# Patient Record
Sex: MALE | Race: BLACK OR AFRICAN AMERICAN | NOT HISPANIC OR LATINO | Employment: OTHER | URBAN - METROPOLITAN AREA
[De-identification: names, ages, dates, MRNs, and addresses within clinical notes are randomized per-mention and may not be internally consistent; named-entity substitution may affect disease eponyms.]

---

## 2017-02-08 ENCOUNTER — OFFICE VISIT (OUTPATIENT)
Dept: FAMILY MEDICINE | Facility: HOSPITAL | Age: 67
End: 2017-02-08
Payer: MEDICARE

## 2017-02-08 VITALS — HEART RATE: 89 BPM | SYSTOLIC BLOOD PRESSURE: 156 MMHG | RESPIRATION RATE: 20 BRPM | DIASTOLIC BLOOD PRESSURE: 105 MMHG

## 2017-02-08 DIAGNOSIS — I10 ESSENTIAL HYPERTENSION: ICD-10-CM

## 2017-02-08 DIAGNOSIS — M10.9 GOUT, ARTHRITIS: ICD-10-CM

## 2017-02-08 DIAGNOSIS — E78.5 HYPERLIPIDEMIA, UNSPECIFIED HYPERLIPIDEMIA TYPE: ICD-10-CM

## 2017-02-08 DIAGNOSIS — R93.3 ABNORMAL COLONOSCOPY: Primary | ICD-10-CM

## 2017-02-08 DIAGNOSIS — E11.9 DIABETES MELLITUS TYPE 2 IN NONOBESE: ICD-10-CM

## 2017-02-08 PROCEDURE — 99214 OFFICE O/P EST MOD 30 MIN: CPT | Performed by: FAMILY MEDICINE

## 2017-02-08 RX ORDER — AMLODIPINE BESYLATE 10 MG/1
10 TABLET ORAL DAILY
Qty: 30 TABLET | Refills: 11 | Status: SHIPPED | OUTPATIENT
Start: 2017-02-08 | End: 2017-05-03 | Stop reason: SDUPTHER

## 2017-02-08 RX ORDER — ATORVASTATIN CALCIUM 40 MG/1
80 TABLET, FILM COATED ORAL DAILY
Qty: 90 TABLET | Refills: 1 | Status: SHIPPED | OUTPATIENT
Start: 2017-02-08 | End: 2017-05-03 | Stop reason: SDUPTHER

## 2017-02-08 RX ORDER — LISINOPRIL 40 MG/1
40 TABLET ORAL DAILY
Qty: 90 TABLET | Refills: 1 | Status: SHIPPED | OUTPATIENT
Start: 2017-02-08 | End: 2017-10-30 | Stop reason: SDUPTHER

## 2017-02-08 RX ORDER — METFORMIN HYDROCHLORIDE 1000 MG/1
1000 TABLET ORAL 2 TIMES DAILY WITH MEALS
Qty: 90 TABLET | Refills: 1 | Status: SHIPPED | OUTPATIENT
Start: 2017-02-08 | End: 2017-05-03

## 2017-02-08 RX ORDER — ALLOPURINOL 100 MG/1
100 TABLET ORAL DAILY
Qty: 90 TABLET | Refills: 1 | Status: SHIPPED | OUTPATIENT
Start: 2017-02-08 | End: 2017-05-03

## 2017-02-08 NOTE — PROGRESS NOTES
Subjective:       Patient ID: Ambrosio Montoya is a 66 y.o. male.    Chief Complaint: Hypertension    HPI Comments: Mr. Montoya is a 66 year old male with PMH CVA in 2013, MI in 2000, HTN, Gout, chronic back pain who presents to clinic for BP follow-up and health maintenance. Patient reports he has had back pain for a couple weeks and describes it as a dull, middle back pain. Denies shooting pains, numbness, tingling or recent falls. Denies blood in his stool or recent weight loss. Previous abnormal colonoscopy recommended follow-up in three years (due in 2015). Patient states he has otherwise been feeling well. No recent/current gout flares.     Hypertension   Pertinent negatives include no chest pain, headaches, neck pain or shortness of breath.     Review of Systems   Constitutional: Negative for chills and fever.   HENT: Negative for congestion and rhinorrhea.    Eyes: Negative for pain and discharge.   Respiratory: Negative for cough and shortness of breath.    Cardiovascular: Negative for chest pain.   Gastrointestinal: Negative for abdominal pain, diarrhea, nausea and vomiting.   Genitourinary: Negative for difficulty urinating and dysuria.   Musculoskeletal: Positive for back pain. Negative for neck pain.   Skin: Negative for rash.   Neurological: Negative for light-headedness and headaches.   Hematological: Does not bruise/bleed easily.   Psychiatric/Behavioral: Negative for agitation and behavioral problems.       Objective:      Vitals:    02/08/17 1632   BP: (!) 156/105   Pulse: 89   Resp: 20     Physical Exam   Constitutional: He is oriented to person, place, and time. He appears well-developed and well-nourished. No distress.   In wheelchair   HENT:   Head: Normocephalic and atraumatic.   Right Ear: External ear normal.   Left Ear: External ear normal.   Mouth/Throat: No oropharyngeal exudate.   Eyes: EOM are normal. Right eye exhibits no discharge. Left eye exhibits no discharge.   Neck: Normal range of  motion. Neck supple. No tracheal deviation present.   Cardiovascular: Normal rate, regular rhythm and normal heart sounds.  Exam reveals no gallop and no friction rub.    No murmur heard.  Pulmonary/Chest: Effort normal and breath sounds normal. No respiratory distress.   Abdominal: Soft. He exhibits no distension. There is no tenderness.   Musculoskeletal: He exhibits no edema or deformity.   Neurological: He is alert and oriented to person, place, and time.   Slight right sided weakness (residual).   Skin: Skin is warm and dry. He is not diaphoretic.   Psychiatric: He has a normal mood and affect. His behavior is normal.   Nursing note and vitals reviewed.      Assessment:       1. Abnormal colonoscopy    2. Gout, arthritis    3. Hyperlipidemia, unspecified hyperlipidemia type    4. Essential hypertension    5. Diabetes mellitus type 2 in nonobese        Plan:       Abnormal colonoscopy  -     Ambulatory consult to Gastroenterology    Gout, arthritis  -     allopurinol (ZYLOPRIM) 100 MG tablet; Take 1 tablet (100 mg total) by mouth once daily.  Dispense: 90 tablet; Refill: 1    Hyperlipidemia, unspecified hyperlipidemia type  -     atorvastatin (LIPITOR) 40 MG tablet; Take 2 tablets (80 mg total) by mouth once daily.  Dispense: 90 tablet; Refill: 1    Essential hypertension  -     lisinopril (PRINIVIL,ZESTRIL) 40 MG tablet; Take 1 tablet (40 mg total) by mouth once daily.  Dispense: 90 tablet; Refill: 1    Diabetes mellitus type 2 in nonobese  -     metformin (GLUCOPHAGE) 1000 MG tablet; Take 1 tablet (1,000 mg total) by mouth 2 (two) times daily with meals.  Dispense: 90 tablet; Refill: 1  -     Hemoglobin A1c; Future; Expected date: 2/8/17    Other orders  -     amlodipine (NORVASC) 10 MG tablet; Take 1 tablet (10 mg total) by mouth once daily.  Dispense: 30 tablet; Refill: 11    Ordered A1C but lab closed, patient will follow-up in one month. Given refills on BP medications and discussed checking his BP at  [facility]. Will recheck in one month and follow-up on colonoscopy.     Return in about 1 month (around 3/8/2017).

## 2017-02-09 NOTE — PROGRESS NOTES
I assume primary medical responsibility for this patient, I have reviewed the case history, findings, diagnosis and treatment plan with the resident and agree that the care is reasonable and necessary. This service has been performed by a resident without the presence of a teaching physician under the primary care exception  Theresa Menezes  2/9/2017

## 2017-03-22 ENCOUNTER — OFFICE VISIT (OUTPATIENT)
Dept: NEUROLOGY | Facility: HOSPITAL | Age: 67
End: 2017-03-22
Attending: INTERNAL MEDICINE
Payer: MEDICARE

## 2017-03-22 VITALS
HEIGHT: 71 IN | RESPIRATION RATE: 18 BRPM | WEIGHT: 211.63 LBS | TEMPERATURE: 99 F | BODY MASS INDEX: 29.63 KG/M2 | DIASTOLIC BLOOD PRESSURE: 90 MMHG | HEART RATE: 70 BPM | SYSTOLIC BLOOD PRESSURE: 158 MMHG

## 2017-03-22 DIAGNOSIS — Z86.010 HISTORY OF COLON POLYPS: Primary | ICD-10-CM

## 2017-03-22 PROCEDURE — 99215 OFFICE O/P EST HI 40 MIN: CPT | Performed by: INTERNAL MEDICINE

## 2017-03-22 RX ORDER — POLYETHYLENE GLYCOL 3350, SODIUM SULFATE ANHYDROUS, SODIUM BICARBONATE, SODIUM CHLORIDE, POTASSIUM CHLORIDE 236; 22.74; 6.74; 5.86; 2.97 G/4L; G/4L; G/4L; G/4L; G/4L
4 POWDER, FOR SOLUTION ORAL ONCE
Qty: 4000 ML | Refills: 0 | Status: SHIPPED | OUTPATIENT
Start: 2017-03-22 | End: 2017-03-22

## 2017-03-22 NOTE — PATIENT INSTRUCTIONS
Nulytely Colonoscopy Prep Instructions    [facility]   [address] [place] [address]  [phone]    PROCEDURE DAY/TIME: 4/20/2017    CLEAR LIQUID DIET (START THE DAY BEFORE PROCEDURE):  Clear Liquid Diet means any liquid from the list below that is not red or purple in color:   Gatorade, Tad-Aid, Lemonade (Yellow ONLY)-Gatorade is the preferred liquid   Tea (no milk or dairy)   Carbonated beverages (soft drink), regular or diet   Apple juice, white grape juice, white cranberry juice   Jell-O (orange, lemon, or lime flavors ONLY)   Clear, fat-free, beef or chicken broth   Bouillon, clear consommé   Snowball, Popsicles (NOT red or purple)  * No Solid Food or Alcohol   ITEMS TO BE PURCHASED FOR PREP (Nu-Lytely requires a prescription):         Nu-Lytely preparation solution.          Gas tablets (Gas-X, Mylanta Gas, Simethicone)  BOWEL PREP INSTRUCTIONS THE DAY BEFORE THE EXAM:  1. Drink only clear liquids (see the above diet) all day. Gatorade is the best liquid.   Drink an extra 8 ounces of clear liquid every hour from 11am to 5pm.         2.   At 6pm, mix Nu-Lytely powder according to the directions on the container and               drink 8 ounces of solution every 10 minutes until about half of the solution is                consumed. Place the remainder of the solution in the refrigerator.         3.   At 9pm, take two gas tablets with 8 ounces of clear liquid.        4.   At 10pm, take two gas tablets with 8 ounces of clear liquid.  THE DAY OF THE EXAM:        1.  Take your morning medications, if any, with a small sip of water.         2.  Beginning 5 hours before your procedure time, drink the remaining half of              Nu-Lytely solution. Drink 8 ounces of solution every 10 minutes until the solution              is gone.              *If your procedure is scheduled for the early morning, you will need to get up in               the middle of the night to take  this dose of preparation. The correct timing of this               dose is essential to an effective preparation. If you do not take this dose, your               exam may be incomplete and need to be repeated.         3.  Have nothing to eat or drink for 3 hours before the procedure.         3.  Bring someone to drive you home (you should not drive for 12 hrs after the exam)        4.  Report to Admitting, 1st floor hospital entrance 2 hours before procedure time.     If you are diabetic, do not take insulin or oral medications the morning of the procedure. Take only a half dose of insulin the day before your procedure. Do not take your diabetic pills the day before your procedure

## 2017-03-22 NOTE — PROGRESS NOTES
"[facility]    CC: colon polyps     HPI 66 y.o. male with a previous history of HTN, DM, CAD, stroke presents for follow up of a previous history of adenomatous colon polyps including one medium sized adenoma removed from the right colon ~5 years ago by me. He denies associated family history of colon cancer. He denies abdominal pain or vomiting. He has no other complaints.       Past Medical History:   Diagnosis Date    Coronary artery disease 2001    ACS    CVA (cerebral infarction)     Diabetes mellitus type II 2001    Hyperlipidemia 2001    Hypertension     Myocardial infarction 2002    Stroke 3/2012    [facility]         Review of Systems  General ROS: negative for chills, fever or weight loss  Cardiovascular ROS: no chest pain or dyspnea on exertion  Gastrointestinal ROS: no abdominal pain, change in bowel habits, or black/ bloody stools    Physical Examination  BP (!) 158/90  Pulse 70  Temp 98.8 °F (37.1 °C) (Oral)   Resp 18  Ht 5' 11" (1.803 m)  Wt 96 kg (211 lb 9.6 oz)  BMI 29.51 kg/m2  General appearance: alert, cooperative, no distress; sits in a wheelchair but can ambulate   HENT: Normocephalic, atraumatic, neck symmetrical, no nasal discharge   Lungs: clear to auscultation bilaterally, no dullness to percussion bilaterally  Heart: regular rate and rhythm without rub; no displacement of the PMI   Abdomen: soft, non-tender; bowel sounds normoactive; no organomegaly  Extremities: extremities symmetric; no clubbing, cyanosis, or edema      Assessment:   History of 13mm colon polyp removed by me in 2012     Plan:  Repeat colonoscopy. If normal, this patient may never need another colonoscopy       Armando Hidlago MD   [address] [place] [address], [address]   [place] [address] [phone]    "

## 2017-03-22 NOTE — MR AVS SNAPSHOT
[facility]  [address] [place] [address]  Phone: [phone]  Fax: [phone]                  Ambrosio Montoya   3/22/2017 9:45 AM   Office Visit    Description:  Male : 1950   Provider:  Armando Hidalgo MD   Department:  [facility]           Reason for Visit     Colonoscopy           Diagnoses this Visit        Comments    History of colon polyps    -  Primary            To Do List           Goals (5 Years of Data)     None       These Medications        Disp Refills Start End    polyethylene glycol (GOLYTELY,NULYTELY) 236-22.74-6.74 -5.86 gram suspension 4000 mL 0 3/22/2017 3/22/2017    Take 4,000 mLs (4 L total) by mouth once. - Oral    Pharmacy: [id number] - [place], [place] - [address] [facility] Ph #: [phone]         [facility]     [facility] Care [facility] -  Assistance  Registered nurses in the [facility] provide clinical advisement, health education, appointment booking, and other advisory services.  Call for this free service at [phone].             Medications           Message regarding Medications     Verify the changes and/or additions to your medication regime listed below are the same as discussed with your clinician today.  If any of these changes or additions are incorrect, please notify your healthcare provider.        START taking these NEW medications        Refills    polyethylene glycol (GOLYTELY,NULYTELY) 236-22.74-6.74 -5.86 gram suspension 0    Sig: Take 4,000 mLs (4 L total) by mouth once.    Class: Normal    Route: Oral           Verify that the below list of medications is an accurate representation of the medications you are currently taking.  If none reported, the list may be blank. If incorrect, please contact your healthcare provider. Carry this list with you in case of emergency.           Current Medications     allopurinol (ZYLOPRIM) 100 MG tablet Take 1 tablet (100 mg  "total) by mouth once daily.    amlodipine (NORVASC) 10 MG tablet Take 1 tablet (10 mg total) by mouth once daily.    aspirin (ECOTRIN) 325 MG EC tablet Take 1 tablet (325 mg total) by mouth once daily.    atorvastatin (LIPITOR) 40 MG tablet Take 2 tablets (80 mg total) by mouth once daily.    lisinopril (PRINIVIL,ZESTRIL) 40 MG tablet Take 1 tablet (40 mg total) by mouth once daily.    metformin (GLUCOPHAGE) 1000 MG tablet Take 1 tablet (1,000 mg total) by mouth 2 (two) times daily with meals.    triamcinolone acetonide 0.1% (KENALOG) 0.1 % ointment Apply topically 3 (three) times daily. For eczema    buPROPion (WELLBUTRIN SR) 150 MG TBSR 12 hr tablet Take 1 tablet (150 mg total) by mouth 2 (two) times daily.    furosemide (LASIX) 20 MG tablet Take 1 tablet (20 mg total) by mouth daily as needed.    indomethacin (INDOCIN) 25 MG capsule Take 1 capsule (25 mg total) by mouth Daily.    naproxen (NAPROSYN) 500 MG tablet Take 1 tablet (500 mg total) by mouth 2 (two) times daily as needed (take as needed for pain).    oxycodone-acetaminophen (PERCOCET) 5-325 mg per tablet Take 1 tablet by mouth every 8 (eight) hours as needed for Pain (as needed for severe 10/10 pain).    polyethylene glycol (GLYCOLAX) 17 gram PwPk Take 17 g by mouth once daily.    polyethylene glycol (GOLYTELY,NULYTELY) 236-22.74-6.74 -5.86 gram suspension Take 4,000 mLs (4 L total) by mouth once.           Clinical Reference Information           Your Vitals Were     BP Pulse Temp Resp Height Weight    158/90 70 98.8 °F (37.1 °C) (Oral) 18 5' 11" (1.803 m) 96 kg (211 lb 9.6 oz)    BMI                29.51 kg/m2          Blood Pressure          Most Recent Value    BP  (!)  158/90      Allergies as of 3/22/2017     Pcn [Penicillins]    Plavix [Clopidogrel]      Immunizations Administered on Date of Encounter - 3/22/2017     None      Orders Placed During Today's Visit      Normal Orders This Visit    Case request GI: COLONOSCOPY       Instructions  "   Nulytely Colonoscopy Prep Instructions    [facility]   [address] [place] [address]  [phone]    PROCEDURE DAY/TIME: 4/20/2017    CLEAR LIQUID DIET (START THE DAY BEFORE PROCEDURE):  Clear Liquid Diet means any liquid from the list below that is not red or purple in color:   Gatorade, Tad-Aid, Lemonade (Yellow ONLY)-Gatorade is the preferred liquid   Tea (no milk or dairy)   Carbonated beverages (soft drink), regular or diet   Apple juice, white grape juice, white cranberry juice   Jell-O (orange, lemon, or lime flavors ONLY)   Clear, fat-free, beef or chicken broth   Bouillon, clear consommé   Snowball, Popsicles (NOT red or purple)  * No Solid Food or Alcohol   ITEMS TO BE PURCHASED FOR PREP (Nu-Lytely requires a prescription):         Nu-Lytely preparation solution.          Gas tablets (Gas-X, Mylanta Gas, Simethicone)  BOWEL PREP INSTRUCTIONS THE DAY BEFORE THE EXAM:  1. Drink only clear liquids (see the above diet) all day. Gatorade is the best liquid.   Drink an extra 8 ounces of clear liquid every hour from 11am to 5pm.         2.   At 6pm, mix Nu-Lytely powder according to the directions on the container and               drink 8 ounces of solution every 10 minutes until about half of the solution is                consumed. Place the remainder of the solution in the refrigerator.         3.   At 9pm, take two gas tablets with 8 ounces of clear liquid.        4.   At 10pm, take two gas tablets with 8 ounces of clear liquid.  THE DAY OF THE EXAM:        1.  Take your morning medications, if any, with a small sip of water.         2.  Beginning 5 hours before your procedure time, drink the remaining half of              Nu-Lytely solution. Drink 8 ounces of solution every 10 minutes until the solution              is gone.              *If your procedure is scheduled for the early morning, you will need to get up in               the middle of the night to take  this dose of preparation. The correct timing of this               dose is essential to an effective preparation. If you do not take this dose, your               exam may be incomplete and need to be repeated.         3.  Have nothing to eat or drink for 3 hours before the procedure.         3.  Bring someone to drive you home (you should not drive for 12 hrs after the exam)        4.  Report to Admitting, 1st floor hospital entrance 2 hours before procedure time.     If you are diabetic, do not take insulin or oral medications the morning of the procedure. Take only a half dose of insulin the day before your procedure. Do not take your diabetic pills the day before your procedure          Smoking Cessation     If you would like to quit smoking:   You may be eligible for free services if you are a [place] resident and started smoking cigarettes before September 1, 1988.  Call the [employer]oking [employer]essation [employer] (S[employer]) toll free at (380) 971-6746 or (371) 034-2394.   Call 7-549-QUIT-NOW if you do not meet the above criteria.            Language Assistance Services     ATTENTION: Language assistance services are available, free of charge. Please call [phone].      ATENCIÓN: Si habla español, tiene a mann disposición servicios gratuitos de asistencia lingüística. Llame al [phone].     [facility] Ý: N?u b?n nói Ti?ng Vi?t, có các d?ch v? h? tr? ngôn ng? mi?n phí dành cho b?n. G?i s? [phone].         [facility] complies with applicable Federal civil rights laws and does not discriminate on the basis of race, color, national origin, age, disability, or sex.

## 2017-03-22 NOTE — LETTER
March 22, 2017      [facility] [address] [place] [address]  Phone: [phone]  Fax: [phone]       Patient: Ambrosio Montoya   YOB: 1950  Date of Visit: 03/22/2017    To Whom It May Concern:    Janene Garcia was at [facility] on 03/22/2017 caring for her family member. She may return to work/school on 3/23/2017. If you have any questions or concerns, or if I can be of further assistance, please do not hesitate to contact me.    Sincerely,        Kalpana Colin LPN

## 2017-03-23 ENCOUNTER — TELEPHONE (OUTPATIENT)
Dept: NEUROLOGY | Facility: HOSPITAL | Age: 67
End: 2017-03-23

## 2017-03-23 NOTE — TELEPHONE ENCOUNTER
----- Message from Kalpana Colin LPN sent at 3/22/2017 10:25 AM CDT -----  Hi iDann,   Can we please get auth for this pt for 4/20?    DX: Z86.010  CPT:     Thanks!  Kalpana

## 2017-04-20 ENCOUNTER — ANESTHESIA EVENT (OUTPATIENT)
Dept: ENDOSCOPY | Facility: HOSPITAL | Age: 67
End: 2017-04-20
Payer: MEDICARE

## 2017-04-20 ENCOUNTER — ANESTHESIA (OUTPATIENT)
Dept: ENDOSCOPY | Facility: HOSPITAL | Age: 67
End: 2017-04-20
Payer: MEDICARE

## 2017-04-20 ENCOUNTER — SURGERY (OUTPATIENT)
Age: 67
End: 2017-04-20

## 2017-04-20 ENCOUNTER — HOSPITAL ENCOUNTER (OUTPATIENT)
Facility: HOSPITAL | Age: 67
Discharge: HOME | End: 2017-04-20
Attending: INTERNAL MEDICINE | Admitting: INTERNAL MEDICINE
Payer: MEDICARE

## 2017-04-20 VITALS
RESPIRATION RATE: 20 BRPM | HEIGHT: 71 IN | BODY MASS INDEX: 28.98 KG/M2 | SYSTOLIC BLOOD PRESSURE: 106 MMHG | HEART RATE: 64 BPM | DIASTOLIC BLOOD PRESSURE: 66 MMHG | TEMPERATURE: 98 F | OXYGEN SATURATION: 91 % | WEIGHT: 207 LBS

## 2017-04-20 DIAGNOSIS — Z86.010 HISTORY OF COLON POLYPS: Primary | ICD-10-CM

## 2017-04-20 DIAGNOSIS — K63.5 POLYP OF COLON, UNSPECIFIED PART OF COLON, UNSPECIFIED TYPE: ICD-10-CM

## 2017-04-20 PROBLEM — Z86.0100 HISTORY OF COLON POLYPS: Status: ACTIVE | Noted: 2017-04-20

## 2017-04-20 LAB
GLUCOSE SERPL-MCNC: 89 MG/DL (ref 70–110)
POCT GLUCOSE: 89 MG/DL (ref 70–110)

## 2017-04-20 PROCEDURE — 25000003 PHARM REV CODE 250: Performed by: INTERNAL MEDICINE

## 2017-04-20 PROCEDURE — 37000009 HC ANESTHESIA EA ADD 15 MINS: Performed by: INTERNAL MEDICINE

## 2017-04-20 PROCEDURE — 37000008 HC ANESTHESIA 1ST 15 MINUTES: Performed by: INTERNAL MEDICINE

## 2017-04-20 PROCEDURE — 27201089 HC SNARE, DISP (ANY): Performed by: INTERNAL MEDICINE

## 2017-04-20 PROCEDURE — 88305 TISSUE EXAM BY PATHOLOGIST: CPT | Performed by: PATHOLOGY

## 2017-04-20 PROCEDURE — 88305 TISSUE EXAM BY PATHOLOGIST: CPT | Mod: 26,,, | Performed by: PATHOLOGY

## 2017-04-20 PROCEDURE — 25000003 PHARM REV CODE 250: Performed by: NURSE ANESTHETIST, CERTIFIED REGISTERED

## 2017-04-20 PROCEDURE — 45385 COLONOSCOPY W/LESION REMOVAL: CPT | Performed by: INTERNAL MEDICINE

## 2017-04-20 PROCEDURE — 63600175 PHARM REV CODE 636 W HCPCS: Performed by: NURSE ANESTHETIST, CERTIFIED REGISTERED

## 2017-04-20 RX ORDER — LIDOCAINE HCL/PF 100 MG/5ML
SYRINGE (ML) INTRAVENOUS
Status: DISCONTINUED | OUTPATIENT
Start: 2017-04-20 | End: 2017-04-20

## 2017-04-20 RX ORDER — ASPIRIN 325 MG
325 TABLET ORAL DAILY
COMMUNITY
End: 2017-10-30 | Stop reason: SDUPTHER

## 2017-04-20 RX ORDER — PHENYLEPHRINE HYDROCHLORIDE 10 MG/ML
INJECTION INTRAVENOUS
Status: DISCONTINUED | OUTPATIENT
Start: 2017-04-20 | End: 2017-04-20

## 2017-04-20 RX ORDER — SODIUM CHLORIDE 9 MG/ML
INJECTION, SOLUTION INTRAVENOUS CONTINUOUS
Status: DISCONTINUED | OUTPATIENT
Start: 2017-04-20 | End: 2017-04-20 | Stop reason: HOSPADM

## 2017-04-20 RX ORDER — PROPOFOL 10 MG/ML
VIAL (ML) INTRAVENOUS CONTINUOUS PRN
Status: DISCONTINUED | OUTPATIENT
Start: 2017-04-20 | End: 2017-04-20

## 2017-04-20 RX ORDER — PROPOFOL 10 MG/ML
VIAL (ML) INTRAVENOUS
Status: DISCONTINUED | OUTPATIENT
Start: 2017-04-20 | End: 2017-04-20

## 2017-04-20 RX ADMIN — PROPOFOL 50 MG: 10 INJECTION, EMULSION INTRAVENOUS at 08:04

## 2017-04-20 RX ADMIN — PHENYLEPHRINE HYDROCHLORIDE 200 MCG: 10 INJECTION INTRAVENOUS at 08:04

## 2017-04-20 RX ADMIN — SODIUM CHLORIDE: 0.9 INJECTION, SOLUTION INTRAVENOUS at 07:04

## 2017-04-20 RX ADMIN — EPHEDRINE SULFATE 10 MG: 50 INJECTION, SOLUTION INTRAMUSCULAR; INTRAVENOUS; SUBCUTANEOUS at 08:04

## 2017-04-20 RX ADMIN — PHENYLEPHRINE HYDROCHLORIDE 100 MCG: 10 INJECTION INTRAVENOUS at 08:04

## 2017-04-20 RX ADMIN — PROPOFOL 150 MCG/KG/MIN: 10 INJECTION, EMULSION INTRAVENOUS at 08:04

## 2017-04-20 RX ADMIN — LIDOCAINE HYDROCHLORIDE 100 MG: 20 INJECTION, SOLUTION INTRAVENOUS at 08:04

## 2017-04-20 RX ADMIN — EPHEDRINE SULFATE 15 MG: 50 INJECTION, SOLUTION INTRAMUSCULAR; INTRAVENOUS; SUBCUTANEOUS at 08:04

## 2017-04-20 RX ADMIN — PROPOFOL 30 MG: 10 INJECTION, EMULSION INTRAVENOUS at 08:04

## 2017-04-20 NOTE — TRANSFER OF CARE
"Anesthesia Transfer of Care Note    Patient: Ambrosio Montoya    Procedure(s) Performed: Procedure(s) (LRB):  COLONOSCOPY (N/A)    Patient location: GI    Anesthesia Type: MAC    Transport from OR: Transported from OR on room air with adequate spontaneous ventilation    Post pain: adequate analgesia    Post assessment: no apparent anesthetic complications and tolerated procedure well    Post vital signs: stable    Level of consciousness: awake, alert and oriented    Nausea/Vomiting: no nausea/vomiting    Complications: none          Last vitals:   Visit Vitals    /84 (BP Location: Right arm, Patient Position: Lying, BP Method: Automatic)    Pulse (!) 59    Temp 36.6 °C (97.8 °F) (Oral)    Resp 20    Ht 5' 11" (1.803 m)    Wt 93.9 kg (207 lb)    SpO2 99%    BMI 28.87 kg/m2     "

## 2017-04-20 NOTE — ANESTHESIA POSTPROCEDURE EVALUATION
"Anesthesia Post Evaluation    Patient: Ambrosio Montoya    Procedure(s) Performed: Procedure(s) (LRB):  COLONOSCOPY (N/A)    Final Anesthesia Type: MAC  Patient location during evaluation: GI PACU  Patient participation: Yes- Able to Participate  Level of consciousness: awake and alert and oriented  Post-procedure vital signs: reviewed and stable  Pain management: adequate  Airway patency: patent  PONV status at discharge: No PONV  Anesthetic complications: no      Cardiovascular status: blood pressure returned to baseline and hemodynamically stable  Respiratory status: unassisted  Hydration status: euvolemic  Follow-up not needed.        Visit Vitals    /84 (BP Location: Right arm, Patient Position: Lying, BP Method: Automatic)    Pulse (!) 59    Temp 36.6 °C (97.8 °F) (Oral)    Resp 20    Ht 5' 11" (1.803 m)    Wt 93.9 kg (207 lb)    SpO2 99%    BMI 28.87 kg/m2       Pain/Raegan Score: Pain Assessment Performed: Yes (4/20/2017  7:18 AM)  Presence of Pain: denies (4/20/2017  7:18 AM)      "

## 2017-04-20 NOTE — ANESTHESIA PREPROCEDURE EVALUATION
04/20/2017  Ambrosio Montoya is a 66 y.o., male for colonoscopy    Review of patient's allergies indicates:   Allergen Reactions    Pcn [penicillins] Nausea And Vomiting    Plavix [clopidogrel] Itching and Rash     Past Medical History:   Diagnosis Date    Coronary artery disease 2001    ACS    CVA (cerebral infarction)     Diabetes mellitus type II 2001    Hyperlipidemia 2001    Hypertension     Myocardial infarction 2002    Stroke 3/2012    [facility]     No past surgical history on file.    Anesthesia Evaluation         Review of Systems    Wt Readings from Last 3 Encounters:   03/22/17 96 kg (211 lb 9.6 oz)   06/06/16 92.7 kg (204 lb 5.9 oz)   01/11/16 77.1 kg (170 lb)     Temp Readings from Last 3 Encounters:   03/22/17 37.1 °C (98.8 °F) (Oral)   06/06/16 36.1 °C (97 °F) (Oral)   01/11/16 37 °C (98.6 °F) (Oral)     BP Readings from Last 3 Encounters:   03/22/17 (!) 158/90   02/08/17 (!) 156/105   06/06/16 (!) 151/97     Pulse Readings from Last 3 Encounters:   03/22/17 70   02/08/17 89   06/06/16 82     Lab Results   Component Value Date    WBC 5.90 11/03/2015    HGB 14.2 11/03/2015    HCT 42.5 11/03/2015    MCV 81 (L) 11/03/2015     11/03/2015         Physical Exam  General:  Well nourished    Airway/Jaw/Neck:  Airway Findings: Mouth Opening: Normal Tongue: Normal  General Airway Assessment: Adult  Mallampati: II  Improves to II with phonation.  TM Distance: Normal, at least 6 cm            Mental Status:  Mental Status Findings:  Cooperative, Alert and Oriented         Anesthesia Plan  Type of Anesthesia, risks & benefits discussed:  Anesthesia Type:  MAC  Patient's Preference:   Intra-op Monitoring Plan:   Intra-op Monitoring Plan Comments:   Post Op Pain Control Plan:   Post Op Pain Control Plan Comments:   Induction:   IV  Beta Blocker:         Informed Consent: Patient  understands risks and agrees with Anesthesia plan.  Questions answered. Anesthesia consent signed with patient.  ASA Score: 3     Day of Surgery Review of History & Physical: I have interviewed and examined the patient. I have reviewed the patient's H&P dated:  There are no significant changes.  H&P update referred to the provider.  H&P completed by Anesthesiologist.       Ready For Surgery From Anesthesia Perspective.

## 2017-04-20 NOTE — H&P
[facility]    CC: history of colon polyps    HPI 66 y.o. male with a previous history of HTN, DM, CAD, stroke presents for follow up of a previous history of adenomatous colon polyps including one medium sized adenoma removed from the right colon ~5 years ago by me. He denies associated family history of colon cancer. He denies abdominal pain or vomiting. He has no other complaints.          Past Medical History:   Diagnosis Date    Coronary artery disease 2001    ACS    CVA (cerebral infarction)     Diabetes mellitus type II 2001    Hyperlipidemia 2001    Hypertension     Myocardial infarction 2002    [facility]2012    [facility]  [place]       Physical Examination  There were no vitals taken for this visit.  General appearance: alert, cooperative, no distress  HENT: Normocephalic, atraumatic, neck symmetrical, no nasal discharge   Lungs: clear to auscultation bilaterally, no dullness to percussion bilaterally  Heart: regular rate and rhythm without rub; no displacement of the PMI   Abdomen: soft, non-tender; bowel sounds normoactive; no organomegaly  Extremities: extremities symmetric; no clubbing, cyanosis, or edema  Neurologic: Alert and oriented X 3, normal strength, normal coordination and gait    Labs:    H/H 14/42    Assessment:   67 yo AAM with history of removal of a 13mm colon polyp by me in 2012    Plan:  Repeat colonoscopy today. If normal, this patient may never need another colonoscopy     Armando Hidalgo MD   [address] [place] [address], [address]   [place] [address] [phone]

## 2017-04-20 NOTE — IP AVS SNAPSHOT
[facility]  [address]  [place] [address]  Phone: [phone]           Patient Discharge Instructions   Our goal is to set you up for success. This packet includes information on your condition, medications, and your home care.  It will help you care for yourself to prevent having to return to the hospital.     Please ask your nurse if you have any questions.      There are many details to remember when preparing to leave the hospital. Here is what you will need to do:    1. Take your medicine. If you are prescribed medications, review your Medication List on the following pages. You may have new medications to  at the pharmacy and others that you'll need to stop taking. Review the instructions for how and when to take your medications. Talk with your doctor or nurses if you are unsure of what to do.     2. Go to your follow-up appointments. Specific follow-up information is listed in the following pages. Your may be contacted by a nurse or clinical provider about future appointments. Be sure we have all of the phone numbers to reach you. Please contact your provider's office if you are unable to make an appointment.     3. Watch for warning signs. Your doctor or nurse will give you detailed warning signs to watch for and when to call for assistance. These instructions may also include educational information about your condition. If you experience any of warning signs to your health, call your doctor.               ** Verify the list of medication(s) below is accurate and up to date. Carry this with you in case of emergency. If your medications have changed, please notify your healthcare provider.             Medication List      CONTINUE taking these medications        Additional Info                      allopurinol 100 MG tablet   Commonly known as:  ZYLOPRIM   Quantity:  90 tablet   Refills:  1   Dose:  100 mg    Instructions:  Take 1 tablet (100 mg total) by mouth once daily.      Begin Date    AM    Noon    PM    Bedtime       amlodipine 10 MG tablet   Commonly known as:  NORVASC   Quantity:  30 tablet   Refills:  11   Dose:  10 mg    Instructions:  Take 1 tablet (10 mg total) by mouth once daily.     Begin Date    AM    Noon    PM    Bedtime       * aspirin 325 MG tablet   Refills:  0   Dose:  325 mg    Instructions:  Take 325 mg by mouth once daily.     Begin Date    AM    Noon    PM    Bedtime       * aspirin 325 MG EC tablet   Commonly known as:  ECOTRIN   Quantity:  90 tablet   Refills:  4   Dose:  325 mg    Instructions:  Take 1 tablet (325 mg total) by mouth once daily.     Begin Date    AM    Noon    PM    Bedtime       atorvastatin 40 MG tablet   Commonly known as:  LIPITOR   Quantity:  90 tablet   Refills:  1   Dose:  80 mg    Instructions:  Take 2 tablets (80 mg total) by mouth once daily.     Begin Date    AM    Noon    PM    Bedtime       buPROPion 150 MG TBSR 12 hr tablet   Commonly known as:  WELLBUTRIN SR   Quantity:  60 tablet   Refills:  11   Dose:  150 mg    Instructions:  Take 1 tablet (150 mg total) by mouth 2 (two) times daily.     Begin Date    AM    Noon    PM    Bedtime       furosemide 20 MG tablet   Commonly known as:  LASIX   Quantity:  10 tablet   Refills:  0   Dose:  20 mg    Instructions:  Take 1 tablet (20 mg total) by mouth daily as needed.     Begin Date    AM    Noon    PM    Bedtime       indomethacin 25 MG capsule   Commonly known as:  INDOCIN   Quantity:  60 capsule   Refills:  1   Dose:  25 mg    Instructions:  Take 1 capsule (25 mg total) by mouth Daily.     Begin Date    AM    Noon    PM    Bedtime       lisinopril 40 MG tablet   Commonly known as:  PRINIVIL,ZESTRIL   Quantity:  90 tablet   Refills:  1   Dose:  40 mg    Instructions:  Take 1 tablet (40 mg total) by mouth once daily.     Begin Date    AM    Noon    PM    Bedtime       metformin 1000 MG tablet   Commonly known as:  GLUCOPHAGE   Quantity:  90 tablet   Refills:  1   Dose:  1000 mg     Instructions:  Take 1 tablet (1,000 mg total) by mouth 2 (two) times daily with meals.     Begin Date    AM    Noon    PM    Bedtime       naproxen 500 MG tablet   Commonly known as:  NAPROSYN   Quantity:  60 tablet   Refills:  0   Dose:  500 mg    Instructions:  Take 1 tablet (500 mg total) by mouth 2 (two) times daily as needed (take as needed for pain).     Begin Date    AM    Noon    PM    Bedtime       oxycodone-acetaminophen 5-325 mg per tablet   Commonly known as:  PERCOCET   Quantity:  10 tablet   Refills:  0   Dose:  1 tablet    Instructions:  Take 1 tablet by mouth every 8 (eight) hours as needed for Pain (as needed for severe 10/10 pain).     Begin Date    AM    Noon    PM    Bedtime       polyethylene glycol 17 gram Pwpk   Commonly known as:  GLYCOLAX   Quantity:  14 each   Refills:  0   Dose:  17 g    Instructions:  Take 17 g by mouth once daily.     Begin Date    AM    Noon    PM    Bedtime       triamcinolone acetonide 0.1% 0.1 % ointment   Commonly known as:  KENALOG   Quantity:  30 g   Refills:  11    Instructions:  Apply topically 3 (three) times daily. For eczema     Begin Date    AM    Noon    PM    Bedtime       * Notice:  This list has 2 medication(s) that are the same as other medications prescribed for you. Read the directions carefully, and ask your doctor or other care provider to review them with you.               Please bring to all follow up appointments:    1. A copy of your discharge instructions.  2. All medicines you are currently taking in their original bottles.  3. Identification and insurance card.    Please arrive 15 minutes ahead of scheduled appointment time.    Please call 24 hours in advance if you must reschedule your appointment and/or time.          Discharge Instructions     Future Orders    Diet general     Questions:    Total calories:      Fat restriction, if any:      Protein restriction, if any:      Na restriction, if any:      Fluid restriction:      Additional  restrictions:          Discharge Instructions       Discharge Summary/Instructions for after Colonoscopy with Biopsy/Polypectomy    Ambrosio Montoya  4/20/2017  Armando Hidalgo MD    Restrictions on Activity:    - Do not drive car or operate machinery until the day after the procedure.  - The following day: return to full activity including work.  - For 3 days: No heavy lifting, straining or running.  - Diet: Eat and drink normally unless instructed otherwise.    Treatment for Common Side Effects:  - Mild abdominal pain and bloating or excessive gas: rest, eat lightly and use a heating pad.     Symptoms to watch for and report to your physician:  1. Severe abdominal pain.  2. Fever within 24 hours after a procedure.  3. A large amount of rectal bleeding. (A small amount of blood from the rectum is not serious, especially if hemorrhoids are present.)  4. Because air was put into your colon during the procedure, expelling large amount of air from your rectum is normal.  5. You may not have a bowel movement for 1-3 days because of the colonoscopy prep. This is normal.  6. Go directly to the emergency room if you notice any of the following:     Chills and/or fever over 101   Persistent vomiting   Severe abdominal pain, other than gas cramps   Severe chest pain   Black, tarry stools   Any bleeding - exceeding one tablespoon    If you have any questions or problems, please call your Physician:    Armando Hidalgo MD      Lab Results: Contact Physician's Office      If a complication or emergency situation arises and you are unable to reach your Physician - GO TO THE EMERGENCY ROOM.          Primary Diagnosis     Your primary diagnosis was:  History Of Colonic Polyps      Admission Information     Date & Time Provider Department [facility]    4/20/2017  6:17 AM Armando Hidalgo MD [facility] [id number]      Care Providers     Provider Role Specialty Primary office phone    Armando Hidalgo MD Attending  "Provider Gastroenterology [phone]    Armando Hidalgo MD Surgeon  Gastroenterology [phone]      Your Vitals Were     BP Pulse Temp Resp Height Weight    91/51 68 98 °F (36.7 °C) 21 5' 11" (1.803 m) 93.9 kg (207 lb)    SpO2 BMI             94% 28.87 kg/m2         Recent Lab Values        3/27/2012 10/1/2013 1/6/2014 6/30/2014 1/14/2015 7/1/2015 1/5/2016         3:45 AM  9:40 AM 11:35 AM  8:42 AM 11:31 AM  2:37 PM  2:30 PM     A1C 6.5 (H) 6.7 (H) 7.0 (H) 6.7 (H) 6.8 (H) 6.9 (H) 6.6 (H)           Pending Labs     Order Current Status    Specimen to Pathology - Surgery Collected (04/20/17 0837)      Allergies as of 4/20/2017        Reactions    Pcn [Penicillins] Nausea And Vomiting    Plavix [Clopidogrel] Itching, Rash      [facility]     [facility] Nurse Care Line - 24/7 Assistance  Unless otherwise directed by your provider, please contact [facility], our nurse care line that is available for 24/7 assistance.     Registered nurses in the [facility] provide clinical advisement, health education, appointment booking, and other advisory services.  Call for this free service at [phone].        Advance Directives     An advance directive is a document which, in the event you are no longer able to make decisions for yourself, tells your healthcare team what kind of treatment you do or do not want to receive, or who you would like to make those decisions for you.  If you do not currently have an advance directive, [facility] encourages you to create one.  For more information call:  [phone]), [phone] ([phone]),  or log on to [email or web address].        Smoking Cessation     If you would like to quit smoking:   You may be eligible for free services if you are a [place] resident and started smoking cigarettes before September 1, 1988.  Call the [employer]oking [employer]essation [employer] ([employer]) toll free at (230) 568-3306 or (057) 062-5544.   Call 8-944-QUIT-NOW if you " do not meet the above criteria.   Contact us via email: [email or web address]   View our website for more information: [email or web address]        Language Assistance Services     ATTENTION: Language assistance services are available, free of charge. Please call [phone].      ATENCIÓN: Si habla oneyda, tiene a mann disposición servicios gratuitos de asistencia lingüística. Llame al [phone].     CHÚ Ý: N?u b?n nói Ti?ng Vi?t, có các d?ch v? h? tr? ngôn ng? mi?n phí dành cho b?n. G?i s? [phone].        Stroke Education              Diabetes Discharge Instructions                                    [facility] complies with applicable Federal civil rights laws and does not discriminate on the basis of race, color, national origin, age, disability, or sex.

## 2017-04-20 NOTE — DISCHARGE INSTRUCTIONS
Discharge Summary/Instructions for after Colonoscopy with Biopsy/Polypectomy    Ambrosio Montoya  4/20/2017  Armando Hidalgo MD    Restrictions on Activity:    - Do not drive car or operate machinery until the day after the procedure.  - The following day: return to full activity including work.  - For 3 days: No heavy lifting, straining or running.  - Diet: Eat and drink normally unless instructed otherwise.    Treatment for Common Side Effects:  - Mild abdominal pain and bloating or excessive gas: rest, eat lightly and use a heating pad.     Symptoms to watch for and report to your physician:  1. Severe abdominal pain.  2. Fever within 24 hours after a procedure.  3. A large amount of rectal bleeding. (A small amount of blood from the rectum is not serious, especially if hemorrhoids are present.)  4. Because air was put into your colon during the procedure, expelling large amount of air from your rectum is normal.  5. You may not have a bowel movement for 1-3 days because of the colonoscopy prep. This is normal.  6. Go directly to the emergency room if you notice any of the following:     Chills and/or fever over 101   Persistent vomiting   Severe abdominal pain, other than gas cramps   Severe chest pain   Black, tarry stools   Any bleeding - exceeding one tablespoon    If you have any questions or problems, please call your Physician:    Armando Hidalgo MD      Lab Results: Contact Physician's Office      If a complication or emergency situation arises and you are unable to reach your Physician - GO TO THE EMERGENCY ROOM.

## 2017-04-24 PROBLEM — K63.5 POLYP OF COLON: Status: ACTIVE | Noted: 2017-04-24

## 2017-04-27 ENCOUNTER — TELEPHONE (OUTPATIENT)
Dept: NEUROLOGY | Facility: HOSPITAL | Age: 67
End: 2017-04-27

## 2017-04-27 NOTE — TELEPHONE ENCOUNTER
Notified patient that the three polyps removed during colonoscopy returned tubular adenomas. Will plan to discuss repeating colonoscopy in three years for surveillance.

## 2017-04-28 ENCOUNTER — OFFICE VISIT (OUTPATIENT)
Dept: FAMILY MEDICINE | Facility: HOSPITAL | Age: 67
End: 2017-04-28
Payer: MEDICARE

## 2017-04-28 ENCOUNTER — LAB VISIT (OUTPATIENT)
Dept: LAB | Facility: HOSPITAL | Age: 67
End: 2017-04-28
Attending: FAMILY MEDICINE
Payer: MEDICARE

## 2017-04-28 VITALS
HEIGHT: 71 IN | BODY MASS INDEX: 28.98 KG/M2 | DIASTOLIC BLOOD PRESSURE: 85 MMHG | WEIGHT: 207 LBS | HEART RATE: 70 BPM | SYSTOLIC BLOOD PRESSURE: 120 MMHG

## 2017-04-28 DIAGNOSIS — M10.9 GOUT, ARTHRITIS: Primary | ICD-10-CM

## 2017-04-28 DIAGNOSIS — E11.9 DIABETES MELLITUS TYPE 2 IN NONOBESE: ICD-10-CM

## 2017-04-28 DIAGNOSIS — E11.8 TYPE 2 DIABETES MELLITUS WITH COMPLICATION, WITHOUT LONG-TERM CURRENT USE OF INSULIN: ICD-10-CM

## 2017-04-28 PROCEDURE — 36415 COLL VENOUS BLD VENIPUNCTURE: CPT

## 2017-04-28 PROCEDURE — 99214 OFFICE O/P EST MOD 30 MIN: CPT | Performed by: FAMILY MEDICINE

## 2017-04-28 PROCEDURE — 83036 HEMOGLOBIN GLYCOSYLATED A1C: CPT

## 2017-04-28 RX ORDER — AMLODIPINE BESYLATE 10 MG/1
TABLET ORAL
COMMUNITY
Start: 2017-02-08 | End: 2017-05-03

## 2017-04-28 RX ORDER — INDOMETHACIN 50 MG/1
50 CAPSULE ORAL
Qty: 30 CAPSULE | Refills: 0 | Status: SHIPPED | OUTPATIENT
Start: 2017-04-28 | End: 2017-05-03

## 2017-04-28 NOTE — PROGRESS NOTES
Subjective:       Patient ID: Ambrosio Montoya is a 66 y.o. male.    Chief Complaint: Follow-up    HPI   Pt is a 67 yo M with hx of gout who presents to the clinic for an urgent visit with L thumb pain. Pt is accompanied by a friend who states that he has been eating food which he is not suppose to including a large red meat meal 3 days ago which is around the time his symptoms started. He denies any trauma to the finger. His last gout flare was a few months back per pt. He is compliant with allopurinol.     Review of Systems   Constitutional: Negative for chills, fatigue and fever.   HENT: Negative for sneezing and sore throat.    Eyes: Negative for pain.   Respiratory: Negative for cough, chest tightness, shortness of breath and wheezing.    Cardiovascular: Negative for chest pain and palpitations.   Gastrointestinal: Negative for abdominal pain, constipation, diarrhea, rectal pain and vomiting.   Genitourinary: Negative for dysuria.   Musculoskeletal: Positive for arthralgias. Negative for back pain.   Skin: Negative for rash.   Neurological: Negative for headaches.   Psychiatric/Behavioral: Negative for confusion.       Objective:      Vitals:    04/28/17 0945   BP: 120/85   Pulse: 70     Physical Exam   Constitutional: He is oriented to person, place, and time. He appears well-developed and well-nourished.   Sitting in wheelchair, NAD    HENT:   Head: Normocephalic and atraumatic.   Eyes: Conjunctivae are normal. Pupils are equal, round, and reactive to light.   Neck: Normal range of motion.   Cardiovascular: Normal rate, regular rhythm and normal heart sounds.    Pulmonary/Chest: Effort normal and breath sounds normal. He has no wheezes.   Abdominal: Soft. Bowel sounds are normal. There is no tenderness.   Musculoskeletal:   L thumb swollen as compared to R side. TTP. ROM limited 2/2 pain. Mild warmth.    Neurological: He is alert and oriented to person, place, and time.   Skin: Skin is warm and dry. No rash  noted.       Assessment:       1. Gout, arthritis    2. Type 2 diabetes mellitus with complication, without long-term current use of insulin        Plan:       Gout, arthritis  -     indomethacin (INDOCIN) 50 MG capsule; Take 1 capsule (50 mg total) by mouth 3 (three) times daily with meals.  Dispense: 30 capsule; Refill: 0  - Pt given handout for proper diet to follow to avoid another flare    Type 2 diabetes mellitus with complication, without long-term current use of insulin   - Pt advised to have A1c done which has been ordered already  - F/U w/ PCP to discuss regimen    Return in about 1 week (around 5/5/2017).

## 2017-04-29 LAB
ESTIMATED AVG GLUCOSE: 151 MG/DL
HBA1C MFR BLD HPLC: 6.9 %

## 2017-05-03 ENCOUNTER — OFFICE VISIT (OUTPATIENT)
Dept: FAMILY MEDICINE | Facility: HOSPITAL | Age: 67
End: 2017-05-03
Attending: FAMILY MEDICINE
Payer: MEDICARE

## 2017-05-03 VITALS — DIASTOLIC BLOOD PRESSURE: 79 MMHG | SYSTOLIC BLOOD PRESSURE: 116 MMHG | HEART RATE: 82 BPM

## 2017-05-03 DIAGNOSIS — M10.9 GOUT, ARTHRITIS: ICD-10-CM

## 2017-05-03 DIAGNOSIS — I10 ESSENTIAL HYPERTENSION: Primary | ICD-10-CM

## 2017-05-03 DIAGNOSIS — E78.5 HYPERLIPIDEMIA, UNSPECIFIED HYPERLIPIDEMIA TYPE: ICD-10-CM

## 2017-05-03 DIAGNOSIS — Z72.0 TOBACCO ABUSE: ICD-10-CM

## 2017-05-03 DIAGNOSIS — R32 URINARY INCONTINENCE, UNSPECIFIED TYPE: ICD-10-CM

## 2017-05-03 DIAGNOSIS — M10.032 ACUTE IDIOPATHIC GOUT OF LEFT WRIST: ICD-10-CM

## 2017-05-03 DIAGNOSIS — E11.8 TYPE 2 DIABETES MELLITUS WITH COMPLICATION, WITHOUT LONG-TERM CURRENT USE OF INSULIN: ICD-10-CM

## 2017-05-03 PROCEDURE — 99214 OFFICE O/P EST MOD 30 MIN: CPT | Performed by: FAMILY MEDICINE

## 2017-05-03 RX ORDER — ATORVASTATIN CALCIUM 40 MG/1
80 TABLET, FILM COATED ORAL DAILY
Qty: 90 TABLET | Refills: 1 | Status: SHIPPED | OUTPATIENT
Start: 2017-05-03 | End: 2017-10-30 | Stop reason: SDUPTHER

## 2017-05-03 RX ORDER — METFORMIN HYDROCHLORIDE 500 MG/1
1000 TABLET, EXTENDED RELEASE ORAL
Qty: 180 TABLET | Refills: 3 | Status: SHIPPED | OUTPATIENT
Start: 2017-05-03 | End: 2017-10-30 | Stop reason: SDUPTHER

## 2017-05-03 RX ORDER — IBUPROFEN 600 MG/1
600 TABLET ORAL 3 TIMES DAILY
Qty: 21 TABLET | Refills: 0 | Status: SHIPPED | OUTPATIENT
Start: 2017-05-03 | End: 2017-05-10

## 2017-05-03 RX ORDER — AMLODIPINE BESYLATE 10 MG/1
10 TABLET ORAL DAILY
Qty: 30 TABLET | Refills: 11 | Status: SHIPPED | OUTPATIENT
Start: 2017-05-03 | End: 2017-10-30 | Stop reason: SDUPTHER

## 2017-05-03 NOTE — PROGRESS NOTES
Subjective:       Patient ID: Ambrosio Montoya is a 66 y.o. male.    Chief Complaint: Gout    HPI Comments: Mr. Montoya is a 66 year old male with pMH HTN, CVA, Stroke, HLD, DMII, CAD with MI who presents to clinic for R hand pain, urinary incontinence and medication refill. Patient reports one week of R hand pain, dull aching pain in his L thumb without f/chills. The pain is constant and making it difficult to grab things. Similar to previous gout flares. Daughter also reports urinary incontinence that has been worsening over the past two months. Patient will feel an urge and suddenly be covered in urine. No weight loss, decreased appetite or night sweats. No other bone pain.     Review of Systems   Constitutional: Negative for chills and fever.   HENT: Negative for congestion and rhinorrhea.    Eyes: Negative for pain and discharge.   Respiratory: Negative for cough and shortness of breath.    Cardiovascular: Negative for chest pain.   Gastrointestinal: Negative for abdominal pain, diarrhea, nausea and vomiting.   Genitourinary: Positive for urgency. Negative for difficulty urinating and dysuria.   Musculoskeletal: Positive for joint swelling. Negative for back pain and neck pain.   Skin: Negative for rash.   Neurological: Negative for light-headedness and headaches.   Psychiatric/Behavioral: Negative for agitation and behavioral problems.       Objective:      Vitals:    05/03/17 1614   BP: 116/79   Pulse: 82     Physical Exam   Constitutional: He is oriented to person, place, and time. He appears well-developed and well-nourished. No distress.   HENT:   Head: Normocephalic and atraumatic.   Right Ear: External ear normal.   Left Ear: External ear normal.   Mouth/Throat: No oropharyngeal exudate.   Eyes: Conjunctivae and EOM are normal. Right eye exhibits no discharge. Left eye exhibits no discharge.   Neck: Normal range of motion. Neck supple.   Cardiovascular: Normal rate, regular rhythm and normal heart sounds.   Exam reveals no gallop and no friction rub.    No murmur heard.  Pulmonary/Chest: Effort normal and breath sounds normal. No respiratory distress.   Abdominal: Soft. He exhibits no distension. There is no tenderness.   Musculoskeletal: He exhibits no edema or deformity.   Swelling to R thumb MCP, TTP, no erythema or warmth.    Neurological: He is alert and oriented to person, place, and time.   Skin: Skin is warm and dry. He is not diaphoretic.   Psychiatric: He has a normal mood and affect. His behavior is normal.   Nursing note and vitals reviewed.      Assessment:       1. Essential hypertension    2. Gout, arthritis    3. Hyperlipidemia, unspecified hyperlipidemia type    4. Tobacco abuse    5. Type 2 diabetes mellitus with complication, without long-term current use of insulin    6. Acute idiopathic gout of left wrist    7. Urinary incontinence, unspecified type        Plan:       Essential hypertension  -     amlodipine (NORVASC) 10 MG tablet; Take 1 tablet (10 mg total) by mouth once daily.  Dispense: 30 tablet; Refill: 11    Gout, arthritis  -     ibuprofen (ADVIL,MOTRIN) 600 MG tablet; Take 1 tablet (600 mg total) by mouth 3 (three) times daily.  Dispense: 21 tablet; Refill: 0    Hyperlipidemia, unspecified hyperlipidemia type  -     atorvastatin (LIPITOR) 40 MG tablet; Take 2 tablets (80 mg total) by mouth once daily.  Dispense: 90 tablet; Refill: 1    Tobacco abuse  -     Ambulatory referral to Smoking Cessation Program    Type 2 diabetes mellitus with complication, without long-term current use of insulin  -     metformin (GLUCOPHAGE XR) 500 MG 24 hr tablet; Take 2 tablets (1,000 mg total) by mouth daily with breakfast.  Dispense: 180 tablet; Refill: 3    Acute idiopathic gout of left wrist  -     ibuprofen (ADVIL,MOTRIN) 600 MG tablet; Take 1 tablet (600 mg total) by mouth 3 (three) times daily.  Dispense: 21 tablet; Refill: 0    Urinary incontinence, unspecified type  -     Urinalysis; Future; Expected  date: 5/3/17  -     Ambulatory Referral to Urology      Sent urinalysis order. Will refer to Urology for incontinence work-up. Continue home BP meds and return in one month to determine gout resolution.     Return in about 1 month (around 6/3/2017).

## 2017-05-11 ENCOUNTER — OFFICE VISIT (OUTPATIENT)
Dept: UROLOGY | Facility: CLINIC | Age: 67
End: 2017-05-11
Payer: MEDICARE

## 2017-05-11 VITALS — DIASTOLIC BLOOD PRESSURE: 89 MMHG | SYSTOLIC BLOOD PRESSURE: 132 MMHG | HEART RATE: 73 BPM

## 2017-05-11 DIAGNOSIS — N40.1 BENIGN NON-NODULAR PROSTATIC HYPERPLASIA WITH LOWER URINARY TRACT SYMPTOMS: Primary | ICD-10-CM

## 2017-05-11 DIAGNOSIS — E11.8 TYPE 2 DIABETES MELLITUS WITH COMPLICATION, WITHOUT LONG-TERM CURRENT USE OF INSULIN: ICD-10-CM

## 2017-05-11 DIAGNOSIS — I10 ESSENTIAL HYPERTENSION: ICD-10-CM

## 2017-05-11 DIAGNOSIS — R39.9 LOWER URINARY TRACT SYMPTOMS: ICD-10-CM

## 2017-05-11 LAB
BACTERIA #/AREA URNS AUTO: NORMAL /HPF
BILIRUB UR QL STRIP: NEGATIVE
CLARITY UR REFRACT.AUTO: CLEAR
COLOR UR AUTO: YELLOW
GLUCOSE UR QL STRIP: NEGATIVE
HGB UR QL STRIP: NEGATIVE
KETONES UR QL STRIP: NEGATIVE
LEUKOCYTE ESTERASE UR QL STRIP: ABNORMAL
MICROSCOPIC COMMENT: NORMAL
NITRITE UR QL STRIP: NEGATIVE
PH UR STRIP: 5 [PH] (ref 5–8)
PROT UR QL STRIP: NEGATIVE
RBC #/AREA URNS AUTO: 1 /HPF (ref 0–4)
SP GR UR STRIP: 1.02 (ref 1–1.03)
SQUAMOUS #/AREA URNS AUTO: 1 /HPF
URN SPEC COLLECT METH UR: ABNORMAL
UROBILINOGEN UR STRIP-ACNC: NEGATIVE EU/DL
WBC #/AREA URNS AUTO: 2 /HPF (ref 0–5)

## 2017-05-11 PROCEDURE — 99213 OFFICE O/P EST LOW 20 MIN: CPT | Mod: PBBFAC,PO | Performed by: UROLOGY

## 2017-05-11 PROCEDURE — 99204 OFFICE O/P NEW MOD 45 MIN: CPT | Mod: S$PBB,,, | Performed by: UROLOGY

## 2017-05-11 PROCEDURE — 99999 PR PBB SHADOW E&M-EST. PATIENT-LVL III: CPT | Mod: PBBFAC,,, | Performed by: UROLOGY

## 2017-05-11 PROCEDURE — 81001 URINALYSIS AUTO W/SCOPE: CPT

## 2017-05-11 PROCEDURE — 87086 URINE CULTURE/COLONY COUNT: CPT

## 2017-05-11 RX ORDER — OXYBUTYNIN CHLORIDE 5 MG/1
5 TABLET, EXTENDED RELEASE ORAL DAILY
Qty: 30 TABLET | Refills: 11 | Status: SHIPPED | OUTPATIENT
Start: 2017-05-11 | End: 2018-08-11 | Stop reason: SDUPTHER

## 2017-05-11 NOTE — PROGRESS NOTES
Subjective:      Patient ID: Ambrosio Montoya is a 66 y.o. male.    Chief Complaint: No chief complaint on file.    Patient is a 66 y.o. male who is new to our clinic and referred by their PCP, Dr. Naik for evaluation of LUTs.     HPI  Benign Prostatic Hypertrophy  Patient complains of lower urinary tract symptoms. He reports frequency, urgency and urge incontinence. He denies incomplete emptying, nocturia, straining and weak stream. Patient states symptoms are of moderate severity. Onset of symptoms was 2 months ago and was gradual in onset.  He has no personal history and a family history of prostate cancer. He reports a history of a prior stroke--3 years ago. He denies flank pain, gross hematuria and kidney stones.    He also has a h/o malodorous urine and UTI's.     He smokes 1/2-1 ppd for ~50 years.      Drinks coffee and caffeinated drinks (coke).  Eats spicy foods.  Does not drink alcohol.      Review of Systems   Constitutional: Negative for chills, fever and unexpected weight change.   HENT: Negative for rhinorrhea and sore throat.    Eyes: Negative for visual disturbance.   Respiratory: Negative for shortness of breath.    Cardiovascular: Negative for chest pain and leg swelling.   Gastrointestinal: Negative for abdominal pain.   Endocrine: Positive for polyuria.   Genitourinary: Positive for frequency and urgency. Negative for dysuria and hematuria.   Musculoskeletal: Positive for back pain.   Skin: Negative for rash and wound.   Allergic/Immunologic: Negative for immunocompromised state.   Neurological: Positive for weakness. Negative for speech difficulty.   Hematological: Does not bruise/bleed easily.   Psychiatric/Behavioral: Negative for confusion.        Objective:     Physical Exam   Nursing note and vitals reviewed.  Constitutional: He is oriented to person, place, and time. Vital signs are normal. He appears well-developed and well-nourished. He is cooperative.   HENT:   Head: Normocephalic.   Neck:  No tracheal deviation present.   Cardiovascular: Normal rate and intact distal pulses.    Pulmonary/Chest: Effort normal. No accessory muscle usage. No tachypnea. No respiratory distress.   Abdominal: Soft. Normal appearance. He exhibits no distension, no fluid wave, no ascites and no mass. There is no tenderness. There is no rebound and no CVA tenderness. No hernia. Hernia confirmed negative in the right inguinal area and confirmed negative in the left inguinal area.   Liver/spleen non-palpable.   Genitourinary: Prostate normal, testes normal and penis normal. Rectal exam shows no external hemorrhoid, no mass, no tenderness and anal tone normal. Prostate is not tender. Right testis shows no mass and no tenderness. Right testis is descended. Left testis shows no mass and no tenderness. Left testis is descended. Uncircumcised.   Genitourinary Comments: Scrotum showed no rashes or lesions.  Anus/perineum without lesion.  Epididymis showed no masses or tenderness. No meatal stenosis, meatus in normal location. No penile discharge/plaques. Prostate smooth, no nodules, 30 grams.  Seminal vesicles non-palpable.  Normal sphincter tone.        Musculoskeletal: Normal range of motion.   Lymphadenopathy: No inguinal adenopathy noted on the right or left side.        Right: No inguinal adenopathy present.        Left: No inguinal adenopathy present.   Neurological: He is alert and oriented to person, place, and time.   Weakness in right hand and lower extremity with gait abnormality     Skin: No bruising and no rash noted.     Psychiatric: He has a normal mood and affect. His speech is normal and behavior is normal. Thought content normal.     Assessment:     1. Benign non-nodular prostatic hyperplasia with lower urinary tract symptoms    2. Lower urinary tract symptoms    3. Essential hypertension    4. Type 2 diabetes mellitus with complication, without long-term current use of insulin      Plan:     1. BPH:  -A post void  residual bladder scan was performed immediately after voiding. The patient's PVR was noted to be 21mL (voided just prior to visit)  -Avoid bladder irritants including but not limited to caffeine, alcohol, smoking, spicy foods, acidic foods, tomato-based products, citrus, artificial sweeteners, chocolate, coffee or tea.  -urine dip: unable to urinate today  -prostate meds:none ; will start oxybuytnin--side effects discussed  -ua/urine culture today       2. HTN:  --BP reviewed  -stable, continue meds and f/u with PCP    3.DM:  -tight control    4. Urge incontinence  -recommend lifestyle changes.  Patient drinks coffee/caffeinated drinks.  Has decreased mobility.  Smokes cigarettes.  All contribute to his urge incontinence  -will try oxybutynin.

## 2017-05-11 NOTE — LETTER
May 11, 2017      Surinder Rodriguez MD  [address]  [facility] [address]           [facility]  [address] [facility] [address]  Phone: [phone]          Patient: Ambrosio Montoya   MR Number: [id number]   YOB: 1950   Date of Visit: 5/11/2017       Dear Dr. Surinder Rodriguez:    Thank you for referring Ambrosio Montoya to me for evaluation. Attached you will find relevant portions of my assessment and plan of care.    If you have questions, please do not hesitate to call me. I look forward to following Ambrosio Montoya along with you.    Sincerely,    Oziel Scott MD    Enclosure  CC:  No Recipients    If you would like to receive this communication electronically, please contact [email or web address] or [phone] to request more information on [employer] access.    For providers and/or their staff who would like to refer a patient to [facility], please contact us through our one-stop-shop provider referral line, [facility], at [phone].    If you feel you have received this communication in error or would no longer like to receive these types of communications, please e-mail [email or web address]

## 2017-05-11 NOTE — MR AVS SNAPSHOT
[facility]  [address]  [place] [address]  Phone: [phone]                  Ambrosio Montoya   2017 1:15 PM   Office Visit    Description:  Male : 1950   Provider:  Oziel Scott MD   Department:  [facility]           Diagnoses this Visit        Comments    Benign non-nodular prostatic hyperplasia with lower urinary tract symptoms    -  Primary     Lower urinary tract symptoms         Essential hypertension         Type 2 diabetes mellitus with complication, without long-term current use of insulin                To Do List           Goals (5 Years of Data)     None      Follow-Up and Disposition     Return in about 1 year (around 2018) for symptom assessment.       These Medications        Disp Refills Start End    oxybutynin (DITROPAN-XL) 5 MG TR24 30 tablet 11 2017    Take 1 tablet (5 mg total) by mouth once daily. - Oral    Pharmacy: [id number]  [place], [place] [address] #: [phone]         [facility]     [facility] Nurse Care Line -  Assistance  Unless otherwise directed by your provider, please contact [facility], our nurse care line that is available for  assistance.     Registered nurses in the [facility] provide: appointment scheduling, clinical advisement, health education, and other advisory services.  Call: [phone] (toll free)               Medications           Message regarding Medications     Verify the changes and/or additions to your medication regime listed below are the same as discussed with your clinician today.  If any of these changes or additions are incorrect, please notify your healthcare provider.        START taking these NEW medications        Refills    oxybutynin (DITROPAN-XL) 5 MG TR24 11    Sig: Take 1 tablet (5 mg total) by mouth once daily.    Class: Normal    Route: Oral           Verify that the below list of medications is an accurate  representation of the medications you are currently taking.  If none reported, the list may be blank. If incorrect, please contact your healthcare provider. Carry this list with you in case of emergency.           Current Medications     amlodipine (NORVASC) 10 MG tablet Take 1 tablet (10 mg total) by mouth once daily.    aspirin 325 MG tablet Take 325 mg by mouth once daily.    atorvastatin (LIPITOR) 40 MG tablet Take 2 tablets (80 mg total) by mouth once daily.    lisinopril (PRINIVIL,ZESTRIL) 40 MG tablet Take 1 tablet (40 mg total) by mouth once daily.    metformin (GLUCOPHAGE XR) 500 MG 24 hr tablet Take 2 tablets (1,000 mg total) by mouth daily with breakfast.    oxybutynin (DITROPAN-XL) 5 MG TR24 Take 1 tablet (5 mg total) by mouth once daily.    polyethylene glycol (GLYCOLAX) 17 gram PwPk Take 17 g by mouth once daily.    triamcinolone acetonide 0.1% (KENALOG) 0.1 % ointment Apply topically 3 (three) times daily. For eczema           Clinical Reference Information           Your Vitals Were     BP Pulse                132/89 73          Blood Pressure          Most Recent Value    BP  132/89      Allergies as of 5/11/2017     Pcn [Penicillins]    Plavix [Clopidogrel]      Immunizations Administered on Date of Encounter - 5/11/2017     None      Orders Placed During Today's Visit      Normal Orders This Visit    Urinalysis Microscopic     Urinalysis     Urine culture       Smoking Cessation     If you would like to quit smoking:   You may be eligible for free services if you are a [place] resident and started smoking cigarettes before September 1, 1988.  Call the Smoking Cessation Trust ([facility]) toll free at [phone] or [phone].   Call 3-800-QUIT-NOW if you do not meet the above criteria.   Contact us via email: [email or web address]   View our website for more information: [email or web address]        Language Assistance Services     ATTENTION: Language assistance services are  available, free of charge. Please call [phone].      ATENCIÓN: Si habla oneyda, tiene a mann disposición servicios gratuitos de asistencia lingüística. Llame al [phone].     CHÚ Ý: N?u b?n nói Ti?ng Vi?t, có các d?ch v? h? tr? ngôn ng? mi?n phí dành cho b?n. G?i s? [phone].         [facility] complies with applicable Federal civil rights laws and does not discriminate on the basis of race, color, national origin, age, disability, or sex.

## 2017-05-12 ENCOUNTER — TELEPHONE (OUTPATIENT)
Dept: UROLOGY | Facility: CLINIC | Age: 67
End: 2017-05-12

## 2017-05-12 NOTE — TELEPHONE ENCOUNTER
----- Message from Oziel Scott MD sent at 5/12/2017  9:43 AM CDT -----  Please notify patient that his urinalysis was normal.

## 2017-05-13 LAB
BACTERIA UR CULT: NORMAL
BACTERIA UR CULT: NORMAL

## 2017-06-01 ENCOUNTER — CLINICAL SUPPORT (OUTPATIENT)
Dept: SMOKING CESSATION | Facility: CLINIC | Age: 67
End: 2017-06-01
Payer: COMMERCIAL

## 2017-06-01 DIAGNOSIS — F17.200 NICOTINE DEPENDENCE: Primary | ICD-10-CM

## 2017-06-01 PROCEDURE — 99404 PREV MED CNSL INDIV APPRX 60: CPT | Mod: S$GLB,,,

## 2017-06-01 PROCEDURE — 99999 PR PBB SHADOW E&M-EST. PATIENT-LVL I: CPT | Mod: PBBFAC,,,

## 2017-06-01 RX ORDER — DM/P-EPHED/ACETAMINOPH/DOXYLAM 30-7.5/3
2 LIQUID (ML) ORAL
Qty: 96 LOZENGE | Refills: 0 | Status: SHIPPED | OUTPATIENT
Start: 2017-06-01 | End: 2017-07-01

## 2017-06-01 RX ORDER — IBUPROFEN 200 MG
1 TABLET ORAL DAILY
Qty: 14 PATCH | Refills: 0 | Status: SHIPPED | OUTPATIENT
Start: 2017-06-01 | End: 2017-06-15

## 2017-06-01 RX ORDER — IBUPROFEN 200 MG
1 TABLET ORAL DAILY
Qty: 14 PATCH | Refills: 0 | Status: SHIPPED | OUTPATIENT
Start: 2017-06-01 | End: 2017-06-01 | Stop reason: CLARIF

## 2017-06-01 NOTE — PROGRESS NOTES
See Tobacco Cessation Intake Form for patient assessment and recommendations.  Exhaled carbon monoxide level was 2 ppm per Smoker[facility]zer.

## 2017-06-01 NOTE — Clinical Note
Pt seen at intake today. He is currently tobacco free. Discussed tobacco cessation medication of 14 mg nicotine patch QD and 2 mg nicotine lozenge PRN (1-2 per hour in place of cigarettes). He states he continues desire/cravings for tobacco. Exhaled carbon monoxide level was 2 (0-6 non-smoker). Will see pt back in office in 1 wk.

## 2017-06-08 ENCOUNTER — CLINICAL SUPPORT (OUTPATIENT)
Dept: SMOKING CESSATION | Facility: CLINIC | Age: 67
End: 2017-06-08
Payer: COMMERCIAL

## 2017-06-08 DIAGNOSIS — F17.200 NICOTINE DEPENDENCE: ICD-10-CM

## 2017-06-08 PROCEDURE — 99402 PREV MED CNSL INDIV APPRX 30: CPT | Mod: S$GLB,,,

## 2017-06-08 PROCEDURE — 99999 PR PBB SHADOW E&M-EST. PATIENT-LVL I: CPT | Mod: PBBFAC,,,

## 2017-06-08 NOTE — Clinical Note
Pt seen in office today. He remains tobacco free at this time. Pt remains on tobacco cessation medication of 14 mg nicotine patch QD and 4 mg nicotine lozenge PRN (1-2 per hour in place of cigarettes). No adverse effects/mental changes noted at this time. Congratulated him on his success and encouraged him to keep up the great work. Reviewed coping strategies/habitual behavior/relapse prevention with patient. Exhaled carbon monoxide level was 6 ppm per Smokerlyzer (0-6 non-smoker). Will see pt back in office in 1 wk.

## 2017-06-08 NOTE — PROGRESS NOTES
Individual Follow-Up Form    6/8/2017    Clinical Status of Patient: Outpatient    Length of Service: 30 minutes    Continuing Medication: yes  Patches or Nicotine Lozenges    Other Medications: none     Target Symptoms: Withdrawal and medication side effects. The following were  rated moderate (3) to severe (4) on TCRS:  · Moderate (3): desire/crave tobacco  · Severe (4): none    Comments:  Pt seen in office today. He remains tobacco free at this time. Pt remains on tobacco cessation medication of 14 mg nicotine patch QD and 4 mg nicotine lozenge PRN (1-2 per hour in place of cigarettes). No adverse effects/mental changes noted at this time. Congratulated him on his success and encouraged him to keep up the great work. Reviewed coping strategies/habitual behavior/relapse prevention with patient. Exhaled carbon monoxide level was 6 ppm per Smokerlyzer (0-6 non-smoker). Will see pt back in office in 1 wk.     Diagnosis: F17.200    Next Visit: 1 week

## 2017-06-15 ENCOUNTER — CLINICAL SUPPORT (OUTPATIENT)
Dept: SMOKING CESSATION | Facility: CLINIC | Age: 67
End: 2017-06-15
Payer: COMMERCIAL

## 2017-06-15 DIAGNOSIS — F17.200 NICOTINE DEPENDENCE: ICD-10-CM

## 2017-06-15 PROCEDURE — 99402 PREV MED CNSL INDIV APPRX 30: CPT | Mod: S$GLB,,,

## 2017-06-15 NOTE — PROGRESS NOTES
Individual Follow-Up Form    6/15/2017    Clinical Status of Patient: Outpatient    Length of Service: 30 minutes    Continuing Medication: yes  Patches or Nicotine Lozenges    Other Medications: none     Target Symptoms: Withdrawal and medication side effects. The following were  rated moderate (3) to severe (4) on TCRS:  · Moderate (3): none  · Severe (4): desire/crave tobacco    Comments: Pt seen in office today. He remains tobacco free at this time. He does state that cravings for tobacco remain great, but he manages. Pt remains on tobacco cessation medication of 21 mg nicotine patch QD and 2 mg nicotine lozenge PRN (1-2 per hour in place of cigarettes). No adverse effects/mental changes noted at this time. Congratulated him on his continues success and encouraged him to keep up the great work. Reviewed coping strategies/habitual behavior/relapse prevention with patient. Exhaled carbon monoxide level was 1 ppm per Smokerlyzer (0-6 non-smoker). Will see pt back in office in 1 wk.     Diagnosis: F17.200    Next Visit: 1 week

## 2017-06-15 NOTE — Clinical Note
Pt seen in office today. He remains tobacco free at this time. He does state that cravings for tobacco remain great, but he manages. Pt remains on tobacco cessation medication of 21 mg nicotine patch QD and 2 mg nicotine lozenge PRN (1-2 per hour in place of cigarettes). No adverse effects/mental changes noted at this time. Congratulated him on his continues success and encouraged him to keep up the great work. Reviewed coping strategies/habitual behavior/relapse prevention with patient. Exhaled carbon monoxide level was 1 ppm per Smokerlyzer (0-6 non-smoker). Will see pt back in office in 1 wk.

## 2017-06-20 ENCOUNTER — OFFICE VISIT (OUTPATIENT)
Dept: FAMILY MEDICINE | Facility: HOSPITAL | Age: 67
End: 2017-06-20
Payer: MEDICARE

## 2017-06-20 VITALS
DIASTOLIC BLOOD PRESSURE: 95 MMHG | HEART RATE: 75 BPM | SYSTOLIC BLOOD PRESSURE: 154 MMHG | WEIGHT: 200.63 LBS | HEIGHT: 71 IN | BODY MASS INDEX: 28.09 KG/M2

## 2017-06-20 DIAGNOSIS — R29.898 RIGHT LEG WEAKNESS: ICD-10-CM

## 2017-06-20 DIAGNOSIS — E11.8 TYPE 2 DIABETES MELLITUS WITH COMPLICATION, WITHOUT LONG-TERM CURRENT USE OF INSULIN: ICD-10-CM

## 2017-06-20 DIAGNOSIS — I10 ESSENTIAL HYPERTENSION: ICD-10-CM

## 2017-06-20 DIAGNOSIS — M10.9 GOUT, ARTHRITIS: Primary | ICD-10-CM

## 2017-06-20 PROCEDURE — 99213 OFFICE O/P EST LOW 20 MIN: CPT | Performed by: FAMILY MEDICINE

## 2017-06-20 RX ORDER — INDOMETHACIN 50 MG/1
50 CAPSULE ORAL 2 TIMES DAILY PRN
Qty: 30 CAPSULE | Refills: 0 | Status: SHIPPED | OUTPATIENT
Start: 2017-06-20 | End: 2022-09-16 | Stop reason: SDUPTHER
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
## 2017-06-21 NOTE — PROGRESS NOTES
I assume primary medical responsibility for this patient, I have reviewed the case history, findings, diagnosis and treatment plan with the resident and agree that the care is reasonable and necessary. This service has been performed by a resident without the presence of a teaching physician under the primary care exception  Theresa Menezes  6/21/2017

## 2017-06-22 ENCOUNTER — CLINICAL SUPPORT (OUTPATIENT)
Dept: SMOKING CESSATION | Facility: CLINIC | Age: 67
End: 2017-06-22
Payer: COMMERCIAL

## 2017-06-22 DIAGNOSIS — F17.200 NICOTINE DEPENDENCE: Primary | ICD-10-CM

## 2017-06-22 PROCEDURE — 99402 PREV MED CNSL INDIV APPRX 30: CPT | Mod: S$GLB,,,

## 2017-06-22 NOTE — Clinical Note
Pt seen in office today. He remains tobacco free at this time. He does state that cravings for tobacco remain great, but he manages by sucking on toothpicks. Pt remains on tobacco cessation medication of 21 mg nicotine patch QD and 2 mg nicotine lozenge PRN (1-2 per hour in place of cigarettes). No adverse effects/mental changes noted at this time. Congratulated him on his quit and to continued success and encouraged him to keep up the good work. Reviewed coping strategies/habitual behavior/relapse prevention with patient.  Will see pt back in office in 1 wk.

## 2017-06-29 ENCOUNTER — CLINICAL SUPPORT (OUTPATIENT)
Dept: SMOKING CESSATION | Facility: CLINIC | Age: 67
End: 2017-06-29
Payer: COMMERCIAL

## 2017-06-29 DIAGNOSIS — F17.200 NICOTINE DEPENDENCE: Primary | ICD-10-CM

## 2017-06-29 PROCEDURE — 99404 PREV MED CNSL INDIV APPRX 60: CPT | Mod: S$GLB,,,

## 2017-06-29 RX ORDER — IBUPROFEN 200 MG
1 TABLET ORAL DAILY
Qty: 14 PATCH | Refills: 0 | Status: SHIPPED | OUTPATIENT
Start: 2017-06-29 | End: 2022-09-16

## 2017-06-29 NOTE — Clinical Note
Patient states that he is almost 1 month free of cigarettes. Patient remains on 21 mg nicotine patch along with lozenges to supplement. Reordered 14 mg patches for patient at this visit.

## 2017-06-29 NOTE — PROGRESS NOTES
Individual Follow-Up Form    6/29/2017      Clinical Status of Patient: Outpatient    Length of Service: 45 minutes    Continuing Medication: yes  Patches    Other Medications: Lozenge     Target Symptoms: Withdrawal and medication side effects. The following were  rated moderate (3) to severe (4) on TCRS:  · Moderate (3): None  · Severe (4): None    Comments: Patient states that he is almost 1 month free of cigarettes. Patient remains on 21 mg nicotine patch along with lozenges to supplement. Patient states that the toothpicks also help him out a lot for the times that he experiences tough urges. Patient also complained of slight itching near the area of the patch. Advised patient to try using some benadryl cream on the area that he feels the itch. Patient states that he his almost out of patches, reordered patches 14 mg patches for patient. Discussed strategies and other coping mechanisms with patient.    Diagnosis: F17.210    Next Visit: 1 week

## 2017-07-06 ENCOUNTER — CLINICAL SUPPORT (OUTPATIENT)
Dept: SMOKING CESSATION | Facility: CLINIC | Age: 67
End: 2017-07-06
Payer: COMMERCIAL

## 2017-07-06 DIAGNOSIS — F17.200 NICOTINE DEPENDENCE: ICD-10-CM

## 2017-07-06 PROCEDURE — 99402 PREV MED CNSL INDIV APPRX 30: CPT | Mod: S$GLB,,,

## 2017-07-06 PROCEDURE — 99999 PR PBB SHADOW E&M-EST. PATIENT-LVL I: CPT | Mod: PBBFAC,,,

## 2017-07-06 NOTE — Clinical Note
Pt seen in office today. He remains tobacco free at this time. Pt remains on tobacco cessation medication of 14 mg nicotine patch QD. No adverse effects/mental changes noted at this time. Reviewed coping strategies/habitual behavior/relapse prevention with patient. Pt has completed all assigned tobacco cessation visits. Will see back PRN

## 2017-07-06 NOTE — PROGRESS NOTES
Individual Follow-Up Form    7/6/2017    Clinical Status of Patient: Outpatient    Length of Service: 30 minutes    Continuing Medication: yes  Patches    Other Medications: none     Target Symptoms: Withdrawal and medication side effects. The following were  rated moderate (3) to severe (4) on TCRS:  · Moderate (3): desire/crave tobacco  · Severe (4): none    Comments:  Pt seen in office today. He remains tobacco free at this time. Pt remains on tobacco cessation medication of 14 mg nicotine patch QD. No adverse effects/mental changes noted at this time. Reviewed coping strategies/habitual behavior/relapse prevention with patient. Pt has completed all assigned tobacco cessation visits. Will see back PRN    Diagnosis: F17.200    Next Visit:  PRN

## 2017-08-02 ENCOUNTER — CLINICAL SUPPORT (OUTPATIENT)
Dept: SMOKING CESSATION | Facility: CLINIC | Age: 67
End: 2017-08-02
Payer: COMMERCIAL

## 2017-08-02 DIAGNOSIS — F17.200 NICOTINE DEPENDENCE: Primary | ICD-10-CM

## 2017-08-02 PROCEDURE — 99407 BEHAV CHNG SMOKING > 10 MIN: CPT | Mod: S$GLB,,,

## 2017-10-23 DIAGNOSIS — E78.5 HYPERLIPIDEMIA, UNSPECIFIED HYPERLIPIDEMIA TYPE: ICD-10-CM

## 2017-10-23 RX ORDER — ATORVASTATIN CALCIUM 40 MG/1
80 TABLET, FILM COATED ORAL DAILY
Qty: 90 TABLET | Refills: 1 | Status: CANCELLED | OUTPATIENT
Start: 2017-10-23

## 2017-10-30 ENCOUNTER — OFFICE VISIT (OUTPATIENT)
Dept: FAMILY MEDICINE | Facility: HOSPITAL | Age: 67
End: 2017-10-30
Attending: FAMILY MEDICINE
Payer: MEDICARE

## 2017-10-30 VITALS — HEART RATE: 51 BPM | SYSTOLIC BLOOD PRESSURE: 108 MMHG | DIASTOLIC BLOOD PRESSURE: 64 MMHG

## 2017-10-30 DIAGNOSIS — I63.9 CEREBROVASCULAR ACCIDENT (CVA), UNSPECIFIED MECHANISM: ICD-10-CM

## 2017-10-30 DIAGNOSIS — Z23 ENCOUNTER FOR VACCINATION: Primary | ICD-10-CM

## 2017-10-30 DIAGNOSIS — E11.8 TYPE 2 DIABETES MELLITUS WITH COMPLICATION, WITHOUT LONG-TERM CURRENT USE OF INSULIN: ICD-10-CM

## 2017-10-30 DIAGNOSIS — L30.9 ECZEMA, UNSPECIFIED TYPE: ICD-10-CM

## 2017-10-30 DIAGNOSIS — E78.5 HYPERLIPIDEMIA, UNSPECIFIED HYPERLIPIDEMIA TYPE: ICD-10-CM

## 2017-10-30 DIAGNOSIS — I10 ESSENTIAL HYPERTENSION: ICD-10-CM

## 2017-10-30 PROCEDURE — 99214 OFFICE O/P EST MOD 30 MIN: CPT | Mod: 25 | Performed by: FAMILY MEDICINE

## 2017-10-30 PROCEDURE — 90670 PCV13 VACCINE IM: CPT

## 2017-10-30 PROCEDURE — G0009 ADMIN PNEUMOCOCCAL VACCINE: HCPCS

## 2017-10-30 PROCEDURE — G0008 ADMIN INFLUENZA VIRUS VAC: HCPCS

## 2017-10-30 RX ORDER — AMLODIPINE BESYLATE 10 MG/1
10 TABLET ORAL DAILY
Qty: 90 TABLET | Refills: 1 | Status: SHIPPED | OUTPATIENT
Start: 2017-10-30 | End: 2019-04-29 | Stop reason: SDUPTHER

## 2017-10-30 RX ORDER — ATORVASTATIN CALCIUM 40 MG/1
80 TABLET, FILM COATED ORAL DAILY
Qty: 90 TABLET | Refills: 1 | Status: SHIPPED | OUTPATIENT
Start: 2017-10-30 | End: 2019-01-18 | Stop reason: SDUPTHER

## 2017-10-30 RX ORDER — LISINOPRIL 40 MG/1
40 TABLET ORAL DAILY
Qty: 90 TABLET | Refills: 1 | Status: SHIPPED | OUTPATIENT
Start: 2017-10-30

## 2017-10-30 RX ORDER — METFORMIN HYDROCHLORIDE 500 MG/1
1000 TABLET, EXTENDED RELEASE ORAL
Qty: 90 TABLET | Refills: 3 | Status: SHIPPED | OUTPATIENT
Start: 2017-10-30 | End: 2020-03-11

## 2017-10-30 RX ORDER — TRIAMCINOLONE ACETONIDE 1 MG/G
OINTMENT TOPICAL DAILY
Qty: 30 G | Refills: 3 | Status: SHIPPED | OUTPATIENT
Start: 2017-10-30

## 2017-10-30 RX ORDER — ASPIRIN 325 MG
325 TABLET ORAL DAILY
COMMUNITY
Start: 2017-10-30

## 2017-10-30 NOTE — PROGRESS NOTES
Subjective:       Patient ID: Ambrosio Montoya is a 67 y.o. male.    Chief Complaint: Flu Vaccine    Mr. Montoya is a 67 year old male with PMH essential HTN, DMII, CVA, gout and eczema who presents for follow-up. Reports he has been taking his BP medications and hasn't had changes in urination, HA's, LH or visual changes. Patient reports that he would like his flu and pneumonia vaccine today. States he has been feeling well at home. Denies polyuria, polydipsia, compliant with Metformin. Does not check his sugars at home. Denies neuropathy symptoms and checks feet daily. Patient does report some chronic deconditioning since having a stroke years ago. Has issues standing, mostly 2/2 R leg weakness. Complains of itching of rash on flexor surfaces, requesting something to help.  Current smoker, relapsed. Not interested in quitting today. Denies EtOH.       Review of Systems   Constitutional: Negative for chills and fever.   HENT: Negative for congestion and sore throat.    Eyes: Negative for pain and discharge.   Respiratory: Negative for cough and shortness of breath.    Cardiovascular: Negative for chest pain.   Gastrointestinal: Negative for abdominal pain, diarrhea, nausea and vomiting.   Genitourinary: Negative for difficulty urinating and dysuria.   Musculoskeletal: Negative for back pain and neck pain.   Skin: Negative for rash.   Neurological: Negative for light-headedness and headaches.   Hematological: Does not bruise/bleed easily.   Psychiatric/Behavioral: Negative for agitation and behavioral problems.       Objective:      Vitals:    10/30/17 1511   BP: 108/64   Pulse: (!) 51     Physical Exam   Constitutional: He is oriented to person, place, and time. He appears well-developed and well-nourished. No distress.   In wheelchair.   HENT:   Head: Normocephalic and atraumatic.   Right Ear: External ear normal.   Left Ear: External ear normal.   Mouth/Throat: No oropharyngeal exudate.   Eyes: Conjunctivae and EOM  are normal. Right eye exhibits no discharge. Left eye exhibits no discharge.   Neck: Normal range of motion. Neck supple.   Cardiovascular: Normal rate, regular rhythm and normal heart sounds.  Exam reveals no gallop and no friction rub.    No murmur heard.  Pulmonary/Chest: Effort normal and breath sounds normal. No respiratory distress.   Abdominal: Soft. He exhibits no distension. There is no tenderness.   Musculoskeletal: He exhibits no edema or deformity.   Foot exam: normal sensation, good pulses, no skin breakdown.    Neurological: He is alert and oriented to person, place, and time.   Skin: Skin is warm and dry. He is not diaphoretic.   Psychiatric: He has a normal mood and affect. His behavior is normal.   Nursing note and vitals reviewed.      Assessment:       1. Encounter for vaccination    2. Essential hypertension    3. Type 2 diabetes mellitus with complication, without long-term current use of insulin    4. Hyperlipidemia, unspecified hyperlipidemia type    5. Cerebrovascular accident (CVA), unspecified mechanism    6. Eczema, unspecified type        Plan:       Encounter for vaccination    Essential hypertension  -     amLODIPine (NORVASC) 10 MG tablet; Take 1 tablet (10 mg total) by mouth once daily.  Dispense: 90 tablet; Refill: 1  -     lisinopril (PRINIVIL,ZESTRIL) 40 MG tablet; Take 1 tablet (40 mg total) by mouth once daily.  Dispense: 90 tablet; Refill: 1    Type 2 diabetes mellitus with complication, without long-term current use of insulin  -     metFORMIN (GLUCOPHAGE XR) 500 MG 24 hr tablet; Take 2 tablets (1,000 mg total) by mouth daily with breakfast.  Dispense: 90 tablet; Refill: 3    Hyperlipidemia, unspecified hyperlipidemia type  -     atorvastatin (LIPITOR) 40 MG tablet; Take 2 tablets (80 mg total) by mouth once daily.  Dispense: 90 tablet; Refill: 1    Cerebrovascular accident (CVA), unspecified mechanism  -     aspirin 325 MG tablet; Take 1 tablet (325 mg total) by mouth once  daily.  -     Ambulatory Referral to Physical/Occupational Therapy    Eczema, unspecified type  -     triamcinolone acetonide 0.1% (KENALOG) 0.1 % ointment; Apply topically once daily. For eczema  Dispense: 30 g; Refill: 3    Other orders  -     (In Office Administered) Pneumococcal Conjugate Vaccine (13 Valent) (IM)  -     Influenza - High Dose (65+) (PF) (IM)    Patient reports deconditioning since prior stroke. Occasionally has trouble standing up on his own. Would like to go back to physical therapy, referral placed. Flu shot and pneumovax administered today.     Return in about 3 months (around 1/30/2018).

## 2017-11-06 ENCOUNTER — CLINICAL SUPPORT (OUTPATIENT)
Dept: REHABILITATION | Facility: HOSPITAL | Age: 67
End: 2017-11-06
Payer: MEDICARE

## 2017-11-06 DIAGNOSIS — R26.9 ABNORMAL GAIT: Primary | ICD-10-CM

## 2017-11-06 DIAGNOSIS — R26.2 DIFFICULTY WALKING: ICD-10-CM

## 2017-11-06 DIAGNOSIS — R53.1 RIGHT SIDED WEAKNESS: ICD-10-CM

## 2017-11-06 PROCEDURE — 97163 PT EVAL HIGH COMPLEX 45 MIN: CPT | Mod: PN

## 2017-11-06 PROCEDURE — G8979 MOBILITY GOAL STATUS: HCPCS | Mod: CK,PN

## 2017-11-06 PROCEDURE — G8978 MOBILITY CURRENT STATUS: HCPCS | Mod: CL,PN

## 2017-11-06 NOTE — PLAN OF CARE
"TIME RECORD    Date: 17    Start Time:  1315  Stop Time:  1400    PROCEDURES:    TIMED  Procedure Min.                         UNTIMED  Procedure Min.   1 high eval 45         Total Timed Minutes:  0  Total Timed Units:  0  Total Untimed Units:  1  Charges Billed/# of units:  1 high eval      OUTPATIENT NEUROLOGICAL REHABILITATION  PHYSICAL THERAPY EVALUATION    Onset Date:   Primary Diagnosis:   1. Abnormal gait     2. Right sided weakness     3. Difficulty walking       Treatment Diagnosis: R sided weakness  Past Medical History:   Diagnosis Date    Coronary artery disease     ACS    CVA (cerebral infarction)     Diabetes mellitus type II     Hyperlipidemia     Hypertension     Myocardial infarction 2002    Stroke 3/2012    [facility]     Precautions: standard  Prior Therapy: OP at [place] on   Medications: Ambrosio Montoya has a current medication list which includes the following prescription(s): amlodipine, aspirin, atorvastatin, indomethacin, lisinopril, metformin, nicotine, oxybutynin, polyethylene glycol, and triamcinolone acetonide 0.1%.  Nutrition:  Normal  History of Present Illness: R sided weakness  Prior Level of Function: Assistive Device and SPC  Social History: lives with girlfriend  Place of Residence (Steps/Adaptations): 1 [place] home with 5 steps to enter  Functional Deficits Leading to Referral/Nature of Injury: L CVA  Current functional status:  KRIS  DME owned: w/c, cane  Work/Job description:  retired  Fall Incidence: 0   Patient Therapy Goals: want to try to get back to walking a little more      Subjective:      Pt stated: he has been able to walk better and he used to walk with an AfO on the L foot but "I stopped wearing it".     Family present/states: --  Pain: 0/10    Objective:      - Command followin%   - Speech: no deficits     Mental status: alert, oriented to person, place, and time  Behavior:  calm  Attention Span and Concentration:  " Normal    Dominant hand:  right     Posture Alignment :slouched posture    Sensation:  Light Touch: intact            Proprioception:   Intact    Tone: 2/4 More marked increase in muscle tone through most of the ROM, but affected part(s) easily moved  Limbs/muscles affected: R UE and R LE    Visual/Auditory: denies changes     Coordination:   - fine motor: impaired R UE  - UE coordination: impaired R UE    - LE coordination:  Impaired heel to shin on the R due to weakness and decreased AROM         Right Left Right Left     Strength Strength AROM/PROM AROM/PROM   Hip flexion 3- 4+ WFL WNL   Extension 3- 4 WFL WNL   Glute max 3- 4       Abduction 3- 4- WFL WNL   Knee ext 3- 4+ 0/0 WNL   Knee flexion 3 4+ 90/130 WNL   DF 3- 4+ WFL WNL   PF 3+ 4+ 0 WNL        Gait Assessment:   - AD used: with QC  - Assistance: Cathie to CGA  - Distance: ~200 ft  - Stairs: Min A per report    Gait Analysis:  Deviations noted: decreased R hip flexion, decreased R strike, decreased R stance time, pt drags R leg, decreased R UE swing    Impairments contributing to deviations:  R sided weakness    Endurance Deficit: noted and pt only able to walk household and short community distances     Balance Assessment:    Sitting balance (static,dynamic):WFL  Standing balance (static, dynamic):see DGI    Dynamic Gait Index - assessed with SPC  1. Gait level surface -  1  2. Change in gait speed - 1  3. Gait with horizontal head turns - 1  4. Gait with vertical head turns - 1  5. Gait and pivot turn - 1  6. Step over obstacle - 1  7. Step around obstacle - 1  8. Steps - 1  Total 8/24 66% CL    Functional Mobility (Bed mobility, transfers)  Bed mobility: I  Supine to sit: I  Sit to supine: I  Rolling: I  Transfers to bed: KRIS  Sit to stand:  Mod I  Stand pivot:  Mod I  Stairs: see DGI  Wheelchair mobility: indep  Tub transfer:[place] and he takes chair baths.    FOTO 88% limitation     Patient Education/Response:     Written Home Exercises Provided: sit  to stand without UE support with hand on thighs 2 x 10  Pt demo good understanding of the education provided. Ambrosio demonstrated good return demonstration of activities.     Education provided re:role of PT, goals for PT, scheduling - pt verbalized good understanding.     Assessment:   Initial Assessment (Pertinent finding, problem list and factors affecting outcome):This is a 67 y.o. male referred to outpatient physical therapy and presents with a medical diagnosis of R sided weakness due to old CVA and demonstrates limitations as described in the problem list. Due to pt's deficits, he is unable to walk community distances, he requires AD to ambulate, he is unable to exit his porch and stairs and walk to his car without assist or supervision and he is a high risk of falls. He has had PT in the past and would benefit from resuming PT and an assessment of his AFO for improve LE positioning for improved gait.  He presented to session today with old slippers and no AFO on R foot.    History  Co-morbidities and personal factors that may impact the plan of care Examination  Body Structures and Functions, activity limitations and participation restrictions that may impact the plan of care Clinical Presentation   Decision Making/ Complexity Score   Co-morbidities:   Coronary artery disease   ACS   CVA (cerebral infarction)   Diabetes mellitus type II   Hyperlipidemia   Hypertension   Myocardial infarction   Stroke     Personal Factors:     high Body Regions:  UE, trunk, LE    Body Systems: musculoskeletal - posture, ROM, strength; neuromuscular - sensation, balance, gait      Activity limitations and Participation Restrictions: pt cant exit or enter his home without assist, pt requires AD to ambulate, high fall risk in home and community, decreased social interactions and self care    high     FOTO cerebrovascular Survey: 88% limitation    high   high       Rehab Potiential: fair   Pt will benefit from continuing skilled  outpatient physical therapy to address the deficits listed below in the problem list, provide pt/family education and to maximize pt's level of independence in the home and community environment.     Medical necessity is demonstrated by the following IMPAIRMENTS/PROMBLEM LIST:   1. Fall Risk - impaired balance   2. Weakness R side UE/LE  3. Impaired muscle tone in R UE/LE  4. Gait deviations   5. Decreased community ambulation   6. Decreased activity tolerance   7. Difficulty to participate in daily activities   8. Continued inability to participate in vocational pursuits   9. Requires skilled supervision to complete and progress HEP     Anticipated barriers to physical therapy: pt's inactivity in the home and noncompliance with AFO wear      Plans and Goals:   Short Term Goals (6 Weeks):   1. Pt will be indep with initial HEP.   2. Pt will be able to perform TUG in 20 secs or less with least restrictive AD without LOB.  3. Pt will be able stand on beep board without AD without beep without UE support without assist for improved standing balance without AD for improved function in standing.      Long Term Goals (12 Weeks):   1. Pt will have MMT score of 4-/5 in all major ms groups in R LE.   2. Patient will be able to achieve greater than or equal to 13/24 on the DGI using least restrictive device decreasing patient's risk for falling and improving patient to 40-60% impaired, limited, or restricted category.  Gcode CK 8978   3. Pt will become a safe community ambulator with least restrictive device with low risk of falls and minimal gait deviations.  4. Pt will complete 6 minute walk test with 0 LOB and least restrictive device.    5. Pt perform tall kneel to 1/2 kneel to stand transitions x 5 bilaterally with UE support  6. Pt will report 57% on the FOTO Functional Assessment  indicating increased functional balance and  mobility.          Certification Period: 11/6/17 to 1/6/17  Recommended Treatment Plan: 1-2  times per week for 8 weeks:  To recieve Education, HEP, therapeutic exercises, neuromuscular re-education, therapeutic activities, manual therapy, joint mobilizations, and modalities modalities prn, ASTYM prn, kinesiotape prn, Functional Dry Needling prn modalities, ASTYM prn, kinesiotape prn, Functional Dry Needling prn to achieve established goals. Pt may be seen by PTA as part of the rehabilitation team.     Other Recommendations: OT eval and treat for R UE impairments      Therapist: Gayle Nath, PT    I CERTIFY THE NEED FOR THESE SERVICES FURNISHED UNDER THIS PLAN OF TREATMENT AND WHILE UNDER MY CARE    Physician's comments: ____________________________________________________________________________________________________________________________________________    Physician's Name: ___________________________________

## 2017-11-09 PROBLEM — R53.1 RIGHT SIDED WEAKNESS: Status: ACTIVE | Noted: 2017-11-09

## 2017-11-10 ENCOUNTER — CLINICAL SUPPORT (OUTPATIENT)
Dept: REHABILITATION | Facility: HOSPITAL | Age: 67
End: 2017-11-10
Payer: MEDICARE

## 2017-11-10 DIAGNOSIS — R53.1 RIGHT SIDED WEAKNESS: ICD-10-CM

## 2017-11-10 PROCEDURE — 97110 THERAPEUTIC EXERCISES: CPT | Mod: PN

## 2017-11-10 NOTE — PROGRESS NOTES
TIME RECORD    Date:  11/10/2017    Start Time:  10:15  Stop Time:  11:05    PROCEDURES:    TIMED  Procedure Min.   TE 45                     UNTIMED  Procedure Min.             Total Timed Minutes:  45  Total Timed Units:  3  Total Untimed Units:  0  Charges Billed/# of units:  3TE      Progress/Current Status    Subjective:     Patient ID: Ambrosio Montoya is a 67 y.o. male.  Diagnosis:   1. Right sided weakness       Pain: 0/10  Pt had no compliant of pain today. Pt agreeable to therapy.     Objective:   Pt arrived to therapy with rolling walker. Session initiated with scifit stepper X6 minutes with red theraband around B LE to maintain alignment of the extremities for B UE/LE reciprocal pattern. Pt then ambulated to the low mat to performed therex exercises which includes the following exercises as per logged below 1:1 with SPTA for hip strengthening and increase of ROM.       DATE 11/10/17   Visit 2   G CODE  POC exp 2 of 10   FOTO 2 of 10   Visit Amount  Total 95.94  171.54   Standing feet apart    Standing feet together    Standing 1/2 tandem    Standing tandem    SLS R    SLS L    Side stepping    Cross overs    Karoke    Wii fit balance    Bosu    Beep board    Physioball sitting balance    SLR 2x10 B   clamshells    Hip abduction Supine (slide board)  2x10 AAROM   bridges 2x10   Adduction isometric 2x10   LAQ 2x10   SAQ 2x15 B (quad activation)   HS curls 2x10 with RTB   HS stretch with strap MT   Gastroc stretch    Leg press    Hip extension    Hip flexion    Hip Abduction    Hip adduction    Knee flexion    Toe raises    Heel raises    Nu-step 6 min  L 1.5   Step ups    Lateral step ups    INITIALS SM SPTA/JA 1/6         Assessment:     Pt reported general fatigue during and post therapy. Pt required active assist for hip ABD in supine and LAQ on the R LE. Pt also required moderate assist for SLR on the R. Notice pt R knee hyperextension with minimal heel strike, forward trunk flexion when ambulating w/  Rollator on level surfaces. Pt has sublux R shoulder and may benefit from a shoulder  Pt had no compliant of pain after therapy.     Patient Education/Response:     Pt was instructed to do HEP and postural awareness at home. Also, instruct pt to be aware of his posture when ambulating with rolling walker to not lean forward too much and practice postural exercises to help with ambulating safely.     Plans and Goals:   Cont with POC and advance with standing exercises for muscle strengthening on the next therapy session.     Short Term Goals (6 Weeks):   1. Pt will be indep with initial HEP.   2. Pt will be able to perform TUG in 20 secs or less with least restrictive AD without LOB.  3. Pt will be able stand on beep board without AD without beep without UE support without assist for improved standing balance without AD for improved function in standing.      Long Term Goals (12 Weeks):   1. Pt will have MMT score of 4-/5 in all major ms groups in R LE.   2. Patient will be able to achieve greater than or equal to 13/24 on the DGI using least restrictive device decreasing patient's risk for falling and improving patient to 40-60% impaired, limited, or restricted category.  ode CK 8978   3. Pt will become a safe community ambulator with least restrictive device with low risk of falls and minimal gait deviations.  4. Pt will complete 6 minute walk test with 0 LOB and least restrictive device.    5. Pt perform tall kneel to 1/2 kneel to stand transitions x 5 bilaterally with UE support  6. Pt will report 57% on the FOTO Functional Assessment  indicating increased functional balance and  mobility.      Certification Period: 11/6/17 to 1/6/17    Session conducted by Rebecca DYER ([facility] Physical Therapist Assistant), under direct supervision of Bony Wolff PTA

## 2017-11-21 ENCOUNTER — CLINICAL SUPPORT (OUTPATIENT)
Dept: REHABILITATION | Facility: HOSPITAL | Age: 67
End: 2017-11-21
Payer: MEDICARE

## 2017-11-21 DIAGNOSIS — R53.1 RIGHT SIDED WEAKNESS: ICD-10-CM

## 2017-11-21 DIAGNOSIS — R26.9 ABNORMAL GAIT: ICD-10-CM

## 2017-11-21 DIAGNOSIS — R26.2 DIFFICULTY WALKING: Primary | ICD-10-CM

## 2017-11-21 PROCEDURE — 97112 NEUROMUSCULAR REEDUCATION: CPT | Mod: PN

## 2017-11-21 PROCEDURE — 97110 THERAPEUTIC EXERCISES: CPT | Mod: PN

## 2017-11-21 NOTE — PROGRESS NOTES
TIME RECORD    Date:  11/21/2017    Start Time:  1100  Stop Time:  1145    PROCEDURES:    TIMED  Procedure Min.   NMR 15   TE 30                 UNTIMED  Procedure Min.             Total Timed Minutes:  45  Total Timed Units:  3  Total Untimed Units:  0  Charges Billed/# of units:  3 (NMR, 2TE)      Progress/Current Status    Subjective:     Patient ID: Ambrosio Montoya is a 67 y.o. male.  Diagnosis:   1. Difficulty walking     2. Right sided weakness     3. Abnormal gait       Pain: 0 /10  Pt stated that he feel last week due to stiffnes     Objective:     Session initiated with TE per log x 30' including MT per log f/b Neuro re-ed and endurance training with B UE/LE extremities for reciprocal motion of all limbs on sci-fit x 10 min at level 1.5 at > or equal to 50 spm w/o rest.        DATE 11/21/17 11/10/17   Visit 3 2   G CODE  POC exp 3 of 10 2 of 10   FOTO 3 of 10 2 of 10   Visit Amount  Total 95.00  266.54 95.94  171.54   Standing feet apart --    Standing feet together --    Standing 1/2 tandem --    Standing tandem --    SLS R --    SLS L     Side stepping 3 laps //    Cross overs --    Karoke --    Wii fit balance --    Bosu --    Beep board --    Physioball sitting balance --    SLR 2x10 B 2x10 B   clamshells hooklying 2x 10 RTB    Hip abduction  Supine (slide board)  2x10 AAROM   bridges 2x10 2x10   Adduction isometric 5'' x 10 2x10   LAQ 2x10 2x10   SAQ 2x15 B 2x15 B (quad activation)   HS curls 2x10 with RTB 2x10 with RTB   HS stretch with strap MT  MT   Gastroc stretch MT    Leg press --    Hip extension --    Hip flexion --    Hip Abduction --    Hip adduction --    Knee flexion --    Toe raises --    Heel raises --    Nu-step 10' L 1.5 6 min  L 1.5   Step ups --    Lateral step ups --    INITIALS FS SM SPTA/JA 1/6       Assessment:     Pt needs continued strengthening to improve overall functional, balance and gait.     Patient Education/Response:     CONT HEP    Plans and Goals:       Short Term Goals  (6 Weeks):   1. Pt will be indep with initial HEP.   2. Pt will be able to perform TUG in 20 secs or less with least restrictive AD without LOB.  3. Pt will be able stand on beep board without AD without beep without UE support without assist for improved standing balance without AD for improved function in standing.      Long Term Goals (12 Weeks):   1. Pt will have MMT score of 4-/5 in all major ms groups in R LE.   2. Patient will be able to achieve greater than or equal to 13/24 on the DGI using least restrictive device decreasing patient's risk for falling and improving patient to 40-60% impaired, limited, or restricted category.  ode CK 8978   3. Pt will become a safe community ambulator with least restrictive device with low risk of falls and minimal gait deviations.  4. Pt will complete 6 minute walk test with 0 LOB and least restrictive device.    5. Pt perform tall kneel to 1/2 kneel to stand transitions x 5 bilaterally with UE support  6. Pt will report 57% on the FOTO Functional Assessment  indicating increased functional balance and  mobility.      Certification Period: 11/6/17 to 1/6/17

## 2017-11-27 ENCOUNTER — CLINICAL SUPPORT (OUTPATIENT)
Dept: REHABILITATION | Facility: HOSPITAL | Age: 67
End: 2017-11-27
Payer: MEDICARE

## 2017-11-27 DIAGNOSIS — R53.1 RIGHT SIDED WEAKNESS: ICD-10-CM

## 2017-11-27 PROCEDURE — 97112 NEUROMUSCULAR REEDUCATION: CPT | Mod: PN

## 2017-11-27 PROCEDURE — 97110 THERAPEUTIC EXERCISES: CPT | Mod: PN

## 2017-11-27 NOTE — PROGRESS NOTES
TIME RECORD    Date:  11/27/2017    Start Time:  1150  Stop Time:  1230    PROCEDURES:    TIMED  Procedure Min.   TE 38   NMR 12                 UNTIMED  Procedure Min.             Total Timed Minutes:  50  Total Timed Units:  3  Total Untimed Units:  0  Charges Billed/# of units:  3 (2TE, 1NMR)      Progress/Current Status    Subjective:     Patient ID: Ambrosio Montoya is a 67 y.o. male.  Diagnosis:   1. Right sided weakness       Pain: 0 /10  Pt tolerated presented to session today with AFO on and using Rollator.    Objective:       Session initiated with TE per log x 30' including MT per log f/b Neuro re-ed and endurance training with B UE/LE extremities for reciprocal motion of all limbs on sci-fit x 10 min at level 1.5 at > or equal to 50 spm w/o rest. Pt performed standing and dyn gait in // bars per log.      DATE 11/27/17 11/21/17 11/10/17   Visit 4 3 2   G CODE  POC exp 4 of 10 3 of 10 2 of 10   FOTO 4 of 10 3 of 10 2 of 10   Visit Amount  Total  95.00  266.54 95.94  171.54   Standing feet apart -- --    Standing feet together -- --    Standing 1/2 tandem -- --    Standing tandem -- --    SLS R -- --    SLS L --     Forward/backward 3 laps//     Side stepping 3 laps // 3 laps //    Cross overs -- --    Karoke -- --    Wii fit balance -- --    Bosu -- --    Beep board -- --    Physioball sitting balance -- --    SLR 2x10 B 2x10 B 2x10 B   clamshells hooklying 2x 10 RTB hooklying 2x 10 RTB    Hip abduction --  Supine (slide board)  2x10 AAROM   bridges 2x10 w/ ball 2x10 2x10   Adduction isometric W/ bridges 5'' x 10 2x10   LAQ 2x10 2x10 2x10   SAQ 2x15 B 2x15 B 2x15 B (quad activation)   HS curls 2x10 with RTB 2x10 with RTB 2x10 with RTB   HS stretch with strap NT/next MT  MT   Gastroc stretch NT/next MT    Leg press -- --    Hip extension -- --    Hip flexion -- --    Hip Abduction -- --    Hip adduction -- --    Knee flexion -- --    Toe raises -- --    Heel raises -- --    Nu-step 8' L 1.5 10' L 1.5 6 min  L  1.5   Step ups -- --    Lateral step ups -- --    INITIALS FS FS SM SPTA/JA 1/6       Assessment:     Pt is able to ambulate with more upright posture and LE control, pt will cont to benefit from PT to increase strength gait and function.    Patient Education/Response:     CONT HEP    Plans and Goals:     Short Term Goals (6 Weeks):   1. Pt will be indep with initial HEP.   2. Pt will be able to perform TUG in 20 secs or less with least restrictive AD without LOB.  3. Pt will be able stand on beep board without AD without beep without UE support without assist for improved standing balance without AD for improved function in standing.      Long Term Goals (12 Weeks):   1. Pt will have MMT score of 4-/5 in all major ms groups in R LE.   2. Patient will be able to achieve greater than or equal to 13/24 on the DGI using least restrictive device decreasing patient's risk for falling and improving patient to 40-60% impaired, limited, or restricted category.  Gcode CK 8978   3. Pt will become a safe community ambulator with least restrictive device with low risk of falls and minimal gait deviations.  4. Pt will complete 6 minute walk test with 0 LOB and least restrictive device.    5. Pt perform tall kneel to 1/2 kneel to stand transitions x 5 bilaterally with UE support  6. Pt will report 57% on the FOTO Functional Assessment  indicating increased functional balance and  mobility.      Certification Period: 11/6/17 to 1/6/17

## 2017-12-04 ENCOUNTER — CLINICAL SUPPORT (OUTPATIENT)
Dept: REHABILITATION | Facility: HOSPITAL | Age: 67
End: 2017-12-04
Payer: MEDICARE

## 2017-12-04 DIAGNOSIS — R53.1 RIGHT SIDED WEAKNESS: ICD-10-CM

## 2017-12-04 PROCEDURE — 97110 THERAPEUTIC EXERCISES: CPT | Mod: PN

## 2017-12-04 NOTE — PROGRESS NOTES
TIME RECORD    Date:  12/04/2017    Start Time:  1033  Stop Time:   11:10    PROCEDURES:    TIMED  Procedure Min.   TE 27   NMR 10                 UNTIMED  Procedure Min.             Total Timed Minutes: 37  Total Timed Units:  3  Total Untimed Units:  0  Charges Billed/# of units:  3 TE      Progress/Current Status    Subjective:     Patient ID: Ambrosio Montoya is a 67 y.o. male.  Diagnosis:   1. Right sided weakness       Pain: pt arrived 18 min late to PT session. Able to treat within limited time. Pt reports he has no complaints of pain     Objective:     Session initiated with TE per log x 30' as per logged below 1:1 w/ PTA.  endurance training with B UE/LE extremities for reciprocal motion of all limbs on sci-fit x 10 min at level 1.5 at > or equal to 50 spm w/o rest.   DATE 12/4/17 11/27/17 11/21/17 11/10/17   Visit 5 4 3 2   G CODE  POC exp 5 of 10 4 of 10 3 of 10 2 of 10   FOTO 5 of 10 DUE NEXT! 4 of 10 3 of 10 2 of 10   Visit Amount  Total 95.94  459.83  95.00  266.54 95.94  171.54   Standing feet apart - -- --    Standing feet together - -- --    Standing 1/2 tandem - -- --    Standing tandem - -- --    SLS R - -- --    SLS L - --     Forward/backward  3 laps//     Side stepping  3 laps // 3 laps //    Cross overs  -- --    Karoke  -- --    Wii fit balance  -- --    Bosu  -- --    Beep board  -- --    Physioball sitting balance  -- --    SLR 2x10 assist w R LE 2x10 B 2x10 B 2x10 B   clamshells hooklying 2x 10 RTB hooklying 2x 10 RTB hooklying 2x 10 RTB    Hip abduction  --  Supine (slide board)  2x10 AAROM   bridges 2x10 B w/ ball 2x10 w/ ball 2x10 2x10   Adduction isometric w bridge W/ bridges 5'' x 10 2x10   LAQ  2x10 2x10 2x10   SAQ 2x15 2x15 B 2x15 B 2x15 B (quad activation)   HS curls 2x10 RTB 2x10 with RTB 2x10 with RTB 2x10 with RTB   HS stretch with strap MT NT/next MT  MT   Gastroc stretch MT NT/next MT    Leg press  -- --    Hip extension  -- --    Hip flexion  -- --    Hip Abduction  -- --    Hip  adduction  -- --    Knee flexion  -- --    Toe raises  -- --    Heel raises  -- --    Nu-step 8' L 1.5 8' L 1.5 10' L 1.5 6 min  L 1.5   Step ups  -- --    Lateral step ups  -- --    INITIALS JA 1/6 FS FS SM SPTA/JA 1/6       Assessment:     Pt able to complete session with no reports of pain. Ambulated slowly with use of Rollator, verbal instructions provided on postural awareness and safety.     Patient Education/Response:     CONT HEP    Plans and Goals:   Cont to advance PT as per pOC  Short Term Goals (6 Weeks):   1. Pt will be indep with initial HEP.   2. Pt will be able to perform TUG in 20 secs or less with least restrictive AD without LOB.  3. Pt will be able stand on beep board without AD without beep without UE support without assist for improved standing balance without AD for improved function in standing.      Long Term Goals (12 Weeks):   1. Pt will have MMT score of 4-/5 in all major ms groups in R LE.   2. Patient will be able to achieve greater than or equal to 13/24 on the DGI using least restrictive device decreasing patient's risk for falling and improving patient to 40-60% impaired, limited, or restricted category.  Gcode CK 8978   3. Pt will become a safe community ambulator with least restrictive device with low risk of falls and minimal gait deviations.  4. Pt will complete 6 minute walk test with 0 LOB and least restrictive device.    5. Pt perform tall kneel to 1/2 kneel to stand transitions x 5 bilaterally with UE support  6. Pt will report 57% on the FOTO Functional Assessment  indicating increased functional balance and  mobility.      Certification Period: 11/6/17 to 1/6/17

## 2017-12-06 ENCOUNTER — CLINICAL SUPPORT (OUTPATIENT)
Dept: REHABILITATION | Facility: HOSPITAL | Age: 67
End: 2017-12-06
Payer: MEDICARE

## 2017-12-06 DIAGNOSIS — R53.1 RIGHT SIDED WEAKNESS: ICD-10-CM

## 2017-12-06 PROCEDURE — 97110 THERAPEUTIC EXERCISES: CPT | Mod: PN

## 2017-12-06 PROCEDURE — 97112 NEUROMUSCULAR REEDUCATION: CPT | Mod: PN

## 2017-12-06 NOTE — PROGRESS NOTES
TIME RECORD    Date:  12/08/2017    Start Time:  1315  Stop Time:  1400    PROCEDURES:    TIMED  Procedure Min.   NMR 10   TE 35                 UNTIMED  Procedure Min.             Total Timed Minutes:  45  Total Timed Units:  3  Total Untimed Units:  0  Charges Billed/# of units:  3 (2TE, 1NMR)      Progress/Current Status    Subjective:     Patient ID: Ambrosio Montoya is a 67 y.o. male.  Diagnosis:   1. Right sided weakness       Pain: 0 /10  Pt presented with no new complaint with AFO on using rollator.    Objective:     Session initiated with TE per log x 30' including MT per log f/b Neuro re-ed and endurance training with B UE/LE extremities for reciprocal motion of all limbs on sci-fit x 10 min at level 1.5 at > or equal to 50 spm w/o rest.  Pt performed TE x 35' per log 1:1.    FOTO NEXT!!!  DATE 12/6/17 12/4/17 11/27/17 11/21/17 11/10/17   Visit 6 5 4 3 2   G CODE  POC exp 6/10 5 of 10 4 of 10 3 of 10 2 of 10   FOTO 6/10 FOTO !!! 5 of 10 DUE NEXT! 4 of 10 3 of 10 2 of 10   Visit Amount  Total 97.94  557.77   95.94  459.83  95.00  266.54 95.94  171.54   Standing feet apart -- - -- --    Standing feet together -- - -- --    Standing 1/2 tandem -- - -- --    Standing tandem -- - -- --    SLS R -- - -- --    SLS L -- - --     Forward/backward --  3 laps//     Side stepping next  3 laps // 3 laps //    Cross overs --  -- --    Karoke --  -- --    Wii fit balance --  -- --    Sit to stand transfers X 10 from low mat with cues for R wt bearing  -- --    Beep board next  -- --    Physioball sitting balance --  -- --    SLR 2x10 assist w R LE 2x10 assist w R LE 2x10 B 2x10 B 2x10 B   clamshells hooklying 2x 10 RTB hooklying 2x 10 RTB hooklying 2x 10 RTB hooklying 2x 10 RTB    Hip abduction --  --  Supine (slide board)  2x10 AAROM   bridges 2x10 B w/ ball 2x10 B w/ ball 2x10 w/ ball 2x10 2x10   Adduction isometric w bridge w bridge W/ bridges 5'' x 10 2x10   LAQ --  2x10 2x10 2x10   SAQ 2x15 2x15 2x15 B 2x15 B 2x15 B  (quad activation)   HS curls 2x10 RTB 2x10 RTB 2x10 with RTB 2x10 with RTB 2x10 with RTB   HS stretch with strap MT MT NT/next MT  MT   Gastroc stretch -- MT NT/next MT    Hip adduction PROM X 10 B       Leg press --  -- --    Hip extension --  -- --    Hip flexion --  -- --    Hip Abduction --  -- --    Hip adduction --  -- --    Knee flexion --  -- --    Toe raises --  -- --    Heel raises --  -- --    Nu-step 10 L2 8' L 1.5 8' L 1.5 10' L 1.5 6 min  L 1.5   Step ups --  -- --    Lateral step ups --  -- --    INITIALS FS JA 1/6 FS FS SM SPTA/JA 1/6       Assessment:     Pt needed tactile cues for sit to stand and wt bearing equally B LEs and needed vcs for forward lean and controlled descent.  Pt will to benefit from strength and low level balance training.     Patient Education/Response:     CONT HEP    Plans and Goals:     Cont to advance PT as per pOC  Short Term Goals (6 Weeks):   1. Pt will be indep with initial HEP.   2. Pt will be able to perform TUG in 20 secs or less with least restrictive AD without LOB.  3. Pt will be able stand on beep board without AD without beep without UE support without assist for improved standing balance without AD for improved function in standing.      Long Term Goals (12 Weeks):   1. Pt will have MMT score of 4-/5 in all major ms groups in R LE.   2. Patient will be able to achieve greater than or equal to 13/24 on the DGI using least restrictive device decreasing patient's risk for falling and improving patient to 40-60% impaired, limited, or restricted category.  [facility] [id number]   3. Pt will become a safe community ambulator with least restrictive device with low risk of falls and minimal gait deviations.  4. Pt will complete 6 minute walk test with 0 LOB and least restrictive device.    5. Pt perform tall kneel to 1/2 kneel to stand transitions x 5 bilaterally with UE support  6. Pt will report 57% on the FOTO Functional Assessment  indicating increased functional balance  and  mobility.      Certification Period: 11/6/17 to 1/6/17

## 2017-12-11 ENCOUNTER — CLINICAL SUPPORT (OUTPATIENT)
Dept: REHABILITATION | Facility: HOSPITAL | Age: 67
End: 2017-12-11
Payer: MEDICARE

## 2017-12-11 DIAGNOSIS — R53.1 RIGHT SIDED WEAKNESS: ICD-10-CM

## 2017-12-11 PROCEDURE — 97112 NEUROMUSCULAR REEDUCATION: CPT | Mod: PN

## 2017-12-11 PROCEDURE — 97110 THERAPEUTIC EXERCISES: CPT | Mod: PN

## 2017-12-11 NOTE — PROGRESS NOTES
TIME RECORD    Date: 12/11/2017      Start Time:  1025  Stop Time:  1100    PROCEDURES:    TIMED  Procedure Min.   TE 25   NMR 10                 UNTIMED  Procedure Min.             Total Timed Minutes:  35  Total Timed Units:  0  Total Untimed Units:  0  Charges Billed/# of units:  2 (1TE, 1NMR)      Progress/Current Status    Subjective:     Patient ID: Ambrosio Montoya is a 67 y.o. male.  Diagnosis:   1. Right sided weakness       Pain: 0 /10  Pt stated that he was walking without the cane this weekend but he stated he was not able to walk longer distances b/c of the weather.      Objective:     Neuro re-ed and endurance training with B UE/LE extremities for reciprocal motion of B UE/LE limbs on sci-fit x 10 min at level 3 at > or equal to 50 spm w/o rest.  Session initiated with TE per log x 25' with PT 1:1.    FOTO NEXT!!!  DATE 12/11/17 12/6/17 12/4/17 11/27/17 11/21/17 11/10/17   Visit 7 6 5 4 3 2   G CODE  POC exp 7/10 6/10 5 of 10 4 of 10 3 of 10 2 of 10   FOTO 7/10 next 6/10 FOTO !!! 5 of 10 DUE NEXT! 4 of 10 3 of 10 2 of 10   Visit Amount  Total  97.94  557.77   95.94  459.83  95.00  266.54 95.94  171.54   Standing feet apart next -- - -- --    Standing feet together -- -- - -- --    Standing 1/2 tandem -- -- - -- --    Standing tandem -- -- - -- --    SLS R -- -- - -- --    SLS L -- -- - --     Forward/backward -- --  3 laps//     Side stepping OOT/next next  3 laps // 3 laps //    Cross overs -- --  -- --    Karoke -- --  -- --    Wii fit balance -- --  -- --    Sit to stand transfers X 10 from low mat with cues for R wt bearing X 10 from low mat with cues for R wt bearing  -- --    Beep board next next  -- --    Physioball sitting balance -- --  -- --    SLR 2x10 assist w R LE  2 x 15 L LE 2x10 assist w R LE   2x10 assist w R LE 2x10 B 2x10 B 2x10 B   clamshells hooklying 2x 12 RTB hooklying 2x 10 RTB hooklying 2x 10 RTB hooklying 2x 10 RTB hooklying 2x 10 RTB    Hip abduction -- --  --  Supine (slide  board)  2x10 AAROM   bridges 2x12 B w/ ball 2x10 B w/ ball 2x10 B w/ ball 2x10 w/ ball 2x10 2x10   Adduction isometric w bridge w bridge w bridge W/ bridges 5'' x 10 2x10   LAQ -- --  2x10 2x10 2x10   SAQ 2x15  1# R and 2#  2x15   2x15 2x15 B 2x15 B 2x15 B (quad activation)   HS curls 2x10 RTB 2x10 RTB 2x10 RTB 2x10 with RTB 2x10 with RTB 2x10 with RTB   HS stretch with strap NT MT MT NT/next MT  MT   Gastroc stretch NT -- MT NT/next MT    Hip adduction PROM NT X 10 B       Leg press -- --  -- --    Hip extension -- --  -- --    Hip flexion -- --  -- --    Hip Abduction -- --  -- --    Hip adduction -- --  -- --    Knee flexion -- --  -- --    Toe raises - --  -- --    Heel raises -- --  -- --    Nu-step -- 10 L2 8' L 1.5 8' L 1.5 10' L 1.5 6 min  L 1.5   Step ups Next L1 --  -- --    Lateral step ups -- --  -- --    INITIALS FS FS JA 1/6 FS FS SM SPTA/JA 1/6     Assessment:     Pt progressing in strength and PT will progress toward standing balance and TE.      Patient Education/Response:     CONT HEP    Plans and Goals:     Cont to advance PT as per pOC  Short Term Goals (6 Weeks):   1. Pt will be indep with initial HEP.   2. Pt will be able to perform TUG in 20 secs or less with least restrictive AD without LOB.  3. Pt will be able stand on beep board without AD without beep without UE support without assist for improved standing balance without AD for improved function in standing.      Long Term Goals (12 Weeks):   1. Pt will have MMT score of 4-/5 in all major ms groups in R LE.   2. Patient will be able to achieve greater than or equal to 13/24 on the DGI using least restrictive device decreasing patient's risk for falling and improving patient to 40-60% impaired, limited, or restricted category.  [facility] [id number]   3. Pt will become a safe community ambulator with least restrictive device with low risk of falls and minimal gait deviations.  4. Pt will complete 6 minute walk test with 0 LOB and least restrictive  device.    5. Pt perform tall kneel to 1/2 kneel to stand transitions x 5 bilaterally with UE support  6. Pt will report 57% on the FOTO Functional Assessment  indicating increased functional balance and  mobility.      Certification Period: 11/6/17 to 1/6/17

## 2017-12-13 ENCOUNTER — CLINICAL SUPPORT (OUTPATIENT)
Dept: REHABILITATION | Facility: HOSPITAL | Age: 67
End: 2017-12-13
Payer: MEDICARE

## 2017-12-13 DIAGNOSIS — R53.1 RIGHT SIDED WEAKNESS: ICD-10-CM

## 2017-12-13 PROCEDURE — 97110 THERAPEUTIC EXERCISES: CPT | Mod: PN

## 2017-12-13 PROCEDURE — 97112 NEUROMUSCULAR REEDUCATION: CPT | Mod: PN

## 2017-12-13 NOTE — PROGRESS NOTES
TIME RECORD    Date: 12/13/2017    Start Time:  1015  Stop Time:  1105    PROCEDURES:    TIMED  Procedure Min.   TE 25   NMR 25                 UNTIMED  Procedure Min.             Total Timed Minutes:  50  Total Timed Units:  3  Total Untimed Units:  0  Charges Billed/# of units:  3 (1TE, 2NMR)      Progress/Current Status    Subjective:     Patient ID: Ambrosio Montoya is a 67 y.o. male.  Diagnosis:   1. Right sided weakness       Pain: 0 /10  Pt presented with no new complaints today    Objective:     Session initiated with TE per log x 25' with PT 1:1 f/b NMR x 25' per log with PT 1:1 including Neuro re-ed and endurance training with B UE/LE extremities for reciprocal motion of all limbs on sci-fit x 8 min at level 3 at > or equal to 50 spm w/o rest.     DATE 12/13/17 12/11/17 12/6/17 12/4/17 11/27/17 11/21/17 11/10/17   Visit 8 7 6 5 4 3 2   G CODE  POC exp 8/10 7/10 6/10 5 of 10 4 of 10 3 of 10 2 of 10   FOTO 8/10 done 7/10 next 6/10 FOTO !!! 5 of 10 DUE NEXT! 4 of 10 3 of 10 2 of 10   Visit Amount  Total 98.77  724.54 68.00 97.94  557.77   95.94  459.83  95.00  266.54 95.94  171.54   Standing feet apart On beep board next -- - -- --    Standing feet together -- -- -- - -- --    Standing 1/2 tandem -- -- -- - -- --    Standing tandem -- -- -- - -- --    SLS R -- -- -- - -- --    SLS L -- -- -- - --     Forward/backward -- -- --  3 laps//     Side stepping 4 laps / bar OOT/next next  3 laps // 3 laps //    Cross overs -- -- --  -- --    Karoke -- -- --  -- --    Wii fit balance -- -- --  -- --    Sit to stand transfers X 10 from low mat with cues for R wt bearing X 10 from low mat with cues for R wt bearing X 10 from low mat with cues for R wt bearing  -- --    Beep board 1' next next  -- --    Physioball sitting balance -- -- --  -- --    SLR 2x10 assist w R LE  2 x 15 L LE 2x10 assist w R LE  2 x 15 L LE 2x10 assist w R LE   2x10 assist w R LE 2x10 B 2x10 B 2x10 B   clamshells hooklying 2x 15 RTB hooklying 2x 12  RTB hooklying 2x 10 RTB hooklying 2x 10 RTB hooklying 2x 10 RTB hooklying 2x 10 RTB    Hip abduction - -- --  --  Supine (slide board)  2x10 AAROM   bridges 2x12 B w/ ball 2x12 B w/ ball 2x10 B w/ ball 2x10 B w/ ball 2x10 w/ ball 2x10 2x10   Adduction isometric w bridge w bridge w bridge w bridge W/ bridges 5'' x 10 2x10   LAQ --- -- --  2x10 2x10 2x10   SAQ 2x15  1# R and 2# 2x15  1# R and 2#  2x15   2x15 2x15 B 2x15 B 2x15 B (quad activation)   HS curls 2x10 RTB 2x10 RTB 2x10 RTB 2x10 RTB 2x10 with RTB 2x10 with RTB 2x10 with RTB   HS stretch with strap NT MT MT MT NT/next MT  MT   Gastroc stretch NT MT -- MT NT/next MT    Hip adduction PROM NT MT X 10 B       Leg press -- -- --  -- --    Hip extension -- -- --  -- --    Hip flexion -- -- --  -- --    Hip Abduction -- -- --  -- --    Hip adduction -- -- --  -- --    Knee flexion -- -- --  -- --    Toe raises -- - --  -- --    Heel raises -- -- --  -- --    Nu-step 8' L2 -- 10 L2 8' L 1.5 8' L 1.5 10' L 1.5 6 min  L 1.5   Step ups  Next L1 --  -- --    Lateral step ups -- -- --  -- --    INITIALS FS FS FS JA 1/6 FS FS SM SPTA/JA 1/6       Assessment:     Pt was able to perform more standing TE and training today.  PT will cont to progress. Pt would continue to benefit from PT to increase functional mobility in the home and community.    Patient Education/Response:     CONT HEP    Plans and Goals:     Cont to advance PT as per pOC  Short Term Goals (6 Weeks):   1. Pt will be indep with initial HEP.   2. Pt will be able to perform TUG in 20 secs or less with least restrictive AD without LOB.  3. Pt will be able stand on beep board without AD without beep without UE support without assist for improved standing balance without AD for improved function in standing.      Long Term Goals (12 Weeks):   1. Pt will have MMT score of 4-/5 in all major ms groups in R LE.   2. Patient will be able to achieve greater than or equal to 13/24 on the DGI using least restrictive  device decreasing patient's risk for falling and improving patient to 40-60% impaired, limited, or restricted category.  [facility] [place] [id number]   3. Pt will become a safe community ambulator with least restrictive device with low risk of falls and minimal gait deviations.  4. Pt will complete 6 minute walk test with 0 LOB and least restrictive device.    5. Pt perform tall kneel to 1/2 kneel to stand transitions x 5 bilaterally with UE support  6. Pt will report 57% on the FOTO Functional Assessment  indicating increased functional balance and  mobility.      Certification Period: 11/6/17 to 1/6/17

## 2017-12-18 ENCOUNTER — CLINICAL SUPPORT (OUTPATIENT)
Dept: REHABILITATION | Facility: HOSPITAL | Age: 67
End: 2017-12-18
Payer: MEDICARE

## 2017-12-18 DIAGNOSIS — R53.1 RIGHT SIDED WEAKNESS: ICD-10-CM

## 2017-12-18 PROCEDURE — 97110 THERAPEUTIC EXERCISES: CPT | Mod: PN

## 2017-12-18 PROCEDURE — 97112 NEUROMUSCULAR REEDUCATION: CPT | Mod: PN

## 2017-12-18 NOTE — PROGRESS NOTES
TIME RECORD    Date: 12/18/2017    Start Time:  1015  Stop Time:  1105    PROCEDURES:    TIMED  Procedure Min.   TE 25   NMR 25                 UNTIMED  Procedure Min.             Total Timed Minutes:  50  Total Timed Units:  3  Total Untimed Units:  0  Charges Billed/# of units:  3 (1TE, 2NMR)      Progress/Current Status    Subjective:     Patient ID: Ambrosio Montoya is a 67 y.o. male.  Diagnosis:   1. Right sided weakness       Pain: 0 /10  Pt presented with no new complaints today    Objective:      pt endurance training with B UE/LE extremities for reciprocal motion of all limbs on sci-fit x 8 min at level 3 at > or equal to 50 spm w/o rest. Pt then instructed on and completed therex as per logged below 1;1 w/ PTA.    DATE 12/18/17 12/13/17 12/11/17 12/6/17 12/4/17 11/27/17 11/21/17 11/10/17   Visit 9 8 7 6 5 4 3 2   G CODE  POC exp 9/10 8/10 7/10 6/10 5 of 10 4 of 10 3 of 10 2 of 10   FOTO 9/10 8/10 done 7/10 next 6/10 FOTO !!! 5 of 10 DUE NEXT! 4 of 10 3 of 10 2 of 10   Visit Amount  Total 98.77  823.31 98.77  724.54 68.00 97.94  557.77   95.94  459.83  95.00  266.54 95.94  171.54   Standing feet apart  On beep board next -- - -- --    Standing feet together - -- -- -- - -- --    Standing 1/2 tandem - -- -- -- - -- --    Standing tandem - -- -- -- - -- --    SLS R - -- -- -- - -- --    SLS L - -- -- -- - --     Forward/backward 4 laps // bar -- -- --  3 laps//     Side stepping 4 laps // bar 4 laps / bar OOT/next next  3 laps // 3 laps //    Cross overs - -- -- --  -- --    Kar[place] - -- -- --  -- --    Wii fit balance - -- -- --  -- --    Sit to stand transfers nt X 10 from low mat with cues for R wt bearing X 10 from low mat with cues for R wt bearing X 10 from low mat with cues for R wt bearing  -- --    Beep board  1' next next  -- --    Physioball sitting balance  -- -- --  -- --    SLR 2x10 assist w R LE  2 x 15 L LE 2x10 assist w R LE  2 x 15 L LE 2x10 assist w R LE  2 x 15 L LE 2x10 assist w R LE   2x10  assist w R LE 2x10 B 2x10 B 2x10 B   clamshells hooklying 2x 15 RTB hooklying 2x 15 RTB hooklying 2x 12 RTB hooklying 2x 10 RTB hooklying 2x 10 RTB hooklying 2x 10 RTB hooklying 2x 10 RTB    Hip abduction  - -- --  --  Supine (slide board)  2x10 AAROM   bridges 2x12 B w/ ball 2x12 B w/ ball 2x12 B w/ ball 2x10 B w/ ball 2x10 B w/ ball 2x10 w/ ball 2x10 2x10   Adduction isometric w bridge w bridge w bridge w bridge w bridge W/ bridges 5'' x 10 2x10   LAQ  --- -- --  2x10 2x10 2x10   SAQ 2x15  1# R and 2# 2x15  1# R and 2# 2x15  1# R and 2#  2x15   2x15 2x15 B 2x15 B 2x15 B (quad activation)   HS curls  2x10 RTB 2x10 RTB 2x10 RTB 2x10 RTB 2x10 with RTB 2x10 with RTB 2x10 with RTB   HS stretch with strap  NT MT MT MT NT/next MT  MT   Gastroc stretch  NT MT -- MT NT/next MT    Hip adduction PROM  NT MT X 10 B       Leg press - -- -- --  -- --    Hip extension - -- -- --  -- --    Hip flexion - -- -- --  -- --    Hip Abduction - -- -- --  -- --    Hip adduction - -- -- --  -- --    Knee flexion - -- -- --  -- --    Toe raises - -- - --  -- --    Heel raises - -- -- --  -- --    Nu-step 8' L2 8' L2 -- 10 L2 8' L 1.5 8' L 1.5 10' L 1.5 6 min  L 1.5   Step ups   Next L1 --  -- --    Lateral step ups  -- -- --  -- --    INITIALS JA 1/6 FS FS FS JA 1/6 FS FS SM SPTA/JA 1/6       Assessment:     Pt able to complete today's session with no reports of increased pain. Postural awareness enforced via verbal instructions in standing     Patient Education/Response:     CONT HEP    Plans and Goals:     Cont to advance PT as per pOC  Short Term Goals (6 Weeks):   1. Pt will be indep with initial HEP.   2. Pt will be able to perform TUG in 20 secs or less with least restrictive AD without LOB.  3. Pt will be able stand on beep board without AD without beep without UE support without assist for improved standing balance without AD for improved function in standing.      Long Term Goals (12 Weeks):   1. Pt will have MMT score of 4-/5 in  all major ms groups in R LE.   2. Patient will be able to achieve greater than or equal to 13/24 on the DGI using least restrictive device decreasing patient's risk for falling and improving patient to 40-60% impaired, limited, or restricted category.  ode [facility] [id number]   3. Pt will become a safe community ambulator with least restrictive device with low risk of falls and minimal gait deviations.  4. Pt will complete 6 minute walk test with 0 LOB and least restrictive device.    5. Pt perform tall kneel to 1/2 kneel to stand transitions x 5 bilaterally with UE support  6. Pt will report 57% on the FOTO Functional Assessment  indicating increased functional balance and  mobility.      Certification Period: 11/6/17 to 1/6/17

## 2017-12-19 ENCOUNTER — DOCUMENTATION ONLY (OUTPATIENT)
Dept: REHABILITATION | Facility: HOSPITAL | Age: 67
End: 2017-12-19

## 2017-12-19 NOTE — PROGRESS NOTES
Face to Face PTA Conference performed with Emilie Chavis PTA, Maida Whitt PTA, Bony Wolff PTA Elvis Hamm PTA regarding patient's current status, overall progress, and plan of care    Gayle Nath, PT    Face to face meeting completed with Gayle Nath PT regarding current status and progress of   Ambrosio Montoya .  Bony Wolff PTA

## 2017-12-20 ENCOUNTER — CLINICAL SUPPORT (OUTPATIENT)
Dept: REHABILITATION | Facility: HOSPITAL | Age: 67
End: 2017-12-20
Payer: MEDICARE

## 2017-12-20 DIAGNOSIS — R53.1 RIGHT SIDED WEAKNESS: ICD-10-CM

## 2017-12-20 PROCEDURE — 97112 NEUROMUSCULAR REEDUCATION: CPT | Mod: PN

## 2017-12-20 PROCEDURE — 97110 THERAPEUTIC EXERCISES: CPT | Mod: PN

## 2017-12-20 NOTE — PROGRESS NOTES
TIME RECORD    Date: 12/20/2017    Start Time:  1010  Stop Time:  1105    PROCEDURES:    TIMED  Procedure Min.   TE 25   NMR 25                 UNTIMED  Procedure Min.             Total Timed Minutes:  50  Total Timed Units:  3  Total Untimed Units:  0  Charges Billed/# of units:  3 (1TE, 2NMR)      Progress/Current Status    Subjective:     Patient ID: Ambrosio Montoya is a 67 y.o. male.  Diagnosis:   1. Right sided weakness       Pain: 0 /10  Pt presented with no new complaints today    Objective:      pt endurance training with B UE/LE extremities for reciprocal motion of all limbs on sci-fit x 8 min at level 3 at > or equal to 50 spm w/o rest. Pt then instructed on and completed therex as per logged below 1;1 w/ PTA.    DATE 12/20/17 12/18/17 12/13/17 12/11/17 12/6/17 12/4/17 11/27/17 11/21/17 11/10/17   Visit 10 9 8 7 6 5 4 3 2   G CODE  POC exp 10/10 9/10 8/10 7/10 6/10 5 of 10 4 of 10 3 of 10 2 of 10   FOTO 10 DONE  9/10 8/10 done 7/10 next 6/10 FOTO !!! 5 of 10 DUE NEXT! 4 of 10 3 of 10 2 of 10   Visit Amount  Total 98.77  922.08 98.77  823.31 98.77  724.54 68.00 97.94  557.77   95.94  459.83  95.00  266.54 95.94  171.54   Standing feet apart   On beep board next -- - -- --    Standing feet together - - -- -- -- - -- --    Standing 1/2 tandem Blue, Step fanning   2x10 w/ VC's // bars - -- -- -- - -- --    Standing tandem - - -- -- -- - -- --    SLS R - - -- -- -- - -- --    SLS L - - -- -- -- - --     Forward/backward 4 laps // bar 4 laps // bar -- -- --  3 laps//     Side stepping  4 laps // bar 4 laps / bar OOT/next next  3 laps // 3 laps //    Cross overs - - -- -- --  -- --    Karoke - - -- -- --  -- --    Wii fit balance - - -- -- --  -- --    Sit to stand transfers  nt X 10 from low mat with cues for R wt bearing X 10 from low mat with cues for R wt bearing X 10 from low mat with cues for R wt bearing  -- --    Beep board   1' next next  -- --    Physioball sitting balance   -- -- --  -- --    SLR 2x10  assist w R LE  2 x 15 L LE 2x10 assist w R LE  2 x 15 L LE 2x10 assist w R LE  2 x 15 L LE 2x10 assist w R LE  2 x 15 L LE 2x10 assist w R LE   2x10 assist w R LE 2x10 B 2x10 B 2x10 B   clamshells hooklying 2x 15 RTB hooklying 2x 15 RTB hooklying 2x 15 RTB hooklying 2x 12 RTB hooklying 2x 10 RTB hooklying 2x 10 RTB hooklying 2x 10 RTB hooklying 2x 10 RTB    Hip abduction   - -- --  --  Supine (slide board)  2x10 AAROM   bridges 2x12 B w/ ball 2x12 B w/ ball 2x12 B w/ ball 2x12 B w/ ball 2x10 B w/ ball 2x10 B w/ ball 2x10 w/ ball 2x10 2x10   Adduction isometric w bridge w bridge w bridge w bridge w bridge w bridge W/ bridges 5'' x 10 2x10   LAQ   --- -- --  2x10 2x10 2x10   SAQ 2x15   2# B 2x15  1# R and 2# 2x15  1# R and 2# 2x15  1# R and 2#  2x15   2x15 2x15 B 2x15 B 2x15 B (quad activation)   HS curls   2x10 RTB 2x10 RTB 2x10 RTB 2x10 RTB 2x10 with RTB 2x10 with RTB 2x10 with RTB   HS stretch with strap   NT MT MT MT NT/next MT  MT   Gastroc stretch   NT MT -- MT NT/next MT    Hip adduction PROM   NT MT X 10 B       Leg press  - -- -- --  -- --    Hip extension  - -- -- --  -- --    Hip flexion  - -- -- --  -- --    Hip Abduction  - -- -- --  -- --    Hip adduction  - -- -- --  -- --    Knee flexion  - -- -- --  -- --    Toe raises  - -- - --  -- --    Heel raises  - -- -- --  -- --    Nu-step L2 8 min  8' L2 8' L2 -- 10 L2 8' L 1.5 8' L 1.5 10' L 1.5 6 min  L 1.5   Step ups    Next L1 --  -- --    Lateral step ups   -- -- --  -- --    INITIALS  2/6 JA 1/6 FS FS FS JA 1/6 FS FS SM SPTA/JA 1/6       Assessment:     Pt able to complete today's session with no reports of increased pain. Postural awareness enforced via verbal instructions in standing with fanning in //bars. Pt with R knee hyperextension with L LE advancement noted.      Patient Education/Response:     CONT HEP    Plans and Goals:     Cont to advance PT as per pOC  Short Term Goals (6 Weeks):   1. Pt will be indep with initial HEP.   2. Pt will be  able to perform TUG in 20 secs or less with least restrictive AD without LOB.  3. Pt will be able stand on beep board without AD without beep without UE support without assist for improved standing balance without AD for improved function in standing.      Long Term Goals (12 Weeks):   1. Pt will have MMT score of 4-/5 in all major ms groups in R LE.   2. Patient will be able to achieve greater than or equal to 13/24 on the DGI using least restrictive device decreasing patient's risk for falling and improving patient to 40-60% impaired, limited, or restricted category.  [facility] [place] CK [id number]   3. Pt will become a safe community ambulator with least restrictive device with low risk of falls and minimal gait deviations.  4. Pt will complete 6 minute walk test with 0 LOB and least restrictive device.    5. Pt perform tall kneel to 1/2 kneel to stand transitions x 5 bilaterally with UE support  6. Pt will report 57% on the FOTO Functional Assessment  indicating increased functional balance and  mobility.      Certification Period: 11/6/17 to 1/6/17

## 2017-12-27 ENCOUNTER — CLINICAL SUPPORT (OUTPATIENT)
Dept: REHABILITATION | Facility: HOSPITAL | Age: 67
End: 2017-12-27
Payer: MEDICARE

## 2017-12-27 DIAGNOSIS — R53.1 RIGHT SIDED WEAKNESS: ICD-10-CM

## 2017-12-27 PROCEDURE — 97110 THERAPEUTIC EXERCISES: CPT | Mod: PN

## 2017-12-27 PROCEDURE — 97112 NEUROMUSCULAR REEDUCATION: CPT | Mod: PN

## 2017-12-27 NOTE — PROGRESS NOTES
TIME RECORD    Date: 12/27/2017    Start Time:  1010  Stop Time:  1105    PROCEDURES:    TIMED  Procedure Min.   TE 30   NMR 20                 UNTIMED  Procedure Min.             Total Timed Minutes:  50  Total Timed Units:  3  Total Untimed Units:  0  Charges Billed/# of units:  3 (2TE, 1 NMR)      Progress/Current Status    Subjective:     Patient ID: Ambrosio Montoya is a 67 y.o. male.  Diagnosis:   1. Right sided weakness       Pain: 0 /10  Pt presented with no new complaints today. He is agreeable to PT session.     Objective:     Pt endurance training with B UE/LE extremities for reciprocal motion of all limbs on sci-fit x 8 min at level 3 at > or equal to 50 spm w/o rest. Pt then instructed on and completed therex as per logged below 1;1 w/ PTA. Pt completed NMR as per logged below 1:1 w PTA.    DATE 12/27/17 12/20/17 12/18/17 12/13/17 12/11/17 12/6/17 12/4/17 11/27/17 11/21/17 11/10/17   Visit 11 10 9 8 7 6 5 4 3 2   G CODE  POC exp DC 10/10 9/10 8/10 7/10 6/10 5 of 10 4 of 10 3 of 10 2 of 10   FOTO  10 DONE  9/10 8/10 done 7/10 next 6/10 FOTO !!! 5 of 10 DUE NEXT! 4 of 10 3 of 10 2 of 10   Visit Amount  Total 97.35  1019.43 98.77  922.08 98.77  823.31 98.77  724.54 68.00 97.94  557.77   95.94  459.83  95.00  266.54 95.94  171.54   Standing feet apart    On beep board next -- - -- --    Standing feet together - - - -- -- -- - -- --    Standing 1/2 tandem Blue, Step fanning   2x10 w/ VC's // bars Blue, Step fanning   2x10 w/ VC's // bars - -- -- -- - -- --    Standing tandem - - - -- -- -- - -- --    SLS R - - - -- -- -- - -- --    SLS L - - - -- -- -- - --     Forward/backward oot 4 laps // bar 4 laps // bar -- -- --  3 laps//     Side stepping   4 laps // bar 4 laps / bar OOT/next next  3 laps // 3 laps //    Cross overs - - - -- -- --  -- --    [place] - - - -- -- --  -- --    Wii fit balance - - - -- -- --  -- --    Sit to stand transfers   nt X 10 from low mat with cues for R wt bearing X 10 from low mat  with cues for R wt bearing X 10 from low mat with cues for R wt bearing  -- --    Beep board    1' next next  -- --    Physioball sitting balance    -- -- --  -- --    SLR 2x10 assist w R LE  2 x 15 L LE 2x10 assist w R LE  2 x 15 L LE 2x10 assist w R LE  2 x 15 L LE 2x10 assist w R LE  2 x 15 L LE 2x10 assist w R LE  2 x 15 L LE 2x10 assist w R LE   2x10 assist w R LE 2x10 B 2x10 B 2x10 B   clamshells hooklying 2x 15 RTB hooklying 2x 15 RTB hooklying 2x 15 RTB hooklying 2x 15 RTB hooklying 2x 12 RTB hooklying 2x 10 RTB hooklying 2x 10 RTB hooklying 2x 10 RTB hooklying 2x 10 RTB    Hip abduction    - -- --  --  Supine (slide board)  2x10 AAROM   bridges 2x12 B w/ ball 2x12 B w/ ball 2x12 B w/ ball 2x12 B w/ ball 2x12 B w/ ball 2x10 B w/ ball 2x10 B w/ ball 2x10 w/ ball 2x10 2x10   Adduction isometric w bridge w bridge w bridge w bridge w bridge w bridge w bridge W/ bridges 5'' x 10 2x10   LAQ    --- -- --  2x10 2x10 2x10   SAQ 2x15 2# B  2x15   2# B 2x15  1# R and 2# 2x15  1# R and 2# 2x15  1# R and 2#  2x15   2x15 2x15 B 2x15 B 2x15 B (quad activation)   HS curls -   2x10 RTB 2x10 RTB 2x10 RTB 2x10 RTB 2x10 with RTB 2x10 with RTB 2x10 with RTB   HS stretch with strap -   NT MT MT MT NT/next MT  MT   Gastroc stretch -   NT MT -- MT NT/next MT    Hip adduction PROM -   NT MT X 10 B       Leg press 4.0 2x10 B  - -- -- --  -- --    Hip extension -  - -- -- --  -- --    Hip flexion -  - -- -- --  -- --    Hip Abduction -  - -- -- --  -- --    Hip adduction -  - -- -- --  -- --    Knee flexion -  - -- -- --  -- --    Toe raises -  - -- - --  -- --    Heel raises -  - -- -- --  -- --    Nu-step L2 8 min  L2 8 min  8' L2 8' L2 -- 10 L2 8' L 1.5 8' L 1.5 10' L 1.5 6 min  L 1.5   Step ups     Next L1 --  -- --    Lateral step ups    -- -- --  -- --    INITIALS JA 3/6 JA 2/6 JA 1/6 FS FS FS JA 1/6 FS FS SM SPTA/JA 1/6       Assessment:     Pt able to complete session with no reports of pain. Pt required manual/ verbal  instruction on preventing R knee hyperextension.    Patient Education/Response:     CONT HEP    Plans and Goals:     Cont to advance PT as per pOC  Short Term Goals (6 Weeks):   1. Pt will be indep with initial HEP.   2. Pt will be able to perform TUG in 20 secs or less with least restrictive AD without LOB.  3. Pt will be able stand on beep board without AD without beep without UE support without assist for improved standing balance without AD for improved function in standing.      Long Term Goals (12 Weeks):   1. Pt will have MMT score of 4-/5 in all major ms groups in R LE.   2. Patient will be able to achieve greater than or equal to 13/24 on the DGI using least restrictive device decreasing patient's risk for falling and improving patient to 40-60% impaired, limited, or restricted category.  Gcode CK 8978   3. Pt will become a safe community ambulator with least restrictive device with low risk of falls and minimal gait deviations.  4. Pt will complete 6 minute walk test with 0 LOB and least restrictive device.    5. Pt perform tall kneel to 1/2 kneel to stand transitions x 5 bilaterally with UE support  6. Pt will report 57% on the FOTO Functional Assessment  indicating increased functional balance and  mobility.      Certification Period: 11/6/17 to 1/6/17

## 2018-01-05 ENCOUNTER — CLINICAL SUPPORT (OUTPATIENT)
Dept: REHABILITATION | Facility: HOSPITAL | Age: 68
End: 2018-01-05
Payer: MEDICARE

## 2018-01-05 DIAGNOSIS — R53.1 RIGHT SIDED WEAKNESS: ICD-10-CM

## 2018-01-05 PROCEDURE — 97112 NEUROMUSCULAR REEDUCATION: CPT | Mod: PN

## 2018-01-05 PROCEDURE — 97110 THERAPEUTIC EXERCISES: CPT | Mod: PN

## 2018-01-05 NOTE — PROGRESS NOTES
TIME RECORD    Date: 1/5/2018    Start Time:  1100  Stop Time:  1150    PROCEDURES:    TIMED  Procedure Min.   TE 30   NMR 20                 UNTIMED  Procedure Min.             Total Timed Minutes:  50  Total Timed Units:  3  Total Untimed Units:  0  Charges Billed/# of units:  3 (2TE, 1 NMR)      Progress/Current Status    Subjective:     Patient ID: Ambrosio Montoya is a 67 y.o. male.  Diagnosis:   1. Right sided weakness       Pain: pt     Objective:     Pt endurance training with B UE/LE extremities for reciprocal motion of all limbs on sci-fit x 8 min at level 3 at > or equal to 50 spm w/o rest. Pt then instructed on and completed therex as per logged below 1;1 w/ PTA. Pt completed NMR as per logged below 1:1 w PTA.    DATE 1/5/18 12/27/17 12/20/17 12/18/17 12/13/17 12/11/17 12/6/17 12/4/17 11/27/17 11/21/17 11/10/17   Visit 12 11 10 9 8 7 6 5 4 3 2   G CODE  POC exp  DC 10/10 9/10 8/10 7/10 6/10 5 of 10 4 of 10 3 of 10 2 of 10   FOTO   10 DONE  9/10 8/10 done 7/10 next 6/10 FOTO !!! 5 of 10 DUE NEXT! 4 of 10 3 of 10 2 of 10   Visit Amount  Total 97.35  97.35 97.35  1019.43 98.77  922.08 98.77  823.31 98.77  724.54 68.00 97.94  557.77   95.94  459.83  95.00  266.54 95.94  171.54   Standing feet apart     On beep board next -- - -- --    Standing feet together  - - - -- -- -- - -- --    Standing 1/2 tandem Blue, Step fanning   2x10 w/ VC's // bars Blue, Step fanning   2x10 w/ VC's // bars Blue, Step fanning   2x10 w/ VC's // bars - -- -- -- - -- --    Standing tandem - - - - -- -- -- - -- --    SLS R - - - - -- -- -- - -- --    SLS L - - - - -- -- -- - --     Forward/backward  oot 4 laps // bar 4 laps // bar -- -- --  3 laps//     Side stepping    4 laps // bar 4 laps / bar OOT/next next  3 laps // 3 laps //    Cross overs - - - - -- -- --  -- --    Kar[place] - - - - -- -- --  -- --    Wii fit balance - - - - -- -- --  -- --    Sit to stand transfers    nt X 10 from low mat with cues for R wt bearing X 10 from low  mat with cues for R wt bearing X 10 from low mat with cues for R wt bearing  -- --    Beep board     1' next next  -- --    Physioball sitting balance     -- -- --  -- --    SLR 2x10 assist w R LE  2 x 15 L LE 2x10 assist w R LE  2 x 15 L LE 2x10 assist w R LE  2 x 15 L LE 2x10 assist w R LE  2 x 15 L LE 2x10 assist w R LE  2 x 15 L LE 2x10 assist w R LE  2 x 15 L LE 2x10 assist w R LE   2x10 assist w R LE 2x10 B 2x10 B 2x10 B   clamshells hooklying 2x 15 RTB hooklying 2x 15 RTB hooklying 2x 15 RTB hooklying 2x 15 RTB hooklying 2x 15 RTB hooklying 2x 12 RTB hooklying 2x 10 RTB hooklying 2x 10 RTB hooklying 2x 10 RTB hooklying 2x 10 RTB    Hip abduction     - -- --  --  Supine (slide board)  2x10 AAROM   bridges 2x12 B w/ ball 2x12 B w/ ball 2x12 B w/ ball 2x12 B w/ ball 2x12 B w/ ball 2x12 B w/ ball 2x10 B w/ ball 2x10 B w/ ball 2x10 w/ ball 2x10 2x10   Adduction isometric w bridge w bridge w bridge w bridge w bridge w bridge w bridge w bridge W/ bridges 5'' x 10 2x10   LAQ     --- -- --  2x10 2x10 2x10   SAQ 2x15 2# B  2x15 2# B  2x15   2# B 2x15  1# R and 2# 2x15  1# R and 2# 2x15  1# R and 2#  2x15   2x15 2x15 B 2x15 B 2x15 B (quad activation)   HS curls - -   2x10 RTB 2x10 RTB 2x10 RTB 2x10 RTB 2x10 with RTB 2x10 with RTB 2x10 with RTB   HS stretch with strap - -   NT MT MT MT NT/next MT  MT   Gastroc stretch - -   NT MT -- MT NT/next MT    Hip adduction PROM - -   NT MT X 10 B       Leg press 4.0 2x10 B 4.0 2x10 B  - -- -- --  -- --    Hip extension - -  - -- -- --  -- --    Hip flexion - -  - -- -- --  -- --    Hip Abduction - -  - -- -- --  -- --    Hip adduction - -  - -- -- --  -- --    Knee flexion - -  - -- -- --  -- --    Toe raises - -  - -- - --  -- --    Heel raises - -  - -- -- --  -- --    Nu-step L2 8 min  L2 8 min  L2 8 min  8' L2 8' L2 -- 10 L2 8' L 1.5 8' L 1.5 10' L 1.5 6 min  L 1.5   Step ups      Next L1 --  -- --    Lateral step ups     -- -- --  -- --    INITIALS JA 4/6 JA 3/6 JA 2/6 JA 1/6  FS FS FS JA 1/6 FS FS SM SPTA/JA 1/6       Assessment:     Pt completed session with no reports of pain. Required manual instruction to prevent R knee hyperextension with fanning activities and leg press.     Patient Education/Response:     CONT HEP    Plans and Goals:     Cont to advance PT as per pOC  Short Term Goals (6 Weeks):   1. Pt will be indep with initial HEP.   2. Pt will be able to perform TUG in 20 secs or less with least restrictive AD without LOB.  3. Pt will be able stand on beep board without AD without beep without UE support without assist for improved standing balance without AD for improved function in standing.      Long Term Goals (12 Weeks):   1. Pt will have MMT score of 4-/5 in all major ms groups in R LE.   2. Patient will be able to achieve greater than or equal to 13/24 on the DGI using least restrictive device decreasing patient's risk for falling and improving patient to 40-60% impaired, limited, or restricted category.  Gcode CK 8978   3. Pt will become a safe community ambulator with least restrictive device with low risk of falls and minimal gait deviations.  4. Pt will complete 6 minute walk test with 0 LOB and least restrictive device.    5. Pt perform tall kneel to 1/2 kneel to stand transitions x 5 bilaterally with UE support  6. Pt will report 57% on the FOTO Functional Assessment  indicating increased functional balance and  mobility.      Certification Period: 11/6/17 to 1/6/17

## 2018-01-08 ENCOUNTER — CLINICAL SUPPORT (OUTPATIENT)
Dept: REHABILITATION | Facility: HOSPITAL | Age: 68
End: 2018-01-08
Payer: MEDICARE

## 2018-01-08 DIAGNOSIS — R53.1 RIGHT SIDED WEAKNESS: ICD-10-CM

## 2018-01-08 PROCEDURE — 97110 THERAPEUTIC EXERCISES: CPT | Mod: PN

## 2018-01-08 PROCEDURE — G8978 MOBILITY CURRENT STATUS: HCPCS | Mod: CK,PN

## 2018-01-08 PROCEDURE — G8979 MOBILITY GOAL STATUS: HCPCS | Mod: CK,PN

## 2018-01-08 NOTE — PLAN OF CARE
Date: 1/8/2018      PHYSICAL THERAPY UPDATED PLAN OF TREATMENT    Patient name: Ambrosio Montoya  Onset Date:  2013  SOC Date:  11/06/2017  Primary Diagnosis:    1. Right sided weakness       Treatment Diagnosis:  R sided weakness, difficulty walking, fall risk, decreased functional mobility  Certification Period:  1/8/2018 to 3/18/18  Precautions:  standard  Visits from SOC:  13  Functional Level Prior to SOC:    Tone: 2/4 More marked increase in muscle tone through most of the ROM, but affected part(s) easily moved  Limbs/muscles affected: R UE and R LE     Visual/Auditory: denies changes      Coordination:   - fine motor: impaired R UE  - UE coordination: impaired R UE    - LE coordination:  Impaired heel to shin on the R due to weakness and decreased AROM          Right Left Right Left     Strength Strength AROM/PROM AROM/PROM   Hip flexion 3- 4+ WFL WNL   Extension 3- 4 WFL WNL   Glute max 3- 4       Abduction 3- 4- WFL WNL   Knee ext 3- 4+ 0/0 WNL   Knee flexion 3 4+ 90/130 WNL   DF 3- 4+ WFL WNL   PF 3+ 4+ 0 WNL         Gait Assessment:   - AD used: with QC  - Assistance: Cathie to CGA  - Distance: ~200 ft  - Stairs: Min A per report     Gait Analysis:  Deviations noted: decreased R hip flexion, decreased R strike, decreased R stance time, pt drags R leg, decreased R UE swing     Impairments contributing to deviations:  R sided weakness     Endurance Deficit: noted and pt only able to walk household and short community distances      Balance Assessment:    Sitting balance (static,dynamic):WFL  Standing balance (static, dynamic):see DGI     Dynamic Gait Index - assessed with SPC  1. Gait level surface -  1  2. Change in gait speed - 1  3. Gait with horizontal head turns - 1  4. Gait with vertical head turns - 1  5. Gait and pivot turn - 1  6. Step over obstacle - 1  7. Step around obstacle - 1  8. Steps - 1  Total 8/24 66% CL     Functional Mobility (Bed mobility, transfers)  Bed mobility: I  Supine to sit: I  Sit  to supine: I  Rolling: I  Transfers to bed: KRIS  Sit to stand:  Mod I  Stand pivot:  Mod I  Stairs: see DGI  Wheelchair mobility: indep  Tub transfer:maxA and he takes chair baths.     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  He has decreased functional mobility in the home and community. He uses SPC in the home and rollator in the community and is progressing toward increased R LE strength, increased transfers, improved gait and increase overall endurance and mobility. He has progress toward his STGs and LTGs but would cont to benefit from PT to progress and met goals and improve gait and mobility while reducing fall risk. See updated functional assessment below:    TU secs with rollator    FOTO: 42% limited    6 minute walk test: 280 ft with rollator with 0 LOB an no rest       Right Left Right Left     Strength Strength AROM/PROM AROM/PROM   Hip flexion 3+ 4+ WFL WNL   Extension 3 4 WFL WNL   Glute max 3 4       Abduction 4- 4- WFL WNL   Knee ext 4- 4+ 0/0 WNL   Knee flexion 3+ 4+ 90/130 WNL   DF 3- 4+ WFL WNL   PF 3+ 4+ 0 WNL      Dynamic Gait Index - assessed with rollator  1. Gait level surface -  2  2. Change in gait speed - 1  3. Gait with horizontal head turns - 2  4. Gait with vertical head turns - 1  5. Gait and pivot turn - 2  6. Step over obstacle - 1  7. Step around obstacle - 2  8. Steps - 1  Total  50% CL    Previous Short Term Goals Status:     Short Term Goals (6 Weeks):   1. Pt will be indep with initial HEP.  MET  2. Pt will be able to perform TUG in 20 secs or less with least restrictive AD without LOB. Not met 43 secs with rollator  3. Pt will be able stand on beep board without AD without beep without UE support without assist for improved standing balance without AD for improved function in standing. progressed    Long Term Goal Status:   continue per initial plan of care.  Reasons for Recertification of  Therapy:   Exp of current care plan    Certification Period: 1/8/18 to 3/8/18  Recommended Treatment Plan: 2 times per week for 8 weeks: Gait Training, Locomotor Training, Manual Therapy, Moist Heat/ Ice, Neuromuscular Re-ed, Patient Education, Self Care, Therapeutic Activites and Therapeutic Exercise, modalities prn, ASTYM prn, kinesiotape prn, Functional Dry Needling prn     Other Recommendations: none    Therapist's Name: Gayle Ntah   Date: 01/08/2018    I CERTIFY THE NEED FOR THESE SERVICES FURNISHED UNDER THIS PLAN OF TREATMENT AND WHILE UNDER MY CARE    Physician's comments: ________________________________________________________________________________________________________________________________________________      Physician's Name: ___________________________________

## 2018-01-08 NOTE — PROGRESS NOTES
TIME RECORD    Date: 1/8/2018      Start Time:  1115  Stop Time:  1100    PROCEDURES:    TIMED  Procedure Min.   Supervised TE 25   TE 20                 UNTIMED  Procedure Min.             Total Timed Minutes:  20  Total Timed Units:  1  Total Untimed Units:  0  Charges Billed/# of units:  1 TE      Progress/Current Status    Subjective:     Patient ID: Ambrosio Montoya is a 67 y.o. male.  Diagnosis:   1. Right sided weakness       Pain: 0 /10  Pt presented to session with no new complaints with rollator as AD.    Objective:   Session initiated with supervised TE per log x 25' f/b objective measures by PT.     DATE 1/8/18 1/5/18 12/27/17 12/20/17 12/18/17 12/13/17 12/11/17 12/6/17 12/4/17 11/27/17 11/21/17 11/10/17   Visit 13 12 11 10 9 8 7 6 5 4 3 2   G CODE  POC exp 3/8/18 1/10   10/10 9/10 8/10 7/10 6/10 5 of 10 4 of 10 3 of 10 2 of 10   FOTO Done for POC   10 DONE  9/10 8/10 done 7/10 next 6/10 FOTO !!! 5 of 10 DUE NEXT! 4 of 10 3 of 10 2 of 10   Visit Amount  Total 31.98  129.33   97.35  97.35 97.35  1019.43 98.77  922.08 98.77  823.31 98.77  724.54 68.00 97.94  557.77   95.94  459.83  95.00  266.54 95.94  171.54   Standing feet apart --     On beep board next -- - -- --    Standing feet together --  - - - -- -- -- - -- --    Standing 1/2 tandem NT Blue, Step fanning   2x10 w/ VC's // bars Blue, Step fanning   2x10 w/ VC's // bars Blue, Step fanning   2x10 w/ VC's // bars - -- -- -- - -- --    Standing tandem -- - - - - -- -- -- - -- --    SLS R -- - - - - -- -- -- - -- --    SLS L -- - - - - -- -- -- - --     Forward/backward NT  oot 4 laps // bar 4 laps // bar -- -- --  3 laps//     Side stepping --    4 laps // bar 4 laps / bar OOT/next next  3 laps // 3 laps //    Cross overs -- - - - - -- -- --  -- --    Kar[place] -- - - - - -- -- --  -- --    Wii fit balance -- - - - - -- -- --  -- --    Sit to stand transfers NT    nt X 10 from low mat with cues for R wt bearing X 10 from low mat with cues for R wt bearing X  10 from low mat with cues for R wt bearing  -- --    Beep board Next      1' next next  -- --    Developmental seq      -- -- --  -- --    SLR 2x10 assist w R LE  2 x 15 L LE 2x10 assist w R LE  2 x 15 L LE 2x10 assist w R LE  2 x 15 L LE 2x10 assist w R LE  2 x 15 L LE 2x10 assist w R LE  2 x 15 L LE 2x10 assist w R LE  2 x 15 L LE 2x10 assist w R LE  2 x 15 L LE 2x10 assist w R LE   2x10 assist w R LE 2x10 B 2x10 B 2x10 B   clamshells hooklying 2x 15 RTB hooklying 2x 15 RTB hooklying 2x 15 RTB hooklying 2x 15 RTB hooklying 2x 15 RTB hooklying 2x 15 RTB hooklying 2x 12 RTB hooklying 2x 10 RTB hooklying 2x 10 RTB hooklying 2x 10 RTB hooklying 2x 10 RTB    Hip abduction --     - -- --  --  Supine (slide board)  2x10 AAROM   bridges 2x15 B w/ ball 2x12 B w/ ball 2x12 B w/ ball 2x12 B w/ ball 2x12 B w/ ball 2x12 B w/ ball 2x12 B w/ ball 2x10 B w/ ball 2x10 B w/ ball 2x10 w/ ball 2x10 2x10   Adduction isometric W/ bridges w bridge w bridge w bridge w bridge w bridge w bridge w bridge w bridge W/ bridges 5'' x 10 2x10   LAQ      --- -- --  2x10 2x10 2x10   SAQ 2x15 2# B  2x15 2# B  2x15 2# B  2x15   2# B 2x15  1# R and 2# 2x15  1# R and 2# 2x15  1# R and 2#  2x15   2x15 2x15 B 2x15 B 2x15 B (quad activation)   HS curls -- - -   2x10 RTB 2x10 RTB 2x10 RTB 2x10 RTB 2x10 with RTB 2x10 with RTB 2x10 with RTB   HS stretch with strap -- - -   NT MT MT MT NT/next MT  MT   Gastroc stretch -- - -   NT MT -- MT NT/next MT    Hip adduction PROM -- - -   NT MT X 10 B       Leg press next 4.0 2x10 B 4.0 2x10 B  - -- -- --  -- --    Hip extension -- - -  - -- -- --  -- --    Hip flexion -- - -  - -- -- --  -- --    Hip Abduction -- - -  - -- -- --  -- --    Hip adduction -- - -  - -- -- --  -- --    Knee flexion -- - -  - -- -- --  -- --    Toe raises -- - -  - -- - --  -- --    Heel raises -- - -  - -- -- --  -- --    Nu-step L2 10' L2 8 min  L2 8 min  L2 8 min  8' L2 8' L2 -- 10 L2 8' L 1.5 8' L 1.5 10' L 1.5 6 min  L 1.5   Step ups  --      Next L1 --  -- --    Lateral step ups --     -- -- --  -- --    INITIALS FS JA 4/6 JA 3/6 JA 2/6 JA 1/6 FS FS FS JA /6 FS FS SM SPTA/JA      Pt performed supervised TE x 25' per log. PT performed objective measures for progress note and POC update:    TU secs with rollator    FOTO: 42% limited    6 minute walk test: 280 ft with rollator with 0 LOB an no rest       Right Left Right Left     Strength Strength AROM/PROM AROM/PROM   Hip flexion 3+ 4+ WFL WNL   Extension 3 4 WFL WNL   Glute max 3 4       Abduction 4- 4- WFL WNL   Knee ext 4- 4+ 0/0 WNL   Knee flexion 3+ 4+ 90/130 WNL   DF 3- 4+ WFL WNL   PF 3+ 4+ 0 WNL      Dynamic Gait Index - assessed with rollator  1. Gait level surface -  2  2. Change in gait speed - 1  3. Gait with horizontal head turns - 2  4. Gait with vertical head turns - 1  5. Gait and pivot turn - 2  6. Step over obstacle - 1  7. Step around obstacle - 2  8. Steps - 1  Total  50% CL    Assessment:     See updated POC    Patient Education/Response:     CONT HEP    Plans and Goals:     See updated POC  Short Term Goals (6 Weeks):   1. Pt will be indep with initial HEP.  MET  2. Pt will be able to perform TUG in 20 secs or less with least restrictive AD without LOB. Not met 43 secs with rollator  3. Pt will be able stand on beep board without AD without beep without UE support without assist for improved standing balance without AD for improved function in standing. progressed     Long Term Goals (12 Weeks):   1. Pt will have MMT score of 4-/5 in all major ms groups in R LE.  progressed not met  2. Patient will be able to achieve greater than or equal to 13/24 on the DGI using least restrictive device decreasing patient's risk for falling and improving patient to 40-60% impaired, limited, or restricted category.  ode CK 8978 progressed   3. Pt will become a safe community ambulator with least restrictive device with low risk of falls and minimal gait deviations.  progressed  4. Pt will complete 6 minute walk test with 0 LOB and least restrictive device.  met with rollator not SPC  5. Pt perform tall kneel to 1/2 kneel to stand transitions x 5 bilaterally with UE support. Not met  6. Pt will report 57% on the FOTO Functional Assessment  indicating increased functional balance and  mobility. MET 42% 1/8/17

## 2018-01-10 ENCOUNTER — CLINICAL SUPPORT (OUTPATIENT)
Dept: REHABILITATION | Facility: HOSPITAL | Age: 68
End: 2018-01-10
Payer: MEDICARE

## 2018-01-10 DIAGNOSIS — R53.1 RIGHT SIDED WEAKNESS: ICD-10-CM

## 2018-01-10 PROCEDURE — 97110 THERAPEUTIC EXERCISES: CPT | Mod: PN

## 2018-01-10 NOTE — PROGRESS NOTES
TIME RECORD    Date: 1/10/2018      Start Time: 1015  Stop Time:  1100    PROCEDURES:    TIMED  Procedure Min.   TE 45                     UNTIMED  Procedure Min.             Total Timed Minutes:  45  Total Timed Units:  3  Total Untimed Units:  0  Charges Billed/# of units:  3 TE      Progress/Current Status    Subjective:     Patient ID: Ambrosio Montoya is a 67 y.o. male.  Diagnosis:   1. Right sided weakness       Pain: 0 /10  Pt reports no complaints of pain upon arrival. Pt agreeable to PT session     Objective:   Session initiated on sci fit stepper x 8 min. Pt then instructed on and completed all therex as per logged below 1:1 w/ PTA.     DATE 1/10/18 1/8/18 1/5/18 12/27/17 12/20/17 12/18/17 12/13/17 12/11/17 12/6/17 12/4/17 11/27/17 11/21/17 11/10/17   Visit 14 13 12 11 10 9 8 7 6 5 4 3 2   G CODE  POC exp 3/8/18 2/10 1/10   10/10 9/10 8/10 7/10 6/10 5 of 10 4 of 10 3 of 10 2 of 10   FOTO  Done for POC   10 DONE  9/10 8/10 done 7/10 next 6/10 FOTO !!! 5 of 10 DUE NEXT! 4 of 10 3 of 10 2 of 10   Visit Amount  Total 95.94  225.27 31.98  129.33   97.35  97.35 97.35  1019.43 98.77  922.08 98.77  823.31 98.77  724.54 68.00 97.94  557.77   95.94  459.83  95.00  266.54 95.94  171.54   Standing feet apart - --     On beep board next -- - -- --    Standing feet together - --  - - - -- -- -- - -- --    Standing 1/2 tandem  NT Blue, Step fanning   2x10 w/ VC's // bars Blue, Step fanning   2x10 w/ VC's // bars Blue, Step fanning   2x10 w/ VC's // bars - -- -- -- - -- --    Standing tandem - -- - - - - -- -- -- - -- --    SLS R - -- - - - - -- -- -- - -- --    SLS L - -- - - - - -- -- -- - --     Forward/backward 3 laps // NT  oot 4 laps // bar 4 laps // bar -- -- --  3 laps//     Side stepping - --    4 laps // bar 4 laps / bar OOT/next next  3 laps // 3 laps //    Cross overs - -- - - - - -- -- --  -- --    Karoke - -- - - - - -- -- --  -- --    Wii fit balance - -- - - - - -- -- --  -- --    Sit to stand transfers 2x10  from lower mat NT    nt X 10 from low mat with cues for R wt bearing X 10 from low mat with cues for R wt bearing X 10 from low mat with cues for R wt bearing  -- --    Beep board  Next      1' next next  -- --    Developmental seq       -- -- --  -- --    SLR 2x10 assist w R LE  2 x 15 L LE 2x10 assist w R LE  2 x 15 L LE 2x10 assist w R LE  2 x 15 L LE 2x10 assist w R LE  2 x 15 L LE 2x10 assist w R LE  2 x 15 L LE 2x10 assist w R LE  2 x 15 L LE 2x10 assist w R LE  2 x 15 L LE 2x10 assist w R LE  2 x 15 L LE 2x10 assist w R LE   2x10 assist w R LE 2x10 B 2x10 B 2x10 B   clamshells RTB  2x15 hooklying hooklying 2x 15 RTB hooklying 2x 15 RTB hooklying 2x 15 RTB hooklying 2x 15 RTB hooklying 2x 15 RTB hooklying 2x 15 RTB hooklying 2x 12 RTB hooklying 2x 10 RTB hooklying 2x 10 RTB hooklying 2x 10 RTB hooklying 2x 10 RTB    Hip abduction  --     - -- --  --  Supine (slide board)  2x10 AAROM   bridges 2x15 B w/ ball 2x15 B w/ ball 2x12 B w/ ball 2x12 B w/ ball 2x12 B w/ ball 2x12 B w/ ball 2x12 B w/ ball 2x12 B w/ ball 2x10 B w/ ball 2x10 B w/ ball 2x10 w/ ball 2x10 2x10   Adduction isometric W/ bridges W/ bridges w bridge w bridge w bridge w bridge w bridge w bridge w bridge w bridge W/ bridges 5'' x 10 2x10   LAQ       --- -- --  2x10 2x10 2x10   SAQ 2x15 3# B w/ large bolster 2x15 2# B  2x15 2# B  2x15 2# B  2x15   2# B 2x15  1# R and 2# 2x15  1# R and 2# 2x15  1# R and 2#  2x15   2x15 2x15 B 2x15 B 2x15 B (quad activation)   HS curls - -- - -   2x10 RTB 2x10 RTB 2x10 RTB 2x10 RTB 2x10 with RTB 2x10 with RTB 2x10 with RTB   HS stretch with strap - -- - -   NT MT MT MT NT/next MT  MT   Gastroc stretch - -- - -   NT MT -- MT NT/next MT    Hip adduction PROM - -- - -   NT MT X 10 B       Leg press 4.0 2x10 b next 4.0 2x10 B 4.0 2x10 B  - -- -- --  -- --    Hip extension - -- - -  - -- -- --  -- --    Hip flexion - -- - -  - -- -- --  -- --    Hip Abduction - -- - -  - -- -- --  -- --    Hip adduction - -- - -  - -- --  --  -- --    Knee flexion - -- - -  - -- -- --  -- --    Toe raises - -- - -  - -- - --  -- --    Heel raises - -- - -  - -- -- --  -- --    Nu-step L2 2x10 L2 10' L2 8 min  L2 8 min  L2 8 min  8' L2 8' L2 -- 10 L2 8' L 1.5 8' L 1.5 10' L 1.5 6 min  L 1.5   Step ups - --      Next L1 --  -- --    Lateral step ups - --     -- -- --  -- --    INITIALS JA 1/6 FS JA 4/6 JA 3/6 JA 2/6 JA 1/6 FS FS FS JA 1/6 FS FS SM SPTA/JA 1/6       Assessment:     Pt tolerated session with no reports of pain. Pt continues to demonstrate R knee hyperextension with Left LE advancement. He demonstrated no LOB during ambulation with RW, however SBA is advised.    Patient Education/Response:     CONT HEP    Plans and Goals:     Cont to advance PT as per POC.  Short Term Goals (6 Weeks):   1. Pt will be indep with initial HEP.  MET  2. Pt will be able to perform TUG in 20 secs or less with least restrictive AD without LOB. Not met 43 secs with rollator  3. Pt will be able stand on beep board without AD without beep without UE support without assist for improved standing balance without AD for improved function in standing. progressed     Long Term Goals (12 Weeks):   1. Pt will have MMT score of 4-/5 in all major ms groups in R LE.  progressed not met  2. Patient will be able to achieve greater than or equal to 13/24 on the DGI using least restrictive device decreasing patient's risk for falling and improving patient to 40-60% impaired, limited, or restricted category.  [place] 8978 progressed 12/24  3. Pt will become a safe community ambulator with least restrictive device with low risk of falls and minimal gait deviations. progressed  4. Pt will complete 6 minute walk test with 0 LOB and least restrictive device.  met with rollator not SPC  5. Pt perform tall kneel to 1/2 kneel to stand transitions x 5 bilaterally with UE support. Not met  6. Pt will report 57% on the FOTO Functional Assessment  indicating increased functional balance and   mobility. MET 42% 1/8/17

## 2018-01-15 ENCOUNTER — CLINICAL SUPPORT (OUTPATIENT)
Dept: REHABILITATION | Facility: HOSPITAL | Age: 68
End: 2018-01-15
Payer: MEDICARE

## 2018-01-15 DIAGNOSIS — R53.1 RIGHT SIDED WEAKNESS: ICD-10-CM

## 2018-01-15 PROCEDURE — 97110 THERAPEUTIC EXERCISES: CPT | Mod: PN

## 2018-01-15 NOTE — PROGRESS NOTES
TIME RECORD    Date: 1/15/2018      Start Time: 1100  Stop Time:  1150    PROCEDURES:    TIMED  Procedure Min.   TE 45                     UNTIMED  Procedure Min.             Total Timed Minutes:  45  Total Timed Units:  3  Total Untimed Units:  0  Charges Billed/# of units:  3 TE      Progress/Current Status    Subjective:     Patient ID: Ambrosio Montoya is a 67 y.o. male.  Diagnosis:   1. Right sided weakness       Pain: 0 /10  Pt reports no complaints of pain upon arrival. Pt agreeable to PT session     Objective:   Session initiated on sci fit stepper x 8 min. Pt then instructed on and completed all therex as per logged below 1:1 w/ PTA.     DATE 1/15/18 1/10/18 1/8/18 1/5/18 12/27/17 12/20/17 12/18/17 12/13/17 12/11/17 12/6/17 12/4/17 11/27/17 11/21/17 11/10/17   Visit 15 14 13 12 11 10 9 8 7 6 5 4 3 2   G CODE  POC exp 3/8/18 3/10 2/10 1/10   10/10 9/10 8/10 7/10 6/10 5 of 10 4 of 10 3 of 10 2 of 10   FOTO   Done for POC   10 DONE  9/10 8/10 done 7/10 next 6/10 FOTO !!! 5 of 10 DUE NEXT! 4 of 10 3 of 10 2 of 10   Visit Amount  Total 95.94  321.21 95.94  225.27 31.98  129.33   97.35  97.35 97.35  1019.43 98.77  922.08 98.77  823.31 98.77  724.54 68.00 97.94  557.77   95.94  459.83  95.00  266.54 95.94  171.54   Standing feet apart  - --     On beep board next -- - -- --    Standing feet together  - --  - - - -- -- -- - -- --    Standing 1/2 tandem   NT Blue, Step fanning   2x10 w/ VC's // bars Blue, Step fanning   2x10 w/ VC's // bars Blue, Step fanning   2x10 w/ VC's // bars - -- -- -- - -- --    Standing tandem  - -- - - - - -- -- -- - -- --    SLS R  - -- - - - - -- -- -- - -- --    SLS L  - -- - - - - -- -- -- - --     Forward/backward 3 laps // 3 laps // NT  oot 4 laps // bar 4 laps // bar -- -- --  3 laps//     Side stepping  - --    4 laps // bar 4 laps / bar OOT/next next  3 laps // 3 laps //    Cross overs  - -- - - - - -- -- --  -- --    Kar[place]  - -- - - - - -- -- --  -- --    Wii fit balance  - -- - -  - - -- -- --  -- --    Sit to stand transfers 2x10 from lower mat 2x10 from lower mat NT    nt X 10 from low mat with cues for R wt bearing X 10 from low mat with cues for R wt bearing X 10 from low mat with cues for R wt bearing  -- --    Beep board   Next      1' next next  -- --    Developmental seq        -- -- --  -- --    SLR 2x10 assist w R LE  2 x 15 L LE 2x10 assist w R LE  2 x 15 L LE 2x10 assist w R LE  2 x 15 L LE 2x10 assist w R LE  2 x 15 L LE 2x10 assist w R LE  2 x 15 L LE 2x10 assist w R LE  2 x 15 L LE 2x10 assist w R LE  2 x 15 L LE 2x10 assist w R LE  2 x 15 L LE 2x10 assist w R LE  2 x 15 L LE 2x10 assist w R LE   2x10 assist w R LE 2x10 B 2x10 B 2x10 B   clamshells  RTB  2x15 hooklying hooklying 2x 15 RTB hooklying 2x 15 RTB hooklying 2x 15 RTB hooklying 2x 15 RTB hooklying 2x 15 RTB hooklying 2x 15 RTB hooklying 2x 12 RTB hooklying 2x 10 RTB hooklying 2x 10 RTB hooklying 2x 10 RTB hooklying 2x 10 RTB    Hip abduction   --     - -- --  --  Supine (slide board)  2x10 AAROM   bridges 2x15 B w/ ball 2x15 B w/ ball 2x15 B w/ ball 2x12 B w/ ball 2x12 B w/ ball 2x12 B w/ ball 2x12 B w/ ball 2x12 B w/ ball 2x12 B w/ ball 2x10 B w/ ball 2x10 B w/ ball 2x10 w/ ball 2x10 2x10   Adduction isometric  W/ bridges W/ bridges w bridge w bridge w bridge w bridge w bridge w bridge w bridge w bridge W/ bridges 5'' x 10 2x10   LAQ        --- -- --  2x10 2x10 2x10   SAQ 2x15 3# B w/ large bolster 2x15 3# B w/ large bolster 2x15 2# B  2x15 2# B  2x15 2# B  2x15   2# B 2x15  1# R and 2# 2x15  1# R and 2# 2x15  1# R and 2#  2x15   2x15 2x15 B 2x15 B 2x15 B (quad activation)   HS curls  - -- - -   2x10 RTB 2x10 RTB 2x10 RTB 2x10 RTB 2x10 with RTB 2x10 with RTB 2x10 with RTB   HS stretch with strap  - -- - -   NT MT MT MT NT/next MT  MT   Gastroc stretch  - -- - -   NT MT -- MT NT/next MT    Hip adduction PROM  - -- - -   NT MT X 10 B       Leg press 4.0 2x10 B 4.0 2x10 b next 4.0 2x10 B 4.0 2x10 B  - -- -- --  -- --     Hip extension - - -- - -  - -- -- --  -- --    Hip flexion - - -- - -  - -- -- --  -- --    Hip Abduction - - -- - -  - -- -- --  -- --    Hip adduction - - -- - -  - -- -- --  -- --    Knee flexion - - -- - -  - -- -- --  -- --    Toe raises - - -- - -  - -- - --  -- --    Heel raises - - -- - -  - -- -- --  -- --    Nu-step L2 10 min  L2 2x10 L2 10' L2 8 min  L2 8 min  L2 8 min  8' L2 8' L2 -- 10 L2 8' L 1.5 8' L 1.5 10' L 1.5 6 min  L 1.5   Step ups - - --      Next L1 --  -- --    Lateral step ups - - --     -- -- --  -- --    INITIALS JA 2/6 JA 1/6 FS JA 4/6 JA 3/6 JA 2/6 JA 1/6 FS FS FS JA 1/6 FS FS SM SPTA/JA 1/6       Assessment:     Pt completed session with no episodes of LOB with standing activities. Pt required verbal instruction on safety and postural awareness throughout standing activities.    Patient Education/Response:     CONT HEP    Plans and Goals:     Cont to advance PT as per POC.  Short Term Goals (6 Weeks):   1. Pt will be indep with initial HEP.  MET  2. Pt will be able to perform TUG in 20 secs or less with least restrictive AD without LOB. Not met 43 secs with rollator  3. Pt will be able stand on beep board without AD without beep without UE support without assist for improved standing balance without AD for improved function in standing. progressed     Long Term Goals (12 Weeks):   1. Pt will have MMT score of 4-/5 in all major ms groups in R LE.  progressed not met  2. Patient will be able to achieve greater than or equal to 13/24 on the DGI using least restrictive device decreasing patient's risk for falling and improving patient to 40-60% impaired, limited, or restricted category.  [facility] [place] CK 8978 progressed 12/24  3. Pt will become a safe community ambulator with least restrictive device with low risk of falls and minimal gait deviations. progressed  4. Pt will complete 6 minute walk test with 0 LOB and least restrictive device.  met with rollator not SPC  5. Pt perform tall kneel to  1/2 kneel to stand transitions x 5 bilaterally with UE support. Not met  6. Pt will report 57% on the FOTO Functional Assessment  indicating increased functional balance and  mobility. MET 42% 1/8/17

## 2018-01-24 ENCOUNTER — CLINICAL SUPPORT (OUTPATIENT)
Dept: REHABILITATION | Facility: HOSPITAL | Age: 68
End: 2018-01-24
Payer: MEDICARE

## 2018-01-24 DIAGNOSIS — R53.1 RIGHT SIDED WEAKNESS: ICD-10-CM

## 2018-01-24 PROCEDURE — 97112 NEUROMUSCULAR REEDUCATION: CPT | Mod: PN

## 2018-01-24 PROCEDURE — 97110 THERAPEUTIC EXERCISES: CPT | Mod: PN

## 2018-01-24 NOTE — PROGRESS NOTES
TIME RECORD    Date: 1/24/2018      Start Time: 1005  Stop Time:  1100    PROCEDURES:    TIMED  Procedure Min.   TE 40   NMR 15                 UNTIMED  Procedure Min.             Total Timed Minutes:  55  Total Timed Units:  4  Total Untimed Units:  0  Charges Billed/# of units:  1NMR, 3 TE    Progress/Current Status    Subjective:     Patient ID: Ambrosio Montoya is a 67 y.o. male.  Diagnosis:   1. Right sided weakness       Pain: 0 /10  Pt reports no complaints of pain upon arrival. Pt agreeable to PT session     Objective:   Session initiated on sci fit stepper x 8 min. Pt then instructed on and completed all therex as per logged below 1:1 w/ PTA.     DATE 1/24/18 1/15/18 1/10/18 1/8/18 1/5/18 12/27/17 12/20/17 12/18/17 12/13/17 12/11/17 12/6/17 12/4/17 11/27/17 11/21/17 11/10/17   Visit 16 15 14 13 12 11 10 9 8 7 6 5 4 3 2   G CODE  POC exp 3/8/18 4/10 3/10 2/10 1/10   10/10 9/10 8/10 7/10 6/10 5 of 10 4 of 10 3 of 10 2 of 10   FOTO    Done for POC   10 DONE  9/10 8/10 done 7/10 next 6/10 FOTO !!! 5 of 10 DUE NEXT! 4 of 10 3 of 10 2 of 10   Visit Amount  Total 129.33  450.54 95.94  321.21 95.94  225.27 31.98  129.33   97.35  97.35 97.35  1019.43 98.77  922.08 98.77  823.31 98.77  724.54 68.00 97.94  557.77   95.94  459.83  95.00  266.54 95.94  171.54   Standing feet apart   - --     On beep board next -- - -- --    Standing feet together   - --  - - - -- -- -- - -- --    Standing 1/2 tandem    NT Blue, Step fanning   2x10 w/ VC's // bars Blue, Step fanning   2x10 w/ VC's // bars Blue, Step fanning   2x10 w/ VC's // bars - -- -- -- - -- --    Standing tandem   - -- - - - - -- -- -- - -- --    SLS R   - -- - - - - -- -- -- - -- --    SLS L   - -- - - - - -- -- -- - --     Forward/backward 3 laps // 3 laps // 3 laps // NT  oot 4 laps // bar 4 laps // bar -- -- --  3 laps//     Side stepping   - --    4 laps // bar 4 laps / bar OOT/next next  3 laps // 3 laps //    Cross overs   - -- - - - - -- -- --  -- --    Daniel    - -- - - - - -- -- --  -- --    Wii fit balance   - -- - - - - -- -- --  -- --    Sit to stand transfers 2x10 from lower mat 2x10 from lower mat 2x10 from lower mat NT    nt X 10 from low mat with cues for R wt bearing X 10 from low mat with cues for R wt bearing X 10 from low mat with cues for R wt bearing  -- --    Beep board    Next      1' next next  -- --    Developmental seq         -- -- --  -- --    SLR 2x10 assist w R LE  2 x 15 L LE 2x10 assist w R LE  2 x 15 L LE 2x10 assist w R LE  2 x 15 L LE 2x10 assist w R LE  2 x 15 L LE 2x10 assist w R LE  2 x 15 L LE 2x10 assist w R LE  2 x 15 L LE 2x10 assist w R LE  2 x 15 L LE 2x10 assist w R LE  2 x 15 L LE 2x10 assist w R LE  2 x 15 L LE 2x10 assist w R LE  2 x 15 L LE 2x10 assist w R LE   2x10 assist w R LE 2x10 B 2x10 B 2x10 B   clamshells   RTB  2x15 hooklying hooklying 2x 15 RTB hooklying 2x 15 RTB hooklying 2x 15 RTB hooklying 2x 15 RTB hooklying 2x 15 RTB hooklying 2x 15 RTB hooklying 2x 12 RTB hooklying 2x 10 RTB hooklying 2x 10 RTB hooklying 2x 10 RTB hooklying 2x 10 RTB    Hip abduction    --     - -- --  --  Supine (slide board)  2x10 AAROM   bridges 2x15 B w/ ball 2x15 B w/ ball 2x15 B w/ ball 2x15 B w/ ball 2x12 B w/ ball 2x12 B w/ ball 2x12 B w/ ball 2x12 B w/ ball 2x12 B w/ ball 2x12 B w/ ball 2x10 B w/ ball 2x10 B w/ ball 2x10 w/ ball 2x10 2x10   Adduction isometric   W/ bridges W/ bridges w bridge w bridge w bridge w bridge w bridge w bridge w bridge w bridge W/ bridges 5'' x 10 2x10   LAQ         --- -- --  2x10 2x10 2x10   SAQ 2x15 3# B w/ large bolster 2x15 3# B w/ large bolster 2x15 3# B w/ large bolster 2x15 2# B  2x15 2# B  2x15 2# B  2x15   2# B 2x15  1# R and 2# 2x15  1# R and 2# 2x15  1# R and 2#  2x15   2x15 2x15 B 2x15 B 2x15 B (quad activation)   HS curls   - -- - -   2x10 RTB 2x10 RTB 2x10 RTB 2x10 RTB 2x10 with RTB 2x10 with RTB 2x10 with RTB   HS stretch with strap   - -- - -   NT MT MT MT NT/next MT  MT   Gastroc stretch   - --  - -   NT MT -- MT NT/next MT    Hip adduction PROM   - -- - -   NT MT X 10 B       Leg press 4.0 2x10 B 4.0 2x10 B 4.0 2x10 b next 4.0 2x10 B 4.0 2x10 B  - -- -- --  -- --    Hip extension  - - -- - -  - -- -- --  -- --    Hip flexion  - - -- - -  - -- -- --  -- --    Hip Abduction x10 B // - - -- - -  - -- -- --  -- --    Hip adduction x10 B // - - -- - -  - -- -- --  -- --    Knee flexion x10 B // - - -- - -  - -- -- --  -- --    Toe raises  - - -- - -  - -- - --  -- --    Heel raises  - - -- - -  - -- -- --  -- --    Nu-step L2 10 min  L2 10 min  L2 2x10 L2 10' L2 8 min  L2 8 min  L2 8 min  8' L2 8' L2 -- 10 L2 8' L 1.5 8' L 1.5 10' L 1.5 6 min  L 1.5   Step ups  - - --      Next L1 --  -- --    Lateral step ups  - - --     -- -- --  -- --    INITIALS JA 3/6 JA 2/6 JA 1/6 FS JA 4/6 JA 3/6 JA 2/6 JA 1/6 FS FS FS JA 1/6 FS FS SM SPTA/JA 1/6       Assessment:     Pt required verbal instruction with postural awareness in standing. Pt tends to lean excessively with single leg stance. No adverse reactions to session noted. Pt continues to demonstrate difficulty with SLR in supine w/ R LE assistance required.     Patient Education/Response:     CONT HEP    Plans and Goals:     Cont to advance PT as per POC.  Short Term Goals (6 Weeks):   1. Pt will be indep with initial HEP.  MET  2. Pt will be able to perform TUG in 20 secs or less with least restrictive AD without LOB. Not met 43 secs with rollator  3. Pt will be able stand on beep board without AD without beep without UE support without assist for improved standing balance without AD for improved function in standing. progressed     Long Term Goals (12 Weeks):   1. Pt will have MMT score of 4-/5 in all major ms groups in R LE.  progressed not met  2. Patient will be able to achieve greater than or equal to 13/24 on the DGI using least restrictive device decreasing patient's risk for falling and improving patient to 40-60% impaired, limited, or restricted category.   [facility] [place] [id number] progressed 12/24  3. Pt will become a safe community ambulator with least restrictive device with low risk of falls and minimal gait deviations. progressed  4. Pt will complete 6 minute walk test with 0 LOB and least restrictive device.  met with rollator not SPC  5. Pt perform tall kneel to 1/2 kneel to stand transitions x 5 bilaterally with UE support. Not met  6. Pt will report 57% on the FOTO Functional Assessment  indicating increased functional balance and  mobility. MET 42% 1/8/17

## 2018-01-29 ENCOUNTER — CLINICAL SUPPORT (OUTPATIENT)
Dept: REHABILITATION | Facility: HOSPITAL | Age: 68
End: 2018-01-29
Payer: MEDICARE

## 2018-01-29 DIAGNOSIS — R53.1 RIGHT SIDED WEAKNESS: ICD-10-CM

## 2018-01-29 PROCEDURE — 97112 NEUROMUSCULAR REEDUCATION: CPT | Mod: PN

## 2018-01-29 PROCEDURE — 97110 THERAPEUTIC EXERCISES: CPT | Mod: PN

## 2018-01-29 NOTE — PROGRESS NOTES
TIME RECORD    Date: 1/29/2018      Start Time:  0950  Stop Time:  1035    PROCEDURES:    TIMED  Procedure Min.   TE 25   NMR 20                 UNTIMED  Procedure Min.             Total Timed Minutes:  45  Total Timed Units:  3  Total Untimed Units:  0  Charges Billed/# of units:  3 (2TE, 1NMR)      Progress/Current Status    Subjective:     Patient ID: Ambrosio Montoya is a 67 y.o. male.  Diagnosis:   1. Right sided weakness       Pain: 0 /10  Pt presented feeling weak and stating that he did not do much of anything this weekend.    Objective:     Session initiated with Neuro re-ed and endurance training with B UE/LE extremities for reciprocal motion of all limbs on sci-fit x 10 min at level 2.5 at > or equal to 50 spm w/o rest.  Pt performed standing balance training x 10' with PT 1:1 f/b TE x 25' with PT 1:1 per log.       DATE 1/29/18 1/24/18 1/15/18 1/10/18 1/8/18 1/5/18 12/27/17 12/20/17 12/18/17 12/13/17 12/11/17 12/6/17 12/4/17 11/27/17 11/21/17 11/10/17   Visit 17 16 15 14 13 12 11 10 9 8 7 6 5 4 3 2   G CODE  POC exp 3/8/18 5/10 4/10 3/10 2/10 1/10   10/10 9/10 8/10 7/10 6/10 5 of 10 4 of 10 3 of 10 2 of 10   FOTO --    Done for POC   10 DONE  9/10 8/10 done 7/10 next 6/10 FOTO !!! 5 of 10 DUE NEXT! 4 of 10 3 of 10 2 of 10   Visit Amount  Total 97.35  547.89 129.33  450.54 95.94  321.21 95.94  225.27 31.98  129.33   97.35  97.35 97.35  1019.43 98.77  922.08 98.77  823.31 98.77  724.54 68.00 97.94  557.77   95.94  459.83  95.00  266.54 95.94  171.54   Standing feet apart --   - --     On beep board next -- - -- --    Standing feet together --   - --  - - - -- -- -- - -- --    Standing 1/2 tandem --    NT Blue, Step fanning   2x10 w/ VC's // bars Blue, Step fanning   2x10 w/ VC's // bars Blue, Step fanning   2x10 w/ VC's // bars - -- -- -- - -- --    Standing tandem --   - -- - - - - -- -- -- - -- --    SLS R --   - -- - - - - -- -- -- - -- --    SLS L --   - -- - - - - -- -- -- - --     Forward/backward 3  laps // 3 laps // 3 laps // 3 laps // NT  oot 4 laps // bar 4 laps // bar -- -- --  3 laps//     Side stepping --   - --    4 laps // bar 4 laps / bar OOT/next next  3 laps // 3 laps //    Cross overs ---   - -- - - - - -- -- --  -- --    Karoke --   - -- - - - - -- -- --  -- --    Wii fit balance --   - -- - - - - -- -- --  -- --    Sit to stand transfers 2x10 from lower mat 2x10 from lower mat 2x10 from lower mat 2x10 from lower mat NT    nt X 10 from low mat with cues for R wt bearing X 10 from low mat with cues for R wt bearing X 10 from low mat with cues for R wt bearing  -- --    Beep board 3 x 30''     Next      1' next next  -- --    Developmental seq --         -- -- --  -- --    SLR -- 2x10 assist w R LE  2 x 15 L LE 2x10 assist w R LE  2 x 15 L LE 2x10 assist w R LE  2 x 15 L LE 2x10 assist w R LE  2 x 15 L LE 2x10 assist w R LE  2 x 15 L LE 2x10 assist w R LE  2 x 15 L LE 2x10 assist w R LE  2 x 15 L LE 2x10 assist w R LE  2 x 15 L LE 2x10 assist w R LE  2 x 15 L LE 2x10 assist w R LE  2 x 15 L LE 2x10 assist w R LE   2x10 assist w R LE 2x10 B 2x10 B 2x10 B   clamshells --   RTB  2x15 hooklying hooklying 2x 15 RTB hooklying 2x 15 RTB hooklying 2x 15 RTB hooklying 2x 15 RTB hooklying 2x 15 RTB hooklying 2x 15 RTB hooklying 2x 12 RTB hooklying 2x 10 RTB hooklying 2x 10 RTB hooklying 2x 10 RTB hooklying 2x 10 RTB    Hip abduction --    --     - -- --  --  Supine (slide board)  2x10 AAROM   bridges -- 2x15 B w/ ball 2x15 B w/ ball 2x15 B w/ ball 2x15 B w/ ball 2x12 B w/ ball 2x12 B w/ ball 2x12 B w/ ball 2x12 B w/ ball 2x12 B w/ ball 2x12 B w/ ball 2x10 B w/ ball 2x10 B w/ ball 2x10 w/ ball 2x10 2x10   Adduction isometric --   W/ bridges W/ bridges w bridge w bridge w bridge w bridge w bridge w bridge w bridge w bridge W/ bridges 5'' x 10 2x10   LAQ 2 x 10 B         --- -- --  2x10 2x10 2x10   SAQ -- 2x15 3# B w/ large bolster 2x15 3# B w/ large bolster 2x15 3# B w/ large bolster 2x15 2# B  2x15 2# B  2x15  2# B  2x15   2# B 2x15  1# R and 2# 2x15  1# R and 2# 2x15  1# R and 2#  2x15   2x15 2x15 B 2x15 B 2x15 B (quad activation)   HS curls 2 x 10 RTB B   - -- - -   2x10 RTB 2x10 RTB 2x10 RTB 2x10 RTB 2x10 with RTB 2x10 with RTB 2x10 with RTB   HS stretch with strap --   - -- - -   NT MT MT MT NT/next MT  MT   Gastroc stretch --   - -- - -   NT MT -- MT NT/next MT    Hip adduction PROM --   - -- - -   NT MT X 10 B       Leg press 4.0 2x10 B 4.0 2x10 B 4.0 2x10 B 4.0 2x10 b next 4.0 2x10 B 4.0 2x10 B  - -- -- --  -- --    Hip extension --  - - -- - -  - -- -- --  -- --    Hip flexion --  - - -- - -  - -- -- --  -- --    Hip Abduction 2 x 10 B // x10 B // - - -- - -  - -- -- --  -- --    Hip adduction 2 x10 B // x10 B // - - -- - -  - -- -- --  -- --    Knee flexion 2 x10 B // x10 B // - - -- - -  - -- -- --  -- --    Toe raises --  - - -- - -  - -- - --  -- --    Heel raises --  - - -- - -  - -- -- --  -- --    Nu-step L2 10 min  L2 10 min  L2 10 min  L2 2x10 L2 10' L2 8 min  L2 8 min  L2 8 min  8' L2 8' L2 -- 10 L2 8' L 1.5 8' L 1.5 10' L 1.5 6 min  L 1.5   Step ups --  - - --      Next L1 --  -- --    Lateral step ups --  - - --     -- -- --  -- --    INITIALS FS JA 3/6 JA 2/6 JA 1/6 FS JA 4/6 JA 3/6 JA 2/6 JA 1/6 FS FS FS JA 1/6 FS FS SM SPTA/JA 1/6       Assessment:     Pt has lack of activity in the home.  PT will cont to progress pt per POC toward LTG    Patient Education/Response:     CONT HEP, pt heavily educated on being active in his home and walking outdoors with supervision and completing HEP.     Plans and Goals:     Cont to advance PT as per POC.  Short Term Goals (6 Weeks):   1. Pt will be indep with initial HEP.  MET  2. Pt will be able to perform TUG in 20 secs or less with least restrictive AD without LOB. Not met 43 secs with rollator  3. Pt will be able stand on beep board without AD without beep without UE support without assist for improved standing balance without AD for improved function in  standing. progressed     Long Term Goals (12 Weeks):   1. Pt will have MMT score of 4-/5 in all major ms groups in R LE.  progressed not met  2. Patient will be able to achieve greater than or equal to 13/24 on the DGI using least restrictive device decreasing patient's risk for falling and improving patient to 40-60% impaired, limited, or restricted category.  Gcode CK 8978 progressed 12/24  3. Pt will become a safe community ambulator with least restrictive device with low risk of falls and minimal gait deviations. progressed  4. Pt will complete 6 minute walk test with 0 LOB and least restrictive device.  met with rollator not SPC  5. Pt perform tall kneel to 1/2 kneel to stand transitions x 5 bilaterally with UE support. Not met  6. Pt will report 57% on the FOTO Functional Assessment  indicating increased functional balance and  mobility. MET 42% 1/8/17

## 2018-01-31 ENCOUNTER — CLINICAL SUPPORT (OUTPATIENT)
Dept: REHABILITATION | Facility: HOSPITAL | Age: 68
End: 2018-01-31
Payer: MEDICARE

## 2018-01-31 DIAGNOSIS — R53.1 RIGHT SIDED WEAKNESS: ICD-10-CM

## 2018-01-31 PROCEDURE — 97110 THERAPEUTIC EXERCISES: CPT | Mod: PN

## 2018-01-31 PROCEDURE — 97112 NEUROMUSCULAR REEDUCATION: CPT | Mod: PN

## 2018-01-31 NOTE — PROGRESS NOTES
TIME RECORD    Date: 1/31/2018      Start Time:  1015  Stop Time:  1100    PROCEDURES:    TIMED  Procedure Min.   TE  25   NMR 20                 UNTIMED  Procedure Min.             Total Timed Minutes:  45  Total Timed Units:  3  Total Untimed Units:  0  Charges Billed/# of units:  3 (2TE, 1NMR)      Progress/Current Status    Subjective:     Patient ID: Ambrosio Montoya is a 67 y.o. male.  Diagnosis:   1. Right sided weakness       Pain: 0 /10  Pt presented with no new complaints.      Objective:     Session initiated with Neuro re-ed and endurance training with B UE/LE extremities for reciprocal motion of all limbs on sci-fit x 10 min at level 2.5 at > or equal to 50 spm w/o rest.  Pt performed standing balance training x 10' with PT 1:1 f/b TE x 25' with PT 1:1 per log.     DATE 1/31/18 1/29/18 1/24/18 1/15/18 1/10/18 1/8/18 1/5/18 12/27/17 12/20/17 12/18/17 12/13/17 12/11/17 12/6/17 12/4/17 11/27/17 11/21/17 11/10/17   Visit 18 17 16 15 14 13 12 11 10 9 8 7 6 5 4 3 2   G CODE  POC exp 3/8/18 6/10 5/10 4/10 3/10 2/10 1/10   10/10 9/10 8/10 7/10 6/10 5 of 10 4 of 10 3 of 10 2 of 10   FOTO -- --    Done for POC   10 DONE  9/10 8/10 done 7/10 next 6/10 FOTO !!! 5 of 10 DUE NEXT! 4 of 10 3 of 10 2 of 10   Visit Amount  Total 97.35  645.37 97.35  547.89 129.33  450.54 95.94  321.21 95.94  225.27 31.98  129.33   97.35  97.35 97.35  1019.43 98.77  922.08 98.77  823.31 98.77  724.54 68.00 97.94  557.77   95.94  459.83  95.00  266.54 95.94  171.54   Standing feet apart -- --   - --     On beep board next -- - -- --    Standing feet together -- --   - --  - - - -- -- -- - -- --    Standing 1/2 tandem -- --    NT Blue, Step fanning   2x10 w/ VC's // bars Blue, Step fanning   2x10 w/ VC's // bars Blue, Step fanning   2x10 w/ VC's // bars - -- -- -- - -- --    Standing tandem -- --   - -- - - - - -- -- -- - -- --    SLS R -- --   - -- - - - - -- -- -- - -- --    SLS L -- --   - -- - - - - -- -- -- - --     Forward/backward 3  laps // 3 laps // 3 laps // 3 laps // 3 laps // NT  oot 4 laps // bar 4 laps // bar -- -- --  3 laps//     Side stepping -- --   - --    4 laps // bar 4 laps / bar OOT/next next  3 laps // 3 laps //    Cross overs -- ---   - -- - - - - -- -- --  -- --    Karoke -- --   - -- - - - - -- -- --  -- --    Wii fit balance  --   - -- - - - - -- -- --  -- --    Sit to stand transfers 2x10 from lower mat 2x10 from lower mat 2x10 from lower mat 2x10 from lower mat 2x10 from lower mat NT    nt X 10 from low mat with cues for R wt bearing X 10 from low mat with cues for R wt bearing X 10 from low mat with cues for R wt bearing  -- --    Beep board next 3 x 30''     Next      1' next next  -- --    Developmental seq -- --         -- -- --  -- --    SLR -- -- 2x10 assist w R LE  2 x 15 L LE 2x10 assist w R LE  2 x 15 L LE 2x10 assist w R LE  2 x 15 L LE 2x10 assist w R LE  2 x 15 L LE 2x10 assist w R LE  2 x 15 L LE 2x10 assist w R LE  2 x 15 L LE 2x10 assist w R LE  2 x 15 L LE 2x10 assist w R LE  2 x 15 L LE 2x10 assist w R LE  2 x 15 L LE 2x10 assist w R LE  2 x 15 L LE 2x10 assist w R LE   2x10 assist w R LE 2x10 B 2x10 B 2x10 B   clamshells -- --   RTB  2x15 hooklying hooklying 2x 15 RTB hooklying 2x 15 RTB hooklying 2x 15 RTB hooklying 2x 15 RTB hooklying 2x 15 RTB hooklying 2x 15 RTB hooklying 2x 12 RTB hooklying 2x 10 RTB hooklying 2x 10 RTB hooklying 2x 10 RTB hooklying 2x 10 RTB    Hip abduction -- --    --     - -- --  --  Supine (slide board)  2x10 AAROM   bridges ----- -- 2x15 B w/ ball 2x15 B w/ ball 2x15 B w/ ball 2x15 B w/ ball 2x12 B w/ ball 2x12 B w/ ball 2x12 B w/ ball 2x12 B w/ ball 2x12 B w/ ball 2x12 B w/ ball 2x10 B w/ ball 2x10 B w/ ball 2x10 w/ ball 2x10 2x10   Adduction isometric -- --   W/ bridges W/ bridges w bridge w bridge w bridge w bridge w bridge w bridge w bridge w bridge W/ bridges 5'' x 10 2x10   LAQ 2 x 10 B 2 x 10 B         --- -- --  2x10 2x10 2x10   SAQ -- -- 2x15 3# B w/ large bolster 2x15  3# B w/ large bolster 2x15 3# B w/ large bolster 2x15 2# B  2x15 2# B  2x15 2# B  2x15   2# B 2x15  1# R and 2# 2x15  1# R and 2# 2x15  1# R and 2#  2x15   2x15 2x15 B 2x15 B 2x15 B (quad activation)   HS curls 2 x 10 RTB B 2 x 10 RTB B   - -- - -   2x10 RTB 2x10 RTB 2x10 RTB 2x10 RTB 2x10 with RTB 2x10 with RTB 2x10 with RTB   HS stretch with strap -- --   - -- - -   NT MT MT MT NT/next MT  MT   Gastroc stretch -- --   - -- - -   NT MT -- MT NT/next MT    Hip adduction PROM -- --   - -- - -   NT MT X 10 B       Leg press 4.0 2x10 B 4.0 2x10 B 4.0 2x10 B 4.0 2x10 B 4.0 2x10 b next 4.0 2x10 B 4.0 2x10 B  - -- -- --  -- --    Hip extension -- --  - - -- - -  - -- -- --  -- --    Hip flexion -- --  - - -- - -  - -- -- --  -- --    Hip Abduction 2 x 10 B // 2 x 10 B // x10 B // - - -- - -  - -- -- --  -- --    Hip adduction 2 x 10 B // 2 x10 B // x10 B // - - -- - -  - -- -- --  -- --    Knee flexion 2 x 10 B // 2 x10 B // x10 B // - - -- - -  - -- -- --  -- --    Toe raises -- --  - - -- - -  - -- - --  -- --    Heel raises -- --  - - -- - -  - -- -- --  -- --    Nu-step L3 10 min L2 10 min  L2 10 min  L2 10 min  L2 2x10 L2 10' L2 8 min  L2 8 min  L2 8 min  8' L2 8' L2 -- 10 L2 8' L 1.5 8' L 1.5 10' L 1.5 6 min  L 1.5   Step ups  --  - - --      Next L1 --  -- --    Lateral step ups  --  - - --     -- -- --  -- --    INITIALS FS FS JA 3/6 JA 2/6 JA 1/6 FS JA 4/6 JA 3/6 JA 2/6 JA 1/6 FS FS FS JA 1/6 FS FS SM SPTA/JA 1/6       Assessment:     Pt would cont to benefit from LE strength and balance training.     Patient Education/Response:     CONT HEP    Plans and Goals:     Cont to advance PT as per POC.  Short Term Goals (6 Weeks):   1. Pt will be indep with initial HEP.  MET  2. Pt will be able to perform TUG in 20 secs or less with least restrictive AD without LOB. Not met 43 secs with rollator  3. Pt will be able stand on beep board without AD without beep without UE support without assist for improved standing  balance without AD for improved function in standing. progressed     Long Term Goals (12 Weeks):   1. Pt will have MMT score of 4-/5 in all major ms groups in R LE.  progressed not met  2. Patient will be able to achieve greater than or equal to 13/24 on the DGI using least restrictive device decreasing patient's risk for falling and improving patient to 40-60% impaired, limited, or restricted category.  Gcode CK 8978 progressed 12/24  3. Pt will become a safe community ambulator with least restrictive device with low risk of falls and minimal gait deviations. progressed  4. Pt will complete 6 minute walk test with 0 LOB and least restrictive device.  met with rollator not SPC  5. Pt perform tall kneel to 1/2 kneel to stand transitions x 5 bilaterally with UE support. Not met  6. Pt will report 57% on the FOTO Functional Assessment  indicating increased functional balance and  mobility. MET 42% 1/8/17

## 2018-02-26 RX ORDER — CIPROFLOXACIN 500 MG/1
TABLET ORAL
Qty: 20 TABLET | Refills: 0 | OUTPATIENT
Start: 2018-02-26

## 2018-04-11 ENCOUNTER — TELEPHONE (OUTPATIENT)
Dept: FAMILY MEDICINE | Facility: CLINIC | Age: 68
End: 2018-04-11

## 2018-05-03 ENCOUNTER — TELEPHONE (OUTPATIENT)
Dept: SMOKING CESSATION | Facility: CLINIC | Age: 68
End: 2018-05-03

## 2018-05-23 ENCOUNTER — TELEPHONE (OUTPATIENT)
Dept: SMOKING CESSATION | Facility: CLINIC | Age: 68
End: 2018-05-23

## 2018-06-18 ENCOUNTER — CLINICAL SUPPORT (OUTPATIENT)
Dept: SMOKING CESSATION | Facility: CLINIC | Age: 68
End: 2018-06-18
Payer: COMMERCIAL

## 2018-06-18 DIAGNOSIS — F17.200 NICOTINE DEPENDENCE: Primary | ICD-10-CM

## 2018-06-18 PROCEDURE — 99407 BEHAV CHNG SMOKING > 10 MIN: CPT | Mod: S$GLB,,,

## 2018-06-18 NOTE — PROGRESS NOTES
Spoke to patient in regards to 12 month quit 1 phone follow up. He reports that he is not tobacco free at this time but unable to rejoin smoking cessation program due to transportation issue. Informed him of is benefits. Left contact information. Will complete and resolve smart form.

## 2018-08-13 RX ORDER — OXYBUTYNIN CHLORIDE 5 MG/1
TABLET, EXTENDED RELEASE ORAL
Qty: 30 TABLET | Refills: 11 | Status: SHIPPED | OUTPATIENT
Start: 2018-08-13 | End: 2022-09-16

## 2018-09-19 ENCOUNTER — HOSPITAL ENCOUNTER (OUTPATIENT)
Dept: RADIOLOGY | Facility: HOSPITAL | Age: 68
Discharge: HOME | End: 2018-09-19
Attending: INTERNAL MEDICINE
Payer: MEDICARE

## 2018-09-19 DIAGNOSIS — M54.50 ACUTE BILATERAL LOW BACK PAIN WITHOUT SCIATICA: Primary | ICD-10-CM

## 2018-09-19 DIAGNOSIS — M54.50 ACUTE BILATERAL LOW BACK PAIN WITHOUT SCIATICA: ICD-10-CM

## 2018-09-19 PROCEDURE — 73521 X-RAY EXAM HIPS BI 2 VIEWS: CPT | Mod: 26,,, | Performed by: RADIOLOGY

## 2018-09-19 PROCEDURE — 72100 X-RAY EXAM L-S SPINE 2/3 VWS: CPT | Mod: 26,,, | Performed by: RADIOLOGY

## 2018-09-19 PROCEDURE — 72100 X-RAY EXAM L-S SPINE 2/3 VWS: CPT | Mod: TC,FY

## 2018-09-19 PROCEDURE — 73521 X-RAY EXAM HIPS BI 2 VIEWS: CPT | Mod: TC,FY

## 2018-10-15 ENCOUNTER — DOCUMENTATION ONLY (OUTPATIENT)
Dept: REHABILITATION | Facility: HOSPITAL | Age: 68
End: 2018-10-15

## 2018-10-15 PROBLEM — R53.1 RIGHT SIDED WEAKNESS: Status: RESOLVED | Noted: 2017-11-09 | Resolved: 2018-10-15

## 2018-10-15 NOTE — PROGRESS NOTES
Pt was evaluated on 11/6/17 and he was seen 18 times for PT. Pt has not attended PT since 1/31/18.  Current status is unknown. Pt to be d/c'd at this time.    Gayle PACET

## 2019-01-18 DIAGNOSIS — E78.5 HYPERLIPIDEMIA, UNSPECIFIED HYPERLIPIDEMIA TYPE: ICD-10-CM

## 2019-01-29 ENCOUNTER — TELEPHONE (OUTPATIENT)
Dept: FAMILY MEDICINE | Facility: HOSPITAL | Age: 69
End: 2019-01-29

## 2019-01-29 RX ORDER — ATORVASTATIN CALCIUM 40 MG/1
80 TABLET, FILM COATED ORAL DAILY
Qty: 90 TABLET | Refills: 3 | Status: SHIPPED | OUTPATIENT
Start: 2019-01-29 | End: 2022-09-16

## 2019-01-29 NOTE — TELEPHONE ENCOUNTER
----- Message from Karina Goodwin MA sent at 1/29/2019  1:43 PM CST -----  Contact: Clarisse templeton [phone]  Please call to clarify rx for atorvastatin 40mg; patient just filled same rx 20mg from another md last week.  Thanks.

## 2019-02-04 ENCOUNTER — TELEPHONE (OUTPATIENT)
Dept: FAMILY MEDICINE | Facility: HOSPITAL | Age: 69
End: 2019-02-04

## 2019-04-29 DIAGNOSIS — I10 ESSENTIAL HYPERTENSION: ICD-10-CM

## 2019-04-30 RX ORDER — AMLODIPINE BESYLATE 10 MG/1
10 TABLET ORAL DAILY
Qty: 90 TABLET | Refills: 1 | Status: SHIPPED | OUTPATIENT
Start: 2019-04-30 | End: 2020-03-11

## 2020-03-06 ENCOUNTER — PES CALL (OUTPATIENT)
Dept: ADMINISTRATIVE | Facility: CLINIC | Age: 70
End: 2020-03-06

## 2020-03-11 ENCOUNTER — OFFICE VISIT (OUTPATIENT)
Dept: FAMILY MEDICINE | Facility: CLINIC | Age: 70
End: 2020-03-11
Payer: MEDICARE

## 2020-03-11 VITALS
BODY MASS INDEX: 28.02 KG/M2 | TEMPERATURE: 98 F | DIASTOLIC BLOOD PRESSURE: 78 MMHG | SYSTOLIC BLOOD PRESSURE: 162 MMHG | HEIGHT: 71 IN | HEART RATE: 68 BPM | OXYGEN SATURATION: 98 % | WEIGHT: 200.19 LBS

## 2020-03-11 DIAGNOSIS — Z00.00 ENCOUNTER FOR PREVENTIVE HEALTH EXAMINATION: Primary | ICD-10-CM

## 2020-03-11 DIAGNOSIS — Z23 NEED FOR SHINGLES VACCINE: ICD-10-CM

## 2020-03-11 DIAGNOSIS — R41.3 MEMORY IMPAIRMENT: ICD-10-CM

## 2020-03-11 DIAGNOSIS — I10 ESSENTIAL HYPERTENSION: ICD-10-CM

## 2020-03-11 DIAGNOSIS — E11.8 TYPE 2 DIABETES MELLITUS WITH COMPLICATION: ICD-10-CM

## 2020-03-11 DIAGNOSIS — I69.959 HEMIPLEGIA OF DOMINANT SIDE AS LATE EFFECT FOLLOWING CEREBROVASCULAR DISEASE: ICD-10-CM

## 2020-03-11 DIAGNOSIS — Z72.0 TOBACCO ABUSE: ICD-10-CM

## 2020-03-11 PROCEDURE — G0439 PPPS, SUBSEQ VISIT: HCPCS | Mod: HCWC,S$GLB,, | Performed by: PHYSICIAN ASSISTANT

## 2020-03-11 PROCEDURE — 3077F SYST BP >= 140 MM HG: CPT | Mod: HCWC,CPTII,S$GLB, | Performed by: PHYSICIAN ASSISTANT

## 2020-03-11 PROCEDURE — G0439 PR MEDICARE ANNUAL WELLNESS SUBSEQUENT VISIT: ICD-10-PCS | Mod: HCWC,S$GLB,, | Performed by: PHYSICIAN ASSISTANT

## 2020-03-11 PROCEDURE — 3078F PR MOST RECENT DIASTOLIC BLOOD PRESSURE < 80 MM HG: ICD-10-PCS | Mod: HCWC,CPTII,S$GLB, | Performed by: PHYSICIAN ASSISTANT

## 2020-03-11 PROCEDURE — 99999 PR PBB SHADOW E&M-EST. PATIENT-LVL V: ICD-10-PCS | Mod: PBBFAC,HCWC,, | Performed by: PHYSICIAN ASSISTANT

## 2020-03-11 PROCEDURE — 99999 PR PBB SHADOW E&M-EST. PATIENT-LVL V: CPT | Mod: PBBFAC,HCWC,, | Performed by: PHYSICIAN ASSISTANT

## 2020-03-11 PROCEDURE — 3078F DIAST BP <80 MM HG: CPT | Mod: HCWC,CPTII,S$GLB, | Performed by: PHYSICIAN ASSISTANT

## 2020-03-11 PROCEDURE — 3077F PR MOST RECENT SYSTOLIC BLOOD PRESSURE >= 140 MM HG: ICD-10-PCS | Mod: HCWC,CPTII,S$GLB, | Performed by: PHYSICIAN ASSISTANT

## 2020-03-11 RX ORDER — ALLOPURINOL 100 MG/1
TABLET ORAL
COMMUNITY
Start: 2019-12-11

## 2020-03-11 RX ORDER — ROSUVASTATIN CALCIUM 40 MG/1
TABLET, COATED ORAL
COMMUNITY
Start: 2019-12-11 | End: 2022-09-16

## 2020-03-11 RX ORDER — CLOTRIMAZOLE AND BETAMETHASONE DIPROPIONATE 10; .64 MG/G; MG/G
CREAM TOPICAL
COMMUNITY
Start: 2019-12-11

## 2020-03-11 RX ORDER — ERGOCALCIFEROL 1.25 MG/1
CAPSULE ORAL
COMMUNITY
Start: 2020-02-03

## 2020-03-11 NOTE — PROGRESS NOTES
"Ambrosio Montoya presented for a Medicare AWV and comprehensive Health Risk Assessment today. He presented with a friend, Dimas, who is his primary caregiver at home. He denied any acute complaints. Patient was unable to recall the name of his PCP or any of the medications that he is currently taking. He did not bring any of his medications with him. He reports not taking his blood pressure medication today. Patient has not been seen by an [facility] provider since 2017.    The following components were reviewed and updated:    · Medical history  · Family History  · Social history  · Allergies and Current Medications  · Health Risk Assessment  · Health Maintenance  · Care Team     ** See Completed Assessments for Annual Wellness Visit within the encounter summary.**       The following assessments were completed:  · Living Situation  · CAGE  · Depression Screening  · Timed Get Up and Go  · Whisper Test  · Cognitive Function Screening  · Nutrition Screening  · ADL Screening  · PAQ Screening    Vitals:    03/11/20 1516   BP: (!) 162/78   Pulse: 68   Temp: 98.3 °F (36.8 °C)   TempSrc: Oral   SpO2: 98%   Weight: 90.8 kg (200 lb 2.8 oz)   Height: 5' 11" (1.803 m)     Body mass index is 27.92 kg/m².     Physical Exam   Constitutional: He is oriented to person, place, and time. He appears well-developed and well-nourished. No distress.   Sitting in a wheelchair   HENT:   Head: Normocephalic.   Right Ear: External ear normal.   Left Ear: External ear normal.   Nose: Nose normal.   Mouth/Throat: Oropharynx is clear and moist. No oropharyngeal exudate.   Edentulous   Eyes: Pupils are equal, round, and reactive to light. Conjunctivae are normal. Right eye exhibits no discharge. Left eye exhibits no discharge.   Neck: Normal range of motion. Neck supple. No JVD present. No tracheal deviation present.   Cardiovascular: Normal rate, regular rhythm, normal heart sounds and intact distal pulses.   Pulmonary/Chest: Effort normal and " "breath sounds normal.   Abdominal: Soft. Bowel sounds are normal. There is no tenderness.   Musculoskeletal: He exhibits edema (2+ b/l LE).   Neurological: He is alert and oriented to person, place, and time.   Right-sided UE and LE weakness (chronic)   Skin: Skin is warm. Capillary refill takes less than 2 seconds. No erythema.   Psychiatric: He has a normal mood and affect. His behavior is normal. Judgment and thought content normal. He exhibits abnormal recent memory.   Vitals reviewed.              Diagnoses and health risks identified today and associated recommendations/orders:    1. Encounter for preventive health examination  Chart reviewed. Discussed current medical problems and addressed any outstanding health maintenance. Patient declined all vaccinations and outstanding labs as he reports that his PCP has him up to date. Continue management with [facility] PCP.    2. Memory impairment  Memory deficit on screening; Mini Cognitive score: 3. See abnormal clock drawing above. Unable to recall 2/3 words after 3 minutes. Unable to spell "world" backwards. Baseline cognition unknown.    Referral to Neuropsychology for dementia evaluation.  - Ambulatory referral/consult to Neuropsychology; Future    3. Tobacco abuse  Dangers of cigarette smoking were reviewed with patient in detail for 3 minutes and patient was encouraged to quit. Nicotine replacement options were discussed. Patient states he has tried to quit several times in the past. Currently smokes 1/2 PPD of cigarettes.  - Ambulatory referral/consult to Smoking Cessation Program; Future    4. Type 2 diabetes mellitus with complication  Chronic. No recent HgbA1c on file. Patient declined labs. Due for eye exam. Educated on diabetic diet and importance of yearly eye and foot exams. Continue management with [facility] PCP.  - Diabetic Eye Screening Photo; Future  - Ambulatory referral/consult to Ophthalmology; Future    5. Hemiplegia affecting dominant side, late effect of " cerebrovascular disease  Chronic; stable. Followed by PCP.    6. Need for shingles vaccine  Recommended patient obtain 2-series Shingrix vaccine at their local pharmacy.    7. BMI 27.0-27.9,adult  Chronic, stable. Therapeutic lifestyle changes discussed. Followed by PCP.    8. Essential hypertension  Chronic; not well controlled. Patient did not take medications today. Educated on importance of medication adherence, low sodium diet, monitoring BP. Follow up with PCP for continued monitoring.      Provided Ambrosio with a 5-10 year written screening schedule and personal prevention plan. Recommendations were developed using the USPSTF age appropriate recommendations. Education, counseling, and referrals were provided as needed. After Visit Summary printed and given to patient which includes a list of additional screenings\tests needed.    I offered to discuss end of life issues, including information on how to make advance directives that the patient could use to name someone who would make medical decisions on their behalf if they became too ill to make themselves.    ___Patient declined  _X_Patient is interested, I provided paper work and offered to discuss.    Follow up for Follow-up with PCP as scheduled, , Annual Wellness Visit in 1 year.    Xochitl Limon PA-C

## 2020-03-11 NOTE — PATIENT INSTRUCTIONS
How to Quit Smoking  Smoking is one of the hardest habits to break. About half of all people who have ever smoked have been able to quit. Most people who still smoke want to quit. Here are some of the best ways to stop smoking.    Keep trying  Most smokers make many attempts at quitting before they are successful. Its important not to give up.  Go cold turkey  Most former smokers quit cold turkey (all at once). Trying to cut back gradually doesn't seem to work as well, perhaps because it continues the smoking habit. Also, it is possible to inhale more while smoking fewer cigarettes. This results in the same amount of nicotine in your body.  Get support  Support programs can be a big help, especially for heavy smokers. These groups offer lectures, ways to change behavior, and peer support. Here are some ways to find a support program:  · Free national quitline: 800-QUIT-NOW (501-356-1098).  · Hospital quit-smoking programs.  · American Lung Association: (169.877.5610).  · American Cancer Society (520-681-7293).  Support at home is important too. Nonsmokers can offer praise and encouragement. If the smoker in your life finds it hard to quit, encourage them to keep trying.  Over-the-counter medicines  Nicotine replacement therapy may make quitting easier. Certain aids, such as the nicotine patch, gum, and lozenges, are available without a prescription. It is best to use these under a doctors care, though. The skin patch provides a steady supply of nicotine. Nicotine gum and lozenges give temporary bursts of low levels of nicotine. Both methods reduce the craving for cigarettes. Warning: If you have nausea, vomiting, dizziness, weakness, or a fast heartbeat, stop using these products and see your doctor.  Prescription medicines  After reviewing your smoking patterns and past attempts to quit, your doctor may offer a prescription medicine such as bupropion, varenicline, a nicotine inhaler, or nasal spray. Each has  "advantages and side effects. Your doctor can review these with you.  Health benefits of quitting  The benefits of quitting start right away and keep improving the longer you go without smoking. These benefits occur at any age.  So whether you are 17 or 70, quitting is a good decision. Some of the benefits include:  · 20 minutes: Blood pressure and pulse return to normal.  · 8 hours: Oxygen levels return to normal.  · 2 days: Ability to smell and taste begin to improve as damaged nerves regrow.  · 2 to 3 weeks: Circulation and lung function improve.  · 1 to 9 months: Coughing, congestion, and shortness of breath decrease; tiredness decreases.  · 1 year: Risk of heart attack decreases by half.  · 5 years: Risk of lung cancer decreases by half; risk of stroke becomes the same as a nonsmokers.  For more on how to quit smoking, try these online resources:   · [employer]  · "Clearing the Air" booklet from the National Cancer [place]: Genesant.gov/sites/default/files/pdf/clearing-the-air-accessible.pdf  Date Last Reviewed: 3/1/2017  © 3604-2025 [employer]. [address] [place] [address]. All rights reserved. This information is not intended as a substitute for professional medical care. Always follow your healthcare professional's instructions.          Controlling High Blood Pressure  High blood pressure (hypertension) is often called the silent killer. This is because many people who have it dont know it. High blood pressure is defined as 140/90 mm Hg or higher. Know your blood pressure and remember to check it regularly. Doing so can save your life. Here are some things you can do to help control your blood pressure.    Choose heart-healthy foods  · Select low-salt, low-fat foods. Limit sodium intake to 2,400 mg per day or the amount suggested by your healthcare provider.  · Limit canned, dried, cured, packaged, and fast foods. These can contain a lot of salt.  · Eat 8 to 10 servings of " fruits and vegetables every day.  · Choose lean meats, fish, or chicken.  · Eat whole-grain pasta, brown rice, and beans.  · Eat 2 to 3 servings of low-fat or fat-free dairy products.  · Ask your doctor about the DASH eating plan. This plan helps reduce blood pressure.  · When you go to a restaurant, ask that your meal be prepared with no added salt.  Maintain a healthy weight  · Ask your healthcare provider how many calories to eat a day. Then stick to that number.  · Ask your healthcare provider what weight range is healthiest for you. If you are overweight, a weight loss of only 3% to 5% of your body weight can help lower blood pressure. Generally, a good weight loss goal is to lose 10% of your body weight in a year.  · Limit snacks and sweets.  · Get regular exercise.  Get up and get active  · Choose activities you enjoy. Find ones you can do with friends or family. This includes bicycling, dancing, walking, and jogging.  · Park farther away from building entrances.  · Use stairs instead of the elevator.  · When you can, walk or bike instead of driving.  · [place] leaves, garden, or do household repairs.  · Be active at a moderate to vigorous level of physical activity for at least 40 minutes for a minimum of 3 to 4 days a week.   Manage stress  · Make time to relax and enjoy life. Find time to laugh.  · Communicate your concerns with your loved ones and your healthcare provider.  · Visit with family and friends, and keep up with hobbies.  Limit alcohol and quit smoking  · Men should have no more than 2 drinks per day.  · Women should have no more than 1 drink per day.  · Talk with your healthcare provider about quitting smoking. Smoking significantly increases your risk for heart disease and stroke. Ask your healthcare provider about community smoking cessation programs and other options.  Medicines  If lifestyle changes arent enough, your healthcare provider may prescribe high blood pressure medicine. Take all  medicines as prescribed. If you have any questions about your medicines, ask your healthcare provider before stopping or changing them.   Date Last Reviewed: 4/27/2016 © 2000-2017 "[employer]. [address] [place] [address], [place] [address]. All rights reserved. This information is not intended as a substitute for professional medical care. Always follow your healthcare professional's instructions.          Long-Term Complications of Diabetes    Diabetes can cause health problems over time. These are called complications. They are more likely to happen if your blood sugar is often too high. Over time, high blood sugar can damage blood vessels in your body. It is important to keep your blood sugar in your target range. This can help prevent or delay complications from diabetes.  Possible complications  Complications of diabetes include:  · Eye problems, including damage to the blood vessels in the eyes (retinopathy), pressure in the eye (glaucoma), and clouding of the eyes lens (a cataract). Eye problems can eventually lead to irreversible blindness.   · Tooth and gum problems (periodontal disease), causing loss of teeth and bone  · Blood vessel (vascular) disease leading to circulation problems, heart attack or stroke, or a need for amputation of a limb   · Problems with sexual function leading to erectile dysfunction in men and sexual discomfort in women   · Kidney disease (nephropathy) can eventually lead to kidney failure, which may require dialysis or kidney transplant   · Nerve problems (neuropathy), causing pain or loss of feeling in your feet and other parts of your body, potentially leading to an amputation of a limb   · High blood pressure (hypertension), putting strain on your heart and blood vessels  · Serious infections, possibly leading to loss of toes, feet, or limbs  How to avoid complications  The serious consequences of these complications may be avoidable for most people with diabetes by  managing your blood glucose, blood pressure, and cholesterol levels. This can help you feel better and stay healthy. You can manage diabetes by tracking your blood sugar. You can also eat healthy and exercise to avoid gaining weight. And you should take medicine if directed by your healthcare provider.  Date Last Reviewed: 5/1/2016  © 5684-1647 [employer]. [address] [place] [address], [place] [address]. All rights reserved. This information is not intended as a substitute for professional medical care. Always follow your healthcare professional's instructions.          Healthy Meals for Diabetes     A healthcare provider will help you develop a meal plan that fits your needs.   Ask your healthcare team to help you make a meal plan that fits your needs. Your meal plan tells you when to eat your meals and snacks, what kinds of foods to eat, and how much of each food to eat. You dont have to give up all the foods you like. But you do need to follow some guidelines.  Choose healthy carbohydrates  Starches, sugars, and fiber are all types of carbohydrates. Fiber can help lower your cholesterol and triglycerides. Fiber is also healthy for your heart. You should have 20 to 35 grams of total fiber each day. Fiber-rich foods include:  · Whole-grain breads and cereals  · Bulgur wheat  · Brown rice     · Whole-wheat pasta  · Fruits and vegetables  · Dry beans, and peas   Keep track of the amount of carbohydrates you eat. This can help you keep the right balance of physical activity and medicine. The amount of carbohydrates needed will vary for each person. It depends on many things such as your health, the medicines you take, and how active you are. Your healthcare team will help you figure out the right amount of carbohydrates for you. You may start with around 45 to 60 grams of carbohydrates per meal, depending on your situation.   Here are some examples of foods containing about 15 grams of carbohydrates (1  serving of carbohydrates):  · 1/2 cup of canned or frozen fruit  · A small piece of fresh fruit (4 ounces)  · 1 slice of bread  · 1/2 cup of oatmeal  · 1/3 cup of rice  · 4 to 6 crackers  · 1/2 English muffin  · 1/2 cup of black beans  · 1/4 of a large baked potato (3 ounces)  · 2/3 cup of plain fat-free yogurt  · 1 cup of soup  · 1/2 cup of casserole  · 6 chicken nuggets  · 2-inch-square brownie or cake without frosting  · 2 small cookies  · 1/2 cup of ice cream or sherbet  Choose healthy protein foods  Eating protein that is low in fat can help you control your weight. It also helps keep your heart healthy. Low-fat protein foods include:  · Fish  · Plant proteins, such as dry beans and peas, nuts, and soy products like tofu and soymilk  · Lean meat with all visible fat removed  · Poultry with the skin removed  · Low-fat or nonfat milk, cheese, and yogurt  Limit unhealthy fats and sugar  Saturated and trans fats are unhealthy for your heart. They raise LDL (bad) cholesterol. Fat is also high in calories, so it can make you gain weight. To cut down on unhealthy fats and sugar, limit these foods:  · Butter or margarine  · Palm and palm kernel oils and coconut oil  · Cream  · Cheese  · [place]  · Lunch meats     · Ice cream  · Sweet bakery goods such as pies, muffins, and donuts  · Jams and jellies  · Candy bars  · Regular sodas   How much to eat  The amount of food you eat affects your blood sugar. It also affects your weight. Your healthcare team will tell you how much of each type of food you should eat.  · Use measuring cups and spoons and a food scale to measure serving sizes.  · Learn what a correct serving size looks like on your plate. This will help when you are away from home and cant measure your servings.  · Eat only the number of servings given on your meal plan for each food. Dont take seconds.  · Learn to read food labels. Be sure to look at serving size, total carbohydrates, fiber, calories, sugar, and  saturated and trans fats. Look for healthier alternatives to foods that have added sugar.  · Plan ahead for parties so you can still have a good time without going overboard with unhealthy food choices. Set a good example yourself by bringing a healthy dish to pot lucks.   Choose healthy snacks  When it comes to snacks, we usually think about foods with added sugar and fats. But there are many other options for healthier snack choices. Here are a few snack ideas to choose from:  Snacks with less than 5 grams of carbohydrates  · 1 piece of string cheese  · 3 celery sticks plus 1 tablespoon of peanut butter  · 5 cherry tomatoes plus 1 tablespoon of ranch dressing  · 1 hard-boiled egg  · 1/4 cup of fresh blueberries  ·  5 baby carrots  · 1 cup of light popcorn  · 1/2 cup of sugar-free gelatin  · 15 almonds  Snacks with about 10 to 20 grams of carbohydrates  · 1/3 cup of hummus plus 1 cup of fresh cut nonstarchy vegetables (carrots, green peppers, broccoli, celery, or a combination)  · 1/2 cup of fresh or canned fruit plus 1/4 cup of cottage cheese  · 1/2 cup of tuna salad with 4 crackers  · 2 rice cakes and a tablespoon of peanut butter  · 1 small apple or orange  · 3 cups light popcorn  · 1/2 of a turkey sandwich (1 slice of whole-wheat bread, 2 ounces of turkey, and mustard)  Portion sizes are important to controlling your blood sugar and staying at a healthy weight. Stock up on healthy snack items so you always have them on hand.  When to eat  Your meal plan will likely include breakfast, lunch, dinner, and some snacks.  · Try to eat your meals and snacks at about the same times each day.  · Eat all your meals and snacks. Skipping a meal or snack can make your blood sugar drop too low. It can also cause you to eat too much at the next meal or snack. Then your blood sugar could get too high.  Date Last Reviewed: 7/1/2016  © 7279-1510 [employer]. [address] [place] [address], [place] [address]. All rights  reserved. This information is not intended as a substitute for professional medical care. Always follow your healthcare professional's instructions.        Counseling and Referral of Other Preventative  (Italic type indicates deductible and co-insurance are waived)    Patient Name: Ambrosio Montoya  Today's Date: 3/11/2020    Health Maintenance       Date Due Completion Date    Hepatitis C Screening 1950 ---    Eye Exam 06/09/1961 ---    TETANUS VACCINE 06/09/1969 ---    Shingles Vaccine (1 of 2) 06/09/2001 ---    Lipid Panel 01/14/2016 1/14/2015    Hemoglobin A1c 10/28/2017 4/28/2017    Foot Exam 10/30/2018 10/30/2017    Override on 10/30/2017: Done (Normal)    Pneumococcal Vaccine (65+ Low/Medium Risk) (2 of 2 - PPSV23) 01/06/2019 10/30/2017    Influenza Vaccine (1) 09/01/2019 10/30/2017    Aspirin/Antiplatelet Therapy 03/11/2021 3/11/2020    Colonoscopy 04/20/2027 4/20/2017        Orders Placed This Encounter   Procedures    Ambulatory referral/consult to Neuropsychology    Ambulatory referral/consult to Smoking Cessation Program     The following information is provided to all patients.  This information is to help you find resources for any of the problems found today that may be affecting your health:                Living healthy guide: www.[facility].[place].gov      Understanding Diabetes: www.diabetes.org      Eating healthy: www.cdc.gov/healthyweight      [facility] home safety checklist: www.cdc.gov/steadi/patient.html      Agency on Aging: www.goea.[place].gov      Alcoholics anonymous (AA): www.aa.org      Physical Activity: www.rosio.nih.gov/wj5rdzx      Tobacco use: www.quitwithusla.org

## 2020-03-17 ENCOUNTER — TELEPHONE (OUTPATIENT)
Dept: OPTOMETRY | Facility: CLINIC | Age: 70
End: 2020-03-17

## 2020-03-19 ENCOUNTER — TELEPHONE (OUTPATIENT)
Dept: OPTOMETRY | Facility: CLINIC | Age: 70
End: 2020-03-19

## 2020-04-24 ENCOUNTER — TELEPHONE (OUTPATIENT)
Dept: NEUROLOGY | Facility: CLINIC | Age: 70
End: 2020-04-24

## 2020-04-24 NOTE — TELEPHONE ENCOUNTER
Attempted to complete previsit questionnaires with patient. No answer left message and callback number.

## 2020-04-28 ENCOUNTER — TELEPHONE (OUTPATIENT)
Dept: NEUROLOGY | Facility: CLINIC | Age: 70
End: 2020-04-28

## 2020-04-30 ENCOUNTER — OFFICE VISIT (OUTPATIENT)
Dept: NEUROLOGY | Facility: CLINIC | Age: 70
End: 2020-04-30
Payer: MEDICARE

## 2020-04-30 DIAGNOSIS — F01.50 VASCULAR DEMENTIA WITHOUT BEHAVIORAL DISTURBANCE: Primary | ICD-10-CM

## 2020-04-30 DIAGNOSIS — R41.3 MEMORY IMPAIRMENT: ICD-10-CM

## 2020-04-30 DIAGNOSIS — I10 ESSENTIAL HYPERTENSION: ICD-10-CM

## 2020-04-30 DIAGNOSIS — F17.200 TOBACCO USE DISORDER: ICD-10-CM

## 2020-04-30 PROCEDURE — 98968 PH1 ASSMT&MGMT NQHP 21-30: CPT | Mod: 95,HCWC,, | Performed by: CLINICAL NEUROPSYCHOLOGIST

## 2020-04-30 PROCEDURE — 99499 NO LOS: ICD-10-PCS | Mod: 95,HCWC,, | Performed by: CLINICAL NEUROPSYCHOLOGIST

## 2020-04-30 PROCEDURE — 99499 UNLISTED E&M SERVICE: CPT | Mod: 95,HCWC,, | Performed by: CLINICAL NEUROPSYCHOLOGIST

## 2020-04-30 PROCEDURE — 98968 PR NONPHYSICIAN TELEPHONE ASSESSMENT 21-30 MIN: ICD-10-PCS | Mod: 95,HCWC,, | Performed by: CLINICAL NEUROPSYCHOLOGIST

## 2020-04-30 NOTE — ASSESSMENT & PLAN NOTE
Assessment:  -very likely vascular dementia (related to HTN, HLD, CAD, prior CVAs) at this time but needs neuropsych testing and clinic exam for full characterization  -there is adequate caregiver support this time but caregiver does need support services and education    Plan:  -[facility]  1. Neuropsych:  Testing in assessment clinic  2. Medical:  Needs neuro exam along with potential referral for MRI to clarify whether or not patient had stroke in the past year or see if there records from outside hospital  3. Care Management:  Referring our  for assessment    -Primary Care  1.  The patient can not reasonably be assumed to manage his own medications and medical advice at this time.  Thus it is important to coordinate with his caregiver during appointments

## 2020-04-30 NOTE — ASSESSMENT & PLAN NOTE
-given patient's cognitive deficits, it is unlikely that he will be able to quit smoking using traditional methods    -patient reported that he was open to quitting smoking today.  His caregiver indicated that she would be able to stop buying him cigarettes as this will likely be the only way to get him to stop (e.g. not providing them to him)

## 2020-04-30 NOTE — PROGRESS NOTES
NEUROPSYCHOLOGY CONSULT (TELEHEALTH)    Referral Information  Name: Ambrosio Montoya  MRN: [id number]  : 1950  Age: 69 y.o.  Race: Black or   Gender: male  Referring Provider: Valeriy Cannon [address] [place] [id number]  Billing: See below for details as coding/billing has changed   Telemedicine:   The patient location is: home  The [place] is: [facility] [place]  The chief complaint leading to consultation/medical necessity is: memory loss s/p CVA with concern for vascular dementia from established patient in primary care  Visit type: Virtual visit with audio only (telephone)  The reason for the audio only service rather than synchronous audio and video virtual visit was related to patient preference.   Total time spent with patient: 33-minutes with patient and 15-minutes for report  Each patient to whom he or she provides medical services by telemedicine is:  (1) informed of the relationship between the physician and patient and the respective role of any other health care provider with respect to management of the patient; and (2) notified that he or she may decline to receive medical services by telemedicine and may withdraw from such care at any time.  Consent/Emergency Plan: The patient expressed an understanding of the purpose of the evaluation and consented to all procedures. I informed the patient of limits to confidentiality and discussed an emergency plan.      SUMMARY/TREATMENT PLAN   Results from the interview indicate the following diagnoses and treatment plan recommendations. This was discussed with the patient's friend and the patient.  The patient cannot follow treatment plan without help from family.      Diagnoses/Plan  Problem List Items Addressed This Visit        Neuro    Vascular dementia without behavioral disturbance    Overview     -2012 CVA, but family reports recent CVA in 2019 (poor historians when asked) with further decline in cognitive/functional status          Current Assessment & Plan     Assessment:  -very likely vascular dementia (related to HTN, HLD, CAD, prior CVAs) at this time but needs neuropsych testing and clinic exam for full characterization  -there is adequate caregiver support this time but caregiver does need support services and education    Plan:  -Memory Clinic  1. Neuropsych:  Testing in assessment clinic  2. Medical:  Needs neuro exam along with potential referral for MRI to clarify whether or not patient had stroke in the past year or see if there records from outside hospital  3. Care Management:  Referring our  for assessment    -Primary Care  1.  The patient can not reasonably be assumed to manage his own medications and medical advice at this time.  Thus it is important to coordinate with his caregiver during appointments                 Cardiac/Vascular    Hypertension    Current Assessment & Plan     -followed by primary care but would recommend that they engage him in remote monitoring if the girlfriend is open to this  -poorly-controlled hypertension can certainly worsen vascular dementia            Other    Tobacco use disorder    Current Assessment & Plan     -given patient's cognitive deficits, it is unlikely that he will be able to quit smoking using traditional methods    -patient reported that he was open to quitting smoking today.  His caregiver indicated that she would be able to stop buying him cigarettes as this will likely be the only way to get him to stop (e.g. not providing them to him)               HISTORY OF PRESENT ILLNESS AND CURRENT SYMPTOMS     Mr. Montoya has CAD s/p MI in 2002, CVA 2012 with residual R sided weakness, DM, HPLD, tobacco abuse, and Hypertension.  Patient was joined for the phone interview with his recent girlfriend and because the patient's current cognitive deficits and girlfriend's lack of understanding about his medical history, recent history and symptoms was difficult to gather.       Onset of cognitive changes was initially noticed in March of 2020 at a primary care visit where he was unable to remember the name of his primary care doctor in performed poorly on the micro cog assessment. Mr. Montoya denies any cognitive trouble when asked today.  His friend reported that he had a stroke in the past year (this is not documented in the record however) and as result has had more cognitive trouble since that event.  Course of new onset cognitive trouble has been stable with girlfriend reporting no major worsening or improvement.  Type of cognitive trouble was described as only mild short-term memory trouble by the girlfriend but it seems though she was minimizing symptoms in the presence of the patient. Contextually, he had a CVA in 2012 with residual right-sided weakness and mobility difficulties but no apparent significant cognitive changes reported by the girlfriend.  Again this may not be accurate.      Current Functional Status/Needs:  ADLs  Self-Care Eating Safety Other   Bathing: Needs assistance  Bathroom: Independent  Other: Adequate No issues NA     Instrumental IADLs:   Driving Medications/Health Household Finances   -none since 2012 due to his physical deficit -in the past year the girlfriend has gotten more involved in his care due to the memory trouble and she is helping with medications -Carlos indicates that he generally functions adequately at the house but she checks in on him throughout the day -girlfriend paces monthly bills       Psychiatric/Behavioral Symptoms:  Mood:  Depression/Dysphoria Anxiety/Fearfulness Irritability   None None -Triggered by caregiver monitoring him.   -No safety issues with irritability     Behavior:  Agitation/Resistance Delusions/Paranoia Hallucinations   -Triggered by caregiver monitoring him.   -No safety issues  -None -None     Apathy/Motivation Repetitive/Restlessness Other   -More now post -stroke -None None  "    Neurovegetative:  Sleep/Nighttime  [employer]   -Normal -Normal -Normal     Suicidal/Homicidal Ideation: Normal    Physical Symptoms:   +Uses a walker for short-distance and wheel chair for longer distance since 2012     PERTINENT BACKGROUND INFORMATION   SOCIAL HISTORY  · Family Status: Together with Hal for more than a year "just friends" +  in 2005 + No involved children but Hal  · Current Living Situation: Won't say (Hal indicated that a question about whether she lived in the home was inappropriate and she would not answer it)  · Primary Source of Support: Ryannaresh  · Daily Activities:  Television mostly  · Stressors: None  · Other Factors:  · Educational Level: 9th grade  · 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; disability and social security    Family Neurologic History: Negative for heritable risk factors  Family Psychiatric History: Negative for heritable risk factors    MEDICAL STATUS  Patient Active Problem List   Diagnosis    Abnormal gait    Hypertension    Gout, arthritis    Type 2 diabetes mellitus with complication    CAD (coronary artery disease)    Dyslipidemia    Constipation - functional    Tobacco use disorder    Back pain    Difficulty walking    Hemiplegia affecting dominant side, late effect of cerebrovascular disease    Range of motion deficit    Right arm weakness    Bilateral cataracts    Right leg weakness    History of colon polyps    Polyp of colon    BMI 27.0-27.9,adult    Vascular dementia without behavioral disturbance     Past Medical History:   Diagnosis Date    Coronary artery disease 2001    [facility]    CVA (cerebral infarction)     Diabetes mellitus type II 2001    Hyperlipidemia 2001    Hypertension     Myocardial infarction 2002    Stroke 3/2012    [facility]     Past Surgical History:   Procedure Laterality Date    COLONOSCOPY N/A 4/20/2017    Procedure: COLONOSCOPY;  " Surgeon: Armando Hidalgo MD;  Location: [facility];  Service: Endoscopy;  Laterality: N/A;       Updated/Relevant Neurologic History:  · Falls: None  · TBI: None  · Seizures: None  · Stroke:  · 2012 Left hemisphere CVA  · ?2019 CVA: no records and patient/caregiver are not good historians  · Movement Concerns:   · Right-sided hemiplegia since 2012    Recent Labs   Lab Results   Component Value Date    [id number] 168 (L) 03/27/2012     Lab Results   Component Value Date    RPR Non-Reactive 03/27/2012     Lab Results   Component Value Date    FOLATE 8.8 03/27/2012     Lab Results   Component Value Date    TSH 0.399 (L) 07/01/2015     Lab Results   Component Value Date    HGBA1C 6.9 (H) 04/28/2017     Lab Results   Component Value Date    HIV1X2 Negative 03/27/2012     Imaging  -2012 MRI  Comparison: CT 2/26/12     Findings: There is a small area of low restricted diffusion in left   centrum semi-ovale without corresponding enhancement most compatible with   acute infarction.  There is additional similar though slightly smaller   area of diffusion signal abnormality right centrum semiovale with some   internal enhancement and is compatible with recent/subacute age infarction.     No midline shift or mass effect.  There is small-sized remote infarcts   involving the bilateral caudate, basal ganglia, thalami left gillian and   cerebellar hemispheres.     Superimposed generalized cerebral volume are slightly advanced for age.    No additional areas of abnormal parenchymal enhancement aside from the   enhancing right centrum semiovale focus.  There is a punctate focus of   susceptibility in the right thalamus most compatible with remote   microhemorrhage.        Impression: Small acute/recent infarction left centrum semi-ovale.  In   addition there is slightly smaller focus of recent/subacute age   infarction right centrum semi-ovale with subtle enhancement.     Superimposed generalized cerebral volume loss slightly advanced  for age   with remote infarcts bilateral caudate, thalami, basal ganglia and   cerebellum.     Clinical correlation and follow-up advised.       single punctate focus of gradient susceptibility within the right   thalamus most compatible with remote microhemorrhage.        ______________________________________      Electronically signed by: RAUEDL ABRAHAM DO  Date:     03/27/12  Time:    11:03   Current Medications    Current Outpatient Medications:     allopurinoL (ZYLOPRIM) 100 MG tablet, , Disp: , Rfl:     amLODIPine (NORVASC) 10 MG tablet, Take 1 tablet (10 mg total) by mouth once daily., Disp: 90 tablet, Rfl: 1    aspirin 325 MG tablet, Take 1 tablet (325 mg total) by mouth once daily., Disp: , Rfl:     atorvastatin (LIPITOR) 40 MG tablet, Take 2 tablets (80 mg total) by mouth once daily. (Patient not taking: Reported on 3/11/2020), Disp: 90 tablet, Rfl: 3    clotrimazole-betamethasone 1-0.05% (LOTRISONE) cream, APPLY SPARINGLY TO THE AFFECTED AREA(S) TWICE DAILY, Disp: , Rfl:     indomethacin (INDOCIN) 50 MG capsule, Take 1 capsule (50 mg total) by mouth 2 (two) times daily as needed. (Patient not taking: Reported on 3/11/2020), Disp: 30 capsule, Rfl: 0    lisinopril (PRINIVIL,ZESTRIL) 40 MG tablet, Take 1 tablet (40 mg total) by mouth once daily., Disp: 90 tablet, Rfl: 1    metFORMIN (GLUCOPHAGE XR) 500 MG 24 hr tablet, Take 2 tablets (1,000 mg total) by mouth daily with breakfast., Disp: 90 tablet, Rfl: 3    nicotine (NICODERM CQ) 14 mg/24 hr, Place 1 patch onto the skin once daily. (Patient not taking: Reported on 3/11/2020), Disp: 14 patch, Rfl: 0    oxybutynin (DITROPAN-XL) 5 MG TR24, TAKE ONE TABLET BY MOUTH ONCE DAILY, Disp: 30 tablet, Rfl: 11    polyethylene glycol (GLYCOLAX) 17 gram PwPk, Take 17 g by mouth once daily. (Patient not taking: Reported on 3/11/2020), Disp: 14 each, Rfl: 0    rosuvastatin (CRESTOR) 40 MG Tab, , Disp: , Rfl:     triamcinolone acetonide 0.1% (KENALOG) 0.1 %  "ointment, Apply topically once daily. For eczema (Patient not taking: Reported on 3/11/2020), Disp: 30 g, Rfl: 3    VITAMIN D2 1,250 mcg (50,000 unit) capsule, , Disp: , Rfl:     Updated/Relevant Psychiatric History: Smokes 0.5 ppd      MENTAL STATUS AND OBSERVATIONS:  APPEARANCE: Casually dressed and adequate grooming/hygiene.   ALERTNESS/ORIENTATION: Attentive and alert. Not oriented to year/month/day but oriented to place and self.   GAIT: Not assessed  MOTOR MOVEMENTS/MANNERISMS: Not assessed  SPEECH/LANGUAGE: Normal in rhythm, tone, and volume.  Slow rate of speech with mild dysarthria but unclear if this is since 2012 No significant word finding difficulty noted. Expressive language was normal.  Receptive language was normal for basic questions but poor comprehension for more open and her complex questions  STATED MOOD/AFFECT: The patients stated mood was "fine." Affect was congruent with stated mood.   INTERPERSONAL BEHAVIOR: Rapport was quickly and easily established   SUICIDALITY/HOMICIDALITY: Denied  HALLUCINATIONS/DELUSIONS: None evidenced or endorsed  THOUGHT PROCESSES/INSIGHT:  Minimal insight and often had a hard time following conversation around clinical questions    QUESTIONNAIRE RESPONSES    Questionnaires were completed via telephone with a psychometrist and reviewed and incorporated by the neuropsychologist.    BILLING  Service Description CPT Code Minutes Units   Psychiatric diagnostic evaluation by physician [id number]  1   Neurobehavioral status exam by physician [id number] 48 1   Each additional hour by physician [id number]  0   Test Evaluation Services --  --   Neuropsychological testing evaluation services by physician [id number]  0   Each additional hour by physician [id number]  0   Test Administration and Scoring --  --   Psychological or neuropsychological test administration and scoring by physician [id number]  0   Each additional 30 minutes by physician [id number]  0   Psychological or neuropsychological test " administration and scoring by technician [id number]  0   Each additional 30 minutes by technician [id number]  0

## 2020-04-30 NOTE — ASSESSMENT & PLAN NOTE
-followed by primary care but would recommend that they engage him in remote monitoring if the girlfriend is open to this  -poorly-controlled hypertension can certainly worsen vascular dementia

## 2020-05-04 ENCOUNTER — TELEPHONE (OUTPATIENT)
Dept: NEUROLOGY | Facility: CLINIC | Age: 70
End: 2020-05-04

## 2020-05-04 NOTE — TELEPHONE ENCOUNTER
"Social Work:    The following content was provided by MSW Student Intern Neel Farrar, with referenced phone interview observed by Klarissa Vera LCSW:     "Contacted caregiver/friend, Ms. Cochran, by phone on 5/1/2020. Caregiver reported everything is going well at home. Patient is having no issues with current stay-at-home order, and he lives with the caregiver. Patient keeps busy with crossword puzzles. Caregiver is able to get patient in and out of the car for doctor's visits. Caregiver does all of the cooking and medication dispensing. Caregiver reported no concerns about behavior, seems to be managing adequately. Caregiver's daughter helps out when she needs a break. Provided information about online caregiver support group. Caregiver does not use email. Caregiver had questions about programs that would deliver meals to the client; will research and discuss in follow up.      Follow up plan: Mail resources by [employer] and follow up call in one month."          "

## 2020-06-26 ENCOUNTER — SOCIAL WORK (OUTPATIENT)
Dept: NEUROLOGY | Facility: CLINIC | Age: 70
End: 2020-06-26

## 2020-06-26 NOTE — PROGRESS NOTES
Social Work, Neuropsychology Clinic:    Spoke briefly on phone with caregiver/friend Dimas. She reported pt has been doing fine, no significant changes, and she denied any questions or concerns at this time. She confirmed she had received resources that I mailed via [employer] last month. I encouraged friend to contact me as needed. Will plan a routine follow up in 2 months.

## 2020-06-30 ENCOUNTER — TELEPHONE (OUTPATIENT)
Dept: NEUROLOGY | Facility: CLINIC | Age: 70
End: 2020-06-30

## 2020-07-07 NOTE — PROGRESS NOTES
NEUROPSYCHOLOGICAL EVALUATION - CONFIDENTIAL    REFERRAL SOURCE: Xochitl Limon PA-C  MEDICAL NECESSITY:  Evaluate cognitive functioning in the setting of suspected dementia.   DATE CONDUCTED: 7/8/2020    SOURCES OF INFORMATION:  The following was gathered from a clinical interview with Mr. Ambrosio Montoya and review of the available medical records. Mr. Montoya expressed an understanding of the purpose of the evaluation and consented to all procedures. Total licensed billing psychologists professional time including clinical interview, test administration and interpretation of tests administered by the billing psychologist, integration of test results and other clinical data, preparing the final report, and personally reporting results to the patient   99266 - 60 minutes, 19667 - 60 minutes, [id number] - 50 minutes    SUMMARY/TREATMENT PLAN   Mr. Montoya is a 70 year old male with a MI in 2001 and left hemisphere stroke in 2012. His functioning has been compromised since the stroke, primarily due to hemiparesis. However, neuropsychological testing also revealed global cognitive dysfunction at the level to warrant a diagnosis of dementia. Of note, Mr. Montoya did not present with the type of debi forgetting seen in Alzheimer's disease. Rather, it is suspected that cognitive dysfunction is attributable to cerebrovascular disease superimposed on low average baseline abilities. Several recommendations are offered to assist in his care/treatment.     Diagnoses  Problem List Items Addressed This Visit        Neuro    Vascular dementia without behavioral disturbance    Overview     -2012 CVA, but family reports recent CVA in 2019 (poor historians when asked) with further decline in cognitive/functional status         Current Assessment & Plan     Further Work-up: Difficult to ascertain history/course of cognitive dysfunction from interview. Neuropsychological testing revealed global cognitive impairment. Consider  updated neuroimaging as his most recent scan in records is from 2012.    Sleep Hygiene: Recommended that Mr. Montoya limit daytime napping or at least napping in the late afternoon to ensure that he is asleep during the night.     Hearing Loss: His caregiver expressed concern about his hearing. An evaluation may be beneficial.     Level of Care: Appropriately managed. Hal manages all complex activities of daily living and he is rarely, if ever, home alone.     Optimize Brain Health: Smoking cessation is recommended for longterm brain health.     Neuropsychology Follow-up: Mr. Montoya would be most appropriate to be followed in memory care clinic every 6-12 months.                 Thank you for allowing me to participate in Mr. Duffy care.  If you have any questions, please contact me at [phone].    Davon Cormier Psy.D., ABPP  Board Certified in Clinical Neuropsychology  Department of Neurology    HISTORY OF PRESENT ILLNESS: Mr. Ambrosio Montoya is a 70 y.o., right-handed, -American male with 9 years of education who was referred for a neuropsychological evaluation in the setting of suspected vascular dementia. He was accompanied to the appointment by his friend/caregiver Dimas. He had a stroke in 2012 with residual right-side weakness and mobility difficulty that has been functionally limiting. Dimas has been his primary caregiver since that time. He is rarely, if ever home alone, as Dimas's daughter will stay with Mr. Montoya if she needs to go run errands.     Mr. Montoya and Dimas were primarily focused on his physical rather than cognitive symptoms. She is aware that he is increasingly reliant on her, but it was unclear if this is due to further health/physical limitations or cognitive decline. She did note some memory difficulties, such as forgetting about upcoming appointments. She also is not sure that he would remember to take his medications without her. Mr. Montoya denied any cognitive  "difficulties. He believes that he may have had mild speech disruption following the stroke, but that these symptoms resolved. He did endorse bradyphrenia, stating that he does not feel as sharp as in the past.     Neuropsychiatric Symptoms:  Hallucinations: Denied  Delusional/Paranoid Thinking: Denied  Apathy: His motor symptoms are disabling, so he is unable to engage in many activities. He spends the majority of his time watching television or doing word searches.   Irritability/Agitation: Denied, but he can get frustrated by his functional limitations. For instance, he would like to be able to cut the grass or bring groceries from the car to the house.   Depression/Labile Mood: Denied, he denied active SI, plan, or intent.   Anxiety: Denied    DAILY FUNCTIONING:  BASIC ADLS:  Feeding: independent  Dressing: dependent, requires assistance.   Bathing: dependent, requires assistance.   Toileting: dependent    IADLS:  Support System: Dimas is his primary caregiver.   Safety Risks:    Wandering: Denied   Falls: No falls recently. He uses a cane around the house. Wheelchair for longer distances.   Appointment Management: dependent.    Medication Compliance: dependent, Dimas manages.   Financial Management: dependent. Dimas manages.   Cooking: dependent  Driving: He hasn't driven since stroke in .     MEDICAL HISTORY: Mr. Montoya  has a past medical history of Coronary artery disease (), CVA (cerebral infarction), Diabetes mellitus type II (), Hyperlipidemia (), Hypertension, Myocardial infarction (), and Stroke (3/2012). Dimas wonders about hearing loss since he has been turning up the television louder and louder. He has an inconsistent sleep schedule. As a result, he sometimes he is up during the night and is confused about the time of day. He naps during the day, so she tries to limit the length of the naps so he can sleep during the night.     NEUROIMAGIN Head CT/CTA: "The " "evolving hypodensity left centrum semi-ovale compatible with recent/subacute age infarction.  Additional abnormality seen on MRI right centrum seminal ovale not seen on CT. This is superimposed on age-appropriate cerebral volume loss with remote bilateral ganglia, caudate, cerebellar and left thalamic and left pontine infarct. No definite abnormal parenchymal enhancement allowing for CT technique. no hydrocephalus or midline shift. Atherosclerotic plaquing of the cavernous and supra-clinoid segments of the ICAs with mild to moderate stenosis most prominent on the left. Development variant with hypoplastic right A1 segment of the REMY as well as right P1 segment of the PCA with essentially persistent fetal circulation the right PCA via right PCOM.       Noncalcified plaquing of the arch and origin the great vessels most prominent at the origin of the right brachiocephalic artery.       Unusual thin symmetrical linear defects within the bilateral distal common carotid arteries at the bifurcation and extending into the proximal ICAs this may represent unusual turbulent contrast however concerning for possible small non-flow limiting dissection flaps.  There is superimposed trace atherosclerotic  plaquing without significant extracranial carotid stenosis."     SUBSTANCE USE: Mr. Montoya  reports that he has been smoking cigarettes, about half PPD. He reports that he does not drink alcohol or use drugs. No history of substance abuse.     CURRENT MEDICATIONS:    Current Outpatient Medications:     allopurinoL (ZYLOPRIM) 100 MG tablet, , Disp: , Rfl:     amLODIPine (NORVASC) 10 MG tablet, Take 1 tablet (10 mg total) by mouth once daily., Disp: 90 tablet, Rfl: 1    aspirin 325 MG tablet, Take 1 tablet (325 mg total) by mouth once daily., Disp: , Rfl:     atorvastatin (LIPITOR) 40 MG tablet, Take 2 tablets (80 mg total) by mouth once daily. (Patient not taking: Reported on 3/11/2020), Disp: 90 tablet, Rfl: 3    " clotrimazole-betamethasone 1-0.05% (LOTRISONE) cream, APPLY SPARINGLY TO THE AFFECTED AREA(S) TWICE DAILY, Disp: , Rfl:     indomethacin (INDOCIN) 50 MG capsule, Take 1 capsule (50 mg total) by mouth 2 (two) times daily as needed. (Patient not taking: Reported on 3/11/2020), Disp: 30 capsule, Rfl: 0    lisinopril (PRINIVIL,ZESTRIL) 40 MG tablet, Take 1 tablet (40 mg total) by mouth once daily., Disp: 90 tablet, Rfl: 1    metFORMIN (GLUCOPHAGE XR) 500 MG 24 hr tablet, Take 2 tablets (1,000 mg total) by mouth daily with breakfast., Disp: 90 tablet, Rfl: 3    nicotine (NICODERM CQ) 14 mg/24 hr, Place 1 patch onto the skin once daily. (Patient not taking: Reported on 3/11/2020), Disp: 14 patch, Rfl: 0    oxybutynin (DITROPAN-XL) 5 MG TR24, TAKE ONE TABLET BY MOUTH ONCE DAILY, Disp: 30 tablet, Rfl: 11    polyethylene glycol (GLYCOLAX) 17 gram PwPk, Take 17 g by mouth once daily. (Patient not taking: Reported on 3/11/2020), Disp: 14 each, Rfl: 0    rosuvastatin (CRESTOR) 40 MG Tab, , Disp: , Rfl:     triamcinolone acetonide 0.1% (KENALOG) 0.1 % ointment, Apply topically once daily. 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     MENTAL STATUS AND OBSERVATIONS:  APPEARANCE: Casually dressed and adequate grooming/hygiene.   ALERTNESS/ORIENTATION: Attentive and alert.   GAIT/MOTOR: Unremarkable  SENSORY: He has reading glasses. He denied problems with hearing, although his caregiver worries that he is hard of hearing.   SPEECH/LANGUAGE: Normal in rate, rhythm, tone, and volume. Expressive and receptive language were grossly intact.  STATED MOOD/AFFECT: Mood was euthymic.   INTERPERSONAL BEHAVIOR: Rapport was quickly  and easily established   THOUGHT PROCESSES: Thoughts seemed logical and goal-directed.     TEST RESULTS: The test scores are also included in an appendix to this report.      Score Label T-Score Standard Score Z-Score Scaled Score %ile Rank   Exceptionally High > 70 > 130 > 2.0  > 16 > 98   Above Average 64-69 120-129 1.4-1.9 15 91-97   High Average 57-63 110-119 0.7-1.3 12-14 75-90   Average 44-56  0.6 to -0.6 8-11 25-74   Low Average 37-43 80-89 -1.3 to -0.7 6-7 9-24   Below Average 30-36 70-79 -2.0 to -1.4 4-5 2-8   Exceptionally Low < 30 < 70  < -2.0 < 4 < 2     Mental Status: He stated the year was 2000. He correctly stated the month and day of the week. He was 1 day off the exact date during the interview, but provided the correct date during testing. He recalled Obama as the previous president and he was able to recall Trump when given the T. He understood that people were wearing masks due to the pandemic. He recalled the hospital that he was at, but stated he was in a different city of [place]. He scored 10/30 on the MoCA. He encoded 4/5 words after 2 trials, freely recalling 0/5 following a brief delay. He recalled 1 word with categorical cueing and correctly identified 3/4 words with multiple choice cueing. Motor difficulties seemed to impact his ability to draw a clock and cylinder as both were micrographic and difficult to read. Regardless, his numbers on the clock face were incorrectly placed. He provided 0 correct responses on a serial 7 subtraction task.     Pre-morbid/Baseline: Estimated to be in the low average range based on demographic variables. A composite score of his current cognitive abilities was in the exceptionally low range.     Language: Letter verbal fluency was below expectation. Semantic verbal fluency was exceptionally low. He performed perfectly on a test of confrontation naming.     Visuospatial: Performance on a test of visual acuity/line orientation was exceptionally  low. Copy of a complex figure was mildly micrographic. He demonstrated an intact appreciation for the gestalt of the figure. He misplaced 1 detail and omitted another.     Learning/Memory: Overall encoding of a supraspan words list was exceptionally low as he recalled 1, 2, 4, and 4 of 10 words across the learning trials. He did not freely recall any words following a long delay. Recognition was below expectation as he correctly identified 9/10 words with 4 false positive errors. Overall encoding of a short story was exceptionally low. He freely recalled 2 of 3 previously encoded details following a long delay and his overall recall was exceptionally low. He remembered several details from a previously copied figure, but his overall recall was exceptionally low.    Executive Functioning: One trial learning/encoding was consistently below expectation. He was unable to complete an abbreviated test of divided attention/set shifting. Performance on tests of working memory were consistently below expectation. Performance on a test of processing speed was exceptionally low.     APPENDIX  TESTS ADMINISTERED:  Clinical Interview and Review of Records, Terell Cognitive Assessment (MoCA), Repeatable Battery for the Assessment of Neuropsychological Status (RBANS, form A).     RBANS   Index  Immediate Memory  49  Visuospatial/Construction 64  Language   78  Attention   43  Delayed Memory  52  Total    50    Subtests   Scaled Scores  List learning   2  Story Memory   2  Figure Copy   4  Line Orientation  <2%  Naming   51-75%  Fluency   2  Digit Span   2  Coding    1  List Recall   <2%  List Recognition  <2%  Story Recall   3  Figure Recall   4

## 2020-07-08 ENCOUNTER — INITIAL CONSULT (OUTPATIENT)
Dept: NEUROLOGY | Facility: CLINIC | Age: 70
End: 2020-07-08
Payer: MEDICARE

## 2020-07-08 ENCOUNTER — SOCIAL WORK (OUTPATIENT)
Dept: NEUROLOGY | Facility: CLINIC | Age: 70
End: 2020-07-08

## 2020-07-08 DIAGNOSIS — I25.10 CORONARY ARTERY DISEASE, ANGINA PRESENCE UNSPECIFIED, UNSPECIFIED VESSEL OR LESION TYPE, UNSPECIFIED WHETHER NATIVE OR TRANSPLANTED HEART: ICD-10-CM

## 2020-07-08 DIAGNOSIS — I10 ESSENTIAL HYPERTENSION: ICD-10-CM

## 2020-07-08 DIAGNOSIS — F01.50 VASCULAR DEMENTIA WITHOUT BEHAVIORAL DISTURBANCE: ICD-10-CM

## 2020-07-08 DIAGNOSIS — E78.5 DYSLIPIDEMIA: Primary | ICD-10-CM

## 2020-07-08 DIAGNOSIS — R29.898 RIGHT ARM WEAKNESS: ICD-10-CM

## 2020-07-08 DIAGNOSIS — R29.898 RIGHT LEG WEAKNESS: ICD-10-CM

## 2020-07-08 PROCEDURE — 96132 NRPSYC TST EVAL PHYS/QHP 1ST: CPT | Mod: HCWC,S$GLB,, | Performed by: PSYCHIATRY & NEUROLOGY

## 2020-07-08 PROCEDURE — 99204 OFFICE O/P NEW MOD 45 MIN: CPT | Mod: HCWC,S$GLB,, | Performed by: NURSE PRACTITIONER

## 2020-07-08 PROCEDURE — 96132 PR NEUROPSYCHOLOGIC TEST EVAL SVCS, 1ST HR: ICD-10-PCS | Mod: HCWC,S$GLB,, | Performed by: PSYCHIATRY & NEUROLOGY

## 2020-07-08 PROCEDURE — 99204 PR OFFICE/OUTPT VISIT, NEW, LEVL IV, 45-59 MIN: ICD-10-PCS | Mod: HCWC,S$GLB,, | Performed by: NURSE PRACTITIONER

## 2020-07-08 PROCEDURE — 99999 PR PBB SHADOW E&M-EST. PATIENT-LVL I: ICD-10-PCS | Mod: PBBFAC,HCWC,,

## 2020-07-08 PROCEDURE — 96138 PSYCL/NRPSYC TECH 1ST: CPT | Mod: HCWC,S$GLB,, | Performed by: PSYCHIATRY & NEUROLOGY

## 2020-07-08 PROCEDURE — 96138 PR PSYCH/NEUROPSYCH TEST ADMIN/SCORING, BY TECH, 2+ TESTS, 1ST 30 MIN: ICD-10-PCS | Mod: HCWC,S$GLB,, | Performed by: PSYCHIATRY & NEUROLOGY

## 2020-07-08 PROCEDURE — 99499 UNLISTED E&M SERVICE: CPT | Mod: HCWC,S$GLB,, | Performed by: PSYCHIATRY & NEUROLOGY

## 2020-07-08 PROCEDURE — 99999 PR PBB SHADOW E&M-EST. PATIENT-LVL I: CPT | Mod: PBBFAC,HCWC,,

## 2020-07-08 PROCEDURE — 96116 PR NEUROBEHAVIORAL STATUS EXAM BY PSYCH/PHYS: ICD-10-PCS | Mod: HCWC,S$GLB,, | Performed by: PSYCHIATRY & NEUROLOGY

## 2020-07-08 PROCEDURE — 99499 NO LOS: ICD-10-PCS | Mod: HCWC,S$GLB,, | Performed by: PSYCHIATRY & NEUROLOGY

## 2020-07-08 PROCEDURE — 96116 NUBHVL XM PHYS/QHP 1ST HR: CPT | Mod: HCWC,S$GLB,, | Performed by: PSYCHIATRY & NEUROLOGY

## 2020-07-08 PROCEDURE — 96139 PSYCL/NRPSYC TST TECH EA: CPT | Mod: HCWC,S$GLB,, | Performed by: PSYCHIATRY & NEUROLOGY

## 2020-07-08 PROCEDURE — 96139 PR PSYCH/NEUROPSYCH TEST ADMIN/SCORING, BY TECH, 2+ TESTS, EA ADDTL 30 MIN: ICD-10-PCS | Mod: HCWC,S$GLB,, | Performed by: PSYCHIATRY & NEUROLOGY

## 2020-07-08 NOTE — PROGRESS NOTES
Name: Ambrosio Montoya  MRN: [id number]   CSN: [id number]      Date: 7-8-2020      Referring physician:  KALYN Cannon W MAULIK ODEN [id number]    Subjective:      Chief Complaint:     History of Present Illness (HPI):    Ambrosio Montoya is a 70 y.o. right-handed male who presents today for an initial evaluation of memory loss and is  accompanied by Ms. Garcia. She says she is a friend. When asked, she says she has been with him since the stroke and knew him before this.     Stroke in 2012.   Lot of changes after stoke. Some things he can remember some things he can't. She cannot provide examples.    R side is weak. Basically says she does things for him.   He can do some puzzle books.   He can feed self but she assists him.   Needs assist with dressing.    Walks short distance with cane. WC for long distances since stroke.   Mood pretty good.   No isses with swallowing.   She feels his memory is 50/50. She feels all of his memory changes started with stroke.   She finds him [place].   Since stroke, he sometimes gets days and nights confused.        He says he is not having any problems.   He goes on to say he has trouble with side (points to his right side). States pulling up his hard for him to get out of floor. No issues with transferring to any chair. Hard for him to get off floor since stroke. However, he says is not falling and does not otherwise get onto floor.    He feels his memory is okay at times but not always. He has noted changes since stroke but does not feel it is getting worse.           Review of Systems   Constitutional: Negative for appetite change.   HENT: Negative for hearing loss and trouble swallowing.    Respiratory: Negative.    Cardiovascular: Negative.    Gastrointestinal: Negative for constipation and diarrhea.   Musculoskeletal: Positive for gait problem.   Neurological: Negative for dizziness, tremors and light-headedness.   Psychiatric/Behavioral: Negative for dysphoric  mood and sleep disturbance. The patient is not nervous/anxious.        Past Medical History: The patient  has a past medical history of Coronary artery disease (2001), CVA (cerebral infarction), Diabetes mellitus type II (2001), Hyperlipidemia (2001), Hypertension, Myocardial infarction (2002), and Stroke (3/2012).    Social History: The patient  reports that he has been smoking cigarettes. He has a 20.00 pack-year smoking history. He uses smokeless tobacco. He reports that he does not drink alcohol or use drugs.    Family History: Their family history is not on file.    Allergies: Pcn [penicillins] and Plavix [clopidogrel]     Meds:   Current Outpatient Medications on File Prior to Visit   Medication Sig Dispense Refill    allopurinoL (ZYLOPRIM) 100 MG tablet       amLODIPine (NORVASC) 10 MG tablet Take 1 tablet (10 mg total) by mouth once daily. 90 tablet 1    aspirin 325 MG tablet Take 1 tablet (325 mg total) by mouth once daily.      atorvastatin (LIPITOR) 40 MG tablet Take 2 tablets (80 mg total) by mouth once daily. (Patient not taking: Reported on 3/11/2020) 90 tablet 3    clotrimazole-betamethasone 1-0.05% (LOTRISONE) cream APPLY SPARINGLY TO THE AFFECTED AREA(S) TWICE DAILY      indomethacin (INDOCIN) 50 MG capsule Take 1 capsule (50 mg total) by mouth 2 (two) times daily as needed. (Patient not taking: Reported on 3/11/2020) 30 capsule 0    lisinopril (PRINIVIL,ZESTRIL) 40 MG tablet Take 1 tablet (40 mg total) by mouth once daily. 90 tablet 1    metFORMIN (GLUCOPHAGE XR) 500 MG 24 hr tablet Take 2 tablets (1,000 mg total) by mouth daily with breakfast. 90 tablet 3    nicotine (NICODERM CQ) 14 mg/24 hr Place 1 patch onto the skin once daily. (Patient not taking: Reported on 3/11/2020) 14 patch 0    oxybutynin (DITROPAN-XL) 5 MG TR24 TAKE ONE TABLET BY MOUTH ONCE DAILY 30 tablet 11    polyethylene glycol (GLYCOLAX) 17 gram PwPk Take 17 g by mouth once daily. (Patient not taking: Reported on  3/11/2020) 14 each 0    rosuvastatin (CRESTOR) 40 MG Tab       triamcinolone acetonide 0.1% (KENALOG) 0.1 % ointment Apply topically once daily. For eczema (Patient not taking: Reported on 3/11/2020) 30 g 3    VITAMIN D2 1,250 mcg (50,000 unit) capsule        No current facility-administered medications on file prior to visit.        Objective:     Physical Exam:      Constitutional  Well-developed, well-nourished, appears stated age     Awake, alert, pleasant and cooperative.   RR equal and unlabored.   Mask on- removes briefly.   Face symmetric.   Palate raises, midline.   EOMI.  Hearing intact to conversation.   R side weakness (4/5)  R shoulder shrug weak.  WC.           Laboratory Results:  No visits with results within 3 Month(s) from this visit.   Latest known visit with results is:   Office Visit on 05/11/2017   Component Date Value Ref Range Status    Specimen UA 05/11/2017 Urine, Clean Catch   Final    Color, UA 05/11/2017 Yellow  Yellow, Straw, Ally Final    Appearance, UA 05/11/2017 Clear  Clear Final    pH, UA 05/11/2017 5.0  5.0 - 8.0 Final    Specific Gravity, UA 05/11/2017 1.020  1.005 - 1.030 Final    Protein, UA 05/11/2017 Negative  Negative Final    Glucose, UA 05/11/2017 Negative  Negative Final    Ketones, UA 05/11/2017 Negative  Negative Final    Bilirubin (UA) 05/11/2017 Negative  Negative Final    Occult Blood UA 05/11/2017 Negative  Negative Final    Nitrite, UA 05/11/2017 Negative  Negative Final    Urobilinogen, UA 05/11/2017 Negative  <2.0 EU/dL Final    Leukocytes, UA 05/11/2017 Trace* Negative Final    Urine Culture, Routine 05/11/2017 Multiple organisms isolated. None in predominance.  Repeat if   Final    Urine Culture, Routine 05/11/2017 clinically necessary.   Final    RBC, UA 05/11/2017 1  0 - 4 /hpf Final    WBC, UA 05/11/2017 2  0 - 5 /hpf Final    Bacteria 05/11/2017 Rare  None-Occ /hpf Final    Squam Epithel, UA 05/11/2017 1  /hpf Final    Microscopic  Comment 05/11/2017 SEE COMMENT   Final       Imaging:   No imaging since 4-2012    Assessment and Plan     Dyslipidemia    Vascular dementia without behavioral disturbance    Essential hypertension    Coronary artery disease, angina presence unspecified, unspecified vessel or lesion type, unspecified whether native or transplanted heart    Right arm weakness    Right leg weakness        Medical Decision Making:    Difficult history to obtain.   Not clear if there is actually progression of memory changes since stroke or if cognition declined as result of stroke but not further declined over time.  Regardless, he does have vascular RF and the importance of risk factor control was discussed.         I spent 35 minutes face-to-face with the patient with >50% of the time spent with counseling and education regarding:  - results of data, diagnosis, and recommendations stated above  - the prognosis of dementia  - vascular RF control    Mary Jacinto, MELLISSA, NP-C  Division of Movement and Memory Disorders  [facility]  [phone]

## 2020-07-10 NOTE — ASSESSMENT & PLAN NOTE
Further Work-up: Difficult to ascertain history/course of cognitive dysfunction from interview. Neuropsychological testing revealed global cognitive impairment. Consider updated neuroimaging as his most recent scan in records is from 2012.    Sleep Hygiene: Recommended that Mr. Montoya limit daytime napping or at least napping in the late afternoon to ensure that he is asleep during the night.     Hearing Loss: His caregiver expressed concern about his hearing. An evaluation may be beneficial.     Level of Care: Appropriately managed. Hal manages all complex activities of daily living and he is rarely, if ever, home alone.     Optimize Brain Health: Smoking cessation is recommended for longterm brain health.     Neuropsychology Follow-up: Mr. Montoya would be most appropriate to be followed in memory care clinic every 6-12 months.

## 2020-07-15 NOTE — PROGRESS NOTES
Social Work, Neuropsychology Clinic:    Met with caregiver Mrs. Dimas Garcia while patient was undergoing Assessment. Mrs. Garcia describes herself as pt's friend, although the nature of relationship is not clear. She presented some questionnaires she had received, and reported she has already answered questions on the phone and is not willing to repeatedly complete questionnaires. She did not report significant concerns regarding pt's behaviors or moods, but did state she needs assistance in getting him bathed. She is also interested in accessing a service wherein someone could provide companionship to caregiver, take him out, etc. She reported she has vision problems, and appeared to have other medical issues which could make caregiving taxing. She inquired as to whether she could get paid to be pt's caregiver. I advised her that options are limited in [place]. Additionally, she has been reluctant to access [facility] due to a previous negative experience. However, caregiver herself is a  of a combat , and may be able to access the [facility] Aid and Attendance benefits which could in general provide more household income. As caregiver does not do email, I advised her I would mail (via [employer]) her information for [facility] Waiver and Long-Term Personal Care Services Program, and the [facility] Aid and Attendance program including contact information for a non-profit [employer] rep who can assist with the application. Will follow up in one month.   Time spent in direct contact and care management tasks: approx 120 minutes.

## 2020-09-08 ENCOUNTER — OUTPATIENT CASE MANAGEMENT (OUTPATIENT)
Dept: NEUROLOGY | Facility: CLINIC | Age: 70
End: 2020-09-08

## 2020-09-13 NOTE — PROGRESS NOTES
Social Work - Neuropsychology Clinic:    Phoned caregiver, earlene Cochran, to assess current care management needs. Friend was brief, but reported things are going about the same, and that she is managing as best as she can. I encouraged caregiver to tend to her own health needs, and to contact me as needed.

## 2020-10-08 ENCOUNTER — OUTPATIENT CASE MANAGEMENT (OUTPATIENT)
Dept: NEUROLOGY | Facility: CLINIC | Age: 70
End: 2020-10-08

## 2020-10-15 ENCOUNTER — OFFICE VISIT (OUTPATIENT)
Dept: OPTOMETRY | Facility: CLINIC | Age: 70
End: 2020-10-15
Attending: PHYSICIAN ASSISTANT
Payer: MEDICARE

## 2020-10-15 DIAGNOSIS — E11.36 TYPE 2 DIABETES MELLITUS WITH CATARACT: ICD-10-CM

## 2020-10-15 DIAGNOSIS — H25.13 SENILE NUCLEAR SCLEROSIS, BILATERAL: ICD-10-CM

## 2020-10-15 DIAGNOSIS — E11.9 TYPE 2 DIABETES MELLITUS WITHOUT RETINOPATHY: Primary | ICD-10-CM

## 2020-10-15 DIAGNOSIS — H40.013 OAG (OPEN ANGLE GLAUCOMA) SUSPECT, LOW RISK, BILATERAL: ICD-10-CM

## 2020-10-15 DIAGNOSIS — H52.203 HYPEROPIA WITH ASTIGMATISM AND PRESBYOPIA, BILATERAL: ICD-10-CM

## 2020-10-15 DIAGNOSIS — H52.03 HYPEROPIA WITH ASTIGMATISM AND PRESBYOPIA, BILATERAL: ICD-10-CM

## 2020-10-15 DIAGNOSIS — E11.8 TYPE 2 DIABETES MELLITUS WITH COMPLICATION: ICD-10-CM

## 2020-10-15 DIAGNOSIS — Z86.73 HISTORY OF STROKE: ICD-10-CM

## 2020-10-15 DIAGNOSIS — H52.4 HYPEROPIA WITH ASTIGMATISM AND PRESBYOPIA, BILATERAL: ICD-10-CM

## 2020-10-15 PROCEDURE — 99999 PR PBB SHADOW E&M-EST. PATIENT-LVL III: ICD-10-PCS | Mod: PBBFAC,HCWC,, | Performed by: OPTOMETRIST

## 2020-10-15 PROCEDURE — 92133 POSTERIOR SEGMENT OCT OPTIC NERVE(OCULAR COHERENCE TOMOGRAPHY) - OU - BOTH EYES: ICD-10-PCS | Mod: S$GLB,,, | Performed by: OPTOMETRIST

## 2020-10-15 PROCEDURE — 99999 PR PBB SHADOW E&M-EST. PATIENT-LVL III: CPT | Mod: PBBFAC,HCWC,, | Performed by: OPTOMETRIST

## 2020-10-15 PROCEDURE — 92004 PR EYE EXAM, NEW PATIENT,COMPREHESV: ICD-10-PCS | Mod: S$GLB,,, | Performed by: OPTOMETRIST

## 2020-10-15 PROCEDURE — 92133 CPTRZD OPH DX IMG PST SGM ON: CPT | Mod: S$GLB,,, | Performed by: OPTOMETRIST

## 2020-10-15 PROCEDURE — 92004 COMPRE OPH EXAM NEW PT 1/>: CPT | Mod: S$GLB,,, | Performed by: OPTOMETRIST

## 2020-10-15 NOTE — PROGRESS NOTES
HPI     FAMILIA:4-5yrs   Glasses? Yes   Contacts? no  H/o eye surgery, injections or laser: no  H/o eye injury: no  Known eye conditions? Cataracts   Family h/o eye conditions? no  Eye gtts?no    (-) Flashes (-) Floaters (-) Mucous   (-) Tearing (-) Itching (-) Burning   (-) Headaches (-) Eye Pain/discomfort (-) Irritation   (-) Redness (-) Double vision (-) Blurry vision    Diabetic? (+)  A1c?  (Hemoglobin A1C       Date                     Value               Ref Range             Status                04/28/2017               6.9 (H)             4.5 - 6.2 %           Final              Comment:    According to ADA guidelines, hemoglobin A1C <7.0% represents  optimal   control in non-pregnant diabetic patients.  Different  metrics may apply   to specific populations.   Standards of Medical Care in Diabetes -   2016.  For the purpose of screening for the presence of diabetes:  <5.7%       Consistent with the absence of diabetes  5.7-6.4%  Consistent with   increasing risk for diabetes   (prediabetes)  >or=6.5%  Consistent with   diabetes  Currently no consensus exists for use of hemoglobin A1C  for   diagnosis of diabetes for children.         01/05/2016               6.6 (H)             4.5 - 6.2 %           Final                 07/01/2015               6.9 (H)             4.5 - 6.2 %           Final            ----------)        Last edited by Patrizia Loera on 10/15/2020 12:02 PM. (History)            Assessment /Plan     For exam results, see Encounter Report.    Type 2 diabetes mellitus without retinopathy    Type 2 diabetes mellitus with complication  -     Ambulatory referral/consult to Ophthalmology    Senile nuclear sclerosis, bilateral    Type 2 diabetes mellitus with cataract    Hyperopia with astigmatism and presbyopia, bilateral    OAG (open angle glaucoma) suspect, low risk, bilateral  -     Posterior Segment OCT Optic Nerve- Both eyes    History of stroke      1-2. BS control. No signs of diabetic  retinopathy. Monitor with annual exam.  3-4. Nuclear sclerotic cataract - not visually significant. Observe.  5. SRx released to patient. Patient educated on lens options. Normal ocular health. RTC 1 year for routine exam.   6. (-) fhx. IOp 16 OD, OS. C/d 0.55 OD, 0.6 OS.   10/15/2020 OCT WNL OD, borderline G and TI OS  Educated pt on findings w/understanding. RTC 1 year Routine and retest.  7. H/o stroke in 2013. Right side affected. No visual changes.     Pt's mother is 91 and he is the 4th of 15 children.

## 2020-11-23 ENCOUNTER — OUTPATIENT CASE MANAGEMENT (OUTPATIENT)
Dept: NEUROLOGY | Facility: CLINIC | Age: 70
End: 2020-11-23

## 2020-12-07 ENCOUNTER — TELEPHONE (OUTPATIENT)
Dept: NEUROLOGY | Facility: CLINIC | Age: 70
End: 2020-12-07

## 2020-12-20 ENCOUNTER — HOSPITAL ENCOUNTER (EMERGENCY)
Facility: HOSPITAL | Age: 70
Discharge: HOME | End: 2020-12-21
Attending: EMERGENCY MEDICINE
Payer: MEDICARE

## 2020-12-20 DIAGNOSIS — R82.998 DARK URINE: Primary | ICD-10-CM

## 2020-12-20 LAB
BILIRUB UR QL STRIP: NEGATIVE
CLARITY UR: CLEAR
COLOR UR: YELLOW
ERYTHROCYTE [DISTWIDTH] IN BLOOD BY AUTOMATED COUNT: 15.2 % (ref 11.5–14.5)
GLUCOSE UR QL STRIP: NEGATIVE
HCT VFR BLD AUTO: 45.8 % (ref 40–54)
HGB BLD-MCNC: 14.7 G/DL (ref 14–18)
HGB UR QL STRIP: NEGATIVE
KETONES UR QL STRIP: NEGATIVE
LEUKOCYTE ESTERASE UR QL STRIP: NEGATIVE
MCH RBC QN AUTO: 27.4 PG (ref 27–31)
MCHC RBC AUTO-ENTMCNC: 32.1 G/DL (ref 32–36)
MCV RBC AUTO: 85 FL (ref 82–98)
NITRITE UR QL STRIP: NEGATIVE
PH UR STRIP: 6 [PH] (ref 5–8)
PLATELET # BLD AUTO: 196 K/UL (ref 150–350)
PMV BLD AUTO: 11.8 FL (ref 9.2–12.9)
PROT UR QL STRIP: ABNORMAL
RBC # BLD AUTO: 5.36 M/UL (ref 4.6–6.2)
SP GR UR STRIP: 1.03 (ref 1–1.03)
URN SPEC COLLECT METH UR: ABNORMAL
UROBILINOGEN UR STRIP-ACNC: NEGATIVE EU/DL
WBC # BLD AUTO: 5.37 K/UL (ref 3.9–12.7)

## 2020-12-20 PROCEDURE — 85027 COMPLETE CBC AUTOMATED: CPT | Mod: HCWC

## 2020-12-20 PROCEDURE — 80053 COMPREHEN METABOLIC PANEL: CPT | Mod: HCWC

## 2020-12-20 PROCEDURE — 81003 URINALYSIS AUTO W/O SCOPE: CPT | Mod: HCWC

## 2020-12-20 PROCEDURE — 99282 EMERGENCY DEPT VISIT SF MDM: CPT | Mod: HCWC

## 2020-12-21 VITALS
SYSTOLIC BLOOD PRESSURE: 148 MMHG | HEIGHT: 71 IN | RESPIRATION RATE: 16 BRPM | OXYGEN SATURATION: 98 % | HEART RATE: 72 BPM | TEMPERATURE: 98 F | WEIGHT: 210 LBS | BODY MASS INDEX: 29.4 KG/M2 | DIASTOLIC BLOOD PRESSURE: 74 MMHG

## 2020-12-21 LAB
ALBUMIN SERPL BCP-MCNC: 3.8 G/DL (ref 3.5–5.2)
ALP SERPL-CCNC: 75 U/L (ref 55–135)
ALT SERPL W/O P-5'-P-CCNC: 11 U/L (ref 10–44)
ANION GAP SERPL CALC-SCNC: 8 MMOL/L (ref 8–16)
AST SERPL-CCNC: 16 U/L (ref 10–40)
BILIRUB SERPL-MCNC: 0.4 MG/DL (ref 0.1–1)
BUN SERPL-MCNC: 9 MG/DL (ref 8–23)
CALCIUM SERPL-MCNC: 9.5 MG/DL (ref 8.7–10.5)
CHLORIDE SERPL-SCNC: 109 MMOL/L (ref 95–110)
CO2 SERPL-SCNC: 25 MMOL/L (ref 23–29)
CREAT SERPL-MCNC: 1.2 MG/DL (ref 0.5–1.4)
EST. GFR  (AFRICAN AMERICAN): >60 ML/MIN/1.73 M^2
EST. GFR  (NON AFRICAN AMERICAN): >60 ML/MIN/1.73 M^2
GLUCOSE SERPL-MCNC: 84 MG/DL (ref 70–110)
POTASSIUM SERPL-SCNC: 4.4 MMOL/L (ref 3.5–5.1)
PROT SERPL-MCNC: 7.5 G/DL (ref 6–8.4)
SODIUM SERPL-SCNC: 142 MMOL/L (ref 136–145)

## 2020-12-21 NOTE — ED PROVIDER NOTES
"Encounter Date: 12/20/2020       History     Chief Complaint   Patient presents with    Dysuria     Pt reports burning pain with urination and brown urine X 2 days     Ambrosio Montoya is a 70 y.o. male who  has a past medical history of Coronary artery disease (2001), CVA (cerebral infarction), Diabetes mellitus type II (2001), Hyperlipidemia (2001), Hypertension, Myocardial infarction (2002), and Stroke (3/2012).    The patient presents to the ED due to dark-colored urine.   Patient reports symptoms started yesterday.  He reports similar symptoms in the past with a UTI. He denies any associated dysuria, hematuria, abdominal pain, flank pain, fever, N/V/D, CP, SOB, or any other complaints.  He does endorse drinking a lot of coffee, "more coffee than water" on most days. He had 3 cups today.  He denies any other complaints or concerns currently.        Review of patient's allergies indicates:   Allergen Reactions    Pcn [penicillins] Nausea And Vomiting    Plavix [clopidogrel] Itching and Rash     Past Medical History:   Diagnosis Date    Coronary artery disease 2001    [facility]    CVA (cerebral infarction)     Diabetes mellitus type II 2001    Hyperlipidemia 2001    Hypertension     Myocardial infarction 2002    Stroke 3/2012    [facility]     Past Surgical History:   Procedure Laterality Date    COLONOSCOPY N/A 4/20/2017    Procedure: COLONOSCOPY;  Surgeon: Armando Hidalgo MD;  Location: [facility];  Service: Endoscopy;  Laterality: N/A;     No family history on file.  Social History     Tobacco Use    Smoking status: Current Every Day Smoker     Packs/day: 0.50     Years: 40.00     Pack years: 20.00     Types: Cigarettes    Smokeless tobacco: Current User   Substance Use Topics    Alcohol use: No    Drug use: No     Review of Systems   Constitutional: Negative for chills and fever.   HENT: Negative for sore throat.    Respiratory: Negative for shortness of breath.    Cardiovascular: Negative for " chest pain.   Gastrointestinal: Negative for constipation, diarrhea, nausea and vomiting.   Genitourinary: Negative for dysuria, frequency, hematuria and urgency.   Musculoskeletal: Negative for back pain.   Skin: Negative for rash and wound.   Neurological: Negative for weakness.   Hematological: Does not bruise/bleed easily.   Psychiatric/Behavioral: Negative for agitation, behavioral problems and confusion.       Physical Exam     Initial Vitals [12/20/20 1952]   BP Pulse Resp Temp SpO2   (!) 159/96 77 16 98.4 °F (36.9 °C) 97 %      MAP       --         Physical Exam    Nursing note and vitals reviewed.  Constitutional: He appears well-developed and well-nourished. He is not diaphoretic. No distress.   Well-appearing, no distress.   HENT:   Head: Normocephalic and atraumatic.   Mouth/Throat: Oropharynx is clear and moist.   Eyes: EOM are normal. Pupils are equal, round, and reactive to light.   Neck: No tracheal deviation present.   Cardiovascular: Normal rate, regular rhythm, normal heart sounds and intact distal pulses.   Pulmonary/Chest: Breath sounds normal. No stridor. No respiratory distress.   Abdominal: Soft. Normal appearance. He exhibits no distension and no mass. There is no abdominal tenderness. There is no rigidity, no rebound, no guarding, no CVA tenderness, no tenderness at McBurney's point and negative Kay's sign.   Musculoskeletal: Normal range of motion. No edema.   Neurological: He is alert and oriented to person, place, and time. No cranial nerve deficit or sensory deficit.   Skin: Skin is warm and dry. Capillary refill takes less than 2 seconds. No rash noted.   Psychiatric: He has a normal mood and affect. His behavior is normal. Thought content normal.         ED Course   Procedures  Labs Reviewed   URINALYSIS, REFLEX TO URINE CULTURE - Abnormal; Notable for the following components:       Result Value    Protein, UA Trace (*)     All other components within normal limits    Narrative:      Specimen Source->Urine   CBC WITHOUT DIFFERENTIAL - Abnormal; Notable for the following components:    RDW 15.2 (*)     All other components within normal limits   COMPREHENSIVE METABOLIC PANEL          Imaging Results    None          Medical Decision Making:   History:   Old Medical Records: I decided to obtain old medical records.  Old Records Summarized: other records.  Initial Assessment:   71 yo M presents to ED with concern for dark-colored urine. Concerned about UTI due to history of dark urine with UTI in the past, however, denies pain or any other complaints. Exam benign.  Will obtain labs, UA, and reassess.  Differential Diagnosis:   Differential Diagnosis includes, but is not limited to:  STI, urethritis, testicular/appendix torsion, epididymitis, orchitis, UTI, kidney stone, urinary retention, appendicitis, prostatitis, testicular mass/neoplasm, incarcerated/strangulated hernia.    Clinical Tests:   Lab Tests: Ordered and Reviewed  ED Management:  Labs, UA unremarkable. No evidence of UTI.  Reassured, stable for D/C. F/u PCP or return to ED for dysuria, fever, abdominal pain, N/V, or any other concerns.    On re-evaluation, the patient's status has improved.  After complete ED evaluation, clinical impression is most consistent with dark urine.  PCP follow-up within 2-3 days was recommended.    After taking into careful account the patient's history, physical exam findings, as well as empirical and objective data obtained throughout ED workup, I feel no emergent medical condition has been identified. No further evaluation or admission was felt to be required, and the patient is stable for discharge from the ED. The patient and any additional family present were updated with test results, overall clinical impression, and recommended further plan of care, including discharge instructions as provided and outpatient follow-up for continued evaluation and management as needed. All questions were answered.  The patient expressed understanding and agreed with current plan for discharge and follow-up plan of care. Strict ED return precautions were provided, including return/worsening of current symptoms, new symptoms, or any other concerns.                               Clinical Impression:     ICD-10-CM ICD-9-CM   1. Dark urine  R82.998 791.9                          ED Disposition Condition    Discharge Stable        ED Prescriptions     None        Follow-up Information     Follow up With Specialties Details Why Contact Info    Penny Johnson MD Cardiology Schedule an appointment as soon as possible for a visit   [address] [facility] [address]  [phone]                                         Tommy Quinones MD  12/21/20 3300

## 2020-12-21 NOTE — DISCHARGE INSTRUCTIONS
Drink lots of fluids and follow up with your doctor this week.  Return to the ED if you experience fever, severe abdominal pain, inability to urinate, or any other concerns.

## 2020-12-21 NOTE — ED NOTES
Patient presents to the ED with c/o burning pain with urination and brown urine x2 days.    Patient identifiers are verified and correct for Ambrosio Montoya     LOC: The patient is awake, alert, and aware of environment with appropriate affect. The patient is oriented x3 and speaking appropriately.   APPEARANCE: Patient is clean and well groomed. Patients clothing are properly fastened.   SKIN: Skin is normal for race, warm, and dry. Normal skin turgor.  CARDIAC: Normal rate  RESPIRATORY:Normal rate and effort. Respirations are equal and unlabored.    GASTRO: Abdomen is soft and non tender.  MUSCLE: Full ROM. No bony tenderness or soft tissue tenderness. No obvious deformity.  PERIPHERAL VASCULAR: peripheral pulses present. Normal cap refill. No edema. Warm to touch.  ENT: EARS: no obvious drainage. NOSE: no active bleeding. THROAT: no redness or swelling.  : +burning pain with urination      Review of patient's allergies indicates:   Allergen Reactions    Pcn [penicillins] Nausea And Vomiting    Plavix [clopidogrel] Itching and Rash

## 2020-12-31 NOTE — LETTER
Behavioral Health: [phone] or [phone]     October 15, 2020      Xochitl Limon PA-C  [address] [place] [address]           [facility]  [address] [facility].  [place] [address]  Phone: [phone]  Fax: [phone]          Patient: Ambrosio Montoya   MR Number: [id number]   YOB: 1950   Date of Visit: 10/15/2020       Dear Xochitl Limon:    Thank you for referring Ambrosio Montoya to me for evaluation. Attached you will find relevant portions of my assessment and plan of care.    If you have questions, please do not hesitate to call me. I look forward to following Ambrosio Montoya along with you.    Sincerely,    Lalo Fernandez, OD    Enclosure  CC:  No Recipients    If you would like to receive this communication electronically, please contact [email or web address] or [phone] to request more information on [employer] access.    For providers and/or their staff who would like to refer a patient to [facility], please contact us through our one-stop-shop provider referral line, [facility], at [phone].    If you feel you have received this communication in error or would no longer like to receive these types of communications, please e-mail [email or web address]

## 2021-02-02 ENCOUNTER — OUTPATIENT CASE MANAGEMENT (OUTPATIENT)
Dept: NEUROLOGY | Facility: CLINIC | Age: 71
End: 2021-02-02

## 2021-03-10 ENCOUNTER — OFFICE VISIT (OUTPATIENT)
Dept: NEUROLOGY | Facility: CLINIC | Age: 71
End: 2021-03-10
Payer: MEDICARE

## 2021-03-10 ENCOUNTER — OUTPATIENT CASE MANAGEMENT (OUTPATIENT)
Dept: NEUROLOGY | Facility: CLINIC | Age: 71
End: 2021-03-10

## 2021-03-10 DIAGNOSIS — E11.8 TYPE 2 DIABETES MELLITUS WITH COMPLICATION: ICD-10-CM

## 2021-03-10 DIAGNOSIS — E78.5 DYSLIPIDEMIA: ICD-10-CM

## 2021-03-10 DIAGNOSIS — F01.50 VASCULAR DEMENTIA WITHOUT BEHAVIORAL DISTURBANCE: ICD-10-CM

## 2021-03-10 DIAGNOSIS — I10 ESSENTIAL HYPERTENSION: ICD-10-CM

## 2021-03-10 DIAGNOSIS — G81.90 HEMIPLEGIA AFFECTING DOMINANT SIDE: Primary | ICD-10-CM

## 2021-03-10 DIAGNOSIS — I25.10 CORONARY ARTERY DISEASE, ANGINA PRESENCE UNSPECIFIED, UNSPECIFIED VESSEL OR LESION TYPE, UNSPECIFIED WHETHER NATIVE OR TRANSPLANTED HEART: ICD-10-CM

## 2021-03-10 PROCEDURE — 99499 UNLISTED E&M SERVICE: CPT | Mod: S$GLB,,, | Performed by: NURSE PRACTITIONER

## 2021-03-10 PROCEDURE — 99214 OFFICE O/P EST MOD 30 MIN: CPT | Mod: S$GLB,,, | Performed by: NURSE PRACTITIONER

## 2021-03-10 PROCEDURE — 99499 UNLISTED E&M SERVICE: CPT | Mod: HCWC,S$GLB,, | Performed by: NURSE PRACTITIONER

## 2021-03-10 PROCEDURE — 99214 PR OFFICE/OUTPT VISIT, EST, LEVL IV, 30-39 MIN: ICD-10-PCS | Mod: S$GLB,,, | Performed by: NURSE PRACTITIONER

## 2021-03-10 PROCEDURE — 3072F LOW RISK FOR RETINOPATHY: CPT | Mod: S$GLB,,, | Performed by: NURSE PRACTITIONER

## 2021-03-10 PROCEDURE — 3072F PR LOW RISK FOR RETINOPATHY: ICD-10-PCS | Mod: S$GLB,,, | Performed by: NURSE PRACTITIONER

## 2021-03-10 PROCEDURE — 99999 PR PBB SHADOW E&M-EST. PATIENT-LVL II: ICD-10-PCS | Mod: PBBFAC,,, | Performed by: NURSE PRACTITIONER

## 2021-03-10 PROCEDURE — 96116 PR NEUROBEHAVIORAL STATUS EXAM BY PSYCH/PHYS: ICD-10-PCS | Mod: S$GLB,,, | Performed by: PSYCHIATRY & NEUROLOGY

## 2021-03-10 PROCEDURE — 96116 NUBHVL XM PHYS/QHP 1ST HR: CPT | Mod: S$GLB,,, | Performed by: PSYCHIATRY & NEUROLOGY

## 2021-03-10 PROCEDURE — 99999 PR PBB SHADOW E&M-EST. PATIENT-LVL II: CPT | Mod: PBBFAC,,, | Performed by: NURSE PRACTITIONER

## 2021-03-10 PROCEDURE — 99499 RISK ADDL DX/OHS AUDIT: ICD-10-PCS | Mod: HCWC,S$GLB,, | Performed by: NURSE PRACTITIONER

## 2021-03-15 ENCOUNTER — CLINICAL SUPPORT (OUTPATIENT)
Dept: REHABILITATION | Facility: HOSPITAL | Age: 71
End: 2021-03-15
Payer: MEDICARE

## 2021-03-15 DIAGNOSIS — G81.90 HEMIPLEGIA AFFECTING DOMINANT SIDE: ICD-10-CM

## 2021-03-15 DIAGNOSIS — Z91.81 AT HIGH RISK FOR FALLS: ICD-10-CM

## 2021-03-15 DIAGNOSIS — I69.351 HEMIPARESIS OF RIGHT DOMINANT SIDE AS LATE EFFECT OF CEREBRAL INFARCTION: ICD-10-CM

## 2021-03-15 PROCEDURE — 97162 PT EVAL MOD COMPLEX 30 MIN: CPT | Mod: PN

## 2021-03-17 ENCOUNTER — OUTPATIENT CASE MANAGEMENT (OUTPATIENT)
Dept: NEUROLOGY | Facility: CLINIC | Age: 71
End: 2021-03-17

## 2021-03-18 ENCOUNTER — DOCUMENTATION ONLY (OUTPATIENT)
Dept: REHABILITATION | Facility: HOSPITAL | Age: 71
End: 2021-03-18

## 2021-04-12 ENCOUNTER — CLINICAL SUPPORT (OUTPATIENT)
Dept: REHABILITATION | Facility: HOSPITAL | Age: 71
End: 2021-04-12
Payer: MEDICARE

## 2021-04-12 DIAGNOSIS — I69.351 HEMIPARESIS OF RIGHT DOMINANT SIDE AS LATE EFFECT OF CEREBRAL INFARCTION: ICD-10-CM

## 2021-04-12 DIAGNOSIS — Z91.81 AT HIGH RISK FOR FALLS: ICD-10-CM

## 2021-04-12 PROCEDURE — 97112 NEUROMUSCULAR REEDUCATION: CPT | Mod: PN

## 2021-04-19 ENCOUNTER — CLINICAL SUPPORT (OUTPATIENT)
Dept: REHABILITATION | Facility: HOSPITAL | Age: 71
End: 2021-04-19
Payer: MEDICARE

## 2021-04-19 DIAGNOSIS — Z91.81 AT HIGH RISK FOR FALLS: ICD-10-CM

## 2021-04-19 DIAGNOSIS — I69.351 HEMIPARESIS OF RIGHT DOMINANT SIDE AS LATE EFFECT OF CEREBRAL INFARCTION: ICD-10-CM

## 2021-04-19 PROCEDURE — 97112 NEUROMUSCULAR REEDUCATION: CPT | Mod: PN

## 2021-04-21 ENCOUNTER — CLINICAL SUPPORT (OUTPATIENT)
Dept: REHABILITATION | Facility: HOSPITAL | Age: 71
End: 2021-04-21
Payer: MEDICARE

## 2021-04-21 DIAGNOSIS — Z91.81 AT HIGH RISK FOR FALLS: ICD-10-CM

## 2021-04-21 DIAGNOSIS — I69.351 HEMIPARESIS OF RIGHT DOMINANT SIDE AS LATE EFFECT OF CEREBRAL INFARCTION: ICD-10-CM

## 2021-04-21 PROCEDURE — 97112 NEUROMUSCULAR REEDUCATION: CPT | Mod: PN

## 2021-04-26 ENCOUNTER — CLINICAL SUPPORT (OUTPATIENT)
Dept: REHABILITATION | Facility: HOSPITAL | Age: 71
End: 2021-04-26
Payer: MEDICARE

## 2021-04-26 DIAGNOSIS — I69.351 HEMIPARESIS OF RIGHT DOMINANT SIDE AS LATE EFFECT OF CEREBRAL INFARCTION: ICD-10-CM

## 2021-04-26 DIAGNOSIS — Z91.81 AT HIGH RISK FOR FALLS: ICD-10-CM

## 2021-04-26 PROCEDURE — 97112 NEUROMUSCULAR REEDUCATION: CPT | Mod: PN

## 2021-04-29 ENCOUNTER — CLINICAL SUPPORT (OUTPATIENT)
Dept: REHABILITATION | Facility: HOSPITAL | Age: 71
End: 2021-04-29
Payer: MEDICARE

## 2021-04-29 DIAGNOSIS — I69.351 HEMIPARESIS OF RIGHT DOMINANT SIDE AS LATE EFFECT OF CEREBRAL INFARCTION: ICD-10-CM

## 2021-04-29 DIAGNOSIS — Z91.81 AT HIGH RISK FOR FALLS: ICD-10-CM

## 2021-04-29 PROCEDURE — 97112 NEUROMUSCULAR REEDUCATION: CPT | Mod: PN,CQ

## 2021-05-03 ENCOUNTER — DOCUMENTATION ONLY (OUTPATIENT)
Dept: REHABILITATION | Facility: HOSPITAL | Age: 71
End: 2021-05-03

## 2021-05-03 ENCOUNTER — CLINICAL SUPPORT (OUTPATIENT)
Dept: REHABILITATION | Facility: HOSPITAL | Age: 71
End: 2021-05-03
Payer: MEDICARE

## 2021-05-03 DIAGNOSIS — I69.351 HEMIPARESIS OF RIGHT DOMINANT SIDE AS LATE EFFECT OF CEREBRAL INFARCTION: ICD-10-CM

## 2021-05-03 DIAGNOSIS — Z91.81 AT HIGH RISK FOR FALLS: ICD-10-CM

## 2021-05-03 PROCEDURE — 97112 NEUROMUSCULAR REEDUCATION: CPT | Mod: PN

## 2021-05-06 DIAGNOSIS — M21.371 RIGHT FOOT DROP: Primary | ICD-10-CM

## 2021-05-06 DIAGNOSIS — M21.861 HYPEREXTENSION DEFORMITY OF RIGHT KNEE: ICD-10-CM

## 2021-05-10 ENCOUNTER — CLINICAL SUPPORT (OUTPATIENT)
Dept: REHABILITATION | Facility: HOSPITAL | Age: 71
End: 2021-05-10
Payer: MEDICARE

## 2021-05-10 DIAGNOSIS — Z91.81 AT HIGH RISK FOR FALLS: ICD-10-CM

## 2021-05-10 DIAGNOSIS — I69.351 HEMIPARESIS OF RIGHT DOMINANT SIDE AS LATE EFFECT OF CEREBRAL INFARCTION: ICD-10-CM

## 2021-05-10 PROCEDURE — 97112 NEUROMUSCULAR REEDUCATION: CPT | Mod: PN

## 2021-05-12 ENCOUNTER — CLINICAL SUPPORT (OUTPATIENT)
Dept: REHABILITATION | Facility: HOSPITAL | Age: 71
End: 2021-05-12
Payer: MEDICARE

## 2021-05-12 DIAGNOSIS — Z91.81 AT HIGH RISK FOR FALLS: ICD-10-CM

## 2021-05-12 DIAGNOSIS — R26.9 ABNORMAL GAIT: ICD-10-CM

## 2021-05-12 DIAGNOSIS — I69.351 HEMIPARESIS OF RIGHT DOMINANT SIDE AS LATE EFFECT OF CEREBRAL INFARCTION: ICD-10-CM

## 2021-05-12 PROCEDURE — 97112 NEUROMUSCULAR REEDUCATION: CPT | Mod: PN

## 2021-05-17 ENCOUNTER — CLINICAL SUPPORT (OUTPATIENT)
Dept: REHABILITATION | Facility: HOSPITAL | Age: 71
End: 2021-05-17
Payer: MEDICARE

## 2021-05-17 DIAGNOSIS — I69.351 HEMIPARESIS OF RIGHT DOMINANT SIDE AS LATE EFFECT OF CEREBRAL INFARCTION: ICD-10-CM

## 2021-05-17 DIAGNOSIS — Z91.81 AT HIGH RISK FOR FALLS: ICD-10-CM

## 2021-05-17 PROCEDURE — 97112 NEUROMUSCULAR REEDUCATION: CPT | Mod: PN,CQ

## 2021-05-19 ENCOUNTER — CLINICAL SUPPORT (OUTPATIENT)
Dept: REHABILITATION | Facility: HOSPITAL | Age: 71
End: 2021-05-19
Payer: MEDICARE

## 2021-05-19 DIAGNOSIS — I69.351 HEMIPARESIS OF RIGHT DOMINANT SIDE AS LATE EFFECT OF CEREBRAL INFARCTION: ICD-10-CM

## 2021-05-19 DIAGNOSIS — Z91.81 AT HIGH RISK FOR FALLS: ICD-10-CM

## 2021-05-19 PROCEDURE — 97112 NEUROMUSCULAR REEDUCATION: CPT | Mod: PN,CQ

## 2021-05-24 ENCOUNTER — CLINICAL SUPPORT (OUTPATIENT)
Dept: REHABILITATION | Facility: HOSPITAL | Age: 71
End: 2021-05-24
Payer: MEDICARE

## 2021-05-24 DIAGNOSIS — Z91.81 AT HIGH RISK FOR FALLS: ICD-10-CM

## 2021-05-24 DIAGNOSIS — I69.351 HEMIPARESIS OF RIGHT DOMINANT SIDE AS LATE EFFECT OF CEREBRAL INFARCTION: ICD-10-CM

## 2021-05-24 PROCEDURE — 97112 NEUROMUSCULAR REEDUCATION: CPT | Mod: PN

## 2021-05-26 ENCOUNTER — CLINICAL SUPPORT (OUTPATIENT)
Dept: REHABILITATION | Facility: HOSPITAL | Age: 71
End: 2021-05-26
Payer: MEDICARE

## 2021-05-26 DIAGNOSIS — Z91.81 AT HIGH RISK FOR FALLS: ICD-10-CM

## 2021-05-26 DIAGNOSIS — I69.351 HEMIPARESIS OF RIGHT DOMINANT SIDE AS LATE EFFECT OF CEREBRAL INFARCTION: ICD-10-CM

## 2021-05-26 PROCEDURE — 97112 NEUROMUSCULAR REEDUCATION: CPT | Mod: PN

## 2021-05-26 PROCEDURE — 97110 THERAPEUTIC EXERCISES: CPT | Mod: PN

## 2021-06-14 ENCOUNTER — CLINICAL SUPPORT (OUTPATIENT)
Dept: REHABILITATION | Facility: HOSPITAL | Age: 71
End: 2021-06-14
Payer: MEDICARE

## 2021-06-14 DIAGNOSIS — Z91.81 AT HIGH RISK FOR FALLS: ICD-10-CM

## 2021-06-14 DIAGNOSIS — I69.351 HEMIPARESIS OF RIGHT DOMINANT SIDE AS LATE EFFECT OF CEREBRAL INFARCTION: ICD-10-CM

## 2021-06-14 PROCEDURE — 97530 THERAPEUTIC ACTIVITIES: CPT | Mod: KX,PN

## 2021-06-14 PROCEDURE — 97110 THERAPEUTIC EXERCISES: CPT | Mod: KX,PN

## 2021-06-18 ENCOUNTER — CLINICAL SUPPORT (OUTPATIENT)
Dept: REHABILITATION | Facility: HOSPITAL | Age: 71
End: 2021-06-18
Payer: MEDICARE

## 2021-06-18 DIAGNOSIS — I69.351 HEMIPARESIS OF RIGHT DOMINANT SIDE AS LATE EFFECT OF CEREBRAL INFARCTION: ICD-10-CM

## 2021-06-18 DIAGNOSIS — Z91.81 AT HIGH RISK FOR FALLS: ICD-10-CM

## 2021-06-18 PROCEDURE — 97112 NEUROMUSCULAR REEDUCATION: CPT | Mod: KX,PN,CQ

## 2021-06-18 PROCEDURE — 97110 THERAPEUTIC EXERCISES: CPT | Mod: KX,PN,CQ

## 2021-06-23 ENCOUNTER — DOCUMENTATION ONLY (OUTPATIENT)
Dept: REHABILITATION | Facility: HOSPITAL | Age: 71
End: 2021-06-23

## 2021-06-28 ENCOUNTER — CLINICAL SUPPORT (OUTPATIENT)
Dept: REHABILITATION | Facility: HOSPITAL | Age: 71
End: 2021-06-28
Payer: MEDICARE

## 2021-06-28 DIAGNOSIS — I69.351 HEMIPARESIS OF RIGHT DOMINANT SIDE AS LATE EFFECT OF CEREBRAL INFARCTION: ICD-10-CM

## 2021-06-28 DIAGNOSIS — Z91.81 AT HIGH RISK FOR FALLS: ICD-10-CM

## 2021-06-28 PROCEDURE — 97112 NEUROMUSCULAR REEDUCATION: CPT | Mod: KX,PN,CQ

## 2021-06-28 PROCEDURE — 97110 THERAPEUTIC EXERCISES: CPT | Mod: KX,PN,CQ

## 2021-07-01 ENCOUNTER — CLINICAL SUPPORT (OUTPATIENT)
Dept: REHABILITATION | Facility: HOSPITAL | Age: 71
End: 2021-07-01
Payer: MEDICARE

## 2021-07-01 DIAGNOSIS — I69.351 HEMIPARESIS OF RIGHT DOMINANT SIDE AS LATE EFFECT OF CEREBRAL INFARCTION: ICD-10-CM

## 2021-07-01 DIAGNOSIS — Z91.81 AT HIGH RISK FOR FALLS: ICD-10-CM

## 2021-07-01 PROCEDURE — 97110 THERAPEUTIC EXERCISES: CPT | Mod: PN,CQ

## 2021-07-01 PROCEDURE — 97112 NEUROMUSCULAR REEDUCATION: CPT | Mod: PN,CQ

## 2021-07-06 ENCOUNTER — CLINICAL SUPPORT (OUTPATIENT)
Dept: REHABILITATION | Facility: HOSPITAL | Age: 71
End: 2021-07-06
Payer: MEDICARE

## 2021-07-06 DIAGNOSIS — I69.351 HEMIPARESIS OF RIGHT DOMINANT SIDE AS LATE EFFECT OF CEREBRAL INFARCTION: ICD-10-CM

## 2021-07-06 DIAGNOSIS — Z91.81 AT HIGH RISK FOR FALLS: ICD-10-CM

## 2021-07-06 PROCEDURE — 97112 NEUROMUSCULAR REEDUCATION: CPT | Mod: KX,PN,CQ

## 2021-07-06 PROCEDURE — 97110 THERAPEUTIC EXERCISES: CPT | Mod: KX,PN,CQ

## 2021-07-08 ENCOUNTER — CLINICAL SUPPORT (OUTPATIENT)
Dept: REHABILITATION | Facility: HOSPITAL | Age: 71
End: 2021-07-08
Payer: MEDICARE

## 2021-07-08 DIAGNOSIS — Z91.81 AT HIGH RISK FOR FALLS: ICD-10-CM

## 2021-07-08 DIAGNOSIS — I69.351 HEMIPARESIS OF RIGHT DOMINANT SIDE AS LATE EFFECT OF CEREBRAL INFARCTION: ICD-10-CM

## 2021-07-08 PROCEDURE — 97530 THERAPEUTIC ACTIVITIES: CPT | Mod: KX,PN

## 2021-07-08 PROCEDURE — 97110 THERAPEUTIC EXERCISES: CPT | Mod: KX,PN

## 2021-07-12 ENCOUNTER — CLINICAL SUPPORT (OUTPATIENT)
Dept: REHABILITATION | Facility: HOSPITAL | Age: 71
End: 2021-07-12
Payer: MEDICARE

## 2021-07-12 DIAGNOSIS — I69.351 HEMIPARESIS OF RIGHT DOMINANT SIDE AS LATE EFFECT OF CEREBRAL INFARCTION: ICD-10-CM

## 2021-07-12 DIAGNOSIS — Z91.81 AT HIGH RISK FOR FALLS: ICD-10-CM

## 2021-07-12 PROCEDURE — 97110 THERAPEUTIC EXERCISES: CPT | Mod: KX,PN

## 2021-07-14 PROBLEM — Z91.81 AT HIGH RISK FOR FALLS: Status: RESOLVED | Noted: 2021-03-15 | Resolved: 2021-07-14

## 2021-07-14 PROBLEM — I69.351 HEMIPARESIS OF RIGHT DOMINANT SIDE AS LATE EFFECT OF CEREBRAL INFARCTION: Status: RESOLVED | Noted: 2021-03-15 | Resolved: 2021-07-14

## 2021-09-30 ENCOUNTER — TELEPHONE (OUTPATIENT)
Dept: OPTOMETRY | Facility: CLINIC | Age: 71
End: 2021-09-30

## 2021-10-05 ENCOUNTER — OFFICE VISIT (OUTPATIENT)
Dept: OPTOMETRY | Facility: CLINIC | Age: 71
End: 2021-10-05
Payer: MEDICARE

## 2021-10-05 DIAGNOSIS — E11.9 TYPE 2 DIABETES MELLITUS WITHOUT RETINOPATHY: Primary | ICD-10-CM

## 2021-10-05 DIAGNOSIS — H25.13 NUCLEAR SCLEROSIS OF BOTH EYES: ICD-10-CM

## 2021-10-05 DIAGNOSIS — H52.203 HYPEROPIA WITH ASTIGMATISM AND PRESBYOPIA, BILATERAL: ICD-10-CM

## 2021-10-05 DIAGNOSIS — H52.03 HYPEROPIA WITH ASTIGMATISM AND PRESBYOPIA, BILATERAL: ICD-10-CM

## 2021-10-05 DIAGNOSIS — H52.4 HYPEROPIA WITH ASTIGMATISM AND PRESBYOPIA, BILATERAL: ICD-10-CM

## 2021-10-05 DIAGNOSIS — H40.013 OAG (OPEN ANGLE GLAUCOMA) SUSPECT, LOW RISK, BILATERAL: ICD-10-CM

## 2021-10-05 PROCEDURE — 92014 PR EYE EXAM, EST PATIENT,COMPREHESV: ICD-10-PCS | Mod: HCWC,S$GLB,, | Performed by: OPTOMETRIST

## 2021-10-05 PROCEDURE — 1126F AMNT PAIN NOTED NONE PRSNT: CPT | Mod: HCWC,CPTII,S$GLB, | Performed by: OPTOMETRIST

## 2021-10-05 PROCEDURE — 3288F FALL RISK ASSESSMENT DOCD: CPT | Mod: HCWC,CPTII,S$GLB, | Performed by: OPTOMETRIST

## 2021-10-05 PROCEDURE — 4010F PR ACE/ARB THEARPY RXD/TAKEN: ICD-10-PCS | Mod: HCWC,CPTII,S$GLB, | Performed by: OPTOMETRIST

## 2021-10-05 PROCEDURE — 1126F PR PAIN SEVERITY QUANTIFIED, NO PAIN PRESENT: ICD-10-PCS | Mod: HCWC,CPTII,S$GLB, | Performed by: OPTOMETRIST

## 2021-10-05 PROCEDURE — 1101F PR PT FALLS ASSESS DOC 0-1 FALLS W/OUT INJ PAST YR: ICD-10-PCS | Mod: HCWC,CPTII,S$GLB, | Performed by: OPTOMETRIST

## 2021-10-05 PROCEDURE — 92133 POSTERIOR SEGMENT OCT OPTIC NERVE(OCULAR COHERENCE TOMOGRAPHY) - OU - BOTH EYES: ICD-10-PCS | Mod: HCWC,S$GLB,, | Performed by: OPTOMETRIST

## 2021-10-05 PROCEDURE — 3288F PR FALLS RISK ASSESSMENT DOCUMENTED: ICD-10-PCS | Mod: HCWC,CPTII,S$GLB, | Performed by: OPTOMETRIST

## 2021-10-05 PROCEDURE — 1159F PR MEDICATION LIST DOCUMENTED IN MEDICAL RECORD: ICD-10-PCS | Mod: HCWC,CPTII,S$GLB, | Performed by: OPTOMETRIST

## 2021-10-05 PROCEDURE — 99499 UNLISTED E&M SERVICE: CPT | Mod: HCWC,S$GLB,, | Performed by: OPTOMETRIST

## 2021-10-05 PROCEDURE — 4010F ACE/ARB THERAPY RXD/TAKEN: CPT | Mod: HCWC,CPTII,S$GLB, | Performed by: OPTOMETRIST

## 2021-10-05 PROCEDURE — 92014 COMPRE OPH EXAM EST PT 1/>: CPT | Mod: HCWC,S$GLB,, | Performed by: OPTOMETRIST

## 2021-10-05 PROCEDURE — 99999 PR PBB SHADOW E&M-EST. PATIENT-LVL III: CPT | Mod: PBBFAC,HCWC,, | Performed by: OPTOMETRIST

## 2021-10-05 PROCEDURE — 1101F PT FALLS ASSESS-DOCD LE1/YR: CPT | Mod: HCWC,CPTII,S$GLB, | Performed by: OPTOMETRIST

## 2021-10-05 PROCEDURE — 92133 CPTRZD OPH DX IMG PST SGM ON: CPT | Mod: HCWC,S$GLB,, | Performed by: OPTOMETRIST

## 2021-10-05 PROCEDURE — 2023F DILAT RTA XM W/O RTNOPTHY: CPT | Mod: HCWC,CPTII,S$GLB, | Performed by: OPTOMETRIST

## 2021-10-05 PROCEDURE — 99499 RISK ADDL DX/OHS AUDIT: ICD-10-PCS | Mod: HCWC,S$GLB,, | Performed by: OPTOMETRIST

## 2021-10-05 PROCEDURE — 1159F MED LIST DOCD IN RCRD: CPT | Mod: HCWC,CPTII,S$GLB, | Performed by: OPTOMETRIST

## 2021-10-05 PROCEDURE — 99999 PR PBB SHADOW E&M-EST. PATIENT-LVL III: ICD-10-PCS | Mod: PBBFAC,HCWC,, | Performed by: OPTOMETRIST

## 2021-10-05 PROCEDURE — 2023F PR DILATED RETINAL EXAM W/O EVID OF RETINOPATHY: ICD-10-PCS | Mod: HCWC,CPTII,S$GLB, | Performed by: OPTOMETRIST

## 2022-01-11 NOTE — TELEPHONE ENCOUNTER
----- Message from Karina Goodwin MA sent at 1/31/2019 10:58 AM CST -----  Contact: Damien templeton [phone]  Please call to discuss atorvastatin; needs to know if changing from crestor; please advise.  Thanks.  
clean

## 2022-03-21 ENCOUNTER — LAB VISIT (OUTPATIENT)
Dept: LAB | Facility: HOSPITAL | Age: 72
End: 2022-03-21
Attending: STUDENT IN AN ORGANIZED HEALTH CARE EDUCATION/TRAINING PROGRAM
Payer: MEDICARE

## 2022-03-21 DIAGNOSIS — E53.8 VITAMIN B 12 DEFICIENCY: ICD-10-CM

## 2022-03-21 DIAGNOSIS — E11.69 DIABETES MELLITUS ASSOCIATED WITH HORMONAL ETIOLOGY: ICD-10-CM

## 2022-03-21 DIAGNOSIS — E55.9 AVITAMINOSIS D: ICD-10-CM

## 2022-03-21 DIAGNOSIS — E11.9 DIABETES MELLITUS WITHOUT COMPLICATION: Primary | ICD-10-CM

## 2022-03-21 LAB
ALBUMIN SERPL BCP-MCNC: 3.8 G/DL (ref 3.5–5.2)
ALBUMIN/CREAT UR: 24.7 UG/MG (ref 0–30)
ALP SERPL-CCNC: 67 U/L (ref 55–135)
ALT SERPL W/O P-5'-P-CCNC: 15 U/L (ref 10–44)
ANION GAP SERPL CALC-SCNC: 5 MMOL/L (ref 8–16)
AST SERPL-CCNC: 26 U/L (ref 10–40)
BASOPHILS # BLD AUTO: 0.03 K/UL (ref 0–0.2)
BASOPHILS NFR BLD: 0.7 % (ref 0–1.9)
BILIRUB SERPL-MCNC: 0.5 MG/DL (ref 0.1–1)
BUN SERPL-MCNC: 14 MG/DL (ref 8–23)
CALCIUM SERPL-MCNC: 10.2 MG/DL (ref 8.7–10.5)
CHLORIDE SERPL-SCNC: 106 MMOL/L (ref 95–110)
CHOLEST SERPL-MCNC: 111 MG/DL (ref 120–199)
CHOLEST/HDLC SERPL: 3.2 {RATIO} (ref 2–5)
CO2 SERPL-SCNC: 28 MMOL/L (ref 23–29)
CREAT SERPL-MCNC: 1.4 MG/DL (ref 0.5–1.4)
CREAT UR-MCNC: 154 MG/DL (ref 23–375)
DIFFERENTIAL METHOD: ABNORMAL
EOSINOPHIL # BLD AUTO: 0.2 K/UL (ref 0–0.5)
EOSINOPHIL NFR BLD: 4.5 % (ref 0–8)
ERYTHROCYTE [DISTWIDTH] IN BLOOD BY AUTOMATED COUNT: 13.9 % (ref 11.5–14.5)
EST. GFR  (AFRICAN AMERICAN): 58 ML/MIN/1.73 M^2
EST. GFR  (NON AFRICAN AMERICAN): 51 ML/MIN/1.73 M^2
ESTIMATED AVG GLUCOSE: 143 MG/DL (ref 68–131)
GLUCOSE SERPL-MCNC: 88 MG/DL (ref 70–110)
HBA1C MFR BLD: 6.6 % (ref 4–5.6)
HCT VFR BLD AUTO: 46.6 % (ref 40–54)
HDLC SERPL-MCNC: 35 MG/DL (ref 40–75)
HDLC SERPL: 31.5 % (ref 20–50)
HGB BLD-MCNC: 14.8 G/DL (ref 14–18)
IMM GRANULOCYTES # BLD AUTO: 0.01 K/UL (ref 0–0.04)
IMM GRANULOCYTES NFR BLD AUTO: 0.2 % (ref 0–0.5)
LDLC SERPL CALC-MCNC: 61 MG/DL (ref 63–159)
LYMPHOCYTES # BLD AUTO: 1.3 K/UL (ref 1–4.8)
LYMPHOCYTES NFR BLD: 32.7 % (ref 18–48)
MCH RBC QN AUTO: 27.3 PG (ref 27–31)
MCHC RBC AUTO-ENTMCNC: 31.8 G/DL (ref 32–36)
MCV RBC AUTO: 86 FL (ref 82–98)
MICROALBUMIN UR DL<=1MG/L-MCNC: 38 UG/ML
MONOCYTES # BLD AUTO: 0.7 K/UL (ref 0.3–1)
MONOCYTES NFR BLD: 16.1 % (ref 4–15)
NEUTROPHILS # BLD AUTO: 1.9 K/UL (ref 1.8–7.7)
NEUTROPHILS NFR BLD: 45.8 % (ref 38–73)
NONHDLC SERPL-MCNC: 76 MG/DL
NRBC BLD-RTO: 0 /100 WBC
PLATELET # BLD AUTO: 192 K/UL (ref 150–450)
PMV BLD AUTO: 12 FL (ref 9.2–12.9)
POTASSIUM SERPL-SCNC: 4.1 MMOL/L (ref 3.5–5.1)
PROT SERPL-MCNC: 7.7 G/DL (ref 6–8.4)
RBC # BLD AUTO: 5.42 M/UL (ref 4.6–6.2)
SODIUM SERPL-SCNC: 139 MMOL/L (ref 136–145)
TRIGL SERPL-MCNC: 75 MG/DL (ref 30–150)
VIT B12 SERPL-MCNC: 856 PG/ML (ref 210–950)
WBC # BLD AUTO: 4.04 K/UL (ref 3.9–12.7)

## 2022-03-21 PROCEDURE — 85025 COMPLETE CBC W/AUTO DIFF WBC: CPT | Performed by: STUDENT IN AN ORGANIZED HEALTH CARE EDUCATION/TRAINING PROGRAM

## 2022-03-21 PROCEDURE — 80061 LIPID PANEL: CPT | Performed by: STUDENT IN AN ORGANIZED HEALTH CARE EDUCATION/TRAINING PROGRAM

## 2022-03-21 PROCEDURE — 36415 COLL VENOUS BLD VENIPUNCTURE: CPT | Performed by: STUDENT IN AN ORGANIZED HEALTH CARE EDUCATION/TRAINING PROGRAM

## 2022-03-21 PROCEDURE — 80053 COMPREHEN METABOLIC PANEL: CPT | Performed by: STUDENT IN AN ORGANIZED HEALTH CARE EDUCATION/TRAINING PROGRAM

## 2022-03-21 PROCEDURE — 82607 VITAMIN B-12: CPT | Performed by: STUDENT IN AN ORGANIZED HEALTH CARE EDUCATION/TRAINING PROGRAM

## 2022-03-21 PROCEDURE — 82570 ASSAY OF URINE CREATININE: CPT | Performed by: STUDENT IN AN ORGANIZED HEALTH CARE EDUCATION/TRAINING PROGRAM

## 2022-03-21 PROCEDURE — 83036 HEMOGLOBIN GLYCOSYLATED A1C: CPT | Performed by: STUDENT IN AN ORGANIZED HEALTH CARE EDUCATION/TRAINING PROGRAM

## 2022-04-06 ENCOUNTER — TELEPHONE (OUTPATIENT)
Dept: OPTOMETRY | Facility: CLINIC | Age: 72
End: 2022-04-06
Payer: MEDICARE

## 2022-04-06 NOTE — TELEPHONE ENCOUNTER
----- Message from Melissa Wu sent at 4/6/2022  7:53 AM CDT -----  Regarding: call back  Contact: [phone]  Type:  Sooner Apoointment Request    Caller is requesting a sooner appointment.  Caller declined first available appointment listed below.  Caller will not accept being placed on the waitlist and is requesting a message be sent to doctor.  Name of Caller: PT   When is the first available appointment? June   Symptoms: Annual   Would the patient rather a call back or a response via [facility]? Call back   Best Call Back Number: [phone]  Additional Information:

## 2022-06-24 ENCOUNTER — TELEPHONE (OUTPATIENT)
Dept: ADMINISTRATIVE | Facility: CLINIC | Age: 72
End: 2022-06-24
Payer: MEDICARE

## 2022-06-27 ENCOUNTER — OFFICE VISIT (OUTPATIENT)
Dept: FAMILY MEDICINE | Facility: CLINIC | Age: 72
End: 2022-06-27
Payer: MEDICARE

## 2022-06-27 VITALS
HEIGHT: 71 IN | RESPIRATION RATE: 14 BRPM | SYSTOLIC BLOOD PRESSURE: 140 MMHG | OXYGEN SATURATION: 98 % | DIASTOLIC BLOOD PRESSURE: 90 MMHG | HEART RATE: 63 BPM | WEIGHT: 184.31 LBS | BODY MASS INDEX: 25.8 KG/M2

## 2022-06-27 DIAGNOSIS — I10 HYPERTENSION, UNSPECIFIED TYPE: ICD-10-CM

## 2022-06-27 DIAGNOSIS — F01.50 VASCULAR DEMENTIA WITHOUT BEHAVIORAL DISTURBANCE: ICD-10-CM

## 2022-06-27 DIAGNOSIS — E78.5 DYSLIPIDEMIA: ICD-10-CM

## 2022-06-27 DIAGNOSIS — F17.200 CURRENT EVERY DAY SMOKER: ICD-10-CM

## 2022-06-27 DIAGNOSIS — Z99.89 DEPENDENCE ON OTHER ENABLING MACHINES AND DEVICES: ICD-10-CM

## 2022-06-27 DIAGNOSIS — Z12.11 SCREENING FOR COLON CANCER: ICD-10-CM

## 2022-06-27 DIAGNOSIS — K59.04 FUNCTIONAL CONSTIPATION: ICD-10-CM

## 2022-06-27 DIAGNOSIS — F17.210 NICOTINE DEPENDENCE, CIGARETTES, UNCOMPLICATED: ICD-10-CM

## 2022-06-27 DIAGNOSIS — Z00.00 ENCOUNTER FOR PREVENTIVE HEALTH EXAMINATION: Primary | ICD-10-CM

## 2022-06-27 DIAGNOSIS — I25.10 CORONARY ARTERY DISEASE INVOLVING NATIVE CORONARY ARTERY OF NATIVE HEART WITHOUT ANGINA PECTORIS: ICD-10-CM

## 2022-06-27 DIAGNOSIS — N18.31 TYPE 2 DIABETES MELLITUS WITH STAGE 3A CHRONIC KIDNEY DISEASE, WITHOUT LONG-TERM CURRENT USE OF INSULIN: ICD-10-CM

## 2022-06-27 DIAGNOSIS — I70.0 AORTIC ATHEROSCLEROSIS: ICD-10-CM

## 2022-06-27 DIAGNOSIS — H91.93 BILATERAL HEARING LOSS, UNSPECIFIED HEARING LOSS TYPE: ICD-10-CM

## 2022-06-27 DIAGNOSIS — R26.9 ABNORMALITY OF GAIT AND MOBILITY: ICD-10-CM

## 2022-06-27 DIAGNOSIS — M10.9 GOUT, ARTHRITIS: ICD-10-CM

## 2022-06-27 DIAGNOSIS — E11.22 TYPE 2 DIABETES MELLITUS WITH STAGE 3A CHRONIC KIDNEY DISEASE, WITHOUT LONG-TERM CURRENT USE OF INSULIN: ICD-10-CM

## 2022-06-27 DIAGNOSIS — R68.89 ABNORMALITY ON SCREENING TEST: ICD-10-CM

## 2022-06-27 DIAGNOSIS — E66.3 OVERWEIGHT (BMI 25.0-29.9): ICD-10-CM

## 2022-06-27 PROBLEM — K63.5 POLYP OF COLON: Status: RESOLVED | Noted: 2017-04-24 | Resolved: 2022-06-27

## 2022-06-27 PROCEDURE — 1170F FXNL STATUS ASSESSED: CPT | Mod: CPTII,S$GLB,, | Performed by: NURSE PRACTITIONER

## 2022-06-27 PROCEDURE — 3044F PR MOST RECENT HEMOGLOBIN A1C LEVEL <7.0%: ICD-10-PCS | Mod: CPTII,S$GLB,, | Performed by: NURSE PRACTITIONER

## 2022-06-27 PROCEDURE — 1101F PT FALLS ASSESS-DOCD LE1/YR: CPT | Mod: CPTII,S$GLB,, | Performed by: NURSE PRACTITIONER

## 2022-06-27 PROCEDURE — 4010F PR ACE/ARB THEARPY RXD/TAKEN: ICD-10-PCS | Mod: CPTII,S$GLB,, | Performed by: NURSE PRACTITIONER

## 2022-06-27 PROCEDURE — 3080F DIAST BP >= 90 MM HG: CPT | Mod: CPTII,S$GLB,, | Performed by: NURSE PRACTITIONER

## 2022-06-27 PROCEDURE — 1160F PR REVIEW ALL MEDS BY PRESCRIBER/CLIN PHARMACIST DOCUMENTED: ICD-10-PCS | Mod: CPTII,S$GLB,, | Performed by: NURSE PRACTITIONER

## 2022-06-27 PROCEDURE — 3288F PR FALLS RISK ASSESSMENT DOCUMENTED: ICD-10-PCS | Mod: CPTII,S$GLB,, | Performed by: NURSE PRACTITIONER

## 2022-06-27 PROCEDURE — 3072F LOW RISK FOR RETINOPATHY: CPT | Mod: CPTII,S$GLB,, | Performed by: NURSE PRACTITIONER

## 2022-06-27 PROCEDURE — 3008F BODY MASS INDEX DOCD: CPT | Mod: CPTII,S$GLB,, | Performed by: NURSE PRACTITIONER

## 2022-06-27 PROCEDURE — 99499 RISK ADDL DX/OHS AUDIT: ICD-10-PCS | Mod: HCWC,S$GLB,, | Performed by: NURSE PRACTITIONER

## 2022-06-27 PROCEDURE — 1101F PR PT FALLS ASSESS DOC 0-1 FALLS W/OUT INJ PAST YR: ICD-10-PCS | Mod: CPTII,S$GLB,, | Performed by: NURSE PRACTITIONER

## 2022-06-27 PROCEDURE — 3072F PR LOW RISK FOR RETINOPATHY: ICD-10-PCS | Mod: CPTII,S$GLB,, | Performed by: NURSE PRACTITIONER

## 2022-06-27 PROCEDURE — 3061F PR NEG MICROALBUMINURIA RESULT DOCUMENTED/REVIEW: ICD-10-PCS | Mod: CPTII,S$GLB,, | Performed by: NURSE PRACTITIONER

## 2022-06-27 PROCEDURE — 1160F RVW MEDS BY RX/DR IN RCRD: CPT | Mod: CPTII,S$GLB,, | Performed by: NURSE PRACTITIONER

## 2022-06-27 PROCEDURE — 3066F PR DOCUMENTATION OF TREATMENT FOR NEPHROPATHY: ICD-10-PCS | Mod: CPTII,S$GLB,, | Performed by: NURSE PRACTITIONER

## 2022-06-27 PROCEDURE — 1159F PR MEDICATION LIST DOCUMENTED IN MEDICAL RECORD: ICD-10-PCS | Mod: CPTII,S$GLB,, | Performed by: NURSE PRACTITIONER

## 2022-06-27 PROCEDURE — 99999 PR PBB SHADOW E&M-EST. PATIENT-LVL V: ICD-10-PCS | Mod: PBBFAC,,, | Performed by: NURSE PRACTITIONER

## 2022-06-27 PROCEDURE — 3077F SYST BP >= 140 MM HG: CPT | Mod: CPTII,S$GLB,, | Performed by: NURSE PRACTITIONER

## 2022-06-27 PROCEDURE — G0439 PPPS, SUBSEQ VISIT: HCPCS | Mod: S$GLB,,, | Performed by: NURSE PRACTITIONER

## 2022-06-27 PROCEDURE — 3008F PR BODY MASS INDEX (BMI) DOCUMENTED: ICD-10-PCS | Mod: CPTII,S$GLB,, | Performed by: NURSE PRACTITIONER

## 2022-06-27 PROCEDURE — 1126F PR PAIN SEVERITY QUANTIFIED, NO PAIN PRESENT: ICD-10-PCS | Mod: CPTII,S$GLB,, | Performed by: NURSE PRACTITIONER

## 2022-06-27 PROCEDURE — 4010F ACE/ARB THERAPY RXD/TAKEN: CPT | Mod: CPTII,S$GLB,, | Performed by: NURSE PRACTITIONER

## 2022-06-27 PROCEDURE — 1126F AMNT PAIN NOTED NONE PRSNT: CPT | Mod: CPTII,S$GLB,, | Performed by: NURSE PRACTITIONER

## 2022-06-27 PROCEDURE — 99499 UNLISTED E&M SERVICE: CPT | Mod: HCWC,S$GLB,, | Performed by: NURSE PRACTITIONER

## 2022-06-27 PROCEDURE — 3044F HG A1C LEVEL LT 7.0%: CPT | Mod: CPTII,S$GLB,, | Performed by: NURSE PRACTITIONER

## 2022-06-27 PROCEDURE — 1170F PR FUNCTIONAL STATUS ASSESSED: ICD-10-PCS | Mod: CPTII,S$GLB,, | Performed by: NURSE PRACTITIONER

## 2022-06-27 PROCEDURE — 3066F NEPHROPATHY DOC TX: CPT | Mod: CPTII,S$GLB,, | Performed by: NURSE PRACTITIONER

## 2022-06-27 PROCEDURE — 99999 PR PBB SHADOW E&M-EST. PATIENT-LVL V: CPT | Mod: PBBFAC,,, | Performed by: NURSE PRACTITIONER

## 2022-06-27 PROCEDURE — 3077F PR MOST RECENT SYSTOLIC BLOOD PRESSURE >= 140 MM HG: ICD-10-PCS | Mod: CPTII,S$GLB,, | Performed by: NURSE PRACTITIONER

## 2022-06-27 PROCEDURE — 3061F NEG MICROALBUMINURIA REV: CPT | Mod: CPTII,S$GLB,, | Performed by: NURSE PRACTITIONER

## 2022-06-27 PROCEDURE — 3288F FALL RISK ASSESSMENT DOCD: CPT | Mod: CPTII,S$GLB,, | Performed by: NURSE PRACTITIONER

## 2022-06-27 PROCEDURE — 3080F PR MOST RECENT DIASTOLIC BLOOD PRESSURE >= 90 MM HG: ICD-10-PCS | Mod: CPTII,S$GLB,, | Performed by: NURSE PRACTITIONER

## 2022-06-27 PROCEDURE — 1159F MED LIST DOCD IN RCRD: CPT | Mod: CPTII,S$GLB,, | Performed by: NURSE PRACTITIONER

## 2022-06-27 PROCEDURE — G0439 PR MEDICARE ANNUAL WELLNESS SUBSEQUENT VISIT: ICD-10-PCS | Mod: S$GLB,,, | Performed by: NURSE PRACTITIONER

## 2022-06-27 NOTE — PROGRESS NOTES
"  Ambrosio Montoya presented for a  Medicare AWV and comprehensive Health Risk Assessment today. The following components were reviewed and updated:    · Medical history  · Family History  · Social history  · Allergies and Current Medications  · Health Risk Assessment  · Health Maintenance  · Care Team         ** See Completed Assessments for Annual Wellness Visit within the encounter summary.**         The following assessments were completed:  · Living Situation  · CAGE  · Depression Screening  · Timed Get Up and Go  · Whisper Test  · Cognitive Function Screening - Unable to perform CDT; asked to spell "world" backwards, able to do correctly  · Nutrition Screening  · PAQ Screening        Vitals:    06/27/22 1309   BP: (!) 140/90   BP Location: Left arm   Patient Position: Sitting   BP Method: Medium (Manual)   Pulse: 63   Resp: 14   SpO2: 98%   Weight: 83.6 kg (184 lb 4.9 oz)   Height: 5' 11" (1.803 m)     Body mass index is 25.71 kg/m².     Physical Exam  Vitals reviewed.   Constitutional:       General: He is not in acute distress.     Appearance: Normal appearance. He is well-developed and well-groomed.   HENT:      Head: Normocephalic.   Eyes:      General:         Right eye: No discharge.         Left eye: No discharge.   Cardiovascular:      Rate and Rhythm: Normal rate.   Pulmonary:      Effort: Pulmonary effort is normal. No respiratory distress.   Skin:     General: Skin is warm and dry.      Coloration: Skin is not pale.   Neurological:      Mental Status: He is alert and oriented to person, place, and time.      Coordination: Coordination abnormal.      Gait: Gait abnormal.      Comments: In a wheelchair  Uses a walking cane   Psychiatric:         Attention and Perception: Attention normal.         Mood and Affect: Mood and affect normal.         Speech: Speech normal.         Behavior: Behavior normal. Behavior is cooperative.         Cognition and Memory: Memory is impaired. He exhibits impaired recent " memory.             Diagnoses and health risks identified today and associated recommendations/orders:    1. Encounter for preventive health examination    2. Type 2 diabetes mellitus with stage 3a chronic kidney disease, without long-term current use of insulin  Chronic; stable on medication. Follow up with PCP.  - Ambulatory referral/consult to Podiatry; Future    3. Coronary artery disease involving native coronary artery of native heart without angina pectoris  Chronic; stable on medication. Follow up with PCP.    4. Aortic atherosclerosis  Chronic; stable on medication. Follow up with PCP.    5. Hypertension, unspecified type  Chronic; stable on medication. Follow up with PCP.    6. Dyslipidemia  Chronic; stable on medication. Follow up with PCP.    7. Vascular dementia without behavioral disturbance  Chronic; stable. Due to follow up with Neurology.    8. Gout, arthritis  Chronic; stable on medication. Follow up with PCP.    9. Constipation - functional  Chronic; stable on medication. Follow up with PCP.    10. Bilateral hearing loss, unspecified hearing loss type  - Ambulatory referral/consult to ENT; Future  - Ambulatory referral/consult to Audiology; Future    11. Current every day smoker  Currently smoking 1 ppd; counseling given. Ready to quit. Agrees to referral to Smoking Cessation.  - Ambulatory referral/consult to Smoking Cessation Program; Future    12. Nicotine dependence, cigarettes, uncomplicated  Currently smoking 1 ppd; counseling given. Ready to quit. Agrees to referral to Smoking Cessation.  - CT Chest Lung Screening Low Dose; Future    13. Dependence on other enabling machines and devices  In a wheelchair today; normally ambulates with a walking cane. Follow up with PCP.    14. Abnormality of gait and mobility  In a wheelchair today; normally ambulates with a walking cane. Follow up with PCP.    15. Abnormality on screening test  Mini Cog Test Score of 2 today; reports forgetfulness at home.  Agrees to Neurology referral.   - Ambulatory referral/consult to Neurology; Future    16. Screening for colon cancer  - Case Request Endoscopy: COLONOSCOPY    17. Overweight (BMI 25.0-29.9)  Continue to eat a low salt/low fat ADA diet and discussed importance of engaging in physical activity at least 5x/week for a minimum of 30 min/day.      Provided Ambrosio with a 5-10 year written screening schedule and personal prevention plan. Recommendations were developed using the USPSTF age appropriate recommendations. Education, counseling, and referrals were provided as needed. After Visit Summary printed and given to patient which includes a list of additional screenings/tests needed.    Follow up for your next annual wellness visit.    Chanel Abraham NP    Advance Care Planning     I offered to discuss advanced care planning, including how to pick a person who would make decisions for you if you were unable to make them for yourself, called a health care power of , and what kind of decisions you might make such as use of life sustaining treatments such as ventilators and tube feeding when faced with a life limiting illness recorded on a living will that they will need to know. (How you want to be cared for as you near the end of your natural life)     X  Patient is unable to engage in a discussion regarding advanced directives due to severe physical and/or cognitive impairment.

## 2022-06-27 NOTE — PATIENT INSTRUCTIONS
Counseling and Referral of Other Preventative  (Italic type indicates deductible and co-insurance are waived)    Patient Name: Ambrosio Montoya  Today's Date: 6/27/2022    Health Maintenance       Date Due Completion Date    LDCT Lung Screen Never done ---    Foot Exam 10/30/2018 10/30/2017    Override on 10/30/2017: Done (Normal)    Colorectal Cancer Screening 04/20/2022 4/20/2017    COVID-19 Vaccine (4 - Booster for Moderna series) 04/28/2022 12/28/2021    Hepatitis C Screening 08/05/2022 (Originally 1950) ---    TETANUS VACCINE 08/05/2022 (Originally 6/9/1968) ---    Shingles Vaccine (1 of 2) 08/05/2022 (Originally 6/9/2001) ---    Pneumococcal Vaccines (Age 65+) (3 - PPSV23 or PCV20) 06/27/2023 (Originally 1/6/2019) 10/30/2017    Influenza Vaccine (Season Ended) 09/01/2022 10/30/2017    Hemoglobin A1c 09/21/2022 3/21/2022    Eye Exam 10/05/2022 10/5/2021    Diabetes Urine Screening 03/21/2023 3/21/2022    Lipid Panel 03/21/2023 3/21/2022    High Dose Statin 06/27/2023 6/27/2022    Aspirin/Antiplatelet Therapy 06/27/2023 6/27/2022        Orders Placed This Encounter   Procedures    CT Chest Lung Screening Low Dose    Ambulatory referral/consult to ENT    Ambulatory referral/consult to Audiology    Ambulatory referral/consult to Neurology    Ambulatory referral/consult to Podiatry    Ambulatory referral/consult to Smoking Cessation Program     The following information is provided to all patients.  This information is to help you find resources for any of the problems found today that may be affecting your health:                Living healthy guide: www.[facility].[place].gov      Understanding Diabetes: www.diabetes.org      Eating healthy: www.cdc.gov/healthyweight      CDC home safety checklist: www.cdc.gov/steadi/patient.html      Agency on Aging: www.goea.[place].gov      Alcoholics anonymous (AA): www.aa.org      Physical Activity: www.rosio.nih.gov/gh2xhjk      Tobacco use: www.quitwithusla.org

## 2022-06-30 ENCOUNTER — HOSPITAL ENCOUNTER (OUTPATIENT)
Dept: RADIOLOGY | Facility: HOSPITAL | Age: 72
Discharge: HOME | End: 2022-06-30
Attending: NURSE PRACTITIONER
Payer: MEDICARE

## 2022-06-30 DIAGNOSIS — F17.210 NICOTINE DEPENDENCE, CIGARETTES, UNCOMPLICATED: ICD-10-CM

## 2022-06-30 PROCEDURE — 71271 CT CHEST LUNG SCREENING LOW DOSE: ICD-10-PCS | Mod: 26,,, | Performed by: RADIOLOGY

## 2022-06-30 PROCEDURE — 71271 CT THORAX LUNG CANCER SCR C-: CPT | Mod: 26,,, | Performed by: RADIOLOGY

## 2022-06-30 PROCEDURE — 71271 CT THORAX LUNG CANCER SCR C-: CPT | Mod: TC

## 2022-07-01 ENCOUNTER — TELEPHONE (OUTPATIENT)
Dept: FAMILY MEDICINE | Facility: CLINIC | Age: 72
End: 2022-07-01
Payer: MEDICARE

## 2022-07-01 NOTE — TELEPHONE ENCOUNTER
----- Message from Chanel Abraham NP sent at 7/1/2022 11:09 AM CDT -----  Emphysema noted on recent CT scan of chest  Also with notable atherosclerosis to arteries around the heart (plaque build-up)  Please follow a heart-healthy diet and incorporate more physical activity to weekly routine  Recommend follow up lung ct scan in 1 year

## 2022-07-01 NOTE — TELEPHONE ENCOUNTER
Patient notified of CT scan results and MARINA Buchanan recommendations. Patient verbalized understanding.

## 2022-07-18 ENCOUNTER — TELEPHONE (OUTPATIENT)
Dept: NEUROLOGY | Facility: HOSPITAL | Age: 72
End: 2022-07-18
Payer: MEDICARE

## 2022-07-18 NOTE — TELEPHONE ENCOUNTER
----- Message from Randall Mcdonough sent at 7/18/2022 11:34 AM CDT -----  Contact: [phone]  Who Called: Dimas Garcia (Friend)  Regarding: pt states he is due for another colonoscopy   Would the patient rather a call back or a response via [facility]? Call back  Best Call Back Number: [phone]  Additional Information: n/a

## 2022-07-19 ENCOUNTER — TELEPHONE (OUTPATIENT)
Dept: NEUROLOGY | Facility: HOSPITAL | Age: 72
End: 2022-07-19
Payer: MEDICARE

## 2022-07-19 NOTE — TELEPHONE ENCOUNTER
----- Message from Randall Mcdonough sent at 7/18/2022  1:32 PM CDT -----  Contact: [phone]  Who Called: remy   Regarding: schedule colonoscopy   Would the patient rather a call back or a response via [facility]? Call back  Best Call Back Number:  [phone]   Additional Information: n/a

## 2022-07-19 NOTE — TELEPHONE ENCOUNTER
Requesting appt for repeat colonoscopy.  Scheduled on Monday, August 8, 2022 at 130pm.  Clinic address given and repeated correctly.

## 2022-08-08 ENCOUNTER — OFFICE VISIT (OUTPATIENT)
Dept: NEUROLOGY | Facility: HOSPITAL | Age: 72
End: 2022-08-08
Attending: INTERNAL MEDICINE
Payer: MEDICARE

## 2022-08-08 VITALS
HEIGHT: 71 IN | DIASTOLIC BLOOD PRESSURE: 84 MMHG | HEART RATE: 102 BPM | BODY MASS INDEX: 25.71 KG/M2 | SYSTOLIC BLOOD PRESSURE: 131 MMHG

## 2022-08-08 DIAGNOSIS — Z86.010 HISTORY OF COLON POLYPS: Primary | ICD-10-CM

## 2022-08-08 PROCEDURE — 99214 OFFICE O/P EST MOD 30 MIN: CPT | Performed by: INTERNAL MEDICINE

## 2022-08-08 RX ORDER — METFORMIN HYDROCHLORIDE 1000 MG/1
1000 TABLET ORAL DAILY
COMMUNITY
Start: 2022-03-25

## 2022-08-08 NOTE — PATIENT INSTRUCTIONS
SUTAB Instructions    [facility] [address]  Clinic Office [phone]  Endoscopy Lab [phone]    You are scheduled for a Colonoscopy with Dr. Hidalgo on Thursday, August 18, 2022 at [facility] in [place].    Check in at the Hospital -1st floor, Information desk. A covid test will be required 3 days before your procedure.  Call [phone] to reschedule.    An adult friend/family member must come with you to drive you home.  You cannot drive, take a taxi, Uber/Lyft or bus to leave the Endoscopy Center alone.  If you do not have someone to drive you home, your test will be cancelled.     Please follow the directions of your doctor if you take any pills that thin your blood. If you take these meds: Aggrenox, Brilinta, Effient, Eliquis, Lovenox, Plavix, Pletal, Pradaxa, Ticilid, Xarelto or Coumadin, let the doctor's office know.    DON'T: On the morning of the test do not take insulin or pills for diabetes.     DO: On the morning of the test, do take any pills for blood pressure, heart, anti-rejection and or seizures with a small sip of water. Bring any inhalers with you.    To have a good prep, you must follow these instructions - please do not use the directions from the pharmacy.    The doctor will send a prescription for the SUTAB.    The Day Before the test:    You can only drink CLEAR LIQUIDS the whole day before your test.  You can't eat any food for the whole day.    You CAN have:  Water, Coffee or decaf coffee (no milk or cream)  Tea  Soft drinks - regular and sugar free  Jell-O (green or yellow)  Apple Juice, grape juice, white cranberry juice  Gatorade, Power Aid, Crystal Light, Tad Aid  Lemonade and Limeade  Bouillon, clear soup  Snowball, popsicles  YOU CAN'T DRINK ANYTHING RED, PURPLE ORANGE OR BLUE   YOU CAN'T DRINK ALCOHOL  ONLY DRINK WHAT IS ON THE LIST      At 5 pm the night before your test:    Open the bottle of 12 tablets. Fill the provided cup with water  up to the line = 16 oz. Swallow each pill with water. Drink the rest of the water in the cup in 20 minutes.    At 6 pm:  Fill the cup with water up to the line = 16 oz. Drink the cup of water in 30 minutes.    At 7 pm:  Fill the cup with water up to the line = 16 oz. Drink the cup of water in 30 minutes.     Continue to drink water or clear liquids until you go to sleep.      The Day of the test - We will call you 2 days before your test to tell you what time to get to the hospital. We will also tell you when to do the next steps.    5 hours before you come to the hospital (this may be in the middle of the night): ___________________= time  Open the bottle of 12 tablets. Fill the cup with water up to the line = 16 oz. Swallow each pill with water. Drink the rest of the water in the cup in 20 minutes.    At 4 hours before you come to the hospital: ______________= time  Fill the cup with water up to the line = 16 oz. Drink the cup of water in 30 minutes.     At 3 hours before you come to the hospital: ______________= time    YOU CAN'T EAT OR DRINK ANYTHING ELSE ONCE YOU FINISH THE WATER AT _____________ = TIME    Leave all valuables and jewelry at home. You will be at the hospital for 2-4 hours.    Call the Endoscopy department at [phone] with any questions about your procedure.          Please give this Coupon Code to your pharmacist.    BIN: [id number]  MOHITN: [id number]  GROUP: [id number]  MEMBER ID: [id number]

## 2022-08-08 NOTE — PROGRESS NOTES
"[facility]    CC: History of adenomatous polyps    HPI 72 y.o. male with history of CKD, HTN, DBM, HLD, smoking use who presents for routine followup to schedule repeat screening colonoscopy. Patient had a 13mm adenoma on colonscopy ten years ago. Five years ago, patient had 3 sessile polyps in the transverse colon removed (pathology tubular adenoma) with plan to repeat screening colonoscopy in 5 years. Patient denies significant changes in his health. Denies red flag symptoms such as unintentional weight changes, fever, chills, or night sweats. Denies hematochezia or hematemesis. Has daily bowel movements although constipation at times.     Past Medical History  Past Medical History:   Diagnosis Date    Cataract     Coronary artery disease 2001    ACS    CVA (cerebral infarction)     Diabetes mellitus type II 2001    Glaucoma suspect     Hyperlipidemia 2001    Hypertension     Myocardial infarction 2002    Stroke 3/2012    [facility]         Physical Examination  /84   Pulse 102   Ht 5' 11" (1.803 m)   BMI 25.71 kg/m²   General appearance: alert, cooperative, no distress  Lungs: clear to auscultation bilaterally, no dullness to percussion bilaterally  Heart: regular rate and rhythm without rub; no displacement of the PMI   Abdomen: soft, non-tender; bowel sounds normoactive; no organomegaly    Assessment:   72M w/ history of adenomatous polyps presenting for followup for routine colonoscopy. Also notes occasional constipation despite regular bowel movements.    Plan:  Plan for next 8/18/22 for colonoscopy. Patient provided with instruction for bowel preparation. Patient was counseled on fiber supplementation and increased water intake to help with passage of stools.      Armando Hidalgo MD   [address] [place] [address], [address]   [place] [address] [phone]    "

## 2022-08-09 ENCOUNTER — TELEPHONE (OUTPATIENT)
Dept: NEUROLOGY | Facility: HOSPITAL | Age: 72
End: 2022-08-09
Payer: MEDICARE

## 2022-08-09 NOTE — TELEPHONE ENCOUNTER
----- Message from Shirley Crow sent at 8/9/2022  9:36 AM CDT -----  Type: Requesting to speak with nurse         Who Called: PT's caregiver MS Garcia  Regarding: pt looking for the prep for procedure please advise   Would the patient rather a call back or a response via [employer] [facility]? Call back  Best Call Back Number: [phone]  Additional Information: [facility] - [place], [place] - [address]

## 2022-08-09 NOTE — TELEPHONE ENCOUNTER
Mrs Garcia, requesting which pharmacy prep sent to.  Notified sutab colon prep called to [facility] at [address] [place] [address] in [place].  Acknowledged understanding.

## 2022-08-15 ENCOUNTER — OFFICE VISIT (OUTPATIENT)
Dept: PODIATRY | Facility: CLINIC | Age: 72
End: 2022-08-15
Payer: MEDICARE

## 2022-08-15 VITALS
HEIGHT: 71 IN | HEART RATE: 66 BPM | DIASTOLIC BLOOD PRESSURE: 81 MMHG | SYSTOLIC BLOOD PRESSURE: 146 MMHG | BODY MASS INDEX: 25.71 KG/M2

## 2022-08-15 DIAGNOSIS — N18.31 TYPE 2 DIABETES MELLITUS WITH STAGE 3A CHRONIC KIDNEY DISEASE, WITHOUT LONG-TERM CURRENT USE OF INSULIN: ICD-10-CM

## 2022-08-15 DIAGNOSIS — E11.51 TYPE 2 DIABETES MELLITUS WITH PERIPHERAL ARTERY DISEASE: Primary | ICD-10-CM

## 2022-08-15 DIAGNOSIS — B35.1 ONYCHOMYCOSIS: ICD-10-CM

## 2022-08-15 DIAGNOSIS — I69.30 HISTORY OF CVA WITH RESIDUAL DEFICIT: ICD-10-CM

## 2022-08-15 DIAGNOSIS — E11.42 TYPE 2 DIABETES MELLITUS WITH PERIPHERAL NEUROPATHY: ICD-10-CM

## 2022-08-15 DIAGNOSIS — E11.22 TYPE 2 DIABETES MELLITUS WITH STAGE 3A CHRONIC KIDNEY DISEASE, WITHOUT LONG-TERM CURRENT USE OF INSULIN: ICD-10-CM

## 2022-08-15 PROCEDURE — 3072F PR LOW RISK FOR RETINOPATHY: ICD-10-PCS | Mod: CPTII,S$GLB,, | Performed by: PODIATRIST

## 2022-08-15 PROCEDURE — 3072F LOW RISK FOR RETINOPATHY: CPT | Mod: CPTII,S$GLB,, | Performed by: PODIATRIST

## 2022-08-15 PROCEDURE — 3061F PR NEG MICROALBUMINURIA RESULT DOCUMENTED/REVIEW: ICD-10-PCS | Mod: CPTII,S$GLB,, | Performed by: PODIATRIST

## 2022-08-15 PROCEDURE — 1159F MED LIST DOCD IN RCRD: CPT | Mod: CPTII,S$GLB,, | Performed by: PODIATRIST

## 2022-08-15 PROCEDURE — 11721 DEBRIDE NAIL 6 OR MORE: CPT | Mod: Q9,S$GLB,, | Performed by: PODIATRIST

## 2022-08-15 PROCEDURE — 1126F AMNT PAIN NOTED NONE PRSNT: CPT | Mod: CPTII,S$GLB,, | Performed by: PODIATRIST

## 2022-08-15 PROCEDURE — 99999 PR PBB SHADOW E&M-EST. PATIENT-LVL IV: CPT | Mod: PBBFAC,,, | Performed by: PODIATRIST

## 2022-08-15 PROCEDURE — 1159F PR MEDICATION LIST DOCUMENTED IN MEDICAL RECORD: ICD-10-PCS | Mod: CPTII,S$GLB,, | Performed by: PODIATRIST

## 2022-08-15 PROCEDURE — 1126F PR PAIN SEVERITY QUANTIFIED, NO PAIN PRESENT: ICD-10-PCS | Mod: CPTII,S$GLB,, | Performed by: PODIATRIST

## 2022-08-15 PROCEDURE — 1160F RVW MEDS BY RX/DR IN RCRD: CPT | Mod: CPTII,S$GLB,, | Performed by: PODIATRIST

## 2022-08-15 PROCEDURE — 1160F PR REVIEW ALL MEDS BY PRESCRIBER/CLIN PHARMACIST DOCUMENTED: ICD-10-PCS | Mod: CPTII,S$GLB,, | Performed by: PODIATRIST

## 2022-08-15 PROCEDURE — 4010F ACE/ARB THERAPY RXD/TAKEN: CPT | Mod: CPTII,S$GLB,, | Performed by: PODIATRIST

## 2022-08-15 PROCEDURE — 99204 PR OFFICE/OUTPT VISIT, NEW, LEVL IV, 45-59 MIN: ICD-10-PCS | Mod: 25,S$GLB,, | Performed by: PODIATRIST

## 2022-08-15 PROCEDURE — 3008F BODY MASS INDEX DOCD: CPT | Mod: CPTII,S$GLB,, | Performed by: PODIATRIST

## 2022-08-15 PROCEDURE — 3079F DIAST BP 80-89 MM HG: CPT | Mod: CPTII,S$GLB,, | Performed by: PODIATRIST

## 2022-08-15 PROCEDURE — 3077F SYST BP >= 140 MM HG: CPT | Mod: CPTII,S$GLB,, | Performed by: PODIATRIST

## 2022-08-15 PROCEDURE — 3079F PR MOST RECENT DIASTOLIC BLOOD PRESSURE 80-89 MM HG: ICD-10-PCS | Mod: CPTII,S$GLB,, | Performed by: PODIATRIST

## 2022-08-15 PROCEDURE — 99999 PR PBB SHADOW E&M-EST. PATIENT-LVL IV: ICD-10-PCS | Mod: PBBFAC,,, | Performed by: PODIATRIST

## 2022-08-15 PROCEDURE — 3066F PR DOCUMENTATION OF TREATMENT FOR NEPHROPATHY: ICD-10-PCS | Mod: CPTII,S$GLB,, | Performed by: PODIATRIST

## 2022-08-15 PROCEDURE — 11721 ROUTINE FOOT CARE: ICD-10-PCS | Mod: Q9,S$GLB,, | Performed by: PODIATRIST

## 2022-08-15 PROCEDURE — 3044F PR MOST RECENT HEMOGLOBIN A1C LEVEL <7.0%: ICD-10-PCS | Mod: CPTII,S$GLB,, | Performed by: PODIATRIST

## 2022-08-15 PROCEDURE — 3061F NEG MICROALBUMINURIA REV: CPT | Mod: CPTII,S$GLB,, | Performed by: PODIATRIST

## 2022-08-15 PROCEDURE — 4010F PR ACE/ARB THEARPY RXD/TAKEN: ICD-10-PCS | Mod: CPTII,S$GLB,, | Performed by: PODIATRIST

## 2022-08-15 PROCEDURE — 3077F PR MOST RECENT SYSTOLIC BLOOD PRESSURE >= 140 MM HG: ICD-10-PCS | Mod: CPTII,S$GLB,, | Performed by: PODIATRIST

## 2022-08-15 PROCEDURE — 3008F PR BODY MASS INDEX (BMI) DOCUMENTED: ICD-10-PCS | Mod: CPTII,S$GLB,, | Performed by: PODIATRIST

## 2022-08-15 PROCEDURE — 3066F NEPHROPATHY DOC TX: CPT | Mod: CPTII,S$GLB,, | Performed by: PODIATRIST

## 2022-08-15 PROCEDURE — 3044F HG A1C LEVEL LT 7.0%: CPT | Mod: CPTII,S$GLB,, | Performed by: PODIATRIST

## 2022-08-15 PROCEDURE — 99204 OFFICE O/P NEW MOD 45 MIN: CPT | Mod: 25,S$GLB,, | Performed by: PODIATRIST

## 2022-08-15 NOTE — PROGRESS NOTES
Subjective:      Patient ID: Ambrosio Montoya is a 72 y.o. male.    Chief Complaint: Diabetes Mellitus (Chanel Abraham NP  06/27/2022) and Nail Care    Ambrosio is a 72 y.o. male who presents to the clinic for evaluation and treatment of high risk feet. Ambrosio has a past medical history of Cataract, Coronary artery disease (2001), CVA (cerebral infarction), Diabetes mellitus type II (2001), Glaucoma suspect, Hyperlipidemia (2001), Hypertension, Myocardial infarction (2002), and Stroke (3/2012). The patient's chief complaint is long, thick toenails. This patient has documented high risk feet requiring routine maintenance secondary to diabetes mellitis and those secondary complications of diabetes, as mentioned..    PCP: MD Chanel Monroe NP on 06/27/2022  Date Last Seen by PCP:     Current shoe gear:  Affected Foot: Tennis shoes     Unaffected Foot: Tennis shoes    Hemoglobin A1C   Date Value Ref Range Status   03/21/2022 6.6 (H) 4.0 - 5.6 % Final     Comment:     ADA Screening Guidelines:  5.7-6.4%  Consistent with prediabetes  >or=6.5%  Consistent with diabetes    High levels of fetal hemoglobin interfere with the HbA1C  assay. Heterozygous hemoglobin variants (HbS, HgC, etc)do  not significantly interfere with this assay.   However, presence of multiple variants may affect accuracy.     04/28/2017 6.9 (H) 4.5 - 6.2 % Final     Comment:     According to ADA guidelines, hemoglobin A1C <7.0% represents  optimal control in non-pregnant diabetic patients.  Different  metrics may apply to specific populations.   Standards of Medical Care in Diabetes - 2016.  For the purpose of screening for the presence of diabetes:  <5.7%     Consistent with the absence of diabetes  5.7-6.4%  Consistent with increasing risk for diabetes   (prediabetes)  >or=6.5%  Consistent with diabetes  Currently no consensus exists for use of hemoglobin A1C  for diagnosis of diabetes for children.     01/05/2016 6.6 (H) 4.5 -  "6.2 % Final     Vitals:    08/15/22 1431   BP: (!) 146/81   Pulse: 66   Height: 5' 11" (1.803 m)   PainSc: 0-No pain   PainLoc: Foot      Past Medical History:   Diagnosis Date    Cataract     Coronary artery disease 2001    ACS    CVA (cerebral infarction)     Diabetes mellitus type II 2001    Glaucoma suspect     Hyperlipidemia 2001    Hypertension     Myocardial infarction 2002    Stroke 3/2012    [facility]       Past Surgical History:   Procedure Laterality Date    COLONOSCOPY N/A 4/20/2017    Procedure: COLONOSCOPY;  Surgeon: Armando Hidalgo MD;  Location: [facility];  Service: Endoscopy;  Laterality: N/A;       Family History   Problem Relation Age of Onset    Heart disease Mother     Cancer Father         prostate CA    Diabetes Sister     Cancer Sister     Depression Brother     Diabetes Brother        Social History     Socioeconomic History    Marital status:    Tobacco Use    Smoking status: Current Every Day Smoker     Packs/day: 1.00     Years: 40.00     Pack years: 40.00     Types: Cigarettes    Smokeless tobacco: Never Used   Substance and Sexual Activity    Alcohol use: No    Drug use: No    Sexual activity: Not Currently     Partners: Female     Social Determinants of Health     Financial Resource Strain: Low Risk     Difficulty of Paying Living Expenses: Not hard at all   Food Insecurity: No Food Insecurity    Worried About Running Out of Food in the Last Year: Never true    Ran Out of Food in the Last Year: Never true   Transportation Needs: No Transportation Needs    Lack of Transportation (Medical): No    Lack of Transportation (Non-Medical): No   Physical Activity: Inactive    Days of Exercise per Week: 0 days    Minutes of Exercise per Session: 0 min   Stress: No Stress Concern Present    Feeling of Stress : Not at all   Social Connections: Socially Isolated    Frequency of Communication with Friends and Family: More than three times a week    " Frequency of Social Gatherings with Friends and Family: More than three times a week    Attends Hinduism Services: Never    Active Member of Clubs or Organizations: No    Attends Club or Organization Meetings: Never    Marital Status:    Housing Stability: Low Risk     Unable to Pay for Housing in the Last Year: No    Number of Places Lived in the Last Year: 1    Unstable Housing in the Last Year: No       Current Outpatient Medications   Medication Sig Dispense Refill    allopurinoL (ZYLOPRIM) 100 MG tablet       aspirin 325 MG tablet Take 1 tablet (325 mg total) by mouth once daily.      atorvastatin (LIPITOR) 40 MG tablet Take 2 tablets (80 mg total) by mouth once daily. 90 tablet 3    clotrimazole-betamethasone 1-0.05% (LOTRISONE) cream APPLY SPARINGLY TO THE AFFECTED AREA(S) TWICE DAILY      indomethacin (INDOCIN) 50 MG capsule Take 1 capsule (50 mg total) by mouth 2 (two) times daily as needed. 30 capsule 0    lisinopril (PRINIVIL,ZESTRIL) 40 MG tablet Take 1 tablet (40 mg total) by mouth once daily. 90 tablet 1    metFORMIN (GLUCOPHAGE) 1000 MG tablet Take 1,000 mg by mouth once daily at 6am.      nicotine (NICODERM CQ) 14 mg/24 hr Place 1 patch onto the skin once daily. 14 patch 0    oxybutynin (DITROPAN-XL) 5 MG TR24 TAKE ONE TABLET BY MOUTH ONCE DAILY 30 tablet 11    polyethylene glycol (GLYCOLAX) 17 gram PwPk Take 17 g by mouth once daily. 14 each 0    rosuvastatin (CRESTOR) 40 MG Tab       triamcinolone acetonide 0.1% (KENALOG) 0.1 % ointment Apply topically once daily. For eczema 30 g 3    VITAMIN D2 1,250 mcg (50,000 unit) capsule       amLODIPine (NORVASC) 10 MG tablet Take 1 tablet (10 mg total) by mouth once daily. 90 tablet 1     No current facility-administered medications for this visit.       Review of patient's allergies indicates:   Allergen Reactions    Pcn [penicillins] Nausea And Vomiting    Plavix [clopidogrel] Itching and Rash       Review of Systems    Constitutional: Negative for chills and fever.   HENT: Negative for congestion and hearing loss.    Respiratory: Negative for cough and shortness of breath.    Skin: Positive for color change, dry skin and nail changes.   Musculoskeletal: Positive for back pain and muscle weakness.   Gastrointestinal: Negative for nausea and vomiting.   Neurological: Negative for numbness and paresthesias.   Psychiatric/Behavioral: Negative for altered mental status.           Objective:      Physical Exam  Constitutional:       General: He is not in acute distress.     Appearance: He is not ill-appearing.   Cardiovascular:      Pulses:           Dorsalis pedis pulses are 2+ on the right side and detected w/ Doppler on the left side.        Posterior tibial pulses are detected w/ Doppler on the right side and detected w/ Doppler on the left side.      Comments: Extremely weak monophasic left DP and weak monophasic PT bilateral with Doppler.  Moderate brawny edema to lower extremity bilateral with hyperpigmentation of the skin bilateral lower extremity.  No hair growth bilateral lower extremity.  Skin temp is warm bilateral foot.  No rubor noted on dependency bilateral foot.  Musculoskeletal:      Right foot: Foot drop present.      Comments: Dorsiflexion of the foot and toes right is 3/5 with remaining muscle groups right lower extremity 4/5 and left lower extremity groups are 5/5.    Pes planus foot type bilateral.  No pain with range of motion or manual muscle strength testing bilateral foot ankle.   Feet:      Right foot:      Protective Sensation: 10 sites tested. 5 sites sensed.      Toenail Condition: Right toenails are abnormally thick and long. Fungal disease present.     Left foot:      Protective Sensation: 10 sites tested. 5 sites sensed.      Toenail Condition: Left toenails are abnormally thick and long. Fungal disease present.  Skin:     General: Skin is warm.      Capillary Refill: Capillary refill takes 2 to 3  seconds.      Findings: No ecchymosis or erythema.      Nails: There is no clubbing.      Comments: Nails 1-5 bilateral foot are moderately elongated, thickened, discolored brown-yellow moderate loosening and underlying debris.  Right hallux nail is nearly completely loosened with moderate underlying debris.   Neurological:      Mental Status: He is alert and oriented to person, place, and time.      Sensory: Sensory deficit present.      Motor: Weakness present.               Assessment:       Encounter Diagnoses   Name Primary?    Type 2 diabetes mellitus with stage 3a chronic kidney disease, without long-term current use of insulin     Type 2 diabetes mellitus with peripheral artery disease Yes    Type 2 diabetes mellitus with peripheral neuropathy     Onychomycosis     History of CVA with residual deficit          Plan:       Ambrosio was seen today for diabetes mellitus and nail care.    Diagnoses and all orders for this visit:    Type 2 diabetes mellitus with peripheral artery disease  -     US Lower Extremity Arteries Bilateral; Future  -     Routine Foot Care    Type 2 diabetes mellitus with stage 3a chronic kidney disease, without long-term current use of insulin  -     Ambulatory referral/consult to Podiatry    Type 2 diabetes mellitus with peripheral neuropathy  -     Routine Foot Care    Onychomycosis  -     Routine Foot Care    History of CVA with residual deficit      I counseled the patient on his conditions, their implications and medical management.    Shoe inspection. Diabetic Foot Education. Patient reminded of the importance of good nutrition and blood sugar control to help prevent podiatric complications of diabetes. Patient instructed on proper foot hygeine. We discussed wearing proper shoe gear, daily foot inspections, never walking without protective shoe gear, never putting sharp instruments to feet.    Routine foot care per attached note. Patient relates relief following the procedure. He  will continue to monitor the areas daily, inspect his feet, wear protective shoe gear when ambulatory, moisturizer to maintain skin integrity.    Arterial ultrasound to assess for significant stenosis to lower extremity bilateral.    Comprehensive annual diabetic foot exam completed today.  Patient found to be high risk for diabetic foot complication.    RTC within 3 months or p.r.n. as discussed.    A portion of this note was generated by voice recognition software and may contain spelling and grammar errors.

## 2022-08-15 NOTE — PROCEDURES
Routine Foot Care    Date/Time: 8/15/2022 2:15 PM  Performed by: Garland Thompson DPM  Authorized by: Garland Thompson DPM     Consent Done?:  Yes (Verbal)  Hyperkeratotic Skin Lesions?: No      Nail Care Type:  Debride  Location(s): All  (Left 1st Toe, Left 3rd Toe, Left 2nd Toe, Left 4th Toe, Left 5th Toe, Right 1st Toe, Right 2nd Toe, Right 3rd Toe, Right 4th Toe and Right 5th Toe)  Patient tolerance:  Patient tolerated the procedure well with no immediate complications     Used sterile nail nipper.

## 2022-08-16 ENCOUNTER — TELEPHONE (OUTPATIENT)
Dept: ENDOSCOPY | Facility: HOSPITAL | Age: 72
End: 2022-08-16
Payer: MEDICARE

## 2022-08-16 ENCOUNTER — TELEPHONE (OUTPATIENT)
Dept: NEUROLOGY | Facility: HOSPITAL | Age: 72
End: 2022-08-16
Payer: MEDICARE

## 2022-08-16 RX ORDER — SOD SULF/POT CHLORIDE/MAG SULF 1.479 G
12 TABLET ORAL DAILY
Qty: 24 TABLET | Refills: 0 | Status: SHIPPED | OUTPATIENT
Start: 2022-08-16

## 2022-08-16 NOTE — TELEPHONE ENCOUNTER
"Dimas, friend, requesting to assist pt with colonoscopy prep instructions.  Instruction with pt, clear liquids on Wednesday, August 17, 2022 with no red, purple or dairy products.  5pm Wednesday, 12 sutabs with 16 ounces of water within 20 minutes, 6pm 16 ounces of water, 7pm 16 ounces of water all within 30 minutes.  Continue clear liquids the entire day.  Thursday, August 18, 2022, 330am take 12 sutabs with 16 ounces of water. Nothing to eat or drink after 530am.  Arrive at [facility] 1st floor Admit at 830am.   An adult  required to drive pt home.  Pt repeated all instructions given correctly and states "I got it".  "

## 2022-08-16 NOTE — TELEPHONE ENCOUNTER
Spoke with patient's significant other about arrival time @ 0830.   Covid test = boosted    Prep instructions reviewed: the day before the procedure, follow a clear liquid diet all day, then start the first 1/2 of prep at 5pm and take 2nd 1/2 of prep @ 0330.  Pt must be completely NPO when prep completed @ 0530.              Medications: Do not take Insulin or oral diabetic medications the day of the procedure.  Take as prescribed: heart, seizure and blood pressure medication in the morning with a sip of water (less than an ounce).  Take any breathing medications and bring inhalers to hospital with you Leave all valuables and jewelry at home.     Wear comfortable clothes to procedure to change into hospital gown You cannot drive for 24 hours after your procedure because you will receive sedation for your procedure to make you comfortable.  A ride must be provided at discharge.      Note:  Patient's significant other stated patient has already started taking Sutabs since he was in the office. Instructed this was the incorrect thing to do and a second rx was sent to pharmacy on file. Went over instructions in detail with her and asked her to review paperwork provided to them at recent office visit. No e-mail and no portal.

## 2022-08-16 NOTE — TELEPHONE ENCOUNTER
----- Message from Julia Berkowitz sent at 8/16/2022  1:13 PM CDT -----  Type:  Needs Medical Advice    Who Called:   Reason:questions regarding his procedure   Would the patient rather a call back or a response via [facility]? call  Best Call Back Number: [phone]  Additional Information: none

## 2022-08-18 ENCOUNTER — ANESTHESIA EVENT (OUTPATIENT)
Dept: ENDOSCOPY | Facility: HOSPITAL | Age: 72
End: 2022-08-18
Payer: MEDICARE

## 2022-08-18 ENCOUNTER — ANESTHESIA (OUTPATIENT)
Dept: ENDOSCOPY | Facility: HOSPITAL | Age: 72
End: 2022-08-18
Payer: MEDICARE

## 2022-08-18 ENCOUNTER — HOSPITAL ENCOUNTER (OUTPATIENT)
Facility: HOSPITAL | Age: 72
Discharge: HOME | End: 2022-08-18
Attending: INTERNAL MEDICINE | Admitting: INTERNAL MEDICINE
Payer: MEDICARE

## 2022-08-18 VITALS
OXYGEN SATURATION: 97 % | TEMPERATURE: 98 F | HEART RATE: 63 BPM | HEIGHT: 71 IN | BODY MASS INDEX: 25.8 KG/M2 | DIASTOLIC BLOOD PRESSURE: 72 MMHG | RESPIRATION RATE: 20 BRPM | SYSTOLIC BLOOD PRESSURE: 104 MMHG | WEIGHT: 184.31 LBS

## 2022-08-18 DIAGNOSIS — K63.5 COLON POLYP: ICD-10-CM

## 2022-08-18 DIAGNOSIS — K63.5 POLYP OF COLON, UNSPECIFIED PART OF COLON, UNSPECIFIED TYPE: Primary | ICD-10-CM

## 2022-08-18 LAB — POCT GLUCOSE: 124 MG/DL (ref 70–110)

## 2022-08-18 PROCEDURE — 88305 TISSUE EXAM BY PATHOLOGIST: CPT | Mod: 59 | Performed by: PATHOLOGY

## 2022-08-18 PROCEDURE — 27201089 HC SNARE, DISP (ANY): Performed by: INTERNAL MEDICINE

## 2022-08-18 PROCEDURE — 82962 GLUCOSE BLOOD TEST: CPT | Performed by: INTERNAL MEDICINE

## 2022-08-18 PROCEDURE — 25000003 PHARM REV CODE 250: Performed by: INTERNAL MEDICINE

## 2022-08-18 PROCEDURE — 88305 TISSUE EXAM BY PATHOLOGIST: CPT | Mod: 26,,, | Performed by: PATHOLOGY

## 2022-08-18 PROCEDURE — 88305 TISSUE EXAM BY PATHOLOGIST: ICD-10-PCS | Mod: 26,,, | Performed by: PATHOLOGY

## 2022-08-18 PROCEDURE — 37000009 HC ANESTHESIA EA ADD 15 MINS: Performed by: INTERNAL MEDICINE

## 2022-08-18 PROCEDURE — 45385 COLONOSCOPY W/LESION REMOVAL: CPT | Performed by: INTERNAL MEDICINE

## 2022-08-18 PROCEDURE — 63600175 PHARM REV CODE 636 W HCPCS: Performed by: NURSE ANESTHETIST, CERTIFIED REGISTERED

## 2022-08-18 PROCEDURE — 37000008 HC ANESTHESIA 1ST 15 MINUTES: Performed by: INTERNAL MEDICINE

## 2022-08-18 PROCEDURE — 25000003 PHARM REV CODE 250: Performed by: NURSE ANESTHETIST, CERTIFIED REGISTERED

## 2022-08-18 RX ORDER — PROPOFOL 10 MG/ML
VIAL (ML) INTRAVENOUS CONTINUOUS PRN
Status: DISCONTINUED | OUTPATIENT
Start: 2022-08-18 | End: 2022-08-18

## 2022-08-18 RX ORDER — SODIUM CHLORIDE 9 MG/ML
INJECTION, SOLUTION INTRAVENOUS CONTINUOUS
Status: DISCONTINUED | OUTPATIENT
Start: 2022-08-18 | End: 2022-08-18 | Stop reason: HOSPADM

## 2022-08-18 RX ORDER — LIDOCAINE HYDROCHLORIDE 20 MG/ML
INJECTION INTRAVENOUS
Status: DISCONTINUED | OUTPATIENT
Start: 2022-08-18 | End: 2022-08-18

## 2022-08-18 RX ORDER — SODIUM CHLORIDE 0.9 % (FLUSH) 0.9 %
10 SYRINGE (ML) INJECTION
Status: DISCONTINUED | OUTPATIENT
Start: 2022-08-18 | End: 2022-08-18 | Stop reason: HOSPADM

## 2022-08-18 RX ORDER — PROPOFOL 10 MG/ML
VIAL (ML) INTRAVENOUS
Status: DISCONTINUED | OUTPATIENT
Start: 2022-08-18 | End: 2022-08-18

## 2022-08-18 RX ADMIN — PROPOFOL 120 MCG/KG/MIN: 10 INJECTION, EMULSION INTRAVENOUS at 09:08

## 2022-08-18 RX ADMIN — PROPOFOL 20 MG: 10 INJECTION, EMULSION INTRAVENOUS at 09:08

## 2022-08-18 RX ADMIN — SODIUM CHLORIDE: 0.9 INJECTION, SOLUTION INTRAVENOUS at 08:08

## 2022-08-18 RX ADMIN — LIDOCAINE HYDROCHLORIDE 20 MG: 20 INJECTION, SOLUTION INTRAVENOUS at 09:08

## 2022-08-18 NOTE — H&P
[facility]    CC: History of adenomatous polyps    HPI 72 y.o. male with history of CKD, HTN, DBM, HLD, smoking use who presents for routine followup to schedule repeat screening colonoscopy. Patient had a 13mm adenoma on colonscopy ten years ago. Five years ago, patient had 3 sessile polyps in the transverse colon removed (pathology tubular adenoma) with plan to repeat screening colonoscopy in 5 years. Patient denies significant changes in his health. Denies red flag symptoms such as unintentional weight changes, fever, chills, or night sweats. Denies hematochezia or hematemesis. Has daily bowel movements although constipation at times.     Past Medical History  Past Medical History:   Diagnosis Date    Cataract     Coronary artery disease 2001    ACS    CVA (cerebral infarction)     Diabetes mellitus type II 2001    Glaucoma suspect     Hyperlipidemia 2001    Hypertension     Myocardial infarction 2002    Stroke 3/2012    [facility]         Physical Examination  There were no vitals taken for this visit.  General appearance: alert, cooperative, no distress  Lungs: clear to auscultation bilaterally, no dullness to percussion bilaterally  Heart: regular rate and rhythm without rub; no displacement of the PMI   Abdomen: soft, non-tender; bowel sounds normoactive; no organomegaly    Assessment:   72M w/ history of adenomatous polyps presenting for followup for routine colonoscopy.    Plan:  Proceed with colonoscopy today      rAmando Hidalgo MD   [address], [address]   [place] [address] [phone]

## 2022-08-18 NOTE — TRANSFER OF CARE
"Anesthesia Transfer of Care Note    Patient: Ambrosio Montoya    Procedure(s) Performed: Procedure(s) (LRB):  COLONOSCOPY (N/A)    Patient location: Other: endo pacu    Anesthesia Type: MAC    Transport from OR: Transported from OR on room air with adequate spontaneous ventilation    Post pain: adequate analgesia    Post assessment: no apparent anesthetic complications    Post vital signs: stable    Level of consciousness: awake and alert    Nausea/Vomiting: no nausea/vomiting    Complications: none    Transfer of care protocol was followed      Last vitals:   Visit Vitals  /74 (BP Location: Left arm, Patient Position: Lying)   Pulse 76   Temp 36.3 °C (97.3 °F) (Tympanic)   Resp 18   Ht 5' 11" (1.803 m)   Wt 83.6 kg (184 lb 4.9 oz)   SpO2 97%   BMI 25.71 kg/m²     "

## 2022-08-18 NOTE — ANESTHESIA PREPROCEDURE EVALUATION
08/18/2022  Ambrosio Montoya is a 72 y.o., male.      Pre-op Assessment    I have reviewed the Patient Summary Reports.     I have reviewed the Nursing Notes. I have reviewed the NPO Status.   I have reviewed the Medications.     Review of Systems  Anesthesia Hx:  No problems with previous Anesthesia    Cardiovascular:   Hypertension CAD      Pulmonary:  Pulmonary Normal    Renal/:   Chronic Renal Disease    Neurological:   CVA    Endocrine:   Diabetes    Psych:   Psychiatric History          Physical Exam  General: Well nourished, Cooperative, Alert and Oriented    Airway:  Mallampati: II   Mouth Opening: Normal  TM Distance: Normal  Tongue: Normal  Neck ROM: Normal ROM    Chest/Lungs:  Clear to auscultation, Normal Respiratory Rate    Heart:  Rate: Normal  Rhythm: Regular Rhythm  Sounds: Normal        Anesthesia Plan  Type of Anesthesia, risks & benefits discussed:    Anesthesia Type: MAC  Intra-op Monitoring Plan: Standard ASA Monitors  Post Op Pain Control Plan: multimodal analgesia  Induction:  IV  Informed Consent: Informed consent signed with the Patient and all parties understand the risks and agree with anesthesia plan.  All questions answered.   ASA Score: 2    Ready For Surgery From Anesthesia Perspective.     .

## 2022-08-18 NOTE — PROVATION PATIENT INSTRUCTIONS
Discharge Summary/Instructions after an Endoscopic Procedure  Patient Name: Ambrosio Montoya  Patient MRN: [id number]  Patient YOB: 1950 Thursday, August 18, 2022  Armnado Hidalgo MD  Dear patient,  As a result of recent federal legislation (The Federal Cures Act), you may   receive lab or pathology results from your procedure in your [facility]   account before your physician is able to contact you. Your physician or   their representative will relay the results to you with their   recommendations at their soonest availability.  Thank you,  Your health is very important to us during the Covid Crisis. Following your   procedure today, you will receive a daily text for 2 weeks asking about   signs or symptoms of Covid 19.  Please respond to this text when you   receive it so we can follow up and keep you as safe as possible.   RESTRICTIONS:  During your procedure today, you received medications for sedation.  These   medications may affect your judgment, balance and coordination.  Therefore,   for 24 hours, you have the following restrictions:   - DO NOT drive a car, operate machinery, make legal/financial decisions,   sign important papers or drink alcohol.    ACTIVITY:  Today: no heavy lifting, straining or running due to procedural   sedation/anesthesia.  The following day: return to full activity including work.  DIET:  Eat and drink normally unless instructed otherwise.     TREATMENT FOR COMMON SIDE EFFECTS:  - Mild abdominal pain, nausea, belching, bloating or excessive gas:  rest,   eat lightly and use a heating pad.  - Sore Throat: treat with throat lozenges and/or gargle with warm salt   water.  - Because air was used during the procedure, expelling large amounts of air   from your rectum or belching is normal.  - If a bowel prep was taken, you may not have a bowel movement for 1-3 days.    This is normal.  SYMPTOMS TO WATCH FOR AND REPORT TO YOUR PHYSICIAN:  1. Abdominal pain or bloating, other than gas  cramps.  2. Chest pain.  3. Back pain.  4. Signs of infection such as: chills or fever occurring within 24 hours   after the procedure.  5. Rectal bleeding, which would show as bright red, maroon, or black stools.   (A tablespoon of blood from the rectum is not serious, especially if   hemorrhoids are present.)  6. Vomiting.  7. Weakness or dizziness.  GO DIRECTLY TO THE NEAREST EMERGENCY ROOM IF YOU HAVE ANY OF THE FOLLOWING:      Difficulty breathing              Chills and/or fever over 101 F   Persistent vomiting and/or vomiting blood   Severe abdominal pain   Severe chest pain   Black, tarry stools   Bleeding- more than one tablespoon   Any other symptom or condition that you feel may need urgent attention  Your doctor recommends these additional instructions:  If any biopsies were taken, your doctors clinic will contact you in 1 to 2   weeks with any results.  - Discharge patient to home.   - Repeat colonoscopy in 5 years for surveillance due to history of colon   polyps and small polyps removed on this exam   - Condition stable   - The signs and symptoms of potential delayed complications were discussed   with the patient. If signs or symptoms of these complications develop, call   the [facility] at [phone].   - Return to normal activities tomorrow.  Written discharge instructions were   provided to the patient.  For questions, problems or results please call your physician - Armando Hidalgo MD.  EMERGENCY PHONE NUMBER: [phone],  LAB RESULTS: [phone]  IF A COMPLICATION OR EMERGENCY SITUATION ARISES AND YOU ARE UNABLE TO REACH   YOUR PHYSICIAN - GO DIRECTLY TO THE EMERGENCY ROOM.  MD Armando Torres MD  8/18/2022 10:48:13 AM  This report has been verified and signed electronically.  Dear patient,  As a result of recent federal legislation (The Federal Cures Act), you may   receive lab or pathology results from your procedure in your [facility]   account before  your physician is able to contact you. Your physician or   their representative will relay the results to you with their   recommendations at their soonest availability.  Thank you,  PROVATION

## 2022-08-18 NOTE — ANESTHESIA POSTPROCEDURE EVALUATION
Anesthesia Post Evaluation    Patient: Ambrosio Montoya    Procedure(s) Performed: Procedure(s) (LRB):  COLONOSCOPY (N/A)    Final Anesthesia Type: MAC      Patient location during evaluation: GI PACU  Patient participation: Yes- Able to Participate  Level of consciousness: awake and alert and awake  Post-procedure vital signs: reviewed and stable  Pain management: adequate  Airway patency: patent    PONV status at discharge: No PONV  Anesthetic complications: no      Cardiovascular status: blood pressure returned to baseline, hemodynamically stable and stable  Respiratory status: spontaneous ventilation, unassisted and room air  Hydration status: euvolemic  Follow-up not needed.          Vitals Value Taken Time   /74 08/18/22 0842   Temp 36.3 °C (97.3 °F) 08/18/22 0842   Pulse 76 08/18/22 0842   Resp 18 08/18/22 0842   SpO2 97 % 08/18/22 0842         No case tracking events are documented in the log.      Pain/Raegan Score: No data recorded

## 2022-08-23 LAB
FINAL PATHOLOGIC DIAGNOSIS: NORMAL
GROSS: NORMAL
Lab: NORMAL

## 2022-09-01 ENCOUNTER — OFFICE VISIT (OUTPATIENT)
Dept: OPTOMETRY | Facility: CLINIC | Age: 72
End: 2022-09-01
Payer: MEDICARE

## 2022-09-01 DIAGNOSIS — H52.03 HYPEROPIA WITH ASTIGMATISM AND PRESBYOPIA, BILATERAL: ICD-10-CM

## 2022-09-01 DIAGNOSIS — H52.4 HYPEROPIA WITH ASTIGMATISM AND PRESBYOPIA, BILATERAL: ICD-10-CM

## 2022-09-01 DIAGNOSIS — Z86.73 HISTORY OF STROKE: ICD-10-CM

## 2022-09-01 DIAGNOSIS — H25.13 NUCLEAR SCLEROSIS OF BOTH EYES: ICD-10-CM

## 2022-09-01 DIAGNOSIS — E11.9 TYPE 2 DIABETES MELLITUS WITHOUT RETINOPATHY: Primary | ICD-10-CM

## 2022-09-01 DIAGNOSIS — H52.203 HYPEROPIA WITH ASTIGMATISM AND PRESBYOPIA, BILATERAL: ICD-10-CM

## 2022-09-01 DIAGNOSIS — H40.013 OAG (OPEN ANGLE GLAUCOMA) SUSPECT, LOW RISK, BILATERAL: ICD-10-CM

## 2022-09-01 PROCEDURE — 1160F PR REVIEW ALL MEDS BY PRESCRIBER/CLIN PHARMACIST DOCUMENTED: ICD-10-PCS | Mod: CPTII,S$GLB,, | Performed by: OPTOMETRIST

## 2022-09-01 PROCEDURE — 1159F PR MEDICATION LIST DOCUMENTED IN MEDICAL RECORD: ICD-10-PCS | Mod: CPTII,S$GLB,, | Performed by: OPTOMETRIST

## 2022-09-01 PROCEDURE — 3066F NEPHROPATHY DOC TX: CPT | Mod: CPTII,S$GLB,, | Performed by: OPTOMETRIST

## 2022-09-01 PROCEDURE — 3061F NEG MICROALBUMINURIA REV: CPT | Mod: CPTII,S$GLB,, | Performed by: OPTOMETRIST

## 2022-09-01 PROCEDURE — 99999 PR PBB SHADOW E&M-EST. PATIENT-LVL III: CPT | Mod: PBBFAC,,, | Performed by: OPTOMETRIST

## 2022-09-01 PROCEDURE — 3044F PR MOST RECENT HEMOGLOBIN A1C LEVEL <7.0%: ICD-10-PCS | Mod: CPTII,S$GLB,, | Performed by: OPTOMETRIST

## 2022-09-01 PROCEDURE — 99999 PR PBB SHADOW E&M-EST. PATIENT-LVL III: ICD-10-PCS | Mod: PBBFAC,,, | Performed by: OPTOMETRIST

## 2022-09-01 PROCEDURE — 3044F HG A1C LEVEL LT 7.0%: CPT | Mod: CPTII,S$GLB,, | Performed by: OPTOMETRIST

## 2022-09-01 PROCEDURE — 92015 DETERMINE REFRACTIVE STATE: CPT | Mod: S$GLB,,, | Performed by: OPTOMETRIST

## 2022-09-01 PROCEDURE — 1126F PR PAIN SEVERITY QUANTIFIED, NO PAIN PRESENT: ICD-10-PCS | Mod: CPTII,S$GLB,, | Performed by: OPTOMETRIST

## 2022-09-01 PROCEDURE — 2023F DILAT RTA XM W/O RTNOPTHY: CPT | Mod: CPTII,S$GLB,, | Performed by: OPTOMETRIST

## 2022-09-01 PROCEDURE — 1101F PR PT FALLS ASSESS DOC 0-1 FALLS W/OUT INJ PAST YR: ICD-10-PCS | Mod: CPTII,S$GLB,, | Performed by: OPTOMETRIST

## 2022-09-01 PROCEDURE — 2023F PR DILATED RETINAL EXAM W/O EVID OF RETINOPATHY: ICD-10-PCS | Mod: CPTII,S$GLB,, | Performed by: OPTOMETRIST

## 2022-09-01 PROCEDURE — 1101F PT FALLS ASSESS-DOCD LE1/YR: CPT | Mod: CPTII,S$GLB,, | Performed by: OPTOMETRIST

## 2022-09-01 PROCEDURE — 1160F RVW MEDS BY RX/DR IN RCRD: CPT | Mod: CPTII,S$GLB,, | Performed by: OPTOMETRIST

## 2022-09-01 PROCEDURE — 92015 PR REFRACTION: ICD-10-PCS | Mod: S$GLB,,, | Performed by: OPTOMETRIST

## 2022-09-01 PROCEDURE — 3066F PR DOCUMENTATION OF TREATMENT FOR NEPHROPATHY: ICD-10-PCS | Mod: CPTII,S$GLB,, | Performed by: OPTOMETRIST

## 2022-09-01 PROCEDURE — 4010F ACE/ARB THERAPY RXD/TAKEN: CPT | Mod: CPTII,S$GLB,, | Performed by: OPTOMETRIST

## 2022-09-01 PROCEDURE — 3061F PR NEG MICROALBUMINURIA RESULT DOCUMENTED/REVIEW: ICD-10-PCS | Mod: CPTII,S$GLB,, | Performed by: OPTOMETRIST

## 2022-09-01 PROCEDURE — 3288F PR FALLS RISK ASSESSMENT DOCUMENTED: ICD-10-PCS | Mod: CPTII,S$GLB,, | Performed by: OPTOMETRIST

## 2022-09-01 PROCEDURE — 4010F PR ACE/ARB THEARPY RXD/TAKEN: ICD-10-PCS | Mod: CPTII,S$GLB,, | Performed by: OPTOMETRIST

## 2022-09-01 PROCEDURE — 1159F MED LIST DOCD IN RCRD: CPT | Mod: CPTII,S$GLB,, | Performed by: OPTOMETRIST

## 2022-09-01 PROCEDURE — 92014 PR EYE EXAM, EST PATIENT,COMPREHESV: ICD-10-PCS | Mod: S$GLB,,, | Performed by: OPTOMETRIST

## 2022-09-01 PROCEDURE — 1126F AMNT PAIN NOTED NONE PRSNT: CPT | Mod: CPTII,S$GLB,, | Performed by: OPTOMETRIST

## 2022-09-01 PROCEDURE — 92014 COMPRE OPH EXAM EST PT 1/>: CPT | Mod: S$GLB,,, | Performed by: OPTOMETRIST

## 2022-09-01 PROCEDURE — 3288F FALL RISK ASSESSMENT DOCD: CPT | Mod: CPTII,S$GLB,, | Performed by: OPTOMETRIST

## 2022-09-01 NOTE — PROGRESS NOTES
HPI    FAMILIA: 10/21 with Dr. Figueroa  Chief complaint (CC): patient is here for annual eye exam.  Patient hasn't   noticed any vision changes and mainly wears his glasses for reading.  Glasses? +10 yrs. old  Contacts? -  H/o eye surgery, injections or laser: -  H/o eye injury: -  Known eye conditions? See above  Family h/o eye conditions? -  Eye gtts? -      (-) Flashes (-)  Floaters (-) Mucous   (-)  Tearing (-) Itching (-) Burning   (-) Headaches (-) Eye Pain/discomfort (-) Irritation   (-)  Redness (-) Double vision (-) Blurry vision    Diabetic? +BS usually 120-130  A1c? Hemoglobin A1C       Date                     Value               Ref Range             Status                03/21/2022               6.6 (H)             4.0 - 5.6 %           Final                 01/05/2016               6.6 (H)             4.5 - 6.2 %           Final                    Last edited by Sheryl Skaggs on 9/1/2022 10:57 AM.            Assessment /Plan     For exam results, see Encounter Report.    Type 2 diabetes mellitus without retinopathy    Nuclear sclerosis of both eyes    OAG (open angle glaucoma) suspect, low risk, bilateral  -     Posterior Segment OCT Optic Nerve- Both eyes; Future  -     Brunner Visual Field - OU - Extended - Both Eyes; Future    History of stroke    Hyperopia with astigmatism and presbyopia, bilateral      BS control. No signs of diabetic retinopathy. Monitor with annual exam.  Nuclear sclerotic cataract - mildly visually significant. Observe.  (-) FHx. IOP 16 OD, 18 OS. C/d 0.55 OD, 0.65 OS. Prev OCT w/Dr Figueroa but pt didn't return for further testing. Educated pt on findings w/understanding. RTC 1-2 mo IOP/BMO Ppole/24-2 HVF/pachy.  S/p stroke 2015. Pt reports no affects on vision  SRx released to patient. Patient educated on lens options. Normal ocular health. RTC 1 year for routine exam.

## 2022-09-06 ENCOUNTER — OUTPATIENT CASE MANAGEMENT (OUTPATIENT)
Dept: NEUROLOGY | Facility: CLINIC | Age: 72
End: 2022-09-06
Payer: MEDICARE

## 2022-09-06 DIAGNOSIS — F01.50 VASCULAR DEMENTIA WITHOUT BEHAVIORAL DISTURBANCE: Primary | ICD-10-CM

## 2022-09-06 PROCEDURE — 99358 PR PROLONGED SERV,NO CONTACT,1ST HR: ICD-10-PCS | Mod: S$GLB,,, | Performed by: PSYCHIATRY & NEUROLOGY

## 2022-09-06 PROCEDURE — 99358 PROLONG SERVICE W/O CONTACT: CPT | Mod: S$GLB,,, | Performed by: PSYCHIATRY & NEUROLOGY

## 2022-09-06 NOTE — PROGRESS NOTES
[facility]  Brain Health and Cognitive Disorders Program    PATIENT: Ambrosio Montoya  DATE: 09/06/2022  MRN: [id number]  PRIMARY PROVIDER: Margarita Taylor MD    Future Appointments   Date Time Provider Department Center   9/16/2022 10:30 AM Brodie Hoover MD [facility] [address]   9/29/2022  2:00 PM Rubia Teresa CCC-A [facility] AUSTYN [facility]   9/29/2022  2:40 PM Belén Sheldon MD [facility] AUSTYN [facility]   11/18/2022  2:15 PM Garland Thompson, SANJEEVM [facility] PODIAT [facility]   11/30/2022  8:45 AM KAMALA VISUAL FIELDS [facility] OPHTHAL [place]   11/30/2022  9:40 AM Lalo Fernandez OD [facility] OPTOMTY [place]     I reviewed old records and/or communicated with other professionals or the patient's family from 05:13 PM until 05:44 PM on 09/06/2022. This is directly related to a face-to-face visit encounter with the patient (Evaluation and Management service) conducted on 09/16/2022.  I reviewed the following documentation for a total of 31 minutes.    CPT codes for billing for prolonged evaluation and management service (non-face-to-face review of records or communications with patient's family or other medical professionals):  02405  [facility] EMR records I reviewed include:     Relevant Background/Context  Known Relevant Genetics:  There is no known relevant genetic testing available.  Known Relevant Family history:  No Known Relevant Family History.  The family denies a history of early/late onset cognitive impairment.  The family denies a history of movement disorder (PD, PDD, tremor, etc).  The family denies a history of motor neuron disease (ALS).  The family denies a history of developmental learning disorder (Dyslexia, ADHD, ASD, etc.).  The family denies a history of mood/substance abuse disorder (MDD, SIDNEY, Schizophrenia, etc.).  Developmental/Milestones:  The patient/family report no known birth complications or early life problems. The patient met all developmental milestones.  Learning  "Disorders:  The patient/family report no signs or symptoms suggestive of developmental learning disorder.  Education:  9 years of formal education.  9th grade  Social History:  Together with Hal for more than a year "just friends" +  in 2005 + No involved children but Hal. 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; disability and social security  Relevant Medical History:  Hypertension  CAD (coronary artery disease)  Dyslipidemia  Aortic atherosclerosis  Type 2 diabetes mellitus with stage 3a chronic kidney disease, without long-term current use of insulin  Constipation - functional  History of colon polyps  Gout, arthritis  Back pain  Relevant Exposure/Trauma to CNS:  No History of Traumatic Brain Injury or Concussions  No History of Chronic Mood Disorder  No History of Chronic Stress  No History of Chronic Substance Abuse  No History of Malnutrition  No History of Toxic Exposure     Neurocognitive Disorder Features  Onset/Duration:  Sep 2012 (~10-year)  First Symptom:  Executive impairment  Progression:  Step-wise Progressive  Clinical Course:  Primary Care Provider (03/11/2020)  Type: Chart Review. Memory deficit on screening; Mini Cognitive score: 3. See abnormal clock drawing above. Unable to recall 2/3 words after 3 minutes. Unable to spell "world" backwards. Baseline cognition unknown.  Neuropsychologist (04/30/2020)  Type: Chart Review. Onset of cognitive changes was initially noticed in March of 2020 at a primary care visit where he was unable to remember the name of his primary care doctor in performed poorly on the micro cog assessment. Mr. Montoya denies any cognitive trouble when asked today. His friend reported that he had a stroke in the past year (this is not documented " in the record however) and as result has had more cognitive trouble since that event. Course of new onset cognitive trouble has been stable with girlfriend reporting no major worsening or improvement. Type of cognitive trouble was described as only mild short-term memory trouble by the girlfriend but it seems though she was minimizing symptoms in the presence of the patient. Contextually, he had a CVA in 2012 with residual right-sided weakness and mobility difficulties but no apparent significant cognitive changes reported by the girlfriend. Again this may not be accurate.     Current Presentation  Recent/Interim History:  2012 CVA, but his family reports recent CVA in 2019 (poor historians when asked) with further decline in cognitive/functional status  2020 - diagnosed with VAD.  Unresolved Concern(s) reported by patient/family:  Unknown          Neuroimaging:    MRI brain/head without contrast on 3/30/2012  Formal interpretation by Radiology:  stable remote bilateral cerebellar, thalami, basal ganglia, caudate and left pontine infarcts.  The prior stable punctate grade susceptibility right thalamus most compatible with remote hemorrhage.  Independently reviewed radiological imaging by Brodie Strickland MD. MPH. Behavioral Neurologist  T1: mild generalized cortical atrophy commensurate degree of subcortical microvascular disease. No significant corpus callosum atrophy. No significant brainstem atrophy. No significant hippocampus atrophy  T2/FLAIR: scattered subcortical or multifocal embolic strokes to the gillian cerebellum bilateral subcortical area with diffusion restriction in the centrum semiovale. Appears to be acute on chronic subcortical microvascular disease pattern likely embolic in nature  DWI/ADC: Allowing area of acute infarction left centrum semiovale slightly more pronounced diffusion restriction which extends inferiorly along the corticospinal track and probable external capsule .  SWI/GRE: Several  punctate susceptibility right thalamus.  Impression: : scattered subcortical microvascular disease likely embolic in nature with embolic strokes to the bilateral cerebellum, gillian, subcortical in regions with age indeterminate hemorrhage in the right thalamus.     Working Diagnosis/Differential  VAD     Sincerely,  Brodie Hoover MD. MPH.    Brain Health and Cognitive Disorders Program  [facility]

## 2022-09-15 ENCOUNTER — NURSE TRIAGE (OUTPATIENT)
Dept: ADMINISTRATIVE | Facility: CLINIC | Age: 72
End: 2022-09-15
Payer: MEDICARE

## 2022-09-15 ENCOUNTER — TELEPHONE (OUTPATIENT)
Dept: NEUROLOGY | Facility: HOSPITAL | Age: 72
End: 2022-09-15

## 2022-09-15 NOTE — TELEPHONE ENCOUNTER
Speaking with pt and family    Colonoscopy with Dr. MARII Hidalgo 8/18, now having bright red blood in stool x 2-3 weeks. Seems like it started about a week after colonoscopy. Also associated back pain. Advised per protocol. Go to ED for eval. VU.   Reason for Disposition   Bloody, black, or tarry bowel movements    Additional Information   Negative: Passed out (i.e., fainted, collapsed and was not responding)   Negative: Shock suspected (e.g., cold/pale/clammy skin, too weak to stand)   Negative: Vomiting red blood or black (coffee ground) material   Negative: Sounds like a life-threatening emergency to the triager    Protocols used: Rectal Bleeding-A-OH

## 2022-09-15 NOTE — TELEPHONE ENCOUNTER
Pt states he sees streaks of blood on tissue when he wipes.  Advised pt to increase fiber and fluid intake.  Pt to be notified with Dr. Hidalgo recommendations.  Pt acknowledged understanding.

## 2022-09-15 NOTE — TELEPHONE ENCOUNTER
----- Message from Anu Wooten sent at 9/15/2022 11:01 AM CDT -----  Regarding: Questions and concerns  Contact: [phone]  Patient Ambrosio is calling. Patient had a colonoscopy on 8/18. Patient is having bright red blood in his stool now. Patient is constipated and pain on the right side of his back. Transferred to the nurse o/c. Please call patient at [phone]    Thank You

## 2022-09-16 ENCOUNTER — OFFICE VISIT (OUTPATIENT)
Dept: NEUROLOGY | Facility: CLINIC | Age: 72
End: 2022-09-16
Payer: MEDICARE

## 2022-09-16 ENCOUNTER — LAB VISIT (OUTPATIENT)
Dept: LAB | Facility: HOSPITAL | Age: 72
End: 2022-09-16
Attending: PSYCHIATRY & NEUROLOGY
Payer: MEDICARE

## 2022-09-16 VITALS
SYSTOLIC BLOOD PRESSURE: 148 MMHG | BODY MASS INDEX: 24.97 KG/M2 | HEART RATE: 62 BPM | WEIGHT: 179 LBS | DIASTOLIC BLOOD PRESSURE: 88 MMHG

## 2022-09-16 DIAGNOSIS — M10.9 GOUT, ARTHRITIS: ICD-10-CM

## 2022-09-16 DIAGNOSIS — G47.33 OSA (OBSTRUCTIVE SLEEP APNEA): ICD-10-CM

## 2022-09-16 DIAGNOSIS — R90.89 OTHER ABNORMAL FINDINGS ON DIAGNOSTIC IMAGING OF CENTRAL NERVOUS SYSTEM: ICD-10-CM

## 2022-09-16 DIAGNOSIS — I69.319 COGNITIVE DEFICIT AS LATE EFFECT OF CEREBROVASCULAR ACCIDENT (CVA): ICD-10-CM

## 2022-09-16 DIAGNOSIS — F01.50 VASCULAR DEMENTIA WITHOUT BEHAVIORAL DISTURBANCE: Primary | ICD-10-CM

## 2022-09-16 DIAGNOSIS — E43 UNSPECIFIED SEVERE PROTEIN-CALORIE MALNUTRITION: ICD-10-CM

## 2022-09-16 DIAGNOSIS — F01.50 VASCULAR DEMENTIA WITHOUT BEHAVIORAL DISTURBANCE: ICD-10-CM

## 2022-09-16 LAB
ALBUMIN SERPL BCP-MCNC: 4.1 G/DL (ref 3.5–5.2)
ALP SERPL-CCNC: 74 U/L (ref 55–135)
ALT SERPL W/O P-5'-P-CCNC: 17 U/L (ref 10–44)
ANION GAP SERPL CALC-SCNC: 7 MMOL/L (ref 8–16)
AST SERPL-CCNC: 27 U/L (ref 10–40)
BASOPHILS # BLD AUTO: 0.05 K/UL (ref 0–0.2)
BASOPHILS NFR BLD: 1 % (ref 0–1.9)
BILIRUB SERPL-MCNC: 0.4 MG/DL (ref 0.1–1)
BUN SERPL-MCNC: 13 MG/DL (ref 8–23)
CALCIUM SERPL-MCNC: 10.5 MG/DL (ref 8.7–10.5)
CHLORIDE SERPL-SCNC: 106 MMOL/L (ref 95–110)
CO2 SERPL-SCNC: 27 MMOL/L (ref 23–29)
CREAT SERPL-MCNC: 1.1 MG/DL (ref 0.5–1.4)
DIFFERENTIAL METHOD: ABNORMAL
EOSINOPHIL # BLD AUTO: 0.4 K/UL (ref 0–0.5)
EOSINOPHIL NFR BLD: 7.9 % (ref 0–8)
ERYTHROCYTE [DISTWIDTH] IN BLOOD BY AUTOMATED COUNT: 14.7 % (ref 11.5–14.5)
EST. GFR  (NO RACE VARIABLE): >60 ML/MIN/1.73 M^2
FOLATE SERPL-MCNC: 16.2 NG/ML (ref 4–24)
GLUCOSE SERPL-MCNC: 81 MG/DL (ref 70–110)
HCT VFR BLD AUTO: 45.4 % (ref 40–54)
HGB BLD-MCNC: 15.2 G/DL (ref 14–18)
IGG SERPL-MCNC: 1261 MG/DL (ref 650–1600)
IMM GRANULOCYTES # BLD AUTO: 0 K/UL (ref 0–0.04)
IMM GRANULOCYTES NFR BLD AUTO: 0 % (ref 0–0.5)
LYMPHOCYTES # BLD AUTO: 1.6 K/UL (ref 1–4.8)
LYMPHOCYTES NFR BLD: 30.3 % (ref 18–48)
MAGNESIUM SERPL-MCNC: 1.8 MG/DL (ref 1.6–2.6)
MCH RBC QN AUTO: 27.6 PG (ref 27–31)
MCHC RBC AUTO-ENTMCNC: 33.5 G/DL (ref 32–36)
MCV RBC AUTO: 82 FL (ref 82–98)
MONOCYTES # BLD AUTO: 0.5 K/UL (ref 0.3–1)
MONOCYTES NFR BLD: 9.7 % (ref 4–15)
NEUTROPHILS # BLD AUTO: 2.7 K/UL (ref 1.8–7.7)
NEUTROPHILS NFR BLD: 51.1 % (ref 38–73)
NRBC BLD-RTO: 0 /100 WBC
PLATELET # BLD AUTO: 170 K/UL (ref 150–450)
PMV BLD AUTO: 10.8 FL (ref 9.2–12.9)
POTASSIUM SERPL-SCNC: 3.9 MMOL/L (ref 3.5–5.1)
PROT SERPL-MCNC: 8 G/DL (ref 6–8.4)
RBC # BLD AUTO: 5.51 M/UL (ref 4.6–6.2)
SODIUM SERPL-SCNC: 140 MMOL/L (ref 136–145)
T4 SERPL-MCNC: 9.7 UG/DL (ref 4.5–11.5)
TSH SERPL DL<=0.005 MIU/L-ACNC: 0.63 UIU/ML (ref 0.4–4)
WBC # BLD AUTO: 5.18 K/UL (ref 3.9–12.7)

## 2022-09-16 PROCEDURE — 96116 PR NEUROBEHAVIORAL STATUS EXAM BY PSYCH/PHYS: ICD-10-PCS | Mod: 59,S$GLB,, | Performed by: PSYCHIATRY & NEUROLOGY

## 2022-09-16 PROCEDURE — 84443 ASSAY THYROID STIM HORMONE: CPT | Performed by: PSYCHIATRY & NEUROLOGY

## 2022-09-16 PROCEDURE — 84436 ASSAY OF TOTAL THYROXINE: CPT | Performed by: PSYCHIATRY & NEUROLOGY

## 2022-09-16 PROCEDURE — 1101F PR PT FALLS ASSESS DOC 0-1 FALLS W/OUT INJ PAST YR: ICD-10-PCS | Mod: CPTII,S$GLB,, | Performed by: PSYCHIATRY & NEUROLOGY

## 2022-09-16 PROCEDURE — 3061F NEG MICROALBUMINURIA REV: CPT | Mod: CPTII,S$GLB,, | Performed by: PSYCHIATRY & NEUROLOGY

## 2022-09-16 PROCEDURE — 3079F DIAST BP 80-89 MM HG: CPT | Mod: CPTII,S$GLB,, | Performed by: PSYCHIATRY & NEUROLOGY

## 2022-09-16 PROCEDURE — 1160F PR REVIEW ALL MEDS BY PRESCRIBER/CLIN PHARMACIST DOCUMENTED: ICD-10-PCS | Mod: CPTII,S$GLB,, | Performed by: PSYCHIATRY & NEUROLOGY

## 2022-09-16 PROCEDURE — 99499 RISK ADDL DX/OHS AUDIT: ICD-10-PCS | Mod: HCWC,S$GLB,, | Performed by: PSYCHIATRY & NEUROLOGY

## 2022-09-16 PROCEDURE — 4010F ACE/ARB THERAPY RXD/TAKEN: CPT | Mod: CPTII,S$GLB,, | Performed by: PSYCHIATRY & NEUROLOGY

## 2022-09-16 PROCEDURE — 1159F MED LIST DOCD IN RCRD: CPT | Mod: CPTII,S$GLB,, | Performed by: PSYCHIATRY & NEUROLOGY

## 2022-09-16 PROCEDURE — 4010F PR ACE/ARB THEARPY RXD/TAKEN: ICD-10-PCS | Mod: CPTII,S$GLB,, | Performed by: PSYCHIATRY & NEUROLOGY

## 2022-09-16 PROCEDURE — 85025 COMPLETE CBC W/AUTO DIFF WBC: CPT | Performed by: PSYCHIATRY & NEUROLOGY

## 2022-09-16 PROCEDURE — 82784 ASSAY IGA/IGD/IGG/IGM EACH: CPT | Performed by: PSYCHIATRY & NEUROLOGY

## 2022-09-16 PROCEDURE — 1159F PR MEDICATION LIST DOCUMENTED IN MEDICAL RECORD: ICD-10-PCS | Mod: CPTII,S$GLB,, | Performed by: PSYCHIATRY & NEUROLOGY

## 2022-09-16 PROCEDURE — 96116 NUBHVL XM PHYS/QHP 1ST HR: CPT | Mod: 59,S$GLB,, | Performed by: PSYCHIATRY & NEUROLOGY

## 2022-09-16 PROCEDURE — 3066F PR DOCUMENTATION OF TREATMENT FOR NEPHROPATHY: ICD-10-PCS | Mod: CPTII,S$GLB,, | Performed by: PSYCHIATRY & NEUROLOGY

## 2022-09-16 PROCEDURE — 3008F PR BODY MASS INDEX (BMI) DOCUMENTED: ICD-10-PCS | Mod: CPTII,S$GLB,, | Performed by: PSYCHIATRY & NEUROLOGY

## 2022-09-16 PROCEDURE — 99215 OFFICE O/P EST HI 40 MIN: CPT | Mod: S$GLB,,, | Performed by: PSYCHIATRY & NEUROLOGY

## 2022-09-16 PROCEDURE — 80061 LIPID PANEL: CPT | Mod: 59 | Performed by: PSYCHIATRY & NEUROLOGY

## 2022-09-16 PROCEDURE — 1126F PR PAIN SEVERITY QUANTIFIED, NO PAIN PRESENT: ICD-10-PCS | Mod: CPTII,S$GLB,, | Performed by: PSYCHIATRY & NEUROLOGY

## 2022-09-16 PROCEDURE — 83520 IMMUNOASSAY QUANT NOS NONAB: CPT | Performed by: PSYCHIATRY & NEUROLOGY

## 2022-09-16 PROCEDURE — 3066F NEPHROPATHY DOC TX: CPT | Mod: CPTII,S$GLB,, | Performed by: PSYCHIATRY & NEUROLOGY

## 2022-09-16 PROCEDURE — 1160F RVW MEDS BY RX/DR IN RCRD: CPT | Mod: CPTII,S$GLB,, | Performed by: PSYCHIATRY & NEUROLOGY

## 2022-09-16 PROCEDURE — 82607 VITAMIN B-12: CPT | Performed by: PSYCHIATRY & NEUROLOGY

## 2022-09-16 PROCEDURE — 3079F PR MOST RECENT DIASTOLIC BLOOD PRESSURE 80-89 MM HG: ICD-10-PCS | Mod: CPTII,S$GLB,, | Performed by: PSYCHIATRY & NEUROLOGY

## 2022-09-16 PROCEDURE — 3288F PR FALLS RISK ASSESSMENT DOCUMENTED: ICD-10-PCS | Mod: CPTII,S$GLB,, | Performed by: PSYCHIATRY & NEUROLOGY

## 2022-09-16 PROCEDURE — 99999 PR PBB SHADOW E&M-EST. PATIENT-LVL IV: ICD-10-PCS | Mod: PBBFAC,,, | Performed by: PSYCHIATRY & NEUROLOGY

## 2022-09-16 PROCEDURE — 3072F PR LOW RISK FOR RETINOPATHY: ICD-10-PCS | Mod: CPTII,S$GLB,, | Performed by: PSYCHIATRY & NEUROLOGY

## 2022-09-16 PROCEDURE — 3008F BODY MASS INDEX DOCD: CPT | Mod: CPTII,S$GLB,, | Performed by: PSYCHIATRY & NEUROLOGY

## 2022-09-16 PROCEDURE — 3044F PR MOST RECENT HEMOGLOBIN A1C LEVEL <7.0%: ICD-10-PCS | Mod: CPTII,S$GLB,, | Performed by: PSYCHIATRY & NEUROLOGY

## 2022-09-16 PROCEDURE — 99215 PR OFFICE/OUTPT VISIT, EST, LEVL V, 40-54 MIN: ICD-10-PCS | Mod: S$GLB,,, | Performed by: PSYCHIATRY & NEUROLOGY

## 2022-09-16 PROCEDURE — 86780 TREPONEMA PALLIDUM: CPT | Performed by: PSYCHIATRY & NEUROLOGY

## 2022-09-16 PROCEDURE — 83921 ORGANIC ACID SINGLE QUANT: CPT | Performed by: PSYCHIATRY & NEUROLOGY

## 2022-09-16 PROCEDURE — 99499 UNLISTED E&M SERVICE: CPT | Mod: HCWC,S$GLB,, | Performed by: PSYCHIATRY & NEUROLOGY

## 2022-09-16 PROCEDURE — 3077F PR MOST RECENT SYSTOLIC BLOOD PRESSURE >= 140 MM HG: ICD-10-PCS | Mod: CPTII,S$GLB,, | Performed by: PSYCHIATRY & NEUROLOGY

## 2022-09-16 PROCEDURE — 84425 ASSAY OF VITAMIN B-1: CPT | Performed by: PSYCHIATRY & NEUROLOGY

## 2022-09-16 PROCEDURE — 82746 ASSAY OF FOLIC ACID SERUM: CPT | Performed by: PSYCHIATRY & NEUROLOGY

## 2022-09-16 PROCEDURE — 3044F HG A1C LEVEL LT 7.0%: CPT | Mod: CPTII,S$GLB,, | Performed by: PSYCHIATRY & NEUROLOGY

## 2022-09-16 PROCEDURE — 1126F AMNT PAIN NOTED NONE PRSNT: CPT | Mod: CPTII,S$GLB,, | Performed by: PSYCHIATRY & NEUROLOGY

## 2022-09-16 PROCEDURE — 3288F FALL RISK ASSESSMENT DOCD: CPT | Mod: CPTII,S$GLB,, | Performed by: PSYCHIATRY & NEUROLOGY

## 2022-09-16 PROCEDURE — 99999 PR PBB SHADOW E&M-EST. PATIENT-LVL IV: CPT | Mod: PBBFAC,,, | Performed by: PSYCHIATRY & NEUROLOGY

## 2022-09-16 PROCEDURE — 1101F PT FALLS ASSESS-DOCD LE1/YR: CPT | Mod: CPTII,S$GLB,, | Performed by: PSYCHIATRY & NEUROLOGY

## 2022-09-16 PROCEDURE — 3072F LOW RISK FOR RETINOPATHY: CPT | Mod: CPTII,S$GLB,, | Performed by: PSYCHIATRY & NEUROLOGY

## 2022-09-16 PROCEDURE — 3061F PR NEG MICROALBUMINURIA RESULT DOCUMENTED/REVIEW: ICD-10-PCS | Mod: CPTII,S$GLB,, | Performed by: PSYCHIATRY & NEUROLOGY

## 2022-09-16 PROCEDURE — 83735 ASSAY OF MAGNESIUM: CPT | Performed by: PSYCHIATRY & NEUROLOGY

## 2022-09-16 PROCEDURE — 80053 COMPREHEN METABOLIC PANEL: CPT | Performed by: PSYCHIATRY & NEUROLOGY

## 2022-09-16 PROCEDURE — 3077F SYST BP >= 140 MM HG: CPT | Mod: CPTII,S$GLB,, | Performed by: PSYCHIATRY & NEUROLOGY

## 2022-09-16 RX ORDER — INDOMETHACIN 50 MG/1
50 CAPSULE ORAL 2 TIMES DAILY PRN
Qty: 30 CAPSULE | Refills: 0 | Status: SHIPPED | OUTPATIENT
Start: 2022-09-16

## 2022-09-16 RX ORDER — POLYETHYLENE GLYCOL 3350 17 G/17G
17 POWDER, FOR SOLUTION ORAL DAILY
Qty: 14 EACH | Refills: 0 | Status: SHIPPED | OUTPATIENT
Start: 2022-09-16

## 2022-09-16 NOTE — PROGRESS NOTES
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 pronounced diffusion restriction which extends inferiorly along the corticospinal track and probable external capsule .  SWI/GRE: Several punctate susceptibility right thalamus.  Impression: : scattered subcortical microvascular disease likely embolic in nature with embolic strokes to the bilateral cerebellum, gillian, subcortical in regions with age indeterminate hemorrhage in the right thalamus.     Procedures:    Electrocardiogram on 11/3/2015  Formal interpretation:  Vent. Rate : 067 BPM     Atrial Rate : 067 BPM    P-R Int : 160 ms          QRS Dur : 078 ms     QT Int : 400 ms       P-R-T Axes : 046 020 031 degrees    QTc Int : 422 ms Normal sinus rhythm Normal ECG  Independently reviewed Electrocardiogram by Brodie Strickland MD. MPH. Behavioral Neurologist  Impression: : Received ECG has no evidence of sinus node disease. HR (>=50-60). Prolonged MO interval (>0.22 s). Broad QRS complex (> 0.12 s).     Clinical Summary:     Mr. Montoya is a 72-year-old right-handed male with a relevant past medical history of HTN, HLD, CAD, AA, who presents reporting a 10-year history of step-wise progressive neurocognitive impairment.       The clinical history is suggestive of:  Memory Impairment: STM encoding impairment,  LTM encoding-retrieval impairment  Attention Impairment: Attention, Selective attention, Sustained attention, Shifting attention  Executive Impairment: Energization, Working Memory  Language Impairment: Language Dysfunction  Motor/Coordination Impairment: Sensory motor integration, Motor weakness  Psychiatric Impairment: Social Coherence  iADL Impairment: Sofía Instrumental Activities of Daily Living Scale  The neurological examination is significant for:  Cerebellar Dysfunction: dysmetria UE, dysdiadochokinesia  Cortical Frontal Dysfunction: non-fluent aphasia (fluency)  Cortical Transcallosal Disconnection: interhemispheric motor control (interhemispheric motor control ), motor efference (motor overflow)  Executive Impairment: thought disorder  Motor Coordination: gait imbalance (tiptoes)  Motor Dysfunction: cranial nerve weakness (VII), UMN (tone, pronator drift)  Movement Disorder (Gait): strength (difficulty rising), abnormal features (abnormal posture, stance, speed), dorsiflexion weakness (heel walking), gait syndrome (spastic)  Movement Disorder (Speech): abnormal vocal features (articulation), AMR/Dysdiadochokinesia (consonants)  Pyramidal Impairment: abnormal reflexes  Sensory Dysfunction: peripheral (A? fibers)  Informal neuropsychology battery is positive (based on age and education) for:  Previous assessment demonstrated executive predominant multidomain major cognitive impairment  Patient declines additional testing today  BEHAV5+ 0/6: See ROS section for a full description  Neurological imaging  MRI brain/head without contrast (3/30/2012): scattered subcortical microvascular disease likely embolic in nature with embolic strokes to the bilateral cerebellum, gillian, subcortical in regions with age indeterminate hemorrhage in the right thalamus.        Assessment:        Mr. Montoya's clinical presentation is dysexecutive predominant major cognitive impairment (mild dementia) sufficient to impair  activities of daily living (CDR-SOB: 4 , [place]-Ulisses iADL: 8/8 - Prodromal Dementia).     Mr. Montoya's clinical presentation meets the criteria for Dementia (Milagro, et al. 2011 Alzheimer's & Dementia).     Concern regarding an intraindividual change in cognition diagnosed through a combination of history and objective cognitive assessment  Impairment in two or more cognitive domains  Interference with independence in functional abilities.  Represents a decline from previous levels of functioning.  Not explained by delirium or major psychiatric disorder     Mr. Pichardos clinical syndrome is best described as Vascular Dementia (VaD) (Casper et al., 1993).  Evidence of dementia.  Evidence of significant and/pr strategic cerebrovascular disease  The onset of dementia within 3 months following a recognized stroke o abrupt deterioration in cognitive functions  Fluctuating, stepwise progression of cognitive deficits.  The early presence of gait disturbance (small-step gait or marche a petits pas, or magnetic, apraxic-ataxic or parkinsonian gait), unsteadiness, and frequent, unprovoked falls.  Early urinary frequency, urgency, and other urinary symptoms not explained by urologic disease  Pseudobulbar palsy  Personality and mood changes, abulia, depression, emotional incontinence, or other subcortical deficits including psychomotor retardation and abnormal executive function.      The pathology underlying Mr. Krishna neurocognitive impairment is most likely primarily due to Vascular Contributions to Cognitive Impairment and Dementia.     The observations made above, were discussed with the patient and his family. Patient presents with the history consistent with vascular dementia previously evaluated by neuropsychology in 2020. Previous neurocognitive assessment indicated sufficient cognitive impairment for the diagnosis of vascular dementia. On presentation today the patient declines any additional diagnostic testing nor  symptomatic medications. We reviewed patient's medications and adjusted. The patient has requested refills on indomethacin and MiraLax. We will place referral to [facility] for additional care management. his family has requested referral to neurologist closer to home in [place]. We have placed referrals. Recommend screening for reversible cause of cognitive impairment. We have placed referrals to Physical therapy/ aqua therapy. We have referred the patient to the [facility] social work team for additional care management support.        Care Management Plan:     #Neurocognitive Disorder Treatment:  Patient is not interested in symptomatic medications for cognitive impairment donepezil  No indication for donepezil/Namenda at this time  Referral to [facility]  Referral to PT aqua-therapy  Recommend urology switch Oxybutrin to Mebetriq  Have given family information regarding senior centers and day programs  Recommend MIND diet  #Microvascular Disease Management:  Patient is taking TWO different statins - stop atorvastatin   Continue Rosuvastatin 40mg  Continue aspirin 81 mg  #Insomnia Treatment:  We have made referrals to Sleep medicine for SXS suggest of BRENNON  #Behavioral/Environmental Treatment  We recommend engaging in activities that stimulate cognitively and socially while avoiding excessive stimulation and fatigue in overwhelmingly complex situations.  We recommend integrating routine and schedule into your daily life. https://www.alzheimersproject.org/news/the-importance-of-routine-and-familiarity-to-persons-with-dementia/  #Health Maintenance/Lifestyle Advice  We have discussed the value in aggressively controlling vascular risk factors like hypertension, hyperlipidemia, and Diabetes SBP<130, LDL<100, A1C<7.0.  We discussed the need to optimize lifestyle choices including a heart-healthy diet (e.g., Mediterranean or DASH), increased cardiovascular exercise (goal 150 minutes of  "moderate-intensity per week), and stay cognitively and socially active.  #Support  We all need support sometimes. Get easy access to local resources, community programs, and services. https://www.communityresourcefinder.org/  Learn more about Cognitive Impairment in [place]: https://www.alz.org/professionals/public-health/state-overview/[place]  #Safety  [place] has no laws against driving with dementia specifically but obviously has laws about medical conditions which impact a person's ability to drive safely. If you believe your loved one's driving capacity has diminished, please reach out to either your primary care physician or our office to discuss driving restrictions or revocation of their license. To learn more: https://www.dementiacarecentral.com/caregiverinfo/driving-problems/  The Alzheimer's Association administers the nationwide "Safe Return" program with identification bracelets, necklaces, or clothing tags and 24-hour assistance. More information is available online at https://www.alz.org/help-support/caregiving/safety/medicalert-with-24-7-wandering-support  #Follow up:  Follow-up as needed.    Thank you for allowing us to participate in the care of your patient. Please do not hesitate to contact us with any questions or concerns.     It was a pleasure seeing Mr. Montoya and we look forward to seeing them at their follow-up visit.     This note is dictated on M*Modal Fluency Direct word recognition program. There are word recognition mistakes that are occasionally missed on review.      Scheduled Follow-up :  Future Appointments   Date Time Provider Department Center   9/29/2022  2:00 PM Rubia Teresa CCC-CHERYL [facility] [place] [facility]   9/29/2022  2:40 PM Belén Sheldon MD [facility] [place] [facility]   11/18/2022  2:15 PM Garland Thompson DPM [facility] PODIAT [place] [facility]   11/30/2022  8:45 AM [place], VISUAL FIELDS [facility] [place]   11/30/2022  9:40 AM Lalo Fernandez OD [facility] OPTOMTY [place] "       After Visit Medication List :     Medication List            Accurate as of September 16, 2022 11:27 AM. If you have any questions, ask your nurse or doctor.                CHANGE how you take these medications      indomethacin 50 MG capsule  Commonly known as: INDOCIN  Take 1 capsule (50 mg total) by mouth 2 (two) times daily as needed (pain).  What changed: reasons to take this  Changed by: Brodie Hoover MD            CONTINUE taking these medications      allopurinoL 100 MG tablet  Commonly known as: ZYLOPRIM     amLODIPine 10 MG tablet  Commonly known as: NORVASC  Take 1 tablet (10 mg total) by mouth once daily.     aspirin 325 MG tablet  Take 1 tablet (325 mg total) by mouth once daily.     clotrimazole-betamethasone 1-0.05% cream  Commonly known as: LOTRISONE     lisinopriL 40 MG tablet  Commonly known as: PRINIVIL,ZESTRIL  Take 1 tablet (40 mg total) by mouth once daily.     metFORMIN 1000 MG tablet  Commonly known as: GLUCOPHAGE     polyethylene glycol 17 gram Pwpk  Commonly known as: GLYCOLAX  Take 17 g by mouth once daily.     SUTAB 1.479-0.188- 0.225 gram tablet  Generic drug: sod sulf-pot chloride-mag sulf  Take 12 tablets by mouth once daily. Take according to package instructions with indicated amount of water.     triamcinolone acetonide 0.1% 0.1 % ointment  Commonly known as: KENALOG  Apply topically once daily. For eczema     VITAMIN D2 50,000 unit Cap  Generic drug: ergocalciferol               Where to Get Your Medications        [address] [facility] [address] [place] [address]      Phone: [phone]   indomethacin 50 MG capsule       You can get these medications from any pharmacy    Bring a paper prescription for each of these medications  polyethylene glycol 17 gram Pwpk         Signing Physician:  Brodie Hoover MD    Billing:    -----------------------------------------------------------------------------    I  spent a total of 60 minutes (time-in: 10:45 AM; time-out: 11:45 AM) on 09/16/2022, in-person face-to-face with the patient and caregiver(s), >50% of that time was spent counseling regarding the symptoms, treatment plan, risks, therapeutic options, lifestyle modifications, and/or safety issues for the diagnoses above.    10/14 Review of Systems completed and is negative except as stated above in HPI (Systems reviewed: Const, Eyes, ENT, Resp, CV, GI, , MSK, Skin, Neuro)    I reviewed previous labs for a total of 5 minutes on 09/16/2022. This is directly related to the face-to-face encounter. Review of previous labs was performed all negative except as stated above in HPI    I reviewed previous diagnostic testing for a total of 5 minutes on 09/16/2022. This is directly related to the face-to-face encounter. A review of previous diagnostic testing was performed was noted to be within normal limits except as is stated above in HPI    I performed a neurobehavioral status examination that included a clinical assessment of thinking, reasoning, and judgment. Please see above HPI and ROS for full details. This exam was performed on 09/16/2022 and included 15 minutes spent on direct face-to-face clinical observation and interview with the patient and 16 minutes spent interpreting test results and preparing the report. The total time of 31 minutes spent on the neurobehavioral status examination is not included in the time spent on evaluation and management coding.    Total Billing time spent on encounter/documentation for this patient's evaluation and management, not including the neurobehavioral status examination: 55 minutes.

## 2022-09-19 LAB
TREPONEMA PALLIDUM IGG+IGM AB [PRESENCE] IN SERUM OR PLASMA BY IMMUNOASSAY: NONREACTIVE
VIT B12 SERPL-MCNC: 610 NG/L (ref 180–914)

## 2022-09-20 LAB — METHYLMALONATE SERPL-SCNC: 0.16 UMOL/L

## 2022-09-21 LAB — VIT B1 BLD-MCNC: 92 UG/L (ref 38–122)

## 2022-09-22 LAB
CHOLEST SERPL-MCNC: 134 MG/DL
HDL SERPL QN: 8.9 NM
HDL SERPL-SCNC: 30.7 UMOL/L
HDLC SERPL-MCNC: 47 MG/DL (ref 40–59)
HLD.LARGE SERPL-SCNC: 4.1 UMOL/L
LDL SERPL QN: 20.5 NM
LDL SERPL-SCNC: 944 NMOL/L
LDL SMALL SERPL-SCNC: 640 NMOL/L
LDLC SERPL CALC-MCNC: 68 MG/DL
MAYO MISCELLANEOUS RESULT (REF): NORMAL
PATHOLOGY STUDY: ABNORMAL
TRIGL SERPL-MCNC: 95 MG/DL (ref 30–149)
VLDL LARGE SERPL-SCNC: 2 NMOL/L
VLDL SERPL QN: 48.3 NM

## 2022-09-28 ENCOUNTER — TELEPHONE (OUTPATIENT)
Dept: NEUROLOGY | Facility: CLINIC | Age: 72
End: 2022-09-28
Payer: MEDICARE

## 2022-09-28 NOTE — TELEPHONE ENCOUNTER
----- Message from Marilee Hawkins MA sent at 9/28/2022  2:53 PM CDT -----  Contact: [phone]    ----- Message -----  From: Haroldo Huffman  Sent: 9/28/2022  11:27 AM CDT  To: Sneha VALENZUELA Staff    Pt is calling to get orders put in. For a wheel chair  Pt would like a call back to let them know the order is in.  Pt can be reached at [phone]

## 2022-09-29 ENCOUNTER — CLINICAL SUPPORT (OUTPATIENT)
Dept: OTOLARYNGOLOGY | Facility: CLINIC | Age: 72
End: 2022-09-29
Payer: MEDICARE

## 2022-09-29 ENCOUNTER — OFFICE VISIT (OUTPATIENT)
Dept: OTOLARYNGOLOGY | Facility: CLINIC | Age: 72
End: 2022-09-29
Payer: MEDICARE

## 2022-09-29 VITALS
SYSTOLIC BLOOD PRESSURE: 140 MMHG | HEART RATE: 66 BPM | BODY MASS INDEX: 25.14 KG/M2 | DIASTOLIC BLOOD PRESSURE: 87 MMHG | WEIGHT: 179.56 LBS | HEIGHT: 71 IN

## 2022-09-29 DIAGNOSIS — H90.3 SENSORINEURAL HEARING LOSS (SNHL) OF BOTH EARS: Primary | Chronic | ICD-10-CM

## 2022-09-29 DIAGNOSIS — H90.3 SENSORINEURAL HEARING LOSS, BILATERAL: Primary | ICD-10-CM

## 2022-09-29 DIAGNOSIS — H91.93 BILATERAL HEARING LOSS, UNSPECIFIED HEARING LOSS TYPE: ICD-10-CM

## 2022-09-29 DIAGNOSIS — H61.21 IMPACTED CERUMEN OF RIGHT EAR: Chronic | ICD-10-CM

## 2022-09-29 DIAGNOSIS — H60.63 CHRONIC OTITIS EXTERNA OF BOTH EARS, UNSPECIFIED TYPE: ICD-10-CM

## 2022-09-29 PROCEDURE — 1159F MED LIST DOCD IN RCRD: CPT | Mod: CPTII,S$GLB,, | Performed by: OTOLARYNGOLOGY

## 2022-09-29 PROCEDURE — 1126F PR PAIN SEVERITY QUANTIFIED, NO PAIN PRESENT: ICD-10-PCS | Mod: CPTII,S$GLB,, | Performed by: OTOLARYNGOLOGY

## 2022-09-29 PROCEDURE — 3077F PR MOST RECENT SYSTOLIC BLOOD PRESSURE >= 140 MM HG: ICD-10-PCS | Mod: CPTII,S$GLB,, | Performed by: OTOLARYNGOLOGY

## 2022-09-29 PROCEDURE — 4010F ACE/ARB THERAPY RXD/TAKEN: CPT | Mod: CPTII,S$GLB,, | Performed by: OTOLARYNGOLOGY

## 2022-09-29 PROCEDURE — 3044F PR MOST RECENT HEMOGLOBIN A1C LEVEL <7.0%: ICD-10-PCS | Mod: CPTII,S$GLB,, | Performed by: OTOLARYNGOLOGY

## 2022-09-29 PROCEDURE — 3008F PR BODY MASS INDEX (BMI) DOCUMENTED: ICD-10-PCS | Mod: CPTII,S$GLB,, | Performed by: OTOLARYNGOLOGY

## 2022-09-29 PROCEDURE — 69210 REMOVE IMPACTED EAR WAX UNI: CPT | Mod: S$GLB,,, | Performed by: OTOLARYNGOLOGY

## 2022-09-29 PROCEDURE — 3061F PR NEG MICROALBUMINURIA RESULT DOCUMENTED/REVIEW: ICD-10-PCS | Mod: CPTII,S$GLB,, | Performed by: OTOLARYNGOLOGY

## 2022-09-29 PROCEDURE — 3079F PR MOST RECENT DIASTOLIC BLOOD PRESSURE 80-89 MM HG: ICD-10-PCS | Mod: CPTII,S$GLB,, | Performed by: OTOLARYNGOLOGY

## 2022-09-29 PROCEDURE — 1101F PR PT FALLS ASSESS DOC 0-1 FALLS W/OUT INJ PAST YR: ICD-10-PCS | Mod: CPTII,S$GLB,, | Performed by: OTOLARYNGOLOGY

## 2022-09-29 PROCEDURE — 99204 OFFICE O/P NEW MOD 45 MIN: CPT | Mod: 25,S$GLB,, | Performed by: OTOLARYNGOLOGY

## 2022-09-29 PROCEDURE — 3072F LOW RISK FOR RETINOPATHY: CPT | Mod: CPTII,S$GLB,, | Performed by: OTOLARYNGOLOGY

## 2022-09-29 PROCEDURE — 92567 TYMPANOMETRY: CPT | Mod: S$GLB,,, | Performed by: PHYSICIAN ASSISTANT

## 2022-09-29 PROCEDURE — 1126F AMNT PAIN NOTED NONE PRSNT: CPT | Mod: CPTII,S$GLB,, | Performed by: OTOLARYNGOLOGY

## 2022-09-29 PROCEDURE — 3077F SYST BP >= 140 MM HG: CPT | Mod: CPTII,S$GLB,, | Performed by: OTOLARYNGOLOGY

## 2022-09-29 PROCEDURE — 3079F DIAST BP 80-89 MM HG: CPT | Mod: CPTII,S$GLB,, | Performed by: OTOLARYNGOLOGY

## 2022-09-29 PROCEDURE — 92567 PR TYMPA2METRY: ICD-10-PCS | Mod: S$GLB,,, | Performed by: PHYSICIAN ASSISTANT

## 2022-09-29 PROCEDURE — 3008F BODY MASS INDEX DOCD: CPT | Mod: CPTII,S$GLB,, | Performed by: OTOLARYNGOLOGY

## 2022-09-29 PROCEDURE — 4010F PR ACE/ARB THEARPY RXD/TAKEN: ICD-10-PCS | Mod: CPTII,S$GLB,, | Performed by: OTOLARYNGOLOGY

## 2022-09-29 PROCEDURE — 3061F NEG MICROALBUMINURIA REV: CPT | Mod: CPTII,S$GLB,, | Performed by: OTOLARYNGOLOGY

## 2022-09-29 PROCEDURE — 3066F NEPHROPATHY DOC TX: CPT | Mod: CPTII,S$GLB,, | Performed by: OTOLARYNGOLOGY

## 2022-09-29 PROCEDURE — 99999 PR PBB SHADOW E&M-EST. PATIENT-LVL I: ICD-10-PCS | Mod: PBBFAC,,, | Performed by: PHYSICIAN ASSISTANT

## 2022-09-29 PROCEDURE — 3288F FALL RISK ASSESSMENT DOCD: CPT | Mod: CPTII,S$GLB,, | Performed by: OTOLARYNGOLOGY

## 2022-09-29 PROCEDURE — 99999 PR PBB SHADOW E&M-EST. PATIENT-LVL III: ICD-10-PCS | Mod: PBBFAC,,, | Performed by: OTOLARYNGOLOGY

## 2022-09-29 PROCEDURE — 3072F PR LOW RISK FOR RETINOPATHY: ICD-10-PCS | Mod: CPTII,S$GLB,, | Performed by: OTOLARYNGOLOGY

## 2022-09-29 PROCEDURE — 99999 PR PBB SHADOW E&M-EST. PATIENT-LVL I: CPT | Mod: PBBFAC,,, | Performed by: PHYSICIAN ASSISTANT

## 2022-09-29 PROCEDURE — 3066F PR DOCUMENTATION OF TREATMENT FOR NEPHROPATHY: ICD-10-PCS | Mod: CPTII,S$GLB,, | Performed by: OTOLARYNGOLOGY

## 2022-09-29 PROCEDURE — 3288F PR FALLS RISK ASSESSMENT DOCUMENTED: ICD-10-PCS | Mod: CPTII,S$GLB,, | Performed by: OTOLARYNGOLOGY

## 2022-09-29 PROCEDURE — 99999 PR PBB SHADOW E&M-EST. PATIENT-LVL III: CPT | Mod: PBBFAC,,, | Performed by: OTOLARYNGOLOGY

## 2022-09-29 PROCEDURE — 92557 COMPREHENSIVE HEARING TEST: CPT | Mod: S$GLB,,, | Performed by: PHYSICIAN ASSISTANT

## 2022-09-29 PROCEDURE — 69210 EAR CERUMEN REMOVAL: ICD-10-PCS | Mod: S$GLB,,, | Performed by: OTOLARYNGOLOGY

## 2022-09-29 PROCEDURE — 92557 PR COMPREHENSIVE HEARING TEST: ICD-10-PCS | Mod: S$GLB,,, | Performed by: PHYSICIAN ASSISTANT

## 2022-09-29 PROCEDURE — 3044F HG A1C LEVEL LT 7.0%: CPT | Mod: CPTII,S$GLB,, | Performed by: OTOLARYNGOLOGY

## 2022-09-29 PROCEDURE — 1101F PT FALLS ASSESS-DOCD LE1/YR: CPT | Mod: CPTII,S$GLB,, | Performed by: OTOLARYNGOLOGY

## 2022-09-29 PROCEDURE — 1159F PR MEDICATION LIST DOCUMENTED IN MEDICAL RECORD: ICD-10-PCS | Mod: CPTII,S$GLB,, | Performed by: OTOLARYNGOLOGY

## 2022-09-29 PROCEDURE — 99204 PR OFFICE/OUTPT VISIT, NEW, LEVL IV, 45-59 MIN: ICD-10-PCS | Mod: 25,S$GLB,, | Performed by: OTOLARYNGOLOGY

## 2022-09-29 NOTE — PROCEDURES
Ear Cerumen Removal    Date/Time: 9/29/2022 2:40 PM  Performed by: Belén Sheldon MD  Authorized by: Belén Sheldon MD     Consent Done?:  Yes (Verbal)    Local anesthetic:  None  Location details:  Right ear  Procedure type: curette    Cerumen  Removal Results:  Cerumen completely removed  Patient tolerance:  Patient tolerated the procedure well with no immediate complications     Dry skin and skin irritation bilateral ear canals, consistent with chronic otitis externa.    Belén Sheldon MD  [facility]

## 2022-09-29 NOTE — PROGRESS NOTES
Chief Complaint   Patient presents with    Hearing Loss     Hearing screening; experienced bleeding out of right ear a few days ago        HPI: Patient is a very pleasant 72 y.o. male here to see me today for the first time for evaluation of hearing loss.  He reports hearing loss that has been gradually progressing over the last few year(s).  He has not noted any difference in hearing between the ears, with both ears being the better hearing ear.  He has noted any tinnitus in both ears.  He has not had any recent issues with ear pain or ear drainage.  He denies a family history of hearing loss, and has not had any previous otologic surgery.  He has a history of significant loud noise exposure.He denies issues with dizziness.  Patient had cerumen removal on the right by his PCP recently.                  Past Medical History:   Diagnosis Date    Cataract     Coronary artery disease 2001    ACS    CVA (cerebral infarction)     Diabetes mellitus type II 2001    Glaucoma suspect     Hyperlipidemia 2001    Hypertension     Myocardial infarction 2002    Stroke 3/2012    [facility]  [place]     Social History     Socioeconomic History    Marital status:    Tobacco Use    Smoking status: Every Day     Packs/day: 1.00     Years: 40.00     Pack years: 40.00     Types: Cigarettes    Smokeless tobacco: Never   Substance and Sexual Activity    Alcohol use: No    Drug use: No    Sexual activity: Not Currently     Partners: Female     Social Determinants of Health     Financial Resource Strain: Low Risk     Difficulty of Paying Living Expenses: Not hard at all   Food Insecurity: No Food Insecurity    Worried About Running Out of Food in the Last Year: Never true    Ran Out of Food in the Last Year: Never true   Transportation Needs: No Transportation Needs    Lack of Transportation (Medical): No    Lack of Transportation (Non-Medical): No   Physical Activity: Inactive    Days of Exercise per Week: 0 days    Minutes of  Exercise per Session: 0 min   Stress: No Stress Concern Present    Feeling of Stress : Not at all   Social Connections: Socially Isolated    Frequency of Communication with Friends and Family: More than three times a week    Frequency of Social Gatherings with Friends and Family: More than three times a week    Attends Jehovah's witness Services: Never    Active Member of Clubs or Organizations: No    Attends Club or Organization Meetings: Never    Marital Status:    Housing Stability: Low Risk     Unable to Pay for Housing in the Last Year: No    Number of Places Lived in the Last Year: 1    Unstable Housing in the Last Year: No     Past Surgical History:   Procedure Laterality Date    COLONOSCOPY N/A 4/20/2017    Procedure: COLONOSCOPY;  Surgeon: Armando Hidalgo MD;  Location: [facility] ENDO;  Service: Endoscopy;  Laterality: N/A;    COLONOSCOPY N/A 8/18/2022    Procedure: COLONOSCOPY;  Surgeon: Armando Hidalgo MD;  Location: [facility] ENDO;  Service: Endoscopy;  Laterality: N/A;     Family History   Problem Relation Age of Onset    Heart disease Mother     Cancer Father         prostate CA    Diabetes Sister     Cancer Sister     Depression Brother     Diabetes Brother            Review of Systems  General: negative for chills, fever or weight loss  Psychological: negative for mood changes or depression  Ophthalmic: negative for blurry vision, photophobia or eye pain  ENT: see HPI  Respiratory: no cough, shortness of breath, or wheezing  Cardiovascular: no chest pain or dyspnea on exertion  Gastrointestinal: no abdominal pain, change in bowel habits, or black/ bloody stools  Musculoskeletal: negative for gait disturbance or muscular weakness  Neurological: no syncope or seizures; no ataxia  Dermatological: negative for pruritis,  rash and jaundice  Hematologic/lymphatic: no easy bruising, no new adenopathy      Physical Exam:    Vitals:    09/29/22 1440   BP: (!) 140/87   Pulse: 66         Constitutional:   He is  oriented to person, place, and time. Vital signs are normal. He appears well-developed and well-nourished. He appears alert. He is cooperative.  Non-toxic appearance. Normal speech.      Head:  Normocephalic and atraumatic. Salivary glands normal.  Facial strength is normal.      Ears:  Hearing normal to normal and whispered voice; external ear normal without scars, lesions, or masses; ear canal, tympanic membrane, and middle ear normal., right ear hearing normal to normal and whispered voice; external ear normal without scars, lesions, or masses; ear canal, tympanic membrane, and middle ear normal. and left ear hearing normal to normal and whispered voice; external ear normal without scars, lesions, or masses; ear canal, tympanic membrane, and middle ear normal..   Right Ear: Tympanic membrane is not perforated, not erythematous and not retracted. No middle ear effusion.   Left Ear: Tympanic membrane is not perforated, not erythematous and not retracted.  No middle ear effusion.   Cerumen impaction right EAC.      Nose:  Mucosal edema present. No rhinorrhea, septal deviation, nasal septal hematoma or polyps. No epistaxis. No turbinate masses and no turbinate hypertrophy (2+ size).  Right sinus exhibits no maxillary sinus tenderness and no frontal sinus tenderness. Left sinus exhibits no maxillary sinus tenderness and no frontal sinus tenderness.     Mouth/Throat  Oropharynx clear and moist without lesions or asymmetry, normal uvula midline, lips, teeth, and gums normal and oropharynx normal. Normal dentition. No uvula swelling or oral lesions. No oropharyngeal exudate, posterior oropharyngeal edema or posterior oropharyngeal erythema. +2.  Tonsillar erythema, tonsillar hyperemia, tonsillar exudate.  Mirror exam not performed due to patient tolerance.  Mirror exam not performed due to patient tolerance.      Neck:  Neck normal without thyromegaly masses, asymmetry, normal tracheal structure, crepitus, and  tenderness, thyroid normal, trachea normal, full range of motion with neck supple and no adenopathy. No JVD present. Carotid bruit is not present. Thyroid tenderness is present. No edema and no erythema present. No thyroid mass and no thyromegaly present.     He has no cervical adenopathy.     Cardiovascular:    Normal rate, regular rhythm, normal heart sounds and rate and rhythm, heart sounds, and pulses normal.              Pulmonary/Chest:   Effort and breath sounds normal.     Psychiatric:   He has a normal mood and affect. His speech is normal and behavior is normal.     Neurological:   He is alert and oriented to person, place, and time. He has neurological normal, alert and oriented. No cranial nerve deficit.     Skin:   No abrasions, lacerations, lesions, or rashes.     See separate Procedure note for cerumen impaction removal on the right.    Audiogram: Interpreted by me and reviewed with the patient today.  Bilatearl SNHL, symmetric.  Tymps WNL bilaterally.           Assessment:    ICD-10-CM ICD-9-CM    1. Sensorineural hearing loss (SNHL) of both ears  H90.3 389.18 Ambulatory referral/consult to ENT      2. Impacted cerumen of right ear  H61.21 380.4       3. Chronic otitis externa of both ears, unspecified type  H60.63 380.23         The primary encounter diagnosis was Sensorineural hearing loss (SNHL) of both ears. Diagnoses of Impacted cerumen of right ear and Chronic otitis externa of both ears, unspecified type were also pertinent to this visit.      Plan:    I would recommend the use of a wax softening drop, either over the counter Debrox, baby oil, or mineral oil, on a weekly basis.  I also instructed the patient to avoid Qtips.    Audiogram was reviewed in detail with the patient, and they understand their hearing loss.    If and when they are a candidate for hearing aids, they were given information on how to contact the audiologist for this appointment.  I recommend annual audiograms as well as  hearing protection in noise.      Belén Sheldon MD

## 2022-09-30 NOTE — PROGRESS NOTES
Ambrosio Montoya, a 72 y.o. male, was seen today in the clinic for an audiologic evaluation.  Patients main complaint was hearing loss.      Audiogram results revealed a mild to moderate sensorineural hearing loss bilaterally.  Speech reception thresholds were noted at 30 dB in the right ear and 25 dB in the left ear.  Speech discrimination scores were 80% in the right ear and 76% in the left ear.  Tympanometry revealed Type A in the right ear and Type A in the left ear.     Recommendations:  Otologic evaluation  Annual audiogram  Hearing protection when in noise  Hearing aid consultation

## 2022-10-21 ENCOUNTER — TELEPHONE (OUTPATIENT)
Dept: NEUROLOGY | Facility: CLINIC | Age: 72
End: 2022-10-21
Payer: MEDICARE

## 2022-10-21 NOTE — TELEPHONE ENCOUNTER
Contacted pt, spoke with Hal, notified her of normal lab results and no change in treatment. Set up MRI in [place]

## 2022-10-27 ENCOUNTER — TELEPHONE (OUTPATIENT)
Dept: NEUROLOGY | Facility: CLINIC | Age: 72
End: 2022-10-27
Payer: MEDICARE

## 2022-10-27 ENCOUNTER — HOSPITAL ENCOUNTER (OUTPATIENT)
Dept: RADIOLOGY | Facility: HOSPITAL | Age: 72
Discharge: HOME | End: 2022-10-27
Attending: PSYCHIATRY & NEUROLOGY
Payer: MEDICARE

## 2022-10-27 DIAGNOSIS — F01.50 VASCULAR DEMENTIA WITHOUT BEHAVIORAL DISTURBANCE: ICD-10-CM

## 2022-10-27 PROCEDURE — 70551 MRI BRAIN STEM W/O DYE: CPT | Mod: 26,,, | Performed by: RADIOLOGY

## 2022-10-27 PROCEDURE — 70551 MRI BRAIN STEM W/O DYE: CPT | Mod: TC

## 2022-10-27 PROCEDURE — 70551 MRI BRAIN WITHOUT CONTRAST: ICD-10-PCS | Mod: 26,,, | Performed by: RADIOLOGY

## 2022-10-27 NOTE — TELEPHONE ENCOUNTER
----- Message from Brodie Hoover MD sent at 10/27/2022  4:44 PM CDT -----  Hi,  Please schedule the patient for a video follow-up to discuss test results and care management.  Brodie Valles

## 2022-11-01 ENCOUNTER — TELEPHONE (OUTPATIENT)
Dept: NEUROLOGY | Facility: CLINIC | Age: 72
End: 2022-11-01
Payer: MEDICARE

## 2022-11-01 NOTE — TELEPHONE ENCOUNTER
----- Message from Maty Dunne sent at 11/1/2022 11:06 AM CDT -----  Regarding: appt  Contact: Dimas @ [phone]  Caller returning missed call from Dr. Hoover's office regarding an appt. Please call.

## 2022-11-08 ENCOUNTER — OFFICE VISIT (OUTPATIENT)
Dept: NEUROLOGY | Facility: CLINIC | Age: 72
End: 2022-11-08
Payer: MEDICARE

## 2022-11-08 ENCOUNTER — TELEPHONE (OUTPATIENT)
Dept: NEUROLOGY | Facility: CLINIC | Age: 72
End: 2022-11-08
Payer: MEDICARE

## 2022-11-08 DIAGNOSIS — I69.319 COGNITIVE DEFICIT AS LATE EFFECT OF CEREBROVASCULAR ACCIDENT (CVA): ICD-10-CM

## 2022-11-08 DIAGNOSIS — E78.5 HYPERLIPIDEMIA, UNSPECIFIED HYPERLIPIDEMIA TYPE: ICD-10-CM

## 2022-11-08 DIAGNOSIS — R26.81 GAIT INSTABILITY: ICD-10-CM

## 2022-11-08 DIAGNOSIS — F01.A18 MILD VASCULAR DEMENTIA WITH OTHER BEHAVIORAL DISTURBANCE: Primary | ICD-10-CM

## 2022-11-08 DIAGNOSIS — I10 HYPERTENSION, UNSPECIFIED TYPE: ICD-10-CM

## 2022-11-08 PROCEDURE — 3061F PR NEG MICROALBUMINURIA RESULT DOCUMENTED/REVIEW: ICD-10-PCS | Mod: CPTII,95,, | Performed by: PSYCHIATRY & NEUROLOGY

## 2022-11-08 PROCEDURE — 3072F LOW RISK FOR RETINOPATHY: CPT | Mod: CPTII,95,, | Performed by: PSYCHIATRY & NEUROLOGY

## 2022-11-08 PROCEDURE — 1159F MED LIST DOCD IN RCRD: CPT | Mod: CPTII,95,, | Performed by: PSYCHIATRY & NEUROLOGY

## 2022-11-08 PROCEDURE — 1160F RVW MEDS BY RX/DR IN RCRD: CPT | Mod: CPTII,95,, | Performed by: PSYCHIATRY & NEUROLOGY

## 2022-11-08 PROCEDURE — 1160F PR REVIEW ALL MEDS BY PRESCRIBER/CLIN PHARMACIST DOCUMENTED: ICD-10-PCS | Mod: CPTII,95,, | Performed by: PSYCHIATRY & NEUROLOGY

## 2022-11-08 PROCEDURE — 99443 PR PHYSICIAN TELEPHONE EVALUATION 21-30 MIN: CPT | Mod: 95,,, | Performed by: PSYCHIATRY & NEUROLOGY

## 2022-11-08 PROCEDURE — 99443 PR PHYSICIAN TELEPHONE EVALUATION 21-30 MIN: ICD-10-PCS | Mod: 95,,, | Performed by: PSYCHIATRY & NEUROLOGY

## 2022-11-08 PROCEDURE — 3066F PR DOCUMENTATION OF TREATMENT FOR NEPHROPATHY: ICD-10-PCS | Mod: CPTII,95,, | Performed by: PSYCHIATRY & NEUROLOGY

## 2022-11-08 PROCEDURE — 3066F NEPHROPATHY DOC TX: CPT | Mod: CPTII,95,, | Performed by: PSYCHIATRY & NEUROLOGY

## 2022-11-08 PROCEDURE — 1159F PR MEDICATION LIST DOCUMENTED IN MEDICAL RECORD: ICD-10-PCS | Mod: CPTII,95,, | Performed by: PSYCHIATRY & NEUROLOGY

## 2022-11-08 PROCEDURE — 3044F HG A1C LEVEL LT 7.0%: CPT | Mod: CPTII,95,, | Performed by: PSYCHIATRY & NEUROLOGY

## 2022-11-08 PROCEDURE — 4010F ACE/ARB THERAPY RXD/TAKEN: CPT | Mod: CPTII,95,, | Performed by: PSYCHIATRY & NEUROLOGY

## 2022-11-08 PROCEDURE — 3072F PR LOW RISK FOR RETINOPATHY: ICD-10-PCS | Mod: CPTII,95,, | Performed by: PSYCHIATRY & NEUROLOGY

## 2022-11-08 PROCEDURE — 4010F PR ACE/ARB THEARPY RXD/TAKEN: ICD-10-PCS | Mod: CPTII,95,, | Performed by: PSYCHIATRY & NEUROLOGY

## 2022-11-08 PROCEDURE — 3061F NEG MICROALBUMINURIA REV: CPT | Mod: CPTII,95,, | Performed by: PSYCHIATRY & NEUROLOGY

## 2022-11-08 PROCEDURE — 3044F PR MOST RECENT HEMOGLOBIN A1C LEVEL <7.0%: ICD-10-PCS | Mod: CPTII,95,, | Performed by: PSYCHIATRY & NEUROLOGY

## 2022-11-08 NOTE — PROGRESS NOTES
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 impairment.       The clinical history is suggestive of:  Memory Impairment: STM encoding impairment, LTM encoding-retrieval impairment  Attention Impairment: Attention, Selective attention, Sustained attention, Shifting attention  Executive Impairment: Energization, Working Memory  Language Impairment: Language Dysfunction  Motor/Coordination Impairment: Sensory motor integration, Motor weakness  Psychiatric Impairment: Social Coherence  iADL Impairment: Sofía Instrumental Activities of Daily Living Scale  The neurological examination is significant for:  Cerebellar Dysfunction: dysmetria UE, dysdiadochokinesia  Cortical Frontal Dysfunction: non-fluent aphasia (fluency)  Cortical Transcallosal Disconnection: interhemispheric motor control (interhemispheric motor control ), motor efference (motor overflow)  Executive Impairment: thought disorder  Motor Coordination: gait imbalance (tiptoes)  Motor Dysfunction: cranial nerve weakness (VII), UMN (tone, pronator drift)  Movement Disorder (Gait): strength (difficulty rising), abnormal features (abnormal posture, stance, speed), dorsiflexion weakness (heel walking), gait syndrome  (spastic)  Movement Disorder (Speech): abnormal vocal features (articulation), AMR/Dysdiadochokinesia (consonants)  Pyramidal Impairment: abnormal reflexes  Sensory Dysfunction: peripheral (A? fibers)  The neurocognitive battery is significant (based on age and education) for:  Previous assessment demonstrated executive predominant multidomain major cognitive impairment - Patient declines additional testing today  BEHAV5+ 1/6: See ROS section for a full description  The neurologically relevant imaging is significant for  MRI brain/head without contrast (10/27/2022): Mild-to-moderate degree of subcortical microvascular disease with scattered subcortical strokes in the left greater than right basal ganglia and bilateral cerebellar areas with regional atrophy patterns but no significant cortical atrophy or degenerative pattern on scan.  MRI brain/head without contrast (3/30/2012): scattered subcortical microvascular disease likely embolic in nature with embolic strokes to the bilateral cerebellum, gillian, subcortical in regions with age indeterminate hemorrhage in the right thalamus.        Assessment:        Mr. Pichardos clinical presentation is dysexecutive predominant major cognitive impairment (mild dementia) sufficient to impair activities of daily living (CDR-SOB: 4 , Sofía-Ulisses iADL: 8/8 - Prodromal Dementia).     Mr. Pichardos clinical presentation meets the criteria for Dementia (McEnriqueann, et al. 2011 Alzheimer's & Dementia).     Concern regarding an intraindividual change in cognition diagnosed through a combination of history and objective cognitive assessment  Impairment in two or more cognitive domains  Interference with independence in functional abilities.  Represents a decline from previous levels of functioning.  Not explained by delirium or major psychiatric disorder           Mr. Pichardos clinical syndrome is best described as Vascular Dementia (VaD) (Casper et al., 1993) however  conflicting with this  diagnosis is brain imaging does not suggest sufficient subcortical microvascular disease burden to impairs patient's cognition to the point dementia.  As such strong probability that depression is currently playing a large role in patient's functional capacity and cognitive impairment.     The pathology underlying Mr. Montoya's neurocognitive impairment is most likely primarily due to Vascular Contributions to Cognitive Impairment and Dementia.     The observations made above, were discussed with the patient and his family. The patient has yet to schedule follow-up appointments with sleep medicine and physical therapy. Will reach out on their behalf to help with the scheduling process. The patient has not yet stopped atorvastatin. Recommend stopping atorvastatin continue rosuvastatin. The patient continues to take aspirin in blood pressure medications as prescribed. The patient has follow-up appointment with Ophthalmology in 20 days. Recommend continue all post stroke prophylaxis. Recommend intensive physical therapy. his family have requested information regarding potential inpatient skilled nursing physical therapy pillar. Will reach out on their behalf to see if we can facilitate this. We have discussed the MIND Diet and other lifestyle behavior that may help maintain brain health. Patient has expressed no interest in symptomatic medications for cognitive impairment.        Care Management Plan:     #Neurocognitive Disorder Treatment:  Patient is not interested in symptomatic medications for cognitive impairment - no donepezil/Namenda at this time  f/u [facility]  f/u PT aqua-therapy -   We will reach out on their behalf to help facilitate this.  Potentially consider inpatient skilled nursing with physical therapy for post stroke per management.  Continue MIND diet  #Microvascular Disease Management:  Continue Rosuvastatin 40mg  Continue aspirin 81 mg  #Insomnia Treatment:  f/u Sleep medicine for SXS suggest  "of BRENNON  #Behavioral/Environmental Treatment  We recommend engaging in activities that stimulate cognitively and socially while avoiding excessive stimulation and fatigue in overwhelmingly complex situations.  We recommend integrating routine and schedule into your daily life. https://www.alzheimersproject.org/news/the-importance-of-routine-and-familiarity-to-persons-with-dementia/  #Health Maintenance/Lifestyle Advice  We have discussed the value in aggressively controlling vascular risk factors like hypertension, hyperlipidemia, and Diabetes SBP<130, LDL<100, A1C<7.0.  We discussed the need to optimize lifestyle choices including a heart-healthy diet (e.g., Mediterranean or DASH), increased cardiovascular exercise (goal 150 minutes of moderate-intensity per week), and stay cognitively and socially active.  #Support  We all need support sometimes. Get easy access to local resources, community programs, and services. https://www.communityresourcefinder.org/  Learn more about Cognitive Impairment in [place]: https://www.alz.org/professionals/public-health/state-overview/[place]  #Safety  [place] has no laws against driving with dementia specifically but obviously has laws about medical conditions which impact a person's ability to drive safely. If you believe your loved one's driving capacity has diminished, please reach out to either your primary care physician or our office to discuss driving restrictions or revocation of their license. To learn more: https://www.dementiacarecentral.com/caregiverinfo/driving-problems/  The Alzheimer's Association administers the nationwide "Safe Return" program with identification bracelets, necklaces, or clothing tags and 24-hour assistance. More information is available online at https://www.alz.org/help-support/caregiving/safety/medicalert-with-24-7-wandering-support  #Follow up:  Follow-up as needed.    Thank you for allowing us to participate in the care of your patient. " Please do not hesitate to contact us with any questions or concerns.     It was a pleasure seeing Mr. Montoya and we look forward to seeing them at their follow-up visit.     This note is dictated on M*Modal Fluency Direct word recognition program. There are word recognition mistakes that are occasionally missed on review. This service was not originating from a related E/M service provided within the previous 7 days nor will  to an E/M service or procedure within the next 24 hours or my soonest available appointment. Prevailing standard of care was able to be met in this audio-only visit.

## 2022-11-09 NOTE — TELEPHONE ENCOUNTER
----- Message from Kenneth Bolanos sent at 11/8/2022  8:36 AM CST -----  Regarding: appt / virtual @ 8:45am-returning missed call  Contact: Dimas @ [phone]  Caller, Dimas (friend) is returning a missed call from someone in the office. Caller is asking for a return call back. Thanks.

## 2022-11-16 ENCOUNTER — TELEPHONE (OUTPATIENT)
Dept: PODIATRY | Facility: CLINIC | Age: 72
End: 2022-11-16
Payer: MEDICARE

## 2022-11-16 NOTE — TELEPHONE ENCOUNTER
Spoke with Ms Garcia, Mr Montoya friend, that Dr. Thompson will be in surgery on 11/17/22 and that I will need to reschedule his appt to a later date. Accepted new appt date and time of 12/16/22 at 2:30 pm. No other needs voiced at this time. Encouraged to call if further assistance is needed. Appt reminder also sent through the mail

## 2022-11-21 ENCOUNTER — OUTPATIENT CASE MANAGEMENT (OUTPATIENT)
Dept: NEUROLOGY | Facility: CLINIC | Age: 72
End: 2022-11-21
Payer: MEDICARE

## 2022-11-21 DIAGNOSIS — R26.81 GAIT INSTABILITY: Primary | ICD-10-CM

## 2022-11-21 DIAGNOSIS — F01.A18 MILD VASCULAR DEMENTIA WITH OTHER BEHAVIORAL DISTURBANCE: ICD-10-CM

## 2022-11-21 NOTE — PROGRESS NOTES
Social Work - Dementia Care Management:    Phoned caregiver,  Mrs. Garcia, to address questions about possible SNF placement for pt to have rehab and options for transportation for Adult Day Programs.  Explained to caregiver the circumstances under which insurance covers a SNF stay, which pt does not meet at this time.Explained SNF limits  Discussed possible home health referral; caregiver in agreement.  Discussed options for Adult Day Programs. Caregiver is interested. I will send materials via [employer] and will first clarify transportation options for [facility].  No additional needs reported at this time.    ADDENDUM:  11/22/22:  Home health PT inquiry sent to Dr. Hoover; order placed.  Sent to caregiver via [employer]:  [facility] brochure  Written explanation of [facility] transportation options  [facility] brochure

## 2022-11-30 ENCOUNTER — CLINICAL SUPPORT (OUTPATIENT)
Dept: OPHTHALMOLOGY | Facility: CLINIC | Age: 72
End: 2022-11-30
Payer: MEDICARE

## 2022-11-30 ENCOUNTER — OFFICE VISIT (OUTPATIENT)
Dept: OPTOMETRY | Facility: CLINIC | Age: 72
End: 2022-11-30
Payer: MEDICARE

## 2022-11-30 DIAGNOSIS — H40.013 OAG (OPEN ANGLE GLAUCOMA) SUSPECT, LOW RISK, BILATERAL: ICD-10-CM

## 2022-11-30 PROCEDURE — 1126F PR PAIN SEVERITY QUANTIFIED, NO PAIN PRESENT: ICD-10-PCS | Mod: CPTII,S$GLB,, | Performed by: OPTOMETRIST

## 2022-11-30 PROCEDURE — 76514 ECHO EXAM OF EYE THICKNESS: CPT | Mod: S$GLB,,, | Performed by: OPTOMETRIST

## 2022-11-30 PROCEDURE — 92012 INTRM OPH EXAM EST PATIENT: CPT | Mod: S$GLB,,, | Performed by: OPTOMETRIST

## 2022-11-30 PROCEDURE — 3044F HG A1C LEVEL LT 7.0%: CPT | Mod: CPTII,S$GLB,, | Performed by: OPTOMETRIST

## 2022-11-30 PROCEDURE — 3288F PR FALLS RISK ASSESSMENT DOCUMENTED: ICD-10-PCS | Mod: CPTII,S$GLB,, | Performed by: OPTOMETRIST

## 2022-11-30 PROCEDURE — 99999 PR PBB SHADOW E&M-EST. PATIENT-LVL III: ICD-10-PCS | Mod: PBBFAC,,, | Performed by: OPTOMETRIST

## 2022-11-30 PROCEDURE — 92083 EXTENDED VISUAL FIELD XM: CPT | Mod: S$GLB,,, | Performed by: OPTOMETRIST

## 2022-11-30 PROCEDURE — 92012 PR EYE EXAM, EST PATIENT,INTERMED: ICD-10-PCS | Mod: S$GLB,,, | Performed by: OPTOMETRIST

## 2022-11-30 PROCEDURE — 1159F PR MEDICATION LIST DOCUMENTED IN MEDICAL RECORD: ICD-10-PCS | Mod: CPTII,S$GLB,, | Performed by: OPTOMETRIST

## 2022-11-30 PROCEDURE — 92133 CPTRZD OPH DX IMG PST SGM ON: CPT | Mod: S$GLB,,, | Performed by: OPTOMETRIST

## 2022-11-30 PROCEDURE — 3061F NEG MICROALBUMINURIA REV: CPT | Mod: CPTII,S$GLB,, | Performed by: OPTOMETRIST

## 2022-11-30 PROCEDURE — 1126F AMNT PAIN NOTED NONE PRSNT: CPT | Mod: CPTII,S$GLB,, | Performed by: OPTOMETRIST

## 2022-11-30 PROCEDURE — 4010F ACE/ARB THERAPY RXD/TAKEN: CPT | Mod: CPTII,S$GLB,, | Performed by: OPTOMETRIST

## 2022-11-30 PROCEDURE — 92133 POSTERIOR SEGMENT OCT OPTIC NERVE(OCULAR COHERENCE TOMOGRAPHY) - OU - BOTH EYES: ICD-10-PCS | Mod: S$GLB,,, | Performed by: OPTOMETRIST

## 2022-11-30 PROCEDURE — 3066F PR DOCUMENTATION OF TREATMENT FOR NEPHROPATHY: ICD-10-PCS | Mod: CPTII,S$GLB,, | Performed by: OPTOMETRIST

## 2022-11-30 PROCEDURE — 1101F PR PT FALLS ASSESS DOC 0-1 FALLS W/OUT INJ PAST YR: ICD-10-PCS | Mod: CPTII,S$GLB,, | Performed by: OPTOMETRIST

## 2022-11-30 PROCEDURE — 1160F PR REVIEW ALL MEDS BY PRESCRIBER/CLIN PHARMACIST DOCUMENTED: ICD-10-PCS | Mod: CPTII,S$GLB,, | Performed by: OPTOMETRIST

## 2022-11-30 PROCEDURE — 4010F PR ACE/ARB THEARPY RXD/TAKEN: ICD-10-PCS | Mod: CPTII,S$GLB,, | Performed by: OPTOMETRIST

## 2022-11-30 PROCEDURE — 76514 PR  US, EYE, FOR CORNEAL THICKNESS: ICD-10-PCS | Mod: S$GLB,,, | Performed by: OPTOMETRIST

## 2022-11-30 PROCEDURE — 3044F PR MOST RECENT HEMOGLOBIN A1C LEVEL <7.0%: ICD-10-PCS | Mod: CPTII,S$GLB,, | Performed by: OPTOMETRIST

## 2022-11-30 PROCEDURE — 3288F FALL RISK ASSESSMENT DOCD: CPT | Mod: CPTII,S$GLB,, | Performed by: OPTOMETRIST

## 2022-11-30 PROCEDURE — 99999 PR PBB SHADOW E&M-EST. PATIENT-LVL III: CPT | Mod: PBBFAC,,, | Performed by: OPTOMETRIST

## 2022-11-30 PROCEDURE — 3066F NEPHROPATHY DOC TX: CPT | Mod: CPTII,S$GLB,, | Performed by: OPTOMETRIST

## 2022-11-30 PROCEDURE — 3061F PR NEG MICROALBUMINURIA RESULT DOCUMENTED/REVIEW: ICD-10-PCS | Mod: CPTII,S$GLB,, | Performed by: OPTOMETRIST

## 2022-11-30 PROCEDURE — 1101F PT FALLS ASSESS-DOCD LE1/YR: CPT | Mod: CPTII,S$GLB,, | Performed by: OPTOMETRIST

## 2022-11-30 PROCEDURE — 92083 HUMPHREY VISUAL FIELD - OU - BOTH EYES: ICD-10-PCS | Mod: S$GLB,,, | Performed by: OPTOMETRIST

## 2022-11-30 PROCEDURE — 1160F RVW MEDS BY RX/DR IN RCRD: CPT | Mod: CPTII,S$GLB,, | Performed by: OPTOMETRIST

## 2022-11-30 PROCEDURE — 1159F MED LIST DOCD IN RCRD: CPT | Mod: CPTII,S$GLB,, | Performed by: OPTOMETRIST

## 2022-11-30 RX ORDER — ROSUVASTATIN CALCIUM 40 MG/1
40 TABLET, COATED ORAL
COMMUNITY
Start: 2022-09-15 | End: 2023-09-15

## 2022-11-30 NOTE — PROGRESS NOTES
Oct/Hvf done ou. Rel/fix/coop. Good ou./chart checked for latex allergy. .75 + .50 x 20/od .50 + 1.25 x 5/os-[facility]

## 2022-11-30 NOTE — PROGRESS NOTES
SAYRA BARBOSA 09/22 Patient is here for follow up with HVF,OCT,pachymetry and IOP   check today. Not using any drops.  Last edited by Sheryl Skaggs on 11/30/2022 10:00 AM.            Assessment /Plan     For exam results, see Encounter Report.    OAG (open angle glaucoma) suspect, low risk, bilateral  -     Brunner Visual Field - OU - Extended - Both Eyes  -     Posterior Segment OCT Optic Nerve- Both eyes      (-) FHx. IOP 22 OD, 20 OS. Last 16 OD, 18 OS. C/d 0.55 OD, 0.65 OS. Pachy 481 OD, 505 OS. Prev OCT w/Dr Figueroa but pt didn't return for further testing.   11/30/2022 OCT OD borderline TI, G and TS, OS borderline T and G, thin TS  11/30/2022 HVF generalized depression OU-- likely related to stroke.   Educated pt on findings w/understanding.   RTC 9 mo Routine. Repeat testing annually.

## 2022-12-16 ENCOUNTER — OFFICE VISIT (OUTPATIENT)
Dept: PODIATRY | Facility: CLINIC | Age: 72
End: 2022-12-16
Payer: MEDICARE

## 2022-12-16 VITALS
HEART RATE: 83 BPM | SYSTOLIC BLOOD PRESSURE: 127 MMHG | BODY MASS INDEX: 25.04 KG/M2 | HEIGHT: 71 IN | DIASTOLIC BLOOD PRESSURE: 78 MMHG

## 2022-12-16 DIAGNOSIS — E11.51 TYPE 2 DIABETES MELLITUS WITH PERIPHERAL ARTERY DISEASE: ICD-10-CM

## 2022-12-16 DIAGNOSIS — N18.31 TYPE 2 DIABETES MELLITUS WITH STAGE 3A CHRONIC KIDNEY DISEASE, WITHOUT LONG-TERM CURRENT USE OF INSULIN: Primary | ICD-10-CM

## 2022-12-16 DIAGNOSIS — E11.22 TYPE 2 DIABETES MELLITUS WITH STAGE 3A CHRONIC KIDNEY DISEASE, WITHOUT LONG-TERM CURRENT USE OF INSULIN: Primary | ICD-10-CM

## 2022-12-16 DIAGNOSIS — B35.1 ONYCHOMYCOSIS: ICD-10-CM

## 2022-12-16 DIAGNOSIS — I69.30 HISTORY OF CVA WITH RESIDUAL DEFICIT: ICD-10-CM

## 2022-12-16 DIAGNOSIS — E11.42 TYPE 2 DIABETES MELLITUS WITH PERIPHERAL NEUROPATHY: ICD-10-CM

## 2022-12-16 PROCEDURE — 3078F DIAST BP <80 MM HG: CPT | Mod: CPTII,S$GLB,, | Performed by: PODIATRIST

## 2022-12-16 PROCEDURE — 3008F PR BODY MASS INDEX (BMI) DOCUMENTED: ICD-10-PCS | Mod: CPTII,S$GLB,, | Performed by: PODIATRIST

## 2022-12-16 PROCEDURE — 1126F AMNT PAIN NOTED NONE PRSNT: CPT | Mod: CPTII,S$GLB,, | Performed by: PODIATRIST

## 2022-12-16 PROCEDURE — 1159F MED LIST DOCD IN RCRD: CPT | Mod: CPTII,S$GLB,, | Performed by: PODIATRIST

## 2022-12-16 PROCEDURE — 3072F PR LOW RISK FOR RETINOPATHY: ICD-10-PCS | Mod: CPTII,S$GLB,, | Performed by: PODIATRIST

## 2022-12-16 PROCEDURE — 99999 PR PBB SHADOW E&M-EST. PATIENT-LVL III: ICD-10-PCS | Mod: PBBFAC,,, | Performed by: PODIATRIST

## 2022-12-16 PROCEDURE — 1126F PR PAIN SEVERITY QUANTIFIED, NO PAIN PRESENT: ICD-10-PCS | Mod: CPTII,S$GLB,, | Performed by: PODIATRIST

## 2022-12-16 PROCEDURE — 3066F PR DOCUMENTATION OF TREATMENT FOR NEPHROPATHY: ICD-10-PCS | Mod: CPTII,S$GLB,, | Performed by: PODIATRIST

## 2022-12-16 PROCEDURE — 99499 UNLISTED E&M SERVICE: CPT | Mod: S$GLB,,, | Performed by: PODIATRIST

## 2022-12-16 PROCEDURE — 11721 DEBRIDE NAIL 6 OR MORE: CPT | Mod: Q9,S$GLB,, | Performed by: PODIATRIST

## 2022-12-16 PROCEDURE — 3072F LOW RISK FOR RETINOPATHY: CPT | Mod: CPTII,S$GLB,, | Performed by: PODIATRIST

## 2022-12-16 PROCEDURE — 3044F PR MOST RECENT HEMOGLOBIN A1C LEVEL <7.0%: ICD-10-PCS | Mod: CPTII,S$GLB,, | Performed by: PODIATRIST

## 2022-12-16 PROCEDURE — 99499 NO LOS: ICD-10-PCS | Mod: S$GLB,,, | Performed by: PODIATRIST

## 2022-12-16 PROCEDURE — 1159F PR MEDICATION LIST DOCUMENTED IN MEDICAL RECORD: ICD-10-PCS | Mod: CPTII,S$GLB,, | Performed by: PODIATRIST

## 2022-12-16 PROCEDURE — 3061F PR NEG MICROALBUMINURIA RESULT DOCUMENTED/REVIEW: ICD-10-PCS | Mod: CPTII,S$GLB,, | Performed by: PODIATRIST

## 2022-12-16 PROCEDURE — 3061F NEG MICROALBUMINURIA REV: CPT | Mod: CPTII,S$GLB,, | Performed by: PODIATRIST

## 2022-12-16 PROCEDURE — 11721 ROUTINE FOOT CARE: ICD-10-PCS | Mod: Q9,S$GLB,, | Performed by: PODIATRIST

## 2022-12-16 PROCEDURE — 3066F NEPHROPATHY DOC TX: CPT | Mod: CPTII,S$GLB,, | Performed by: PODIATRIST

## 2022-12-16 PROCEDURE — 3078F PR MOST RECENT DIASTOLIC BLOOD PRESSURE < 80 MM HG: ICD-10-PCS | Mod: CPTII,S$GLB,, | Performed by: PODIATRIST

## 2022-12-16 PROCEDURE — 3044F HG A1C LEVEL LT 7.0%: CPT | Mod: CPTII,S$GLB,, | Performed by: PODIATRIST

## 2022-12-16 PROCEDURE — 1160F PR REVIEW ALL MEDS BY PRESCRIBER/CLIN PHARMACIST DOCUMENTED: ICD-10-PCS | Mod: CPTII,S$GLB,, | Performed by: PODIATRIST

## 2022-12-16 PROCEDURE — 3008F BODY MASS INDEX DOCD: CPT | Mod: CPTII,S$GLB,, | Performed by: PODIATRIST

## 2022-12-16 PROCEDURE — 4010F ACE/ARB THERAPY RXD/TAKEN: CPT | Mod: CPTII,S$GLB,, | Performed by: PODIATRIST

## 2022-12-16 PROCEDURE — 99999 PR PBB SHADOW E&M-EST. PATIENT-LVL III: CPT | Mod: PBBFAC,,, | Performed by: PODIATRIST

## 2022-12-16 PROCEDURE — 3074F SYST BP LT 130 MM HG: CPT | Mod: CPTII,S$GLB,, | Performed by: PODIATRIST

## 2022-12-16 PROCEDURE — 4010F PR ACE/ARB THEARPY RXD/TAKEN: ICD-10-PCS | Mod: CPTII,S$GLB,, | Performed by: PODIATRIST

## 2022-12-16 PROCEDURE — 3074F PR MOST RECENT SYSTOLIC BLOOD PRESSURE < 130 MM HG: ICD-10-PCS | Mod: CPTII,S$GLB,, | Performed by: PODIATRIST

## 2022-12-16 PROCEDURE — 1160F RVW MEDS BY RX/DR IN RCRD: CPT | Mod: CPTII,S$GLB,, | Performed by: PODIATRIST

## 2022-12-16 NOTE — PROCEDURES
"Routine Foot Care    Date/Time: 12/16/2022 2:30 PM  Performed by: Garland Thompson DPM  Authorized by: Garland Thompson DPM     Time out: Immediately prior to procedure a "time out" was called to verify the correct patient, procedure, equipment, support staff and site/side marked as required.    Consent Done?:  Yes (Verbal)  Hyperkeratotic Skin Lesions?: No      Nail Care Type:  Debride  Location(s): All  (Left 1st Toe, Left 3rd Toe, Left 2nd Toe, Left 4th Toe, Left 5th Toe, Right 1st Toe, Right 2nd Toe, Right 3rd Toe, Right 4th Toe and Right 5th Toe)  Patient tolerance:  Patient tolerated the procedure well with no immediate complications     Used sterile nail nipper.      "

## 2022-12-17 NOTE — PROGRESS NOTES
Subjective:      Patient ID: Ambrosio Montoya is a 72 y.o. male.    Chief Complaint: Diabetes Mellitus (Margarita Taylor MD  09/15/2022) and Nail Care    Ambrosio is a 72 y.o. male who presents to the clinic for evaluation and treatment of high risk feet. Ambrosio has a past medical history of Cataract, Coronary artery disease (2001), CVA (cerebral infarction), Diabetes mellitus type II (2001), Glaucoma suspect, Hyperlipidemia (2001), Hypertension, Myocardial infarction (2002), and Stroke (3/2012). The patient's chief complaint is long, thick toenails. This patient has documented high risk feet requiring routine maintenance secondary to diabetes mellitis and those secondary complications of diabetes, as mentioned..    12/16/22: Returns for routine foot care. No new concerns.    PCP: Margarita Taylor MD    Date Last Seen by PCP: 09/15/2022    Current shoe gear:  Affected Foot: Tennis shoes     Unaffected Foot: Tennis shoes    Hemoglobin A1C   Date Value Ref Range Status   03/21/2022 6.6 (H) 4.0 - 5.6 % Final     Comment:     ADA Screening Guidelines:  5.7-6.4%  Consistent with prediabetes  >or=6.5%  Consistent with diabetes    High levels of fetal hemoglobin interfere with the HbA1C  assay. Heterozygous hemoglobin variants (HbS, HgC, etc)do  not significantly interfere with this assay.   However, presence of multiple variants may affect accuracy.     04/28/2017 6.9 (H) 4.5 - 6.2 % Final     Comment:     According to ADA guidelines, hemoglobin A1C <7.0% represents  optimal control in non-pregnant diabetic patients.  Different  metrics may apply to specific populations.   Standards of Medical Care in Diabetes - 2016.  For the purpose of screening for the presence of diabetes:  <5.7%     Consistent with the absence of diabetes  5.7-6.4%  Consistent with increasing risk for diabetes   (prediabetes)  >or=6.5%  Consistent with diabetes  Currently no consensus exists for use of hemoglobin A1C  for diagnosis of diabetes for  "children.     01/05/2016 6.6 (H) 4.5 - 6.2 % Final     Vitals:    12/16/22 1438   BP: 127/78   Pulse: 83   Height: 5' 11" (1.803 m)   PainSc: 0-No pain   PainLoc: Foot      Past Medical History:   Diagnosis Date    Cataract     Coronary artery disease 2001    ACS    CVA (cerebral infarction)     Diabetes mellitus type II 2001    Glaucoma suspect     Hyperlipidemia 2001    Hypertension     Myocardial infarction 2002    Stroke 3/2012    [facility]       Past Surgical History:   Procedure Laterality Date    COLONOSCOPY N/A 4/20/2017    Procedure: COLONOSCOPY;  Surgeon: Armando Hidalgo MD;  Location: [facility] ENDO;  Service: Endoscopy;  Laterality: N/A;    COLONOSCOPY N/A 8/18/2022    Procedure: COLONOSCOPY;  Surgeon: Armando Hidalgo MD;  Location: [facility] ENDO;  Service: Endoscopy;  Laterality: N/A;       Family History   Problem Relation Age of Onset    Heart disease Mother     Cancer Father         prostate CA    Diabetes Sister     Cancer Sister     Depression Brother     Diabetes Brother        Social History     Socioeconomic History    Marital status:    Tobacco Use    Smoking status: Every Day     Packs/day: 1.00     Years: 40.00     Pack years: 40.00     Types: Cigarettes    Smokeless tobacco: Never   Substance and Sexual Activity    Alcohol use: No    Drug use: No    Sexual activity: Not Currently     Partners: Female     Social Determinants of Health     Financial Resource Strain: Low Risk     Difficulty of Paying Living Expenses: Not hard at all   Food Insecurity: No Food Insecurity    Worried About Running Out of Food in the Last Year: Never true    Ran Out of Food in the Last Year: Never true   Transportation Needs: No Transportation Needs    Lack of Transportation (Medical): No    Lack of Transportation (Non-Medical): No   Physical Activity: Inactive    Days of Exercise per Week: 0 days    Minutes of Exercise per Session: 0 min   Stress: No Stress Concern Present    Feeling of Stress : Not at " all   Social Connections: Socially Isolated    Frequency of Communication with Friends and Family: More than three times a week    Frequency of Social Gatherings with Friends and Family: More than three times a week    Attends Zoroastrian Services: Never    Active Member of Clubs or Organizations: No    Attends Club or Organization Meetings: Never    Marital Status:    Housing Stability: Low Risk     Unable to Pay for Housing in the Last Year: No    Number of Places Lived in the Last Year: 1    Unstable Housing in the Last Year: No       Current Outpatient Medications   Medication Sig Dispense Refill    allopurinoL (ZYLOPRIM) 100 MG tablet       aspirin 325 MG tablet Take 1 tablet (325 mg total) by mouth once daily.      clotrimazole-betamethasone 1-0.05% (LOTRISONE) cream APPLY SPARINGLY TO THE AFFECTED AREA(S) TWICE DAILY      indomethacin (INDOCIN) 50 MG capsule Take 1 capsule (50 mg total) by mouth 2 (two) times daily as needed (pain). 30 capsule 0    lisinopril (PRINIVIL,ZESTRIL) 40 MG tablet Take 1 tablet (40 mg total) by mouth once daily. 90 tablet 1    metFORMIN (GLUCOPHAGE) 1000 MG tablet Take 1,000 mg by mouth once daily at 6am.      polyethylene glycol (GLYCOLAX) 17 gram PwPk Take 17 g by mouth once daily. 14 each 0    rosuvastatin (CRESTOR) 40 MG Tab Take 40 mg by mouth.      sod sulf-pot chloride-mag sulf (SUTAB) 1.479-0.188- 0.225 gram tablet Take 12 tablets by mouth once daily. Take according to package instructions with indicated amount of water. 24 tablet 0    triamcinolone acetonide 0.1% (KENALOG) 0.1 % ointment Apply topically once daily. For eczema 30 g 3    VITAMIN D2 1,250 mcg (50,000 unit) capsule       amLODIPine (NORVASC) 10 MG tablet Take 1 tablet (10 mg total) by mouth once daily. 90 tablet 1     No current facility-administered medications for this visit.       Review of patient's allergies indicates:   Allergen Reactions    Pcn [penicillins] Nausea And Vomiting    Plavix  [clopidogrel] Itching and Rash       Review of Systems   Constitutional: Negative for chills and fever.   HENT:  Negative for congestion and hearing loss.    Respiratory:  Negative for cough and shortness of breath.    Skin:  Positive for color change, dry skin and nail changes.   Musculoskeletal:  Positive for back pain and muscle weakness.   Gastrointestinal:  Negative for nausea and vomiting.   Neurological:  Negative for numbness and paresthesias.   Psychiatric/Behavioral:  Negative for altered mental status.          Objective:      Physical Exam  Constitutional:       General: He is not in acute distress.     Appearance: He is not ill-appearing.   Cardiovascular:      Pulses:           Dorsalis pedis pulses are 2+ on the right side and detected w/ Doppler on the left side.        Posterior tibial pulses are detected w/ Doppler on the right side and detected w/ Doppler on the left side.      Comments: Extremely weak monophasic left DP and weak monophasic PT bilateral with Doppler.  Moderate brawny edema to lower extremity bilateral with hyperpigmentation of the skin bilateral lower extremity.  No hair growth bilateral lower extremity.  Skin temp is warm bilateral foot.  No rubor noted on dependency bilateral foot.  Musculoskeletal:      Right foot: Foot drop present.      Comments: Dorsiflexion of the foot and toes right is 3/5 with remaining muscle groups right lower extremity 4/5 and left lower extremity groups are 5/5.    Pes planus foot type bilateral.  No pain with range of motion or manual muscle strength testing bilateral foot ankle.   Feet:      Right foot:      Protective Sensation: 10 sites tested.  5 sites sensed.      Toenail Condition: Right toenails are abnormally thick and long. Fungal disease present.     Left foot:      Protective Sensation: 10 sites tested.  5 sites sensed.      Toenail Condition: Left toenails are abnormally thick and long. Fungal disease present.  Skin:     General: Skin is  warm.      Capillary Refill: Capillary refill takes 2 to 3 seconds.      Findings: No ecchymosis or erythema.      Nails: There is no clubbing.      Comments: Nails 1-5 bilateral foot are elongated, thickened, discolored brown-yellow moderate loosening and underlying debris.  Right hallux nail 3/4 grown with thickened leading edge.   Neurological:      Mental Status: He is alert and oriented to person, place, and time.      Sensory: Sensory deficit present.      Motor: Weakness present.             Assessment:       Encounter Diagnoses   Name Primary?    Type 2 diabetes mellitus with stage 3a chronic kidney disease, without long-term current use of insulin Yes    Type 2 diabetes mellitus with peripheral artery disease     Type 2 diabetes mellitus with peripheral neuropathy     Onychomycosis     History of CVA with residual deficit          Plan:       Ambrosio was seen today for diabetes mellitus and nail care.    Diagnoses and all orders for this visit:    Type 2 diabetes mellitus with stage 3a chronic kidney disease, without long-term current use of insulin  -     Routine Foot Care    Type 2 diabetes mellitus with peripheral artery disease  -     Routine Foot Care    Type 2 diabetes mellitus with peripheral neuropathy  -     Routine Foot Care    Onychomycosis  -     Routine Foot Care    History of CVA with residual deficit  -     Routine Foot Care    I counseled the patient on his conditions, their implications and medical management.    Shoe inspection. Diabetic Foot Education. Patient reminded of the importance of good nutrition and blood sugar control to help prevent podiatric complications of diabetes. Patient instructed on proper foot hygeine. We discussed wearing proper shoe gear, daily foot inspections, never walking without protective shoe gear, never putting sharp instruments to feet.    Routine foot care per attached note. Patient relates relief following the procedure. He will continue to monitor the areas  daily, inspect his feet, wear protective shoe gear when ambulatory, moisturizer to maintain skin integrity.    Arterial US not completed per patient.    RTC within 3 months or p.r.n. as discussed.    A portion of this note was generated by voice recognition software and may contain spelling and grammar errors.

## 2023-01-04 ENCOUNTER — PES CALL (OUTPATIENT)
Dept: ADMINISTRATIVE | Facility: CLINIC | Age: 73
End: 2023-01-04
Payer: MEDICARE

## 2023-01-09 ENCOUNTER — TELEPHONE (OUTPATIENT)
Dept: GASTROENTEROLOGY | Facility: CLINIC | Age: 73
End: 2023-01-09
Payer: MEDICARE

## 2023-01-09 NOTE — TELEPHONE ENCOUNTER
----- Message from Julissa Nelson sent at 1/9/2023  1:46 PM CST -----  Type:   Appointment Request      Name of Caller:Ms Garcia   When is the first available appointment?N/A   Symptoms:blood in stool   Best Call Back Number:[phone]  Additional Information:

## 2023-02-09 ENCOUNTER — TELEPHONE (OUTPATIENT)
Dept: GASTROENTEROLOGY | Facility: CLINIC | Age: 73
End: 2023-02-09
Payer: MEDICARE

## 2023-02-09 NOTE — TELEPHONE ENCOUNTER
Ms. Garcia, caretaker, requesting clinic appt for streaks of blood on tissue.  Appt scheduled on Wednesday, February 15, 2023 at 9am.

## 2023-02-09 NOTE — TELEPHONE ENCOUNTER
----- Message from Filomena Scott sent at 2/9/2023  8:45 AM CST -----  Type:  Needs Medical Advice    Who Called:  Pt Caretaker  Would the patient rather a call back or a response via [facility]?  Call  Best Call Back Number: [phone]  [phone]  Additional Information:  Pt is having complications after his procedure 2 months ago and would like to be seen as soon as possible but no available appts. Pt would like  a call back as soon as possible.

## 2023-02-15 ENCOUNTER — OFFICE VISIT (OUTPATIENT)
Dept: GASTROENTEROLOGY | Facility: CLINIC | Age: 73
End: 2023-02-15
Payer: MEDICARE

## 2023-02-15 VITALS
HEART RATE: 76 BPM | TEMPERATURE: 99 F | SYSTOLIC BLOOD PRESSURE: 125 MMHG | WEIGHT: 180 LBS | DIASTOLIC BLOOD PRESSURE: 84 MMHG | HEIGHT: 71 IN | BODY MASS INDEX: 25.2 KG/M2

## 2023-02-15 DIAGNOSIS — K62.5 RECTAL BLEEDING: Primary | ICD-10-CM

## 2023-02-15 NOTE — PROGRESS NOTES
"[facility]    CC: History of adenomatous polyps    HPI 72 y.o. male with history of CKD, HTN, DBM, HLD, smoking use who presents for follow up.    Notes intermittent painless rectal bleeding with wiping since colonoscopy 6 months ago. Has been having some constipation which has not responded to miralax.    Past Medical History  Past Medical History:   Diagnosis Date    Cataract     Coronary artery disease 2001    ACS    CVA (cerebral infarction)     Diabetes mellitus type II 2001    Glaucoma suspect     Hyperlipidemia 2001    Hypertension     Myocardial infarction 2002    Stroke 3/2012    [facility]         Physical Examination  /84 (BP Location: Left arm, Patient Position: Sitting, BP Method: Large (Automatic))   Pulse 76   Temp 98.8 °F (37.1 °C) (Oral)   Ht 5' 11" (1.803 m)   Wt 81.7 kg (180 lb 0.1 oz)   BMI 25.11 kg/m²   General appearance: alert, cooperative, no distress  Lungs: clear to auscultation bilaterally, no dullness to percussion bilaterally  Heart: regular rate and rhythm without rub; no displacement of the PMI   Abdomen: soft, non-tender; bowel sounds normoactive; no organomegaly    Assessment:   72 y.o.male with CKD, HTN, DBM, HLD, smoking presenting for follow up with complaints new rectal bleeding c/w outlet etiology. Last colonoscopy 8/2022 notable only for small polyps with 5 year repeat recommended, small hemorrhoids on my review today.    Plan:  - Treatment of rectal bleeding likely related to hemorrhoids with hydration, high fiber diet, avoidance of straining  - Start Linzess 145mcg daily, increase to 290mcg every other day if needed for constipation   - If bleeding persists or recurs, patient will contact my office to review risks/benefits of repeat endoscopy such as colonoscopy or flexible sigmoidoscopy with hemorrhoidal banding     Total encounter time >30 min including all elements       Armando Hidalgo MD   [address], [address]   [place] [address] [phone] " [phone]

## 2023-02-17 ENCOUNTER — TELEPHONE (OUTPATIENT)
Dept: GASTROENTEROLOGY | Facility: CLINIC | Age: 73
End: 2023-02-17
Payer: MEDICARE

## 2023-02-17 NOTE — TELEPHONE ENCOUNTER
"Obtain status of new medication, linzess.  Pt states "I started on yesterday, no bowel movement yet".  Pt advised to contact clinic in a couple of days with symptoms.  "

## 2023-03-15 ENCOUNTER — TELEPHONE (OUTPATIENT)
Dept: GASTROENTEROLOGY | Facility: CLINIC | Age: 73
End: 2023-03-15
Payer: MEDICARE

## 2023-03-16 ENCOUNTER — TELEPHONE (OUTPATIENT)
Dept: PODIATRY | Facility: CLINIC | Age: 73
End: 2023-03-16

## 2023-03-16 ENCOUNTER — OFFICE VISIT (OUTPATIENT)
Dept: PODIATRY | Facility: CLINIC | Age: 73
End: 2023-03-16
Payer: MEDICARE

## 2023-03-16 VITALS
HEIGHT: 71 IN | BODY MASS INDEX: 25.2 KG/M2 | WEIGHT: 180 LBS | HEART RATE: 83 BPM | SYSTOLIC BLOOD PRESSURE: 129 MMHG | DIASTOLIC BLOOD PRESSURE: 82 MMHG

## 2023-03-16 DIAGNOSIS — E11.51 TYPE 2 DIABETES MELLITUS WITH PERIPHERAL ARTERY DISEASE: ICD-10-CM

## 2023-03-16 DIAGNOSIS — N18.31 TYPE 2 DIABETES MELLITUS WITH STAGE 3A CHRONIC KIDNEY DISEASE, WITHOUT LONG-TERM CURRENT USE OF INSULIN: Primary | ICD-10-CM

## 2023-03-16 DIAGNOSIS — B35.1 ONYCHOMYCOSIS: ICD-10-CM

## 2023-03-16 DIAGNOSIS — E11.42 TYPE 2 DIABETES MELLITUS WITH PERIPHERAL NEUROPATHY: ICD-10-CM

## 2023-03-16 DIAGNOSIS — E11.22 TYPE 2 DIABETES MELLITUS WITH STAGE 3A CHRONIC KIDNEY DISEASE, WITHOUT LONG-TERM CURRENT USE OF INSULIN: Primary | ICD-10-CM

## 2023-03-16 DIAGNOSIS — I69.30 HISTORY OF CVA WITH RESIDUAL DEFICIT: ICD-10-CM

## 2023-03-16 PROCEDURE — 1159F MED LIST DOCD IN RCRD: CPT | Mod: HCWC,CPTII,S$GLB, | Performed by: PODIATRIST

## 2023-03-16 PROCEDURE — 3072F PR LOW RISK FOR RETINOPATHY: ICD-10-PCS | Mod: HCWC,CPTII,S$GLB, | Performed by: PODIATRIST

## 2023-03-16 PROCEDURE — 3072F LOW RISK FOR RETINOPATHY: CPT | Mod: HCWC,CPTII,S$GLB, | Performed by: PODIATRIST

## 2023-03-16 PROCEDURE — 4010F PR ACE/ARB THEARPY RXD/TAKEN: ICD-10-PCS | Mod: HCWC,CPTII,S$GLB, | Performed by: PODIATRIST

## 2023-03-16 PROCEDURE — 1125F AMNT PAIN NOTED PAIN PRSNT: CPT | Mod: HCWC,CPTII,S$GLB, | Performed by: PODIATRIST

## 2023-03-16 PROCEDURE — 3008F BODY MASS INDEX DOCD: CPT | Mod: HCWC,CPTII,S$GLB, | Performed by: PODIATRIST

## 2023-03-16 PROCEDURE — 99999 PR PBB SHADOW E&M-EST. PATIENT-LVL III: CPT | Mod: PBBFAC,HCWC,, | Performed by: PODIATRIST

## 2023-03-16 PROCEDURE — 1101F PR PT FALLS ASSESS DOC 0-1 FALLS W/OUT INJ PAST YR: ICD-10-PCS | Mod: HCWC,CPTII,S$GLB, | Performed by: PODIATRIST

## 2023-03-16 PROCEDURE — 4010F ACE/ARB THERAPY RXD/TAKEN: CPT | Mod: HCWC,CPTII,S$GLB, | Performed by: PODIATRIST

## 2023-03-16 PROCEDURE — 3074F PR MOST RECENT SYSTOLIC BLOOD PRESSURE < 130 MM HG: ICD-10-PCS | Mod: HCWC,CPTII,S$GLB, | Performed by: PODIATRIST

## 2023-03-16 PROCEDURE — 99999 PR PBB SHADOW E&M-EST. PATIENT-LVL III: ICD-10-PCS | Mod: PBBFAC,HCWC,, | Performed by: PODIATRIST

## 2023-03-16 PROCEDURE — 3074F SYST BP LT 130 MM HG: CPT | Mod: HCWC,CPTII,S$GLB, | Performed by: PODIATRIST

## 2023-03-16 PROCEDURE — 99212 PR OFFICE/OUTPT VISIT, EST, LEVL II, 10-19 MIN: ICD-10-PCS | Mod: HCWC,S$GLB,, | Performed by: PODIATRIST

## 2023-03-16 PROCEDURE — 1160F PR REVIEW ALL MEDS BY PRESCRIBER/CLIN PHARMACIST DOCUMENTED: ICD-10-PCS | Mod: HCWC,CPTII,S$GLB, | Performed by: PODIATRIST

## 2023-03-16 PROCEDURE — 3288F PR FALLS RISK ASSESSMENT DOCUMENTED: ICD-10-PCS | Mod: HCWC,CPTII,S$GLB, | Performed by: PODIATRIST

## 2023-03-16 PROCEDURE — 1125F PR PAIN SEVERITY QUANTIFIED, PAIN PRESENT: ICD-10-PCS | Mod: HCWC,CPTII,S$GLB, | Performed by: PODIATRIST

## 2023-03-16 PROCEDURE — 1160F RVW MEDS BY RX/DR IN RCRD: CPT | Mod: HCWC,CPTII,S$GLB, | Performed by: PODIATRIST

## 2023-03-16 PROCEDURE — 1159F PR MEDICATION LIST DOCUMENTED IN MEDICAL RECORD: ICD-10-PCS | Mod: HCWC,CPTII,S$GLB, | Performed by: PODIATRIST

## 2023-03-16 PROCEDURE — 3288F FALL RISK ASSESSMENT DOCD: CPT | Mod: HCWC,CPTII,S$GLB, | Performed by: PODIATRIST

## 2023-03-16 PROCEDURE — 3079F PR MOST RECENT DIASTOLIC BLOOD PRESSURE 80-89 MM HG: ICD-10-PCS | Mod: HCWC,CPTII,S$GLB, | Performed by: PODIATRIST

## 2023-03-16 PROCEDURE — 3079F DIAST BP 80-89 MM HG: CPT | Mod: HCWC,CPTII,S$GLB, | Performed by: PODIATRIST

## 2023-03-16 PROCEDURE — 3008F PR BODY MASS INDEX (BMI) DOCUMENTED: ICD-10-PCS | Mod: HCWC,CPTII,S$GLB, | Performed by: PODIATRIST

## 2023-03-16 PROCEDURE — 99212 OFFICE O/P EST SF 10 MIN: CPT | Mod: HCWC,S$GLB,, | Performed by: PODIATRIST

## 2023-03-16 PROCEDURE — 1101F PT FALLS ASSESS-DOCD LE1/YR: CPT | Mod: HCWC,CPTII,S$GLB, | Performed by: PODIATRIST

## 2023-03-16 NOTE — PROGRESS NOTES
Subjective:      Patient ID: Ambrosio Montoya is a 72 y.o. male.    Chief Complaint: Nail Care (3 month nail care f/u; also c/o splinter to right foot)    Ambrosio is a 72 y.o. male who presents to the clinic for evaluation and treatment of high risk feet. Ambrosio has a past medical history of Cataract, Coronary artery disease (2001), CVA (cerebral infarction), Diabetes mellitus type II (2001), Glaucoma suspect, Hyperlipidemia (2001), Hypertension, Myocardial infarction (2002), and Stroke (3/2012). The patient's chief complaint is long, thick toenails. This patient has documented high risk feet requiring routine maintenance secondary to diabetes mellitis and those secondary complications of diabetes, as mentioned..    12/16/22: Returns for routine foot care. No new concerns.    03/16/2023:  Returns for routine foot care.  Inquiring about a prescription for extra-depth diabetic shoes.    PCP: Margarita Taylor MD    Date Last Seen by PCP:  03/15/2023    Current shoe gear:  Affected Foot: Tennis shoes     Unaffected Foot: Tennis shoes    Hemoglobin A1C   Date Value Ref Range Status   03/21/2022 6.6 (H) 4.0 - 5.6 % Final     Comment:     ADA Screening Guidelines:  5.7-6.4%  Consistent with prediabetes  >or=6.5%  Consistent with diabetes    High levels of fetal hemoglobin interfere with the HbA1C  assay. Heterozygous hemoglobin variants (HbS, HgC, etc)do  not significantly interfere with this assay.   However, presence of multiple variants may affect accuracy.     04/28/2017 6.9 (H) 4.5 - 6.2 % Final     Comment:     According to ADA guidelines, hemoglobin A1C <7.0% represents  optimal control in non-pregnant diabetic patients.  Different  metrics may apply to specific populations.   Standards of Medical Care in Diabetes - 2016.  For the purpose of screening for the presence of diabetes:  <5.7%     Consistent with the absence of diabetes  5.7-6.4%  Consistent with increasing risk for diabetes   (prediabetes)  >or=6.5%  Consistent  "with diabetes  Currently no consensus exists for use of hemoglobin A1C  for diagnosis of diabetes for children.     01/05/2016 6.6 (H) 4.5 - 6.2 % Final     Vitals:    03/16/23 1429   BP: 129/82   Pulse: 83   Weight: 81.6 kg (180 lb)   Height: 5' 11" (1.803 m)   PainSc:   1      Past Medical History:   Diagnosis Date    Cataract     Coronary artery disease 2001    ACS    CVA (cerebral infarction)     Diabetes mellitus type II 2001    Glaucoma suspect     Hyperlipidemia 2001    Hypertension     Myocardial infarction 2002    Stroke 3/2012    [facility]       Past Surgical History:   Procedure Laterality Date    COLONOSCOPY N/A 4/20/2017    Procedure: COLONOSCOPY;  Surgeon: Armando Hidalgo MD;  Location: [facility] ENDO;  Service: Endoscopy;  Laterality: N/A;    COLONOSCOPY N/A 8/18/2022    Procedure: COLONOSCOPY;  Surgeon: Armando Hidalgo MD;  Location: [facility] ENDO;  Service: Endoscopy;  Laterality: N/A;       Family History   Problem Relation Age of Onset    Heart disease Mother     Cancer Father         prostate CA    Diabetes Sister     Cancer Sister     Depression Brother     Diabetes Brother        Social History     Socioeconomic History    Marital status:    Tobacco Use    Smoking status: Every Day     Packs/day: 1.00     Years: 40.00     Pack years: 40.00     Types: Cigarettes    Smokeless tobacco: Never   Substance and Sexual Activity    Alcohol use: No    Drug use: No    Sexual activity: Not Currently     Partners: Female     Social Determinants of Health     Financial Resource Strain: Low Risk     Difficulty of Paying Living Expenses: Not hard at all   Food Insecurity: No Food Insecurity    Worried About Running Out of Food in the Last Year: Never true    Ran Out of Food in the Last Year: Never true   Transportation Needs: No Transportation Needs    Lack of Transportation (Medical): No    Lack of Transportation (Non-Medical): No   Physical Activity: Inactive    Days of Exercise per Week: 0 days    " Minutes of Exercise per Session: 0 min   Stress: No Stress Concern Present    Feeling of Stress : Not at all   Social Connections: Socially Isolated    Frequency of Communication with Friends and Family: More than three times a week    Frequency of Social Gatherings with Friends and Family: More than three times a week    Attends Shinto Services: Never    Active Member of Clubs or Organizations: No    Attends Club or Organization Meetings: Never    Marital Status:    Housing Stability: Low Risk     Unable to Pay for Housing in the Last Year: No    Number of Places Lived in the Last Year: 1    Unstable Housing in the Last Year: No       Current Outpatient Medications   Medication Sig Dispense Refill    allopurinoL (ZYLOPRIM) 100 MG tablet       aspirin 325 MG tablet Take 1 tablet (325 mg total) by mouth once daily.      clotrimazole-betamethasone 1-0.05% (LOTRISONE) cream APPLY SPARINGLY TO THE AFFECTED AREA(S) TWICE DAILY      indomethacin (INDOCIN) 50 MG capsule Take 1 capsule (50 mg total) by mouth 2 (two) times daily as needed (pain). 30 capsule 0    linaCLOtide (LINZESS) 145 mcg Cap capsule Take 1 capsule (145 mcg total) by mouth before breakfast. 30 capsule 11    lisinopril (PRINIVIL,ZESTRIL) 40 MG tablet Take 1 tablet (40 mg total) by mouth once daily. 90 tablet 1    metFORMIN (GLUCOPHAGE) 1000 MG tablet Take 1,000 mg by mouth once daily at 6am.      polyethylene glycol (GLYCOLAX) 17 gram PwPk Take 17 g by mouth once daily. 14 each 0    rosuvastatin (CRESTOR) 40 MG Tab Take 40 mg by mouth.      sod sulf-pot chloride-mag sulf (SUTAB) 1.479-0.188- 0.225 gram tablet Take 12 tablets by mouth once daily. Take according to package instructions with indicated amount of water. 24 tablet 0    triamcinolone acetonide 0.1% (KENALOG) 0.1 % ointment Apply topically once daily. For eczema 30 g 3    VITAMIN D2 1,250 mcg (50,000 unit) capsule       amLODIPine (NORVASC) 10 MG tablet Take 1 tablet (10 mg total) by  mouth once daily. 90 tablet 1     No current facility-administered medications for this visit.       Review of patient's allergies indicates:   Allergen Reactions    Pcn [penicillins] Nausea And Vomiting    Plavix [clopidogrel] Itching and Rash       Review of Systems   Constitutional: Negative for chills and fever.   HENT:  Negative for congestion and hearing loss.    Respiratory:  Negative for cough and shortness of breath.    Skin:  Positive for color change, dry skin and nail changes.   Musculoskeletal:  Positive for back pain and muscle weakness.   Gastrointestinal:  Negative for nausea and vomiting.   Neurological:  Negative for numbness and paresthesias.   Psychiatric/Behavioral:  Negative for altered mental status.          Objective:      Physical Exam  Constitutional:       General: He is not in acute distress.     Appearance: He is not ill-appearing.   Cardiovascular:      Pulses:           Dorsalis pedis pulses are 2+ on the right side and detected w/ Doppler on the left side.        Posterior tibial pulses are detected w/ Doppler on the right side and detected w/ Doppler on the left side.      Comments: Extremely weak monophasic left DP and weak monophasic PT bilateral with Doppler.  Moderate brawny edema to lower extremity bilateral with hyperpigmentation of the skin bilateral lower extremity.  No hair growth bilateral lower extremity.  Skin temp is warm bilateral foot.  No rubor noted on dependency bilateral foot.  Musculoskeletal:      Right foot: Foot drop present.      Comments: Dorsiflexion of the foot and toes right is 3/5 with remaining muscle groups right lower extremity 4/5 and left lower extremity groups are 5/5.    Pes planus foot type bilateral.  No pain with range of motion or manual muscle strength testing bilateral foot ankle.   Feet:      Right foot:      Protective Sensation: 10 sites tested.  5 sites sensed.      Toenail Condition: Right toenails are abnormally thick and long. Fungal  disease present.     Left foot:      Protective Sensation: 10 sites tested.  5 sites sensed.      Toenail Condition: Left toenails are abnormally thick and long. Fungal disease present.  Skin:     General: Skin is warm.      Capillary Refill: Capillary refill takes 2 to 3 seconds.      Findings: No ecchymosis or erythema.      Nails: There is no clubbing.      Comments: Nails 1-5 bilateral foot are elongated, thickened, discolored brown-yellow moderate loosening and underlying debris.  Right hallux nail 3/4 grown with thickened leading edge.   Neurological:      Mental Status: He is alert and oriented to person, place, and time.      Sensory: Sensory deficit present.      Motor: Weakness present.             Assessment:       Encounter Diagnoses   Name Primary?    Type 2 diabetes mellitus with stage 3a chronic kidney disease, without long-term current use of insulin Yes    Type 2 diabetes mellitus with peripheral artery disease     Type 2 diabetes mellitus with peripheral neuropathy     History of CVA with residual deficit     Onychomycosis          Plan:       Ambrosio was seen today for nail care.    Diagnoses and all orders for this visit:    Type 2 diabetes mellitus with stage 3a chronic kidney disease, without long-term current use of insulin  -     DIABETIC SHOES FOR HOME USE    Type 2 diabetes mellitus with peripheral artery disease  -     DIABETIC SHOES FOR HOME USE    Type 2 diabetes mellitus with peripheral neuropathy  -     DIABETIC SHOES FOR HOME USE    History of CVA with residual deficit  -     DIABETIC SHOES FOR HOME USE    Onychomycosis    I counseled the patient on his conditions, their implications and medical management.    Shoe inspection. Diabetic Foot Education. Patient reminded of the importance of good nutrition and blood sugar control to help prevent podiatric complications of diabetes. Patient instructed on proper foot hygeine. We discussed wearing proper shoe gear, daily foot inspections,  never walking without protective shoe gear, never putting sharp instruments to feet.    Routine foot care per attached note. Patient relates relief following the procedure. He will continue to monitor the areas daily, inspect his feet, wear protective shoe gear when ambulatory, moisturizer to maintain skin integrity.    Prescription dispensed for extra-depth diabetic shoes with heat molded insoles.    RTC within 3 months or p.r.n. as discussed.     Assisted per Yuan Mcrae DPM PGY 2    A portion of this note was generated by voice recognition software and may contain spelling and grammar errors.

## 2023-03-16 NOTE — TELEPHONE ENCOUNTER
----- Message from Cordelia Bolanos sent at 3/16/2023  1:21 PM CDT -----  Type:  Needs Medical Advice    Who Called: pt  Would the patient rather a call back or a response via [facility]? call  Best Call Back Number: [phone] or [phone]  Additional Information: Pt is running a little late for schedule appointment

## 2023-03-17 DIAGNOSIS — G81.91 RIGHT HEMIPARESIS: Primary | ICD-10-CM

## 2023-03-20 ENCOUNTER — NURSE TRIAGE (OUTPATIENT)
Dept: ADMINISTRATIVE | Facility: CLINIC | Age: 73
End: 2023-03-20
Payer: MEDICARE

## 2023-03-20 NOTE — TELEPHONE ENCOUNTER
[facility] outgoing call -         Pt's friend Dimas, c/o Pt having slight cough, no fever, 95% sat, 98.0 F, covid, hx stroke with weakness but no new or worsening stroke symptoms at this time per friend. Covid protocol followed and pt advised to speak to a doctor within the next 24 hours or use a telemed doctor or go to an uc/er. [facility] offered and accepted. Education provided on [facility], covid, isolation, need to be seen by a doctor within 24 hours, see care advice, instructed friend to call [facility] [phone] if he has any new or worsening symptoms, Encounter not routed to PCP/staff as high prioirity d/t Margarita Thakurutista not being a valid routing candidate.   Reason for Disposition   [1] HIGH RISK for severe COVID complications (e.g., weak immune system, age > 64 years, obesity with BMI 30 or higher, pregnant, chronic lung disease or other chronic medical condition) AND [2] COVID symptoms (e.g., cough, fever)  (Exceptions: Already seen by PCP and no new or worsening symptoms.)    Additional Information   Negative: SEVERE difficulty breathing (e.g., struggling for each breath, speaks in single words)   Negative: Difficult to awaken or acting confused (e.g., disoriented, slurred speech)   Negative: Bluish (or gray) lips or face now   Negative: Shock suspected (e.g., cold/pale/clammy skin, too weak to stand, low BP, rapid pulse)   Negative: SEVERE or constant chest pain or pressure  (Exception: Mild central chest pain, present only when coughing.)   Negative: MODERATE difficulty breathing (e.g., speaks in phrases, SOB even at rest, pulse 100-120)   Negative: [1] Headache AND [2] stiff neck (can't touch chin to chest)   Negative: Oxygen level (e.g., pulse oximetry) 90 percent or lower   Negative: Chest pain or pressure  (Exception: MILD central chest pain, present only when coughing)   Negative: Patient sounds very sick or weak to the triager   Negative: MILD difficulty breathing (e.g., minimal/no SOB at rest, SOB with walking,  pulse <100)   Negative: Fever > 103 F (39.4 C)   Negative: [1] Fever > 101 F (38.3 C) AND [2] age > 60 years   Negative: [1] Fever > 100.0 F (37.8 C) AND [2] bedridden (e.g., CVA, chronic illness, recovering from surgery)   Negative: Oxygen level (e.g., pulse oximetry) 91 to 94 percent    Protocols used: Coronavirus (COVID-19) Diagnosed or [id number]

## 2023-03-27 ENCOUNTER — CLINICAL SUPPORT (OUTPATIENT)
Dept: REHABILITATION | Facility: HOSPITAL | Age: 73
End: 2023-03-27
Payer: MEDICARE

## 2023-03-27 DIAGNOSIS — Z78.9 IMPAIRED MOBILITY AND ACTIVITIES OF DAILY LIVING: ICD-10-CM

## 2023-03-27 DIAGNOSIS — G81.91 RIGHT HEMIPARESIS: ICD-10-CM

## 2023-03-27 DIAGNOSIS — Z74.09 IMPAIRED MOBILITY AND ACTIVITIES OF DAILY LIVING: ICD-10-CM

## 2023-03-27 PROCEDURE — 97162 PT EVAL MOD COMPLEX 30 MIN: CPT | Mod: HCWC,PN

## 2023-03-29 PROBLEM — Z78.9 IMPAIRED MOBILITY AND ACTIVITIES OF DAILY LIVING: Status: ACTIVE | Noted: 2023-03-29

## 2023-03-29 PROBLEM — Z74.09 IMPAIRED MOBILITY AND ACTIVITIES OF DAILY LIVING: Status: ACTIVE | Noted: 2023-03-29

## 2023-03-30 NOTE — PLAN OF CARE
"[facility]  Physical Therapy Neurological Rehabilitation Initial Evaluation    Name: Ambrosio Montoya  Clinic Number: [id number]    Therapy Diagnosis:   Encounter Diagnoses   Name Primary?    Right hemiparesis     Impaired mobility and activities of daily living      Physician: Margarita Taylor MD    Physician Orders: PT Eval and Treat   Medical Diagnosis from Referral: G81.91 (ICD-10-CM) - Right hemiparesis   Evaluation Date: 3/27/2023  Authorization Period Expiration: 12/29/2023   Plan of Care Expiration: 5/19/2023  Visit # / Visits authorized: 1/ 1    Time In: 1:55 PM  Time Out: 2:35 PM  Total Billable Time: 40 minutes (1 MCE)    Precautions: Standard, Diabetes, Fall, and HTN    Subjective   Date of onset: CVA with right HP in 2013  History of current condition - Ambrosio reports:  CVA occurred in 2013, affecting his right side.  Pt has attended physical therapy to address deficits in 2013, 2018 and 2021.  Pt wants to return to therapy to "walk like I used to before the stroke."     Medical History:   Past Medical History:   Diagnosis Date    Cataract     Coronary artery disease 2001    ACS    CVA (cerebral infarction)     Diabetes mellitus type II 2001    Glaucoma suspect     Hyperlipidemia 2001    Hypertension     Myocardial infarction 2002    Stroke 3/2012    [facility]  [place]       Surgical History:   Ambrosio Montoya  has a past surgical history that includes Colonoscopy (N/A, 4/20/2017) and Colonoscopy (N/A, 8/18/2022).    Medications:   Ambrosio has a current medication list which includes the following prescription(s): allopurinol, amlodipine, aspirin, clotrimazole-betamethasone 1-0.05%, indomethacin, linaclotide, lisinopril, metformin, polyethylene glycol, rosuvastatin, sutab, triamcinolone acetonide 0.1%, and vitamin d2.    Allergies:   Review of patient's allergies indicates:   Allergen Reactions    Pcn [penicillins] Nausea And Vomiting    Plavix [clopidogrel] Itching and Rash    " "    Imaging, none: no recent imaging    Prior Therapy: at this location for same complaint  Social History:  lives with girlfriend  Falls: none recently   DME: wheelchair, walking stick, AFO and Swedish knee cage (pt believes the AFO and brace are in the closet but he is not sure, does not use them)   Home Environment: single story home, 3 steps to enter with single HR   Exercise Routine / History: none   Family Present at time of Eval: no   Occupation: retired   Prior Level of Function: min A for ADLs  Current Level of Function: min A for ADLs, IADLs, girlfriend does housework, shopping, driving    Pain:  Current 0/10, worst 0/10, best 0/10   Location: none     Patient's goals: "walk better, walk like I used to"    Objective     - Command followin% simple; 75% complex   - Speech: no deficits     Mental status: alert, oriented to person, place, and time, normal mood, behavior, speech, motor activity, and thought processes  Behavior:  calm and cooperative  Attention Span and Concentration:  Easily distracted    Dominant hand:  right     Posture Alignment :slouched posture    Sensation:  Light Touch: Patient reports no sensory changes                       Proprioception:  Intact    Tone: 1+ Slight increases in muscle tone, manifested by a catch, followed by minimal resistance throughout the remainder (less than half) of the ROM.  Limbs/muscles affected: right bicep, hamstring    Visual/Auditory: denies changes     Coordination:   - fine motor: Impaired opposition  - UE coordination: Impaired MAURICIO    - LE coordination:  Impaired MAURICIO         ROM:   UPPER EXTREMITY--AROM/PROM  (R) UE: limited as follows: right shoulder elevation ~80 degrees, external rotation ~20 degrees, elbow  degrees,   (L) UE: WFLs           RANGE OF MOTION--LOWER EXTREMITIES  (R) LE Hip: hip extension to neutral with overpressure   Knee: Lacking 52 degrees knee extension for HS 90/90   Ankle: neutral with " "overpressure    (L) LE: Hip: hip extension to neutral   Knee: lacking 46 degrees knee extension for HS 90/90   Ankle: neutral with overpressure    Upper Extremity Strength    RUE LUE   Shoulder Flexion: 3/5 4+/5   Shoulder Abduction: 3/5 4+/5   Elbow Flexion:       4-/5 5/5   Elbow Extension: 4-/5 4+/5      Lower Extremity Strength    RLE LLE   Hip Flexion: 3/5 4/5   Hip Extension:  See 30 sec STS  See 30 sec STS   Hip Abduction: 2/5 4-/5   Knee Extension: 3/5 5/5   Knee Flexion: 2+/5 4+/5   Ankle Dorsiflexion: 2/5 4/5        - AD used: WBQC  - Assistance: contact guard  - Distance: ~60 feet during evaluation      Gait Analysis:  Deviations noted: decreased trupti, decreased right step length, step to gait pattern, right knee hyperextension in stance    Impairments contributing to deviations:  right sided strength deficits    Endurance Deficit: attempt 2 min walk test next visit     Balance Assessment:      TINETTI BALANCE ASSESSMENT TOOL    Leidy ME, Alessio TF, Cameron R, Fall Risk Index for elderly patients based on number of chronic disabilities.Am J Med [id number]      BALANCE SECTION  Patient is seated in hard, armless chair;    1.Sitting Balance:   Steady; safe = 1  2.Rises from chair :  Able, uses arms to help = 1  3.Attempts to arise :  Able, requirese > 1 attempt = 1  4.Immediate standing Balance (first 5 seconds) : Steady but uses walker or other support = 1  5.Standing balance: Steady but wide stance (medal heel>4" apart) & uses cane or other support = 1  6.Nudged : Staggers, grabs, catches self = 1  7.Eyes closed: Steady = 1  8.Turning 360 degrees:  Discontinuous steps = 0  9.Sitting Down : Uses arms or not a smooth motion = 1    Balance Score:  8/16    GAIT SECTION  Patient stands with therapist, walks across room (+/- aids), first at usual pace, then at rapid pace.    10.Initiation of Gait (Immediately after told to go.): Any hesitancy or multiple attempts to start = 0  11.Step length and " height :  Step to=0  12.Foot Clearance :  L foot clears floor=1   13.Step symmetry: Right & Left step length not equal (estimate) = 0  14.Step continuity : Stooping or discontinuity between steps = 0  15.Path: Mild/moderate deviation or uses walking aid = 1  16.Trunk : Marked sway or uses walking aid = 0  17.Walking Time: Heels amost touching while walking = 1    Gait Score: 8/12  Balance score:3/16  Total Score=Balance + Gait Score: 11/28     Risk Indicators:    Tinetti Tool Score   Risk of Falls   ?18    High   19-23   Moderate   ?24   Low     Functional Mobility (Bed mobility, transfers)  Bed mobility: Mod I  Supine to sit: Mod I  Sit to supine: Mod I  Rolling: Mod I  Transfers to bed: Min A  Sit to stand:  Mod I  Stand pivot:  Min A    Five time sit to stand - 71 seconds with bilateral upper extremity support  Timed Up and Go: 84.4 seconds with WBQC with contact guard assist    CMS Impairment/Limitation/Restriction for FOTO Stroke Lower Extremity Survey    Therapist reviewed FOTO scores for Ambrosio Montoya on 3/27/2023.   FOTO documents entered into [employer] - see Media section.    Limitation Score: 33% (adjusted score 55% limitation)  Projected Limitation Score: 36% limitation         TREATMENT   - not initiated this visit    Home Exercises and Patient Education Provided    Education provided:   - realistic expectations based on diagnosis and amount of time since CVA  - PT POC  - need for patient to bring AFO/braces and walking stick to sessions    Written Home Exercises Provided: yes.  Exercises were reviewed and Ambrosio was able to demonstrate them prior to the end of the session.  Ambrosio demonstrated fair  understanding of the education provided.     See EMR under Patient Instructions for exercises provided 3/27/2023.    Assessment   Ambrosio is a 72 y.o. male referred to outpatient Physical Therapy with a medical diagnosis of right hemiparesis. Patient presents with bilateral lower extremity strength deficits  (right>left), impaired balance and coordination, decreased transfers, gait deficits listed above, decreased activity tolerance and ability to perform activities of daily living and high risk of falls based on Tinetti and Timed Up and Go scores.  Patient is appropriate for skilled physical therapy services to address listed impairments and improve safety/independence with functional mobility. Patient would benefit from physical therapy for general balance and strengthening program and considering RUE complaints, he would likely benefit from occupational therapy evaluation and treatment.    Patient prognosis is Fair.   Patient will benefit from skilled outpatient Physical Therapy to address the deficits stated above and in the chart below, provide patient/family education, and to maximize patient's level of independence.     Plan of care discussed with patient: Yes  Patient's spiritual, cultural and educational needs considered and patient is agreeable to the plan of care and goals as stated below:     Anticipated Barriers for therapy: chronicity of impairments, unrealistic expectations    Medical Necessity is demonstrated by the following  History  Co-morbidities and personal factors that may impact the plan of care Co-morbidities:   Allergies, Arthritis, Back pain, BMI over 30, Diabetes Type I or II, Gastrointestinal Disease, Heart Attack, High Blood Pressure, Kidney, Bladder, Prostate or Urination Problems, Stroke or TIA    Personal Factors:   social background  lifestyle     moderate   Examination  Body Structures and Functions, activity limitations and participation restrictions that may impact the plan of care Body Regions:   lower extremities  trunk    Body Systems:    gross symmetry  ROM  strength  gross coordinated movement  balance  gait  transfers  transitions  motor control  motor learning    Participation Restrictions:   Community and recreational activities    Activity limitations:   Learning and  applying knowledge  solving problems    General Tasks and Commands  undertaking multiple tasks    Communication  no deficits    Mobility  lifting and carrying objects  walking  driving (bike, car, motorcycle)    Self care  washing oneself (bathing, drying, washing hands)  looking after one's health    Domestic Life  shopping  cooking  doing house work (cleaning house, washing dishes, laundry)  assisting others    Interactions/Relationships  no deficits    Life Areas  employment    Community and Social Life  community life  recreation and leisure         moderate   Clinical Presentation stable and uncomplicated moderate   Decision Making/ Complexity Score: moderate     Goals:  Short Term Goals: 4 weeks   Pt will be compliant with HEP in order to maximize PT benefits  Pt will improve BLE MMT grades by >/=1/2 grade in order to improve strength for ADL completion  Pt will improve bilateral hamstring flexibility by at least 5 degrees in 90/90 hamstring assessment to improve transfers and ambulation.   Pt will complete TUG in </= 60 seconds with least restrictive assistive device in order to reduce risk for falls and improve safety with functional mobility  Pt will score >/= 15/28 on Tinetti balance test with least restrictive assistive device in order to reduce risk for falls and improve postural control  Pt will  perform 5x sit to  less than 50 seconds demonstrating improved BLE endurance and muscular power for transfers     Long Term Goals: 8 weeks   Pt will score </= 40% on FOTO limitation survey in order to improve self-perception of functional mobility deficits  Pt will improve BLE MMT grades by >/=1 grade in order to improve strength for ADL completion  Pt will improve bilateral hamstring flexibility by at least 15 degrees in 90/90 hamstring assessment to improve transfers and ambulation.  Pt will complete TUG in </= 45 seconds with least restrictive assistive device in order to reduce risk for falls and  improve safety with functional mobility  Pt will score >/= 19/28 on Tinetti with least restrictive assistive device in order to reduce risk for falls and improve postural control  Pt will  perform 5x sit to  less than 40 seconds demonstrating improved BLE endurance and muscular power for transfers   Pt will report 0 falls from initiation of PT management  Pt will begin some form of home/community fitness in order to sustain progress gained in PT    Plan   Plan of care Certification: 3/27/2023 to 5/19/2023.    Outpatient Physical Therapy 2 times weekly for 8 weeks to include the following interventions: Gait Training, Manual Therapy, Moist Heat/ Ice, Neuromuscular Re-ed, Orthotic Management and Training, Patient Education, Self Care, Therapeutic Activities, and Therapeutic Exercise.     Mary Moreira, PT

## 2023-04-04 ENCOUNTER — CLINICAL SUPPORT (OUTPATIENT)
Dept: REHABILITATION | Facility: HOSPITAL | Age: 73
End: 2023-04-04
Payer: MEDICARE

## 2023-04-04 DIAGNOSIS — Z78.9 IMPAIRED MOBILITY AND ACTIVITIES OF DAILY LIVING: Primary | ICD-10-CM

## 2023-04-04 DIAGNOSIS — Z74.09 IMPAIRED MOBILITY AND ACTIVITIES OF DAILY LIVING: Primary | ICD-10-CM

## 2023-04-04 PROCEDURE — 97116 GAIT TRAINING THERAPY: CPT | Mod: HCWC,PN

## 2023-04-04 PROCEDURE — 97110 THERAPEUTIC EXERCISES: CPT | Mod: HCWC,PN

## 2023-04-04 PROCEDURE — 97530 THERAPEUTIC ACTIVITIES: CPT | Mod: HCWC,PN

## 2023-04-04 NOTE — PROGRESS NOTES
"[facility]   Physical Therapy Treatment Note     Name: Ambrosio Montoya  Clinic Number: [id number]    Therapy Diagnosis:   Encounter Diagnosis   Name Primary?    Impaired mobility and activities of daily living Yes     Physician: Margarita Taylor MD    Visit Date: 4/4/2023    Physician Orders: PT Eval and Treat   Medical Diagnosis from Referral: G81.91 (ICD-10-CM) - Right hemiparesis   Evaluation Date: 3/27/2023  Authorization Period Expiration: 12/29/2023   Plan of Care Expiration: 5/19/2023  Visit # / Visits authorized: 2/ 40     Time In: 11:15 PM  Time Out: 12:00 PM  Total Billable Time: 45 minutes (1 TE, 1 gait, 1 TA)     Precautions: Standard, Diabetes, Fall, and HTN    PTA Visit #: 0/5     SUBJECTIVE     Pt reports: he does not wear his AFO much at home but he states he is able to yaima it indep.  He also stated that he can put this AFO indep with his "tennis shoes" that are best for the AFO.  He did not wear those shoes today.  He presented with his cane and AFO and knee cage in tow.   He was compliant with home exercise program.  Response to previous treatment: no adverse affects  Functional change: see goals    Pain: 0/10  Location: N/A    OBJECTIVE     Objective Measures updated at progress report unless specified.     Treatment     Ambrosio received the treatments listed below:      therapeutic exercises to develop strength, endurance, ROM, flexibility, posture, and core stabilization for 8 minutes including:  -- recumbent stepper  L4 sprints with cues to prevent hip abduction    neuromuscular re-education activities to improve: Balance, Coordination, and Sense for 5 minutes. The following activities were included:  -- pre-gait in // bars   Weight shifts x 20    therapeutic activities to improve functional performance for 10  minutes, including:    Donning and doffing of AFO and knee cage  Sit to stand x 5 from wheelchair with B UE support on armrests.     gait training to improve " functional mobility and safety for 15  minutes, including:  -- 3 trials gait training in gym with SPC and AFO on (20, 18, 22 feet)    Patient Education and Home Exercises     Home Exercises Provided and Patient Education Provided     Education provided:   - education on wearing AFO when walking  - education on need to perform some type of physical activity every hour (sit to stands x 10)  - need to walk in home at least 3x/day outside of bathroom trips.    Written Home Exercises Provided: Patient instructed to cont prior HEP. Exercises were reviewed and Ambrosio was able to demonstrate them prior to the end of the session.  Ambrosio demonstrated good  understanding of the education provided. See EMR under Patient Instructions for exercises provided during therapy sessions    ASSESSMENT     Ambrosio tolerated today's session fairly well but he lacks activity tolerance due to his very sedentary habits at home.  He does not use his AFO when walking at home and he sits or lies down most of the day at home.  He was not able to walk >25 feet today due to CV fatigue and lack of walking endurance.  His prognosis is fair is continues to sit and not walk in the home multiple times a day.     Ambrosio Is progressing well towards his goals.   Pt prognosis is Good.     Pt will continue to benefit from skilled outpatient physical therapy to address the deficits listed in the problem list box on initial evaluation, provide pt/family education and to maximize pt's level of independence in the home and community environment.     Pt's spiritual, cultural and educational needs considered and pt agreeable to plan of care and goals.     Anticipated barriers to physical therapy: sedentary lifestyle, chronic nature of CEREBROVASCULAR ACCIDENT, low endurance.     Goals:  Short Term Goals: 4 weeks   Pt will be compliant with HEP in order to maximize PT benefits (PROGRESSING, NOT MET)   Pt will improve BLE MMT grades by >/=1/2 grade in order to improve  strength for ADL completion (PROGRESSING, NOT MET)   Pt will improve bilateral hamstring flexibility by at least 5 degrees in 90/90 hamstring assessment to improve transfers and ambulation. (PROGRESSING, NOT MET)   Pt will complete TUG in </= 60 seconds with least restrictive assistive device in order to reduce risk for falls and improve safety with functional mobility (PROGRESSING, NOT MET)   Pt will score >/= 15/28 on Tinetti balance test with least restrictive assistive device in order to reduce risk for falls and improve postural control (PROGRESSING, NOT MET)   Pt will  perform 5x sit to  less than 50 seconds demonstrating improved BLE endurance and muscular power for transfers  (PROGRESSING, NOT MET)      Long Term Goals: 8 weeks   Pt will score </= 40% on FOTO limitation survey in order to improve self-perception of functional mobility deficits (PROGRESSING, NOT MET)   Pt will improve BLE MMT grades by >/=1 grade in order to improve strength for ADL completion (PROGRESSING, NOT MET)   Pt will improve bilateral hamstring flexibility by at least 15 degrees in 90/90 hamstring assessment to improve transfers and ambulation. (PROGRESSING, NOT MET)   Pt will complete TUG in </= 45 seconds with least restrictive assistive device in order to reduce risk for falls and improve safety with functional mobility (PROGRESSING, NOT MET)   Pt will score >/= 19/28 on Tinetti with least restrictive assistive device in order to reduce risk for falls and improve postural control (PROGRESSING, NOT MET)   Pt will  perform 5x sit to  less than 40 seconds demonstrating improved BLE endurance and muscular power for transfers (PROGRESSING, NOT MET)    Pt will report 0 falls from initiation of PT management (PROGRESSING, NOT MET)   Pt will begin some form of home/community fitness in order to sustain progress gained in PT (PROGRESSING, NOT MET)     PLAN     Progress patient in standing or walking as much a possible to  counter lack of activity in the home    Gayle Nath, PT

## 2023-04-13 ENCOUNTER — CLINICAL SUPPORT (OUTPATIENT)
Dept: REHABILITATION | Facility: HOSPITAL | Age: 73
End: 2023-04-13
Payer: MEDICARE

## 2023-04-13 DIAGNOSIS — Z78.9 IMPAIRED MOBILITY AND ACTIVITIES OF DAILY LIVING: Primary | ICD-10-CM

## 2023-04-13 DIAGNOSIS — Z74.09 IMPAIRED MOBILITY AND ACTIVITIES OF DAILY LIVING: Primary | ICD-10-CM

## 2023-04-13 PROCEDURE — 97116 GAIT TRAINING THERAPY: CPT | Mod: HCWC,PN,CQ

## 2023-04-13 PROCEDURE — 97112 NEUROMUSCULAR REEDUCATION: CPT | Mod: HCWC,PN,CQ

## 2023-04-13 PROCEDURE — 97110 THERAPEUTIC EXERCISES: CPT | Mod: HCWC,PN,CQ

## 2023-04-13 PROCEDURE — 97530 THERAPEUTIC ACTIVITIES: CPT | Mod: HCWC,PN,CQ

## 2023-04-13 NOTE — PROGRESS NOTES
"[facility]   Physical Therapy Treatment Note     Name: Ambrosio Montoya  Clinic Number: [id number]    Therapy Diagnosis:   Encounter Diagnosis   Name Primary?    Impaired mobility and activities of daily living Yes     Physician: Margarita Taylor MD    Visit Date: 4/13/2023    Physician Orders: PT Eval and Treat   Medical Diagnosis from Referral: G81.91 (ICD-10-CM) - Right hemiparesis   Evaluation Date: 3/27/2023  Authorization Period Expiration: 12/29/2023   Plan of Care Expiration: 5/19/2023  Visit # / Visits authorized: 2/40 (+eval)  FOTO #: 1/5  PTA Visit #: 1/5      Time In: 1:45 PM  Time Out: 2:40 PM  Total Billable Time: 55 minutes (1 TE, 1 gait, 1 TA, 1 NMR)     Precautions: Standard, Diabetes, Fall, and HTN    SUBJECTIVE     Pt reports: he does not wear his AFO much at home but he states he is able to yaima it indep.  He also stated that he can put this AFO indep with his "tennis shoes" that are best for the AFO.  He did not wear those shoes today.  He presented with his cane and AFO and knee cage in tow.   He was compliant with home exercise program.  Response to previous treatment: no adverse affects  Functional change: see goals    Pain: 0/10  Location: N/A    OBJECTIVE     Objective Measures updated at progress report unless specified.     Treatment     Ambrosio received the treatments listed below:      therapeutic exercises to develop strength, endurance, ROM, flexibility, posture, and core stabilization for 15 minutes including:  -- recumbent stepper            8 minutes L4 sprints with cues to prevent hip abduction  -- seated hip flexion                          2x45" (active rest)  -- seated HS Curl                             2 x 10 with yellow theraband (active rest)  -- seated hip abduction ISO             5" holds x 2 minutes (active rest)  -- seated hip adduction with ball       5" holds x 2 minutes (active rest)    neuromuscular re-education activities to improve: " Balance, Coordination, and Sense for 15 minutes. The following activities were included:  -- pre-gait in // bars   Weight shifts x 20  -- forward walking in // bars                4 lengths x 10 feet  -- backwards walking in // bars           4 lengths x 10 feet  -- lateral walking in // bars                   4 lengths x 10 feet      therapeutic activities to improve functional performance for 10 minutes, including:  Donning and doffing of R AFO and R knee cage prior to starting treatment  Sit to stand 2 x 5 from wheelchair with B UE support on armrests.     gait training to improve functional mobility and safety for 15 minutes, including:  -- 3 trials gait training in gym with SPC and AFO on (20, 18, 22 feet) CGA     Patient Education and Home Exercises     Home Exercises Provided and Patient Education Provided     Education provided:   - education on wearing AFO when walking  - education on need to perform some type of physical activity every hour (sit to stands x 10)  - need to walk in home at least 3x/day outside of bathroom trips.    Written Home Exercises Provided: Patient instructed to cont prior HEP. Exercises were reviewed and Ambrosio was able to demonstrate them prior to the end of the session.  Ambrosio demonstrated good  understanding of the education provided. See EMR under Patient Instructions for exercises provided during therapy sessions    ASSESSMENT     Ambrosio arrived to session without any complaints of pain and was agreeable to treatment. Fair tolerance of treatment today with continued decreased standing and overall activity tolerance observed.  Multiple seated rest breaks required and high levels of fatigue reported with low level strengthening.  He was only able to tolerate small distances ambulating in clinic with single point cane despite encouragement.  No shortness of breath observed but patient reported high levels of fatigue.  Despite decreased tolerance to activity, he appeared steady donning  AFO and walking with single point cane and no loss of balance observed.  He may benefit from continued PT services with regular attendance and increased activity at home to maximize the benefit of treatment.      Ambrosio Is progressing well towards his goals.   Pt prognosis is Good.     Pt will continue to benefit from skilled outpatient physical therapy to address the deficits listed in the problem list box on initial evaluation, provide pt/family education and to maximize pt's level of independence in the home and community environment.     Pt's spiritual, cultural and educational needs considered and pt agreeable to plan of care and goals.     Anticipated barriers to physical therapy: sedentary lifestyle, chronic nature of CEREBROVASCULAR ACCIDENT, low endurance.     Goals:  Short Term Goals: 4 weeks   Pt will be compliant with HEP in order to maximize PT benefits (PROGRESSING, NOT MET)   Pt will improve BLE MMT grades by >/=1/2 grade in order to improve strength for ADL completion (PROGRESSING, NOT MET)   Pt will improve bilateral hamstring flexibility by at least 5 degrees in 90/90 hamstring assessment to improve transfers and ambulation. (PROGRESSING, NOT MET)   Pt will complete TUG in </= 60 seconds with least restrictive assistive device in order to reduce risk for falls and improve safety with functional mobility (PROGRESSING, NOT MET)   Pt will score >/= 15/28 on Tinetti balance test with least restrictive assistive device in order to reduce risk for falls and improve postural control (PROGRESSING, NOT MET)   Pt will  perform 5x sit to  less than 50 seconds demonstrating improved BLE endurance and muscular power for transfers  (PROGRESSING, NOT MET)      Long Term Goals: 8 weeks   Pt will score </= 40% on FOTO limitation survey in order to improve self-perception of functional mobility deficits (PROGRESSING, NOT MET)   Pt will improve BLE MMT grades by >/=1 grade in order to improve strength for ADL  completion (PROGRESSING, NOT MET)   Pt will improve bilateral hamstring flexibility by at least 15 degrees in 90/90 hamstring assessment to improve transfers and ambulation. (PROGRESSING, NOT MET)   Pt will complete TUG in </= 45 seconds with least restrictive assistive device in order to reduce risk for falls and improve safety with functional mobility (PROGRESSING, NOT MET)   Pt will score >/= 19/28 on Tinetti with least restrictive assistive device in order to reduce risk for falls and improve postural control (PROGRESSING, NOT MET)   Pt will  perform 5x sit to  less than 40 seconds demonstrating improved BLE endurance and muscular power for transfers (PROGRESSING, NOT MET)    Pt will report 0 falls from initiation of PT management (PROGRESSING, NOT MET)   Pt will begin some form of home/community fitness in order to sustain progress gained in PT (PROGRESSING, NOT MET)     PLAN     Progress patient in standing or walking as much a possible to counter lack of activity in the home    Sheridan Wayne, PTA

## 2023-04-17 ENCOUNTER — CLINICAL SUPPORT (OUTPATIENT)
Dept: REHABILITATION | Facility: HOSPITAL | Age: 73
End: 2023-04-17
Payer: MEDICARE

## 2023-04-17 DIAGNOSIS — Z74.09 IMPAIRED MOBILITY AND ACTIVITIES OF DAILY LIVING: Primary | ICD-10-CM

## 2023-04-17 DIAGNOSIS — Z78.9 IMPAIRED MOBILITY AND ACTIVITIES OF DAILY LIVING: Primary | ICD-10-CM

## 2023-04-17 PROCEDURE — 97112 NEUROMUSCULAR REEDUCATION: CPT | Mod: HCWC,PN,CQ

## 2023-04-17 PROCEDURE — 97530 THERAPEUTIC ACTIVITIES: CPT | Mod: HCWC,PN,CQ

## 2023-04-17 PROCEDURE — 97116 GAIT TRAINING THERAPY: CPT | Mod: HCWC,PN,CQ

## 2023-04-17 PROCEDURE — 97110 THERAPEUTIC EXERCISES: CPT | Mod: HCWC,PN,CQ

## 2023-04-17 NOTE — PROGRESS NOTES
"[facility]   Physical Therapy Treatment Note     Name: Ambrosio Montoya  Clinic Number: [id number]    Therapy Diagnosis:   Encounter Diagnosis   Name Primary?    Impaired mobility and activities of daily living Yes     Physician: Margarita Taylor MD    Visit Date: 4/17/2023    Physician Orders: PT Eval and Treat   Medical Diagnosis from Referral: G81.91 (ICD-10-CM) - Right hemiparesis   Evaluation Date: 3/27/2023  Authorization Period Expiration: 12/29/2023   Plan of Care Expiration: 5/19/2023  Visit # / Visits authorized: 3/40 (+eval)  FOTO #: 3/5  PTA Visit #: 2/5      Time In: 12:00 PM  Time Out: 12:55 PM  Total Billable Time: 55 minutes (1 TE, 1 gait, 1 TA, 1 NMR)     Precautions: Standard, Diabetes, Fall, and HTN    SUBJECTIVE     Pt reports: He was sore following last session but otherwise feels fine.  He presented in wheelchair with AFO donned but no knee cage or walking stick today.   He was compliant with home exercise program.  Response to previous treatment: no adverse affects  Functional change: see goals    Pain: 0/10  Location: N/A    OBJECTIVE     Objective Measures updated at progress report unless specified.     Treatment     Ambrosio received the treatments listed below:      therapeutic exercises to develop strength, endurance, ROM, flexibility, posture, and core stabilization for 15 minutes including:  -- recumbent stepper            8 minutes L4 sprints with cues to prevent hip abduction  -- seated hip flexion                          2x45" (active rest)  -- seated HS Curl                             2 x 10 with yellow theraband (active rest)  -- seated hip abduction ISO             5" holds x 2 minutes (active rest)  -- seated hip adduction with ball       5" holds x 2 minutes (active rest)    neuromuscular re-education activities to improve: Balance, Coordination, and Sense for 15 minutes. The following activities were included:  -- pre-gait in // bars   Weight shifts x " 20  -- standing marching                           2 x 20  -- forward walking in // bars                4 lengths x 10 feet  -- backwards walking in // bars           4 lengths x 10 feet  -- lateral walking in // bars                   4 lengths x 10 feet      therapeutic activities to improve functional performance for 10 minutes, including:  Donning and doffing of R AFO prior to starting treatment  Sit to stand 2 x 8 from wheelchair with B UE support on armrests.     gait training to improve functional mobility and safety for 15 minutes, including:  -- 3 trials gait training in gym with rolling walker and AFO on (25 feet ea) CGA     Patient Education and Home Exercises     Home Exercises Provided and Patient Education Provided     Education provided:   - education on wearing AFO when walking  - education on need to perform some type of physical activity every hour (sit to stands x 10)  - need to walk in home at least 3x/day outside of bathroom trips.    Written Home Exercises Provided: Patient instructed to cont prior HEP. Exercises were reviewed and Ambrosio was able to demonstrate them prior to the end of the session.  Ambrosio demonstrated good  understanding of the education provided. See EMR under Patient Instructions for exercises provided during therapy sessions    ASSESSMENT     Ambrosio arrived to session without any complaints of pain and was agreeable to treatment. Fair tolerance of treatment today with continued decreased standing and overall activity tolerance observed.  Multiple seated rest breaks required and high levels of fatigue reported with low level strengthening.  He was better  able to tolerate small distances ambulating in clinic with rolling walker as opposed to single point cane last session. Increased verbal and tactile cuing for postural awareness with all standing activity with short lived improvement observed. No shortness of breath observed but patient reported high levels of fatigue.  He may  benefit from continued PT services with regular attendance and increased activity at home to maximize the benefit of treatment.      Ambrosio Is progressing well towards his goals.   Pt prognosis is Good.     Pt will continue to benefit from skilled outpatient physical therapy to address the deficits listed in the problem list box on initial evaluation, provide pt/family education and to maximize pt's level of independence in the home and community environment.     Pt's spiritual, cultural and educational needs considered and pt agreeable to plan of care and goals.     Anticipated barriers to physical therapy: sedentary lifestyle, chronic nature of CEREBROVASCULAR ACCIDENT, low endurance.     Goals:  Short Term Goals: 4 weeks   Pt will be compliant with HEP in order to maximize PT benefits (PROGRESSING, NOT MET)   Pt will improve BLE MMT grades by >/=1/2 grade in order to improve strength for ADL completion (PROGRESSING, NOT MET)   Pt will improve bilateral hamstring flexibility by at least 5 degrees in 90/90 hamstring assessment to improve transfers and ambulation. (PROGRESSING, NOT MET)   Pt will complete TUG in </= 60 seconds with least restrictive assistive device in order to reduce risk for falls and improve safety with functional mobility (PROGRESSING, NOT MET)   Pt will score >/= 15/28 on Tinetti balance test with least restrictive assistive device in order to reduce risk for falls and improve postural control (PROGRESSING, NOT MET)   Pt will  perform 5x sit to  less than 50 seconds demonstrating improved BLE endurance and muscular power for transfers  (PROGRESSING, NOT MET)      Long Term Goals: 8 weeks   Pt will score </= 40% on FOTO limitation survey in order to improve self-perception of functional mobility deficits (PROGRESSING, NOT MET)   Pt will improve BLE MMT grades by >/=1 grade in order to improve strength for ADL completion (PROGRESSING, NOT MET)   Pt will improve bilateral hamstring  flexibility by at least 15 degrees in 90/90 hamstring assessment to improve transfers and ambulation. (PROGRESSING, NOT MET)   Pt will complete TUG in </= 45 seconds with least restrictive assistive device in order to reduce risk for falls and improve safety with functional mobility (PROGRESSING, NOT MET)   Pt will score >/= 19/28 on Tinetti with least restrictive assistive device in order to reduce risk for falls and improve postural control (PROGRESSING, NOT MET)   Pt will  perform 5x sit to  less than 40 seconds demonstrating improved BLE endurance and muscular power for transfers (PROGRESSING, NOT MET)    Pt will report 0 falls from initiation of PT management (PROGRESSING, NOT MET)   Pt will begin some form of home/community fitness in order to sustain progress gained in PT (PROGRESSING, NOT MET)     PLAN     Progress patient in standing or walking as much a possible to counter lack of activity in the home    Sheridan Wayne, PTA

## 2023-04-20 ENCOUNTER — CLINICAL SUPPORT (OUTPATIENT)
Dept: REHABILITATION | Facility: HOSPITAL | Age: 73
End: 2023-04-20
Payer: MEDICARE

## 2023-04-20 DIAGNOSIS — Z74.09 IMPAIRED MOBILITY AND ACTIVITIES OF DAILY LIVING: Primary | ICD-10-CM

## 2023-04-20 DIAGNOSIS — Z78.9 IMPAIRED MOBILITY AND ACTIVITIES OF DAILY LIVING: Primary | ICD-10-CM

## 2023-04-20 PROCEDURE — 97530 THERAPEUTIC ACTIVITIES: CPT | Mod: HCWC,PN

## 2023-04-20 PROCEDURE — 97116 GAIT TRAINING THERAPY: CPT | Mod: HCWC,PN

## 2023-04-20 PROCEDURE — 97110 THERAPEUTIC EXERCISES: CPT | Mod: HCWC,PN

## 2023-04-20 NOTE — PROGRESS NOTES
"[facility]   Physical Therapy Treatment Note     Name: Ambrosio Montoya  [facility] Number: [id number]    Therapy Diagnosis:   Encounter Diagnosis   Name Primary?    Impaired mobility and activities of daily living Yes     Physician: Margarita Taylor MD    Visit Date: 4/20/2023    Physician Orders: PT Eval and Treat   Medical Diagnosis from Referral: G81.91 (ICD-10-CM) - Right hemiparesis   Evaluation Date: 3/27/2023  Authorization Period Expiration: 12/29/2023   Plan of Care Expiration: 5/19/2023  Visit # / Visits authorized: 4/40 (+eval)  FOTO #: issued 4/20/2023  PTA Visit #: 0/5      Time In: 12:30 PM  Time Out: 1:25 PM  Total Billable Time: 55 minutes (2 TE, 1 gait, 1 TA)     Precautions: Standard, Diabetes, Fall, and HTN    SUBJECTIVE     Pt reports: He is not sore and he was walking more.  He stated that he walked around this morning prior to coming.      He was compliant with home exercise program.  Response to previous treatment: no adverse affects  Functional change: see goals    Pain: 0/10  Location: N/A    OBJECTIVE     Objective Measures updated at progress report unless specified.     Treatment     Ambrosio received the treatments listed below:      therapeutic exercises to develop strength, endurance, ROM, flexibility, posture, and core stabilization for 27 minutes including:  -- recumbent stepper            10 minutes L3.5 sprints with cues to prevent hip abduction  -- seated hip flexion                          2x45" (active rest) 2#  -- seated HS Curl                             2 x 10 with yellow theraband (active rest)  -- seated hip adduction with ball       5" holds x 2 minutes (active rest)  -- standing marching                           2 x 20  NOT today  -- seated hip abduction ISO             5" holds x 2 minutes (active rest)    neuromuscular re-education activities to improve: Balance, Coordination, and Sense for 00 minutes. The following activities were " included:    therapeutic activities to improve functional performance for 10 minutes, including:  Donning and doffing of R AFO prior to starting treatment  Sit to stand 2 x 5 from wheelchair with B UE support on armrests.   FOTO functional assessment x 7 minutes    gait training to improve functional mobility and safety for 17 minutes, including:  -- 3 trials gait training in gym with rolling walker and AFO on (45 feet ea) CGA     Patient Education and Home Exercises     Home Exercises Provided and Patient Education Provided     Education provided:   - education on need to perform some type of physical activity every hour (sit to stands x 10)  - need to walk in home at least 3x/day outside of bathroom trips.    Written Home Exercises Provided: Patient instructed to cont prior HEP. Exercises were reviewed and Ambrosio was able to demonstrate them prior to the end of the session.  Ambrosio demonstrated good  understanding of the education provided. See EMR under Patient Instructions for exercises provided during therapy sessions    ASSESSMENT     Ambrosio arrived to session without any complaints of pain and was agreeable to treatment. Better tolerance of walking today (45 foot bouts of walking). Less seated rest breaks required and moderate levels of fatigue reported with low level strengthening.  He was better  able to tolerate distances ambulating in clinic with rolling walker still and he is using the rolling walker more at home vs the SPC. Increased verbal and tactile cuing for postural awareness with all standing activity with short lived improvement observed. No shortness of breath observed but patient reported high levels of fatigue.  He may benefit from continued PT services with regular attendance and increased activity at home to maximize the benefit of treatment.      Ambrosio Is progressing well towards his goals.   Pt prognosis is Good.     Pt will continue to benefit from skilled outpatient physical therapy to address  the deficits listed in the problem list box on initial evaluation, provide pt/family education and to maximize pt's level of independence in the home and community environment.     Pt's spiritual, cultural and educational needs considered and pt agreeable to plan of care and goals.     Anticipated barriers to physical therapy: sedentary lifestyle, chronic nature of CEREBROVASCULAR ACCIDENT, low endurance.     Goals:  Short Term Goals: 4 weeks   Pt will be compliant with HEP in order to maximize PT benefits (PROGRESSING, NOT MET)   Pt will improve BLE MMT grades by >/=1/2 grade in order to improve strength for ADL completion (PROGRESSING, NOT MET)   Pt will improve bilateral hamstring flexibility by at least 5 degrees in 90/90 hamstring assessment to improve transfers and ambulation. (PROGRESSING, NOT MET)   Pt will complete TUG in </= 60 seconds with least restrictive assistive device in order to reduce risk for falls and improve safety with functional mobility (PROGRESSING, NOT MET)   Pt will score >/= 15/28 on Tinetti balance test with least restrictive assistive device in order to reduce risk for falls and improve postural control (PROGRESSING, NOT MET)   Pt will  perform 5x sit to  less than 50 seconds demonstrating improved BLE endurance and muscular power for transfers  (PROGRESSING, NOT MET)      Long Term Goals: 8 weeks   Pt will score </= 40% on FOTO limitation survey in order to improve self-perception of functional mobility deficits (PROGRESSING, NOT MET)   Pt will improve BLE MMT grades by >/=1 grade in order to improve strength for ADL completion (PROGRESSING, NOT MET)   Pt will improve bilateral hamstring flexibility by at least 15 degrees in 90/90 hamstring assessment to improve transfers and ambulation. (PROGRESSING, NOT MET)   Pt will complete TUG in </= 45 seconds with least restrictive assistive device in order to reduce risk for falls and improve safety with functional mobility  (PROGRESSING, NOT MET)   Pt will score >/= 19/28 on Tinetti with least restrictive assistive device in order to reduce risk for falls and improve postural control (PROGRESSING, NOT MET)   Pt will  perform 5x sit to  less than 40 seconds demonstrating improved BLE endurance and muscular power for transfers (PROGRESSING, NOT MET)    Pt will report 0 falls from initiation of PT management (PROGRESSING, NOT MET)   Pt will begin some form of home/community fitness in order to sustain progress gained in PT (PROGRESSING, NOT MET)     PLAN     Progress patient in standing or walking as much a possible to counter lack of activity in the home    Gayle Nath, PT

## 2023-04-24 ENCOUNTER — CLINICAL SUPPORT (OUTPATIENT)
Dept: REHABILITATION | Facility: HOSPITAL | Age: 73
End: 2023-04-24
Payer: MEDICARE

## 2023-04-24 DIAGNOSIS — Z78.9 IMPAIRED MOBILITY AND ACTIVITIES OF DAILY LIVING: Primary | ICD-10-CM

## 2023-04-24 DIAGNOSIS — Z74.09 IMPAIRED MOBILITY AND ACTIVITIES OF DAILY LIVING: Primary | ICD-10-CM

## 2023-04-24 PROCEDURE — 97530 THERAPEUTIC ACTIVITIES: CPT | Mod: HCWC,PN

## 2023-04-24 PROCEDURE — 97110 THERAPEUTIC EXERCISES: CPT | Mod: HCWC,PN

## 2023-04-24 PROCEDURE — 97116 GAIT TRAINING THERAPY: CPT | Mod: HCWC,PN

## 2023-04-24 NOTE — PROGRESS NOTES
"  [facility]   Physical Therapy Treatment Note     Name: Ambrosio Montoya  Clinic Number: [id number]    Therapy Diagnosis:   Encounter Diagnosis   Name Primary?    Impaired mobility and activities of daily living Yes     Physician: Margarita Taylor MD    Visit Date: 4/24/2023    Physician Orders: PT Eval and Treat   Medical Diagnosis from Referral: G81.91 (ICD-10-CM) - Right hemiparesis   Evaluation Date: 3/27/2023  Authorization Period Expiration: 12/29/2023   Plan of Care Expiration: 5/19/2023  Visit # / Visits authorized: 5/40 (+eval)  FOTO #: issued 4/20/2023  PTA Visit #: 0/5      Time In: 11:15 AM  Time Out: 12:00 PM  Total Billable Time: 45 minutes (2 TE, 1 gait, 1 TA)     Precautions: Standard, Diabetes, Fall, and HTN    SUBJECTIVE     Pt reports: Pt without complaints, he arrived with AFO in place     He was compliant with home exercise program.  Response to previous treatment: no adverse affects  Functional change: see goals    Pain: 0/10  Location: N/A    OBJECTIVE     Objective Measures updated at progress report unless specified.     Treatment     Ambrosio received the treatments listed below:      therapeutic exercises to develop strength, endurance, ROM, flexibility, posture, and core stabilization for 25 minutes including:  -- recumbent stepper            10 minutes L3.5 sprints with cues to prevent hip abduction  -- seated hip flexion                          2x45" (active rest) 2#  -- standing marching                           2 x 20    Not performed:  -- seated HS Curl                             2 x 10 with yellow theraband (active rest)  -- seated hip adduction with ball       5" holds x 2 minutes (active rest)      neuromuscular re-education activities to improve: Balance, Coordination, and Sense for 00 minutes. The following activities were included:    therapeutic activities to improve functional performance for 10 minutes, including:  - Sit to stand 2 x 5 from " wheelchair with B UE support on armrests.     gait training to improve functional mobility and safety for 10 minutes, including:  -- 2 trials gait training in gym with rolling walker and AFO on (74 feet ea) CGA     Patient Education and Home Exercises     Home Exercises Provided and Patient Education Provided     Education provided:   - education on need to perform some type of physical activity every hour (sit to stands x 10)  - need to walk in home at least 3x/day outside of bathroom trips.    Written Home Exercises Provided: Patient instructed to cont prior HEP. Exercises were reviewed and Ambrosio was able to demonstrate them prior to the end of the session.  Ambrosio demonstrated good  understanding of the education provided. See EMR under Patient Instructions for exercises provided during therapy sessions    ASSESSMENT     Ambrosio arrived to session without any complaints of pain and was agreeable to treatment. Pt was able to ambulate increased distance today 74 foot bouts but less trials today. Pt responded well to cuing to improve sit to stand mechanics but carryover is limited..  He may benefit from continued PT services with regular attendance and increased activity at home to maximize the benefit of treatment.      Ambrosio Is progressing well towards his goals.   Pt prognosis is Good.     Pt will continue to benefit from skilled outpatient physical therapy to address the deficits listed in the problem list box on initial evaluation, provide pt/family education and to maximize pt's level of independence in the home and community environment.     Pt's spiritual, cultural and educational needs considered and pt agreeable to plan of care and goals.     Anticipated barriers to physical therapy: sedentary lifestyle, chronic nature of CEREBROVASCULAR ACCIDENT, low endurance.     Goals:  Short Term Goals: 4 weeks   Pt will be compliant with HEP in order to maximize PT benefits (PROGRESSING, NOT MET)   Pt will improve BLE MMT  grades by >/=1/2 grade in order to improve strength for ADL completion (PROGRESSING, NOT MET)   Pt will improve bilateral hamstring flexibility by at least 5 degrees in 90/90 hamstring assessment to improve transfers and ambulation. (PROGRESSING, NOT MET)   Pt will complete TUG in </= 60 seconds with least restrictive assistive device in order to reduce risk for falls and improve safety with functional mobility (PROGRESSING, NOT MET)   Pt will score >/= 15/28 on Tinetti balance test with least restrictive assistive device in order to reduce risk for falls and improve postural control (PROGRESSING, NOT MET)   Pt will  perform 5x sit to  less than 50 seconds demonstrating improved BLE endurance and muscular power for transfers  (PROGRESSING, NOT MET)      Long Term Goals: 8 weeks   Pt will score </= 40% on FOTO limitation survey in order to improve self-perception of functional mobility deficits (PROGRESSING, NOT MET)   Pt will improve BLE MMT grades by >/=1 grade in order to improve strength for ADL completion (PROGRESSING, NOT MET)   Pt will improve bilateral hamstring flexibility by at least 15 degrees in 90/90 hamstring assessment to improve transfers and ambulation. (PROGRESSING, NOT MET)   Pt will complete TUG in </= 45 seconds with least restrictive assistive device in order to reduce risk for falls and improve safety with functional mobility (PROGRESSING, NOT MET)   Pt will score >/= 19/28 on Tinetti with least restrictive assistive device in order to reduce risk for falls and improve postural control (PROGRESSING, NOT MET)   Pt will  perform 5x sit to  less than 40 seconds demonstrating improved BLE endurance and muscular power for transfers (PROGRESSING, NOT MET)    Pt will report 0 falls from initiation of PT management (PROGRESSING, NOT MET)   Pt will begin some form of home/community fitness in order to sustain progress gained in PT (PROGRESSING, NOT MET)     PLAN     Progress patient in  standing or walking as much a possible to counter lack of activity in the home    Mary Moreira, PT

## 2023-04-25 ENCOUNTER — DOCUMENTATION ONLY (OUTPATIENT)
Dept: REHABILITATION | Facility: HOSPITAL | Age: 73
End: 2023-04-25
Payer: MEDICARE

## 2023-04-25 NOTE — PROGRESS NOTES
Face to Face PTA Conference performed with Sheridan Wayne PTA regarding patient's current status, overall progress, and plan of care.    Mary Moreira, PT

## 2023-04-27 ENCOUNTER — CLINICAL SUPPORT (OUTPATIENT)
Dept: REHABILITATION | Facility: HOSPITAL | Age: 73
End: 2023-04-27
Payer: MEDICARE

## 2023-04-27 DIAGNOSIS — Z74.09 IMPAIRED MOBILITY AND ACTIVITIES OF DAILY LIVING: Primary | ICD-10-CM

## 2023-04-27 DIAGNOSIS — Z78.9 IMPAIRED MOBILITY AND ACTIVITIES OF DAILY LIVING: Primary | ICD-10-CM

## 2023-04-27 PROCEDURE — 97530 THERAPEUTIC ACTIVITIES: CPT | Mod: HCWC,PN

## 2023-04-27 PROCEDURE — 97110 THERAPEUTIC EXERCISES: CPT | Mod: HCWC,PN

## 2023-04-27 NOTE — PROGRESS NOTES
"  [facility]   Physical Therapy Treatment Note     Name: Ambrosio Montoya  Clinic Number: [id number]    Therapy Diagnosis:   Encounter Diagnosis   Name Primary?    Impaired mobility and activities of daily living Yes     Physician: Margarita Taylor MD    Visit Date: 4/27/2023    Physician Orders: PT Eval and Treat   Medical Diagnosis from Referral: G81.91 (ICD-10-CM) - Right hemiparesis   Evaluation Date: 3/27/2023  Authorization Period Expiration: 12/29/2023   Plan of Care Expiration: 5/19/2023  Visit # / Visits authorized: 6/40 (+eval)  FOTO #: issued 4/20/2023  PTA Visit #: 0/5      Time In: 1:00 PM  Time Out: 1:45 PM  Total Billable Time: 45 minutes (1 TE,  2TA)     Precautions: Standard, Diabetes, Fall, and HTN    SUBJECTIVE     Pt reports: Pt without complaints, he arrived with AFO in place     He was compliant with home exercise program.  Response to previous treatment: no adverse affects  Functional change: see goals    Pain: 0/10  Location: N/A    OBJECTIVE     Objective Measures updated at progress report unless specified.     Treatment     Ambrosio received the treatments listed below:      therapeutic exercises to develop strength, endurance, ROM, flexibility, posture, and core stabilization for 20 minutes including:  -- recumbent stepper            8 minutes L3.5 sprints with cues to prevent hip abduction  -- standing marching                         3x 30"  -- seated hip adduction with ball       5" holds x 2 minutes (active rest)    Not performed:  -- seated HS Curl                             2 x 10 with yellow theraband (active rest)  -- seated hip flexion                          2x45" (active rest) 2#      neuromuscular re-education activities to improve: Balance, Coordination, and Sense for 00 minutes. The following activities were included:    therapeutic activities to improve functional performance for 25 minutes, including:  - Sit to stand 2 x 5 from wheelchair with B UE " support on armrests.   -- 4 trials gait training in gym with rolling walker and AFO on (75 feet ea) CGA  (no wc follow)        Patient Education and Home Exercises     Home Exercises Provided and Patient Education Provided     Education provided:   - education on need to perform some type of physical activity every hour (sit to stands x 10)  - need to walk in home at least 3x/day outside of bathroom trips.    Written Home Exercises Provided: Patient instructed to cont prior HEP. Exercises were reviewed and Ambrosio was able to demonstrate them prior to the end of the session.  Ambrosio demonstrated good  understanding of the education provided. See EMR under Patient Instructions for exercises provided during therapy sessions    ASSESSMENT     Ambrosio arrived to session without any complaints of pain and was agreeable to treatment. Pt was able to ambulate increased distance today, 75 feet and as well as tolerate increased number of trials.  Pt's sit to stand transfer is improving but patient does occasionally need cuing for correct hand placement.  He may benefit from continued PT services with regular attendance and increased activity at home to maximize the benefit of treatment.      Ambrosio Is progressing well towards his goals.   Pt prognosis is Good.     Pt will continue to benefit from skilled outpatient physical therapy to address the deficits listed in the problem list box on initial evaluation, provide pt/family education and to maximize pt's level of independence in the home and community environment.     Pt's spiritual, cultural and educational needs considered and pt agreeable to plan of care and goals.     Anticipated barriers to physical therapy: sedentary lifestyle, chronic nature of CEREBROVASCULAR ACCIDENT, low endurance.     Goals:  Short Term Goals: 4 weeks   Pt will be compliant with HEP in order to maximize PT benefits (PROGRESSING, NOT MET)   Pt will improve BLE MMT grades by >/=1/2 grade in order to improve  strength for ADL completion (PROGRESSING, NOT MET)   Pt will improve bilateral hamstring flexibility by at least 5 degrees in 90/90 hamstring assessment to improve transfers and ambulation. (PROGRESSING, NOT MET)   Pt will complete TUG in </= 60 seconds with least restrictive assistive device in order to reduce risk for falls and improve safety with functional mobility (PROGRESSING, NOT MET)   Pt will score >/= 15/28 on Tinetti balance test with least restrictive assistive device in order to reduce risk for falls and improve postural control (PROGRESSING, NOT MET)   Pt will  perform 5x sit to  less than 50 seconds demonstrating improved BLE endurance and muscular power for transfers  (PROGRESSING, NOT MET)      Long Term Goals: 8 weeks   Pt will score </= 40% on FOTO limitation survey in order to improve self-perception of functional mobility deficits (PROGRESSING, NOT MET)   Pt will improve BLE MMT grades by >/=1 grade in order to improve strength for ADL completion (PROGRESSING, NOT MET)   Pt will improve bilateral hamstring flexibility by at least 15 degrees in 90/90 hamstring assessment to improve transfers and ambulation. (PROGRESSING, NOT MET)   Pt will complete TUG in </= 45 seconds with least restrictive assistive device in order to reduce risk for falls and improve safety with functional mobility (PROGRESSING, NOT MET)   Pt will score >/= 19/28 on Tinetti with least restrictive assistive device in order to reduce risk for falls and improve postural control (PROGRESSING, NOT MET)   Pt will  perform 5x sit to  less than 40 seconds demonstrating improved BLE endurance and muscular power for transfers (PROGRESSING, NOT MET)    Pt will report 0 falls from initiation of PT management (PROGRESSING, NOT MET)   Pt will begin some form of home/community fitness in order to sustain progress gained in PT (PROGRESSING, NOT MET)     PLAN     Progress patient in standing or walking as much a possible to  counter lack of activity in the home    Mary Moreira, PT

## 2023-05-04 ENCOUNTER — CLINICAL SUPPORT (OUTPATIENT)
Dept: REHABILITATION | Facility: HOSPITAL | Age: 73
End: 2023-05-04
Payer: MEDICARE

## 2023-05-04 DIAGNOSIS — Z78.9 IMPAIRED MOBILITY AND ACTIVITIES OF DAILY LIVING: Primary | ICD-10-CM

## 2023-05-04 DIAGNOSIS — Z74.09 IMPAIRED MOBILITY AND ACTIVITIES OF DAILY LIVING: Primary | ICD-10-CM

## 2023-05-04 PROCEDURE — 97530 THERAPEUTIC ACTIVITIES: CPT | Mod: HCWC,PN,CQ

## 2023-05-04 PROCEDURE — 97110 THERAPEUTIC EXERCISES: CPT | Mod: HCWC,PN,CQ

## 2023-05-04 NOTE — PROGRESS NOTES
"  [facility]   Physical Therapy Treatment Note     Name: Ambrosio Montoya  Clinic Number: [id number]    Therapy Diagnosis:   Encounter Diagnosis   Name Primary?    Impaired mobility and activities of daily living Yes     Physician: Margarita Taylor MD    Visit Date: 5/4/2023    Physician Orders: PT Eval and Treat   Medical Diagnosis from Referral: G81.91 (ICD-10-CM) - Right hemiparesis   Evaluation Date: 3/27/2023  Authorization Period Expiration: 12/29/2023   Plan of Care Expiration: 5/19/2023  Visit # / Visits authorized: 7/40 (+eval)  FOTO #: issued 4/20/2023  PTA Visit #: 1/5      Time In: 11:05 AM  Time Out: 12:00 PM  Total Billable Time: 55 minutes (2 TE,  2TA)     Precautions: Standard, Diabetes, Fall, and HTN    SUBJECTIVE     Pt reports: Pt without complaints, he arrived with AFO in place     He was compliant with home exercise program.  Response to previous treatment: no adverse affects  Functional change: see goals    Pain: 0/10  Location: N/A    OBJECTIVE     Objective Measures updated at progress report unless specified.     Treatment     Ambrosio received the treatments listed below:      therapeutic exercises to develop strength, endurance, ROM, flexibility, posture, and core stabilization for 30 minutes including:  -- recumbent stepper            10 minutes L3.5 sprints with cues to prevent hip abduction  -- standing marching                         3x 30"  -- seated hip adduction with ball       5" holds x 2 minutes (active rest)  --seated hip abduction                      5" holds x 2 minutes with yellow theraband  -- seated HS Curl                             2 x 10 with yellow theraband (active rest)  -- seated hip flexion                          2x45" (active rest) 3#      neuromuscular re-education activities to improve: Balance, Coordination, and Sense for 00 minutes. The following activities were included:    therapeutic activities to improve functional performance for " 25 minutes, including:  -- Sit to stand 2 x 5 from wheelchair with B UE support on armrests.   -- 3 trials gait training in gym with rolling walker and AFO on (80 feet ea) CGA  (no wc follow)        Patient Education and Home Exercises     Home Exercises Provided and Patient Education Provided     Education provided:   - education on need to perform some type of physical activity every hour (sit to stands x 10)  - need to walk in home at least 3x/day outside of bathroom trips.    Written Home Exercises Provided: Patient instructed to cont prior HEP. Exercises were reviewed and Ambrosio was able to demonstrate them prior to the end of the session.  Ambrosio demonstrated good  understanding of the education provided. See EMR under Patient Instructions for exercises provided during therapy sessions    ASSESSMENT     Ambrosio arrived to session without any complaints of pain and was agreeable to treatment. He was able to tolerate increased distance of in clinic ambulation trials, 80 feet, with rolling walker though signs of muscle fatigue observed towards the end of each trial.  Brief seated rest breaks required following each walking trial.  Endurance deficits would be progressing quicker with both regular attendance and increased activity at home.  Still requiring heavy verbal and tactile cuing for proper sequencing for sit to stand transfer mechanics as well as for increased safety awareness.  He may benefit from continued PT services with regular attendance and increased activity at home to maximize the benefit of treatment.      Ambrosio Is progressing well towards his goals.   Pt prognosis is Good.     Pt will continue to benefit from skilled outpatient physical therapy to address the deficits listed in the problem list box on initial evaluation, provide pt/family education and to maximize pt's level of independence in the home and community environment.     Pt's spiritual, cultural and educational needs considered and pt  agreeable to plan of care and goals.     Anticipated barriers to physical therapy: sedentary lifestyle, chronic nature of CEREBROVASCULAR ACCIDENT, low endurance.     Goals:  Short Term Goals: 4 weeks   Pt will be compliant with HEP in order to maximize PT benefits (PROGRESSING, NOT MET)   Pt will improve BLE MMT grades by >/=1/2 grade in order to improve strength for ADL completion (PROGRESSING, NOT MET)   Pt will improve bilateral hamstring flexibility by at least 5 degrees in 90/90 hamstring assessment to improve transfers and ambulation. (PROGRESSING, NOT MET)   Pt will complete TUG in </= 60 seconds with least restrictive assistive device in order to reduce risk for falls and improve safety with functional mobility (PROGRESSING, NOT MET)   Pt will score >/= 15/28 on Tinetti balance test with least restrictive assistive device in order to reduce risk for falls and improve postural control (PROGRESSING, NOT MET)   Pt will  perform 5x sit to  less than 50 seconds demonstrating improved BLE endurance and muscular power for transfers  (PROGRESSING, NOT MET)      Long Term Goals: 8 weeks   Pt will score </= 40% on FOTO limitation survey in order to improve self-perception of functional mobility deficits (PROGRESSING, NOT MET)   Pt will improve BLE MMT grades by >/=1 grade in order to improve strength for ADL completion (PROGRESSING, NOT MET)   Pt will improve bilateral hamstring flexibility by at least 15 degrees in 90/90 hamstring assessment to improve transfers and ambulation. (PROGRESSING, NOT MET)   Pt will complete TUG in </= 45 seconds with least restrictive assistive device in order to reduce risk for falls and improve safety with functional mobility (PROGRESSING, NOT MET)   Pt will score >/= 19/28 on Tinetti with least restrictive assistive device in order to reduce risk for falls and improve postural control (PROGRESSING, NOT MET)   Pt will  perform 5x sit to  less than 40 seconds  demonstrating improved BLE endurance and muscular power for transfers (PROGRESSING, NOT MET)    Pt will report 0 falls from initiation of PT management (PROGRESSING, NOT MET)   Pt will begin some form of home/community fitness in order to sustain progress gained in PT (PROGRESSING, NOT MET)     PLAN     Progress patient in standing or walking as much a possible to counter lack of activity in the home    Sheridan Wayne, PTA

## 2023-05-08 ENCOUNTER — CLINICAL SUPPORT (OUTPATIENT)
Dept: REHABILITATION | Facility: HOSPITAL | Age: 73
End: 2023-05-08
Payer: MEDICARE

## 2023-05-08 DIAGNOSIS — Z78.9 IMPAIRED MOBILITY AND ACTIVITIES OF DAILY LIVING: Primary | ICD-10-CM

## 2023-05-08 DIAGNOSIS — Z74.09 IMPAIRED MOBILITY AND ACTIVITIES OF DAILY LIVING: Primary | ICD-10-CM

## 2023-05-08 PROCEDURE — 97110 THERAPEUTIC EXERCISES: CPT | Mod: HCWC,PN,CQ

## 2023-05-08 PROCEDURE — 97530 THERAPEUTIC ACTIVITIES: CPT | Mod: HCWC,PN,CQ

## 2023-05-08 NOTE — PROGRESS NOTES
"  [facility]   Physical Therapy Treatment Note     Name: Ambrosio Montoya  Clinic Number: [id number]    Therapy Diagnosis:   Encounter Diagnosis   Name Primary?    Impaired mobility and activities of daily living Yes     Physician: Margarita Taylor MD    Visit Date: 5/8/2023    Physician Orders: PT Eval and Treat   Medical Diagnosis from Referral: G81.91 (ICD-10-CM) - Right hemiparesis   Evaluation Date: 3/27/2023  Authorization Period Expiration: 12/29/2023   Plan of Care Expiration: 5/19/2023  Visit # / Visits authorized: 8/40 (+eval)  FOTO #: issued 4/20/2023  PTA Visit #: 2/5      Time In: 11:20 AM  Time Out: 12:28 PM  Total Billable Time: 68 minutes (3 TE,  2TA)     Precautions: Standard, Diabetes, Fall, and HTN    SUBJECTIVE     Pt reports: Pt without complaints, he arrived with AFO in place   He was compliant with home exercise program.  Response to previous treatment: no adverse affects  Functional change: see goals    Pain: 0/10  Location: N/A     OBJECTIVE     Objective Measures updated at progress report unless specified.     Treatment     Ambrosio received the treatments listed below:      therapeutic exercises to develop strength, endurance, ROM, flexibility, posture, and core stabilization for 38 minutes including:  -- recumbent stepper            10 minutes L3.5 sprints with cues to prevent hip abduction  -- standing marching                         3x 30"  -- seated hip adduction with ball       5" holds x 2 minutes (active rest)  -- seated hip abduction                      5" holds x 2 minutes with yellow theraband  -- seated HS Curl                             2 x 10 with yellow theraband (active rest)  -- seated hip flexion                          2x45" (active rest) 3#      neuromuscular re-education activities to improve: Balance, Coordination, and Sense for 00 minutes. The following activities were included:    therapeutic activities to improve functional performance for " 30 minutes, including:  -- Sit to stand 2 x 8 from wheelchair with B UE support on armrests.   -- 4 trials gait training in gym with rolling walker and AFO on (40 feet, 40 feet, 80 feet, 80 feet) CGA  (no wc follow)             * Seated rest break requested after each walking trial      Patient Education and Home Exercises     Home Exercises Provided and Patient Education Provided     Education provided:   - education on need to perform some type of physical activity every hour (sit to stands x 10)  - need to walk in home at least 3x/day outside of bathroom trips.    Written Home Exercises Provided: Patient instructed to cont prior HEP. Exercises were reviewed and Ambrosio was able to demonstrate them prior to the end of the session.  Ambrosio demonstrated good  understanding of the education provided. See EMR under Patient Instructions for exercises provided during therapy sessions    ASSESSMENT     Ambrosio arrived to session without any complaints of pain and was agreeable to treatment. He was able to tolerate increased distance of in clinic ambulation trials, 80 feet, with rolling walker though signs of muscle fatigue observed towards the end of each trial.  Brief seated rest breaks required following each walking trial.  Endurance deficits would be progressing quicker with both regular attendance and increased activity at home.  Still requiring heavy verbal and tactile cuing for proper sequencing for sit to stand transfer mechanics as well as for increased safety awareness.  He may benefit from continued PT services with regular attendance and increased activity at home to maximize the benefit of treatment.      Ambrosio Is progressing well towards his goals.   Pt prognosis is Good.     Pt will continue to benefit from skilled outpatient physical therapy to address the deficits listed in the problem list box on initial evaluation, provide pt/family education and to maximize pt's level of independence in the home and community  environment.     Pt's spiritual, cultural and educational needs considered and pt agreeable to plan of care and goals.     Anticipated barriers to physical therapy: sedentary lifestyle, chronic nature of CEREBROVASCULAR ACCIDENT, low endurance.     Goals:  Short Term Goals: 4 weeks   Pt will be compliant with HEP in order to maximize PT benefits (PROGRESSING, NOT MET)   Pt will improve BLE MMT grades by >/=1/2 grade in order to improve strength for ADL completion (PROGRESSING, NOT MET)   Pt will improve bilateral hamstring flexibility by at least 5 degrees in 90/90 hamstring assessment to improve transfers and ambulation. (PROGRESSING, NOT MET)   Pt will complete TUG in </= 60 seconds with least restrictive assistive device in order to reduce risk for falls and improve safety with functional mobility (PROGRESSING, NOT MET)   Pt will score >/= 15/28 on Tinetti balance test with least restrictive assistive device in order to reduce risk for falls and improve postural control (PROGRESSING, NOT MET)   Pt will  perform 5x sit to  less than 50 seconds demonstrating improved BLE endurance and muscular power for transfers  (PROGRESSING, NOT MET)      Long Term Goals: 8 weeks   Pt will score </= 40% on FOTO limitation survey in order to improve self-perception of functional mobility deficits (PROGRESSING, NOT MET)   Pt will improve BLE MMT grades by >/=1 grade in order to improve strength for ADL completion (PROGRESSING, NOT MET)   Pt will improve bilateral hamstring flexibility by at least 15 degrees in 90/90 hamstring assessment to improve transfers and ambulation. (PROGRESSING, NOT MET)   Pt will complete TUG in </= 45 seconds with least restrictive assistive device in order to reduce risk for falls and improve safety with functional mobility (PROGRESSING, NOT MET)   Pt will score >/= 19/28 on Tinetti with least restrictive assistive device in order to reduce risk for falls and improve postural control  (PROGRESSING, NOT MET)   Pt will  perform 5x sit to  less than 40 seconds demonstrating improved BLE endurance and muscular power for transfers (PROGRESSING, NOT MET)    Pt will report 0 falls from initiation of PT management (PROGRESSING, NOT MET)   Pt will begin some form of home/community fitness in order to sustain progress gained in PT (PROGRESSING, NOT MET)     PLAN     Progress patient in standing or walking as much a possible to counter lack of activity in the home    Sheridan Wayne, PTA

## 2023-05-15 NOTE — PROGRESS NOTES
"  [facility]   Physical Therapy Treatment Note     Name: Ambrosio Montoya  Clinic Number: [id number]    Therapy Diagnosis:   No diagnosis found.    Physician: Margarita Taylor MD    Visit Date: 5/16/2023    Physician Orders: PT Eval and Treat   Medical Diagnosis from Referral: G81.91 (ICD-10-CM) - Right hemiparesis   Evaluation Date: 3/27/2023  Authorization Period Expiration: 12/29/2023   Plan of Care Expiration: 5/19/2023  Visit # / Visits authorized: 9/40 (+eval)  FOTO #: issued 4/20/2023  PTA Visit #: 0/5      Time In: ***  Time Out: ***  Total Billable Time: *** minutes      Precautions: Standard, Diabetes, Fall, and HTN    SUBJECTIVE     Pt reports: Pt without complaints, he arrived with AFO in place   He was compliant with home exercise program.  Response to previous treatment: no adverse affects  Functional change: see goals    Pain: 0/10  Location: N/A     OBJECTIVE     Objective Measures updated at progress report unless specified.     Treatment     Ambrosio received the treatments listed below:      therapeutic exercises to develop strength, endurance, ROM, flexibility, posture, and core stabilization for 38 minutes including:  -- recumbent stepper            10 minutes L3.5 sprints with cues to prevent hip abduction  -- standing marching                         3x 30"  -- seated hip adduction with ball       5" holds x 2 minutes (active rest)  -- seated hip abduction                      5" holds x 2 minutes with yellow theraband  -- seated HS Curl                             2 x 10 with yellow theraband (active rest)  -- seated hip flexion                          2x45" (active rest) 3#      neuromuscular re-education activities to improve: Balance, Coordination, and Sense for 00 minutes. The following activities were included:    therapeutic activities to improve functional performance for 30 minutes, including:  -- Sit to stand 2 x 8 from wheelchair with B UE support on armrests. "   -- 4 trials gait training in gym with rolling walker and AFO on (40 feet, 40 feet, 80 feet, 80 feet) CGA  (no wc follow)             * Seated rest break requested after each walking trial      Patient Education and Home Exercises     Home Exercises Provided and Patient Education Provided     Education provided:   - education on need to perform some type of physical activity every hour (sit to stands x 10)  - need to walk in home at least 3x/day outside of bathroom trips.    Written Home Exercises Provided: Patient instructed to cont prior HEP. Exercises were reviewed and Ambrosio was able to demonstrate them prior to the end of the session.  Ambrosio demonstrated good  understanding of the education provided. See EMR under Patient Instructions for exercises provided during therapy sessions    ASSESSMENT     Ambrosio arrived to session without any complaints of pain and was agreeable to treatment. He was able to tolerate increased distance of in clinic ambulation trials, 80 feet, with rolling walker though signs of muscle fatigue observed towards the end of each trial.  Brief seated rest breaks required following each walking trial.  Endurance deficits would be progressing quicker with both regular attendance and increased activity at home.  Still requiring heavy verbal and tactile cuing for proper sequencing for sit to stand transfer mechanics as well as for increased safety awareness.  He may benefit from continued PT services with regular attendance and increased activity at home to maximize the benefit of treatment.      Ambrosio Is progressing well towards his goals.   Pt prognosis is Good.     Pt will continue to benefit from skilled outpatient physical therapy to address the deficits listed in the problem list box on initial evaluation, provide pt/family education and to maximize pt's level of independence in the home and community environment.     Pt's spiritual, cultural and educational needs considered and pt agreeable  to plan of care and goals.     Anticipated barriers to physical therapy: sedentary lifestyle, chronic nature of CEREBROVASCULAR ACCIDENT, low endurance.     Goals:  Short Term Goals: 4 weeks   Pt will be compliant with HEP in order to maximize PT benefits (PROGRESSING, NOT MET)   Pt will improve BLE MMT grades by >/=1/2 grade in order to improve strength for ADL completion (PROGRESSING, NOT MET)   Pt will improve bilateral hamstring flexibility by at least 5 degrees in 90/90 hamstring assessment to improve transfers and ambulation. (PROGRESSING, NOT MET)   Pt will complete TUG in </= 60 seconds with least restrictive assistive device in order to reduce risk for falls and improve safety with functional mobility (PROGRESSING, NOT MET)   Pt will score >/= 15/28 on Tinetti balance test with least restrictive assistive device in order to reduce risk for falls and improve postural control (PROGRESSING, NOT MET)   Pt will  perform 5x sit to  less than 50 seconds demonstrating improved BLE endurance and muscular power for transfers  (PROGRESSING, NOT MET)      Long Term Goals: 8 weeks   Pt will score </= 40% on FOTO limitation survey in order to improve self-perception of functional mobility deficits (PROGRESSING, NOT MET)   Pt will improve BLE MMT grades by >/=1 grade in order to improve strength for ADL completion (PROGRESSING, NOT MET)   Pt will improve bilateral hamstring flexibility by at least 15 degrees in 90/90 hamstring assessment to improve transfers and ambulation. (PROGRESSING, NOT MET)   Pt will complete TUG in </= 45 seconds with least restrictive assistive device in order to reduce risk for falls and improve safety with functional mobility (PROGRESSING, NOT MET)   Pt will score >/= 19/28 on Tinetti with least restrictive assistive device in order to reduce risk for falls and improve postural control (PROGRESSING, NOT MET)   Pt will  perform 5x sit to  less than 40 seconds demonstrating  improved BLE endurance and muscular power for transfers (PROGRESSING, NOT MET)    Pt will report 0 falls from initiation of PT management (PROGRESSING, NOT MET)   Pt will begin some form of home/community fitness in order to sustain progress gained in PT (PROGRESSING, NOT MET)     PLAN     Progress patient in standing or walking as much a possible to counter lack of activity in the home    Haroldo Claros, PT

## 2023-05-16 ENCOUNTER — CLINICAL SUPPORT (OUTPATIENT)
Dept: REHABILITATION | Facility: HOSPITAL | Age: 73
End: 2023-05-16
Payer: MEDICARE

## 2023-05-16 DIAGNOSIS — Z74.09 IMPAIRED MOBILITY AND ACTIVITIES OF DAILY LIVING: Primary | ICD-10-CM

## 2023-05-16 DIAGNOSIS — Z78.9 IMPAIRED MOBILITY AND ACTIVITIES OF DAILY LIVING: Primary | ICD-10-CM

## 2023-05-16 PROCEDURE — 97530 THERAPEUTIC ACTIVITIES: CPT | Mod: HCWC,PN

## 2023-05-16 NOTE — PROGRESS NOTES
"  [facility]   Physical Therapy Treatment Note     Name: Ambrosio Montoya  [facility] Number: [id number]    Therapy Diagnosis:   Encounter Diagnosis   Name Primary?    Impaired mobility and activities of daily living Yes     Physician: Margarita Taylor MD    Visit Date: 5/16/2023    Physician Orders: PT Eval and Treat   Medical Diagnosis from Referral: G81.91 (ICD-10-CM) - Right hemiparesis   Evaluation Date: 3/27/2023  Authorization Period Expiration: 12/29/2023   Plan of Care Expiration: 6/23/2023  Visit # / Visits authorized: 8/40 (+eval)  FOTO #: next visit please  PTA Visit #: 0/5      Time In: 11:17 AM  Time Out: 12:00 PM  Total Billable Time: 43 minutes (3 TA)     Precautions: Standard, Diabetes, Fall, and HTN    SUBJECTIVE     Pt reports: Pt without complaints, he arrived with AFO in place   He was compliant with home exercise program.  Response to previous treatment: no adverse affects  Functional change: see goals    Pain: 0/10  Location: N/A     OBJECTIVE     Objective Measures updated at progress report unless specified.     Able to ambulate 110 feet today in >2 minutes but safely with rolling walker.    TINETTI BALANCE ASSESSMENT TOOL  BALANCE SECTION  1.Sitting Balance:   Steady; safe = 1  2.Rises from chair :  Able, uses arms to help = 1  3.Attempts to arise :  Able to arise, 1 attempt = 2  4.Immediate standing Balance (first 5 seconds) : Steady but uses walker or other support = 1  5.Standing balance: Steady but wide stance (medal heel>4" apart) & uses cane or other support = 1  6.Nudged : Steady = 2  7.Eyes closed: Steady = 1  8.Turning 360 degrees:  Discontinuous steps = 0 and Unsteady (grabs, staggers) = 0  9.Sitting Down : Uses arms or not a smooth motion = 1  Balance Score:  10/16    GAIT SECTION  10.Initiation of Gait (Immediately after told to go.): Any hesitancy or multiple attempts to start = 0  11.Step length and height :  Step through R=1 and Step through " L=1  12.Foot Clearance :  R foot clears floor=1 left foot clears floor =1  13.Step symmetry: Right & Left step length not equal (estimate) = 0  14.Step continuity : Stooping or discontinuity between steps = 0  15.Path: Mild/moderate deviation or uses walking aid = 1  16.Trunk : Marked sway or uses walking aid = 0  17.Walking Time: Heels amost touching while walking = 1    Gait Score: 6/12  Balance score:10/16  Total Score=Balance + Gait Score: 16/28    Risk Indicators:    Tinetti Tool Score   Risk of Falls   ?18    High    TUG 64 secs    5 x sit to stand : 41 secs.   Treatment     Ambrosio received the treatments listed below:      therapeutic exercises to develop strength, endurance, ROM, flexibility, posture, and core stabilization for 38 minutes including:    neuromuscular re-education activities to improve: Balance, Coordination, and Sense for 00 minutes. The following activities were included:    therapeutic activities to improve functional performance for 8 minutes, including:  -- 4 trials gait training in gym with rolling walker and AFO on (50 feet, 110 feet) CGA  (no wc follow)             * Seated rest break requested after each walking trial    -- Objective measures (see objective section) x 37 minutes       Patient Education and Home Exercises     Home Exercises Provided and Patient Education Provided     Education provided:   - education on need to perform some type of physical activity every hour (sit to stands x 10)  - need to walk in home at least 3x/day outside of bathroom trips.    Written Home Exercises Provided: Patient instructed to cont prior HEP. Exercises were reviewed and Ambrosio was able to demonstrate them prior to the end of the session.  Ambrosio demonstrated good  understanding of the education provided. See EMR under Patient Instructions for exercises provided during therapy sessions    ASSESSMENT     See updated POC.  Multiple STGs met and up to 10 more visit recommended.    Ambrosio Is progressing  well towards his goals.   Pt prognosis is Good.     Pt will continue to benefit from skilled outpatient physical therapy to address the deficits listed in the problem list box on initial evaluation, provide pt/family education and to maximize pt's level of independence in the home and community environment.     Pt's spiritual, cultural and educational needs considered and pt agreeable to plan of care and goals.     Anticipated barriers to physical therapy: sedentary lifestyle, chronic nature of CEREBROVASCULAR ACCIDENT, low endurance.     Goals:  Short Term Goals: 4 weeks   Pt will be compliant with HEP in order to maximize PT benefits (Met daily walking in home)  Pt will improve BLE MMT grades by >/=1/2 grade in order to improve strength for ADL completion (PROGRESSING, NOT MET)   Pt will improve bilateral hamstring flexibility by at least 5 degrees in 90/90 hamstring assessment to improve transfers and ambulation. (PROGRESSING, NOT MET)   Pt will complete TUG in </= 60 seconds with least restrictive assistive device in order to reduce risk for falls and improve safety with functional mobility (PROGRESSING, NOT MET)  64 secs  Pt will score >/= 15/28 on Tinetti balance test with least restrictive assistive device in order to reduce risk for falls and improve postural control (Met 16/28)  Pt will  perform 5x sit to  less than 50 seconds demonstrating improved BLE endurance and muscular power for transfers  (Met from wheelchair 41 seconds)     Long Term Goals: 8 weeks   Pt will score </= 40% on FOTO limitation survey in order to improve self-perception of functional mobility deficits (PROGRESSING, NOT MET)   Pt will improve BLE MMT grades by >/=1 grade in order to improve strength for ADL completion (PROGRESSING, NOT MET)   Pt will improve bilateral hamstring flexibility by at least 15 degrees in 90/90 hamstring assessment to improve transfers and ambulation. (PROGRESSING, NOT MET)   Pt will complete TUG in  "</= 45 seconds with least restrictive assistive device in order to reduce risk for falls and improve safety with functional mobility (Met from wheelchair 41 secs)   Pt will score >/= 19/28 on Tinetti with least restrictive assistive device in order to reduce risk for falls and improve postural control (PROGRESSING, NOT MET)   Pt will  perform 5x sit to  less than 40 seconds demonstrating improved BLE endurance and muscular power for transfers (PROGRESSING, NOT MET)    Pt will report 0 falls from initiation of PT management (PROGRESSING, NOT MET)   Pt will begin some form of home/community fitness in order to sustain progress gained in PT (PROGRESSING, NOT MET)     PLAN     Progress walking with rolling walker and functional activities   -- recumbent stepper            10 minutes L3.5 sprints with cues to prevent hip abduction  -- standing marching                         3x 30"  -- seated hip adduction with ball       5" holds x 2 minutes (active rest)  -- seated hip abduction                      5" holds x 2 minutes with yellow theraband  -- seated HS Curl                             2 x 10 with yellow theraband (active rest)  -- seated hip flexion                          2x45" (active rest) 3#  Gayle Nath, PT     "

## 2023-05-17 NOTE — PLAN OF CARE
"[facility]  Physical Therapy Plan of Care Note     Name: Ambrosio Montoya  Clinic Number: [id number]    Therapy Diagnosis:   Encounter Diagnosis   Name Primary?    Impaired mobility and activities of daily living Yes     Physician: Margraita Taylor MD    Visit Date: 5/16/2023    Physician Orders: PT Eval and Treat   Medical Diagnosis from Referral: G81.91 (ICD-10-CM) - Right hemiparesis   Evaluation Date: 3/27/2023  Authorization Period Expiration: 12/31/2023   Plan of Care Expiration: 6/23/2023  Progress Note Due: 6/23/2023   Visit # / Visits authorized: 9/20  FOTO: next visit please    Precautions: Fall  Functional Level Prior to Evaluation:  see initial eval    SUBJECTIVE     Update: see treatment note    OBJECTIVE     Update:   Able to ambulate 110 feet today in >2 minutes but safely with rolling walker.     TINETTI BALANCE ASSESSMENT TOOL  BALANCE SECTION  1.Sitting Balance:   Steady; safe = 1  2.Rises from chair :  Able, uses arms to help = 1  3.Attempts to arise :  Able to arise, 1 attempt = 2  4.Immediate standing Balance (first 5 seconds) : Steady but uses walker or other support = 1  5.Standing balance: Steady but wide stance (medal heel>4" apart) & uses cane or other support = 1  6.Nudged : Steady = 2  7.Eyes closed: Steady = 1  8.Turning 360 degrees:  Discontinuous steps = 0 and Unsteady (grabs, staggers) = 0  9.Sitting Down : Uses arms or not a smooth motion = 1  Balance Score:  10/16     GAIT SECTION  10.Initiation of Gait (Immediately after told to go.): Any hesitancy or multiple attempts to start = 0  11.Step length and height :  Step through R=1 and Step through L=1  12.Foot Clearance :  R foot clears floor=1 left foot clears floor =1  13.Step symmetry: Right & Left step length not equal (estimate) = 0  14.Step continuity : Stooping or discontinuity between steps = 0  15.Path: Mild/moderate deviation or uses walking aid = 1  16.Trunk : Marked sway or uses walking aid = " 0  17.Walking Time: Heels amost touching while walking = 1     Gait Score: 6/12  Balance score:10/16  Total Score=Balance + Gait Score: 16/28      Risk Indicators:     Tinetti Tool Score                 Risk of Falls              ?18                              High              19-23                           Moderate              ?24                              Low     TUG 64 secs     5 x sit to stand : 41 secs.   Treatment      Ambrosio received the treatments listed below:     see treatment note  ASSESSMENT     Update: Ambrosio has met multiple STGs.  His fall risk has reduced. He is also more active in the home walking daily and more often.  He can ambulate safely with a rolling walker and his AFO on.  He is also improving in his overall mobility and reducing the burden of care on his primary care giver.      Goals:  Short Term Goals: 4 weeks   Pt will be compliant with HEP in order to maximize PT benefits (Met daily walking in home)  Pt will improve BLE MMT grades by >/=1/2 grade in order to improve strength for ADL completion (PROGRESSING, NOT MET)   Pt will improve bilateral hamstring flexibility by at least 5 degrees in 90/90 hamstring assessment to improve transfers and ambulation. (PROGRESSING, NOT MET)   Pt will complete TUG in </= 60 seconds with least restrictive assistive device in order to reduce risk for falls and improve safety with functional mobility (PROGRESSING, NOT MET)  64 secs  Pt will score >/= 15/28 on Tinetti balance test with least restrictive assistive device in order to reduce risk for falls and improve postural control (Met 16/28)  Pt will  perform 5x sit to  less than 50 seconds demonstrating improved BLE endurance and muscular power for transfers  (Met from wheelchair 41 seconds)     Long Term Goals: 8 weeks   Pt will score </= 40% on FOTO limitation survey in order to improve self-perception of functional mobility deficits (PROGRESSING, NOT MET)   Pt will improve BLE MMT grades  by >/=1 grade in order to improve strength for ADL completion (PROGRESSING, NOT MET)   Pt will improve bilateral hamstring flexibility by at least 15 degrees in 90/90 hamstring assessment to improve transfers and ambulation. (PROGRESSING, NOT MET)   Pt will complete TUG in </= 45 seconds with least restrictive assistive device in order to reduce risk for falls and improve safety with functional mobility (Met from wheelchair 41 secs)   Pt will score >/= 19/28 on Tinetti with least restrictive assistive device in order to reduce risk for falls and improve postural control (PROGRESSING, NOT MET)   Pt will  perform 5x sit to  less than 40 seconds demonstrating improved BLE endurance and muscular power for transfers (PROGRESSING, NOT MET)    Pt will report 0 falls from initiation of PT management (PROGRESSING, NOT MET)   Pt will begin some form of home/community fitness in order to sustain progress gained in PT (PROGRESSING, NOT MET)   Reasons for Recertification of Therapy:   expiration of current care plan with extended care plan needed to reach LTGs    PLAN     Updated Certification Period: 5/16/2023 to 6/23/2023   Recommended Treatment Plan: 10 more visits in 6 weeks:  Gait Training, Manual Therapy, Moist Heat/ Ice, Neuromuscular Re-ed, Patient Education, Self Care, Therapeutic Activities, and Therapeutic Exercise  Other Recommendations: increase activity in the home in a safe environment.     Gayle Nath, PT

## 2023-05-18 ENCOUNTER — CLINICAL SUPPORT (OUTPATIENT)
Dept: REHABILITATION | Facility: HOSPITAL | Age: 73
End: 2023-05-18
Payer: MEDICARE

## 2023-05-18 DIAGNOSIS — Z74.09 IMPAIRED MOBILITY AND ACTIVITIES OF DAILY LIVING: Primary | ICD-10-CM

## 2023-05-18 DIAGNOSIS — Z78.9 IMPAIRED MOBILITY AND ACTIVITIES OF DAILY LIVING: Primary | ICD-10-CM

## 2023-05-18 PROCEDURE — 97530 THERAPEUTIC ACTIVITIES: CPT | Mod: HCWC,PN

## 2023-05-18 PROCEDURE — 97110 THERAPEUTIC EXERCISES: CPT | Mod: HCWC,PN

## 2023-05-18 PROCEDURE — 97116 GAIT TRAINING THERAPY: CPT | Mod: HCWC,PN

## 2023-05-18 NOTE — PROGRESS NOTES
"  [facility]   Physical Therapy Treatment Note     Name: Ambrosio Montoya  Clinic Number: [id number]    Therapy Diagnosis:   Encounter Diagnosis   Name Primary?    Impaired mobility and activities of daily living Yes     Physician: Margarita Taylor MD    Visit Date: 5/18/2023    Physician Orders: PT Eval and Treat   Medical Diagnosis from Referral: G81.91 (ICD-10-CM) - Right hemiparesis   Evaluation Date: 3/27/2023  Authorization Period Expiration: 12/29/2023   Plan of Care Expiration: 6/23/2023  Visit # / Visits authorized: 10/40 (+eval)  FOTO #: next visit please  PTA Visit #: 0/5      Time In: 1:00 AM  Time Out: 1:45 PM  Total Billable Time: 40 minutes (1 gait, 1 TE, 1TA)     Precautions: Standard, Diabetes, Fall, and HTN    SUBJECTIVE     Pt reports: Pt without complaints, he arrived with AFO in place   He was compliant with home exercise program.  Response to previous treatment: no adverse affects  Functional change: see goals    Pain: 0/10  Location: N/A     OBJECTIVE     Objective Measures updated at progress report unless specified.     Treatment     Ambrosio received the treatments listed below:      therapeutic exercises to develop strength, endurance, ROM, flexibility, posture, and core stabilization for 13 minutes including:  -- recumbent stepper            8 minutes L3.5 sprints with cues to prevent hip abduction  -- standing marching                         3x 30" 2# cuffs at / bar  -- seated hip adduction with ball       5" holds x 2 minutes (active rest)    Not today  -- seated hip abduction                      5" holds x 2 minutes with yellow theraband  -- seated HS Curl                             2 x 10 with yellow theraband (active rest)  -- seated hip flexion                          2x45" (active rest) 3#    neuromuscular re-education activities to improve: Balance, Coordination, and Sense for 00 minutes. The following activities were included:    therapeutic activities " to improve functional performance for 8 minutes, including:  Step overs with 2# cuff weight X 10 total with / bar stepping over wooden block   Sit to stands from wheel chair  3 x 5 from wheelchair with supervision     Gait training x 19 minutes total (including rests)  -- 3 trials gait training in gym with rolling walker and AFO on (80 feet 80 feet, 160 feet) CGA  (no wc follow)             * Seated rest break requested after each walking trial    Patient Education and Home Exercises     Home Exercises Provided and Patient Education Provided     Education provided:   - education on need to perform some type of physical activity every hour (sit to stands x 10)  - need to walk in home at least 3x/day outside of bathroom trips.    Written Home Exercises Provided: Patient instructed to cont prior HEP. Exercises were reviewed and Ambrosio was able to demonstrate them prior to the end of the session.  Ambrosio demonstrated good  understanding of the education provided. See EMR under Patient Instructions for exercises provided during therapy sessions    ASSESSMENT     Ambrosio was able to walk 160 feet without a seated rest on his 3rd trial of walking today with the rolling walker.  He is progressing in walking tolerance well. He would continue to benefit from PT to progress toward LTGs.    Ambrosio Is progressing well towards his goals.   Pt prognosis is Good.     Pt will continue to benefit from skilled outpatient physical therapy to address the deficits listed in the problem list box on initial evaluation, provide pt/family education and to maximize pt's level of independence in the home and community environment.     Pt's spiritual, cultural and educational needs considered and pt agreeable to plan of care and goals.     Anticipated barriers to physical therapy: sedentary lifestyle, chronic nature of CEREBROVASCULAR ACCIDENT, low endurance.     Goals:  Short Term Goals: 4 weeks   Pt will be compliant with HEP in order to maximize PT  benefits (Met daily walking in home)  Pt will improve BLE MMT grades by >/=1/2 grade in order to improve strength for ADL completion (PROGRESSING, NOT MET)   Pt will improve bilateral hamstring flexibility by at least 5 degrees in 90/90 hamstring assessment to improve transfers and ambulation. (PROGRESSING, NOT MET)   Pt will complete TUG in </= 60 seconds with least restrictive assistive device in order to reduce risk for falls and improve safety with functional mobility (PROGRESSING, NOT MET)  64 secs  Pt will score >/= 15/28 on Tinetti balance test with least restrictive assistive device in order to reduce risk for falls and improve postural control (Met 16/28)  Pt will  perform 5x sit to  less than 50 seconds demonstrating improved BLE endurance and muscular power for transfers  (Met from wheelchair 41 seconds)     Long Term Goals: 8 weeks   Pt will score </= 40% on FOTO limitation survey in order to improve self-perception of functional mobility deficits (PROGRESSING, NOT MET)   Pt will improve BLE MMT grades by >/=1 grade in order to improve strength for ADL completion (PROGRESSING, NOT MET)   Pt will improve bilateral hamstring flexibility by at least 15 degrees in 90/90 hamstring assessment to improve transfers and ambulation. (PROGRESSING, NOT MET)   Pt will complete TUG in </= 45 seconds with least restrictive assistive device in order to reduce risk for falls and improve safety with functional mobility (Met from wheelchair 41 secs)   Pt will score >/= 19/28 on Tinetti with least restrictive assistive device in order to reduce risk for falls and improve postural control (PROGRESSING, NOT MET)   Pt will  perform 5x sit to  less than 40 seconds demonstrating improved BLE endurance and muscular power for transfers (PROGRESSING, NOT MET)    Pt will report 0 falls from initiation of PT management (PROGRESSING, NOT MET)   Pt will begin some form of home/community fitness in order to sustain  progress gained in PT (PROGRESSING, NOT MET)     PLAN     Progress walking with rolling walker and functional activities   Gayle Nath, PT

## 2023-05-23 ENCOUNTER — CLINICAL SUPPORT (OUTPATIENT)
Dept: REHABILITATION | Facility: HOSPITAL | Age: 73
End: 2023-05-23
Payer: MEDICARE

## 2023-05-23 DIAGNOSIS — Z74.09 IMPAIRED MOBILITY AND ACTIVITIES OF DAILY LIVING: Primary | ICD-10-CM

## 2023-05-23 DIAGNOSIS — Z78.9 IMPAIRED MOBILITY AND ACTIVITIES OF DAILY LIVING: Primary | ICD-10-CM

## 2023-05-23 PROCEDURE — 97116 GAIT TRAINING THERAPY: CPT | Mod: HCWC,PN

## 2023-05-23 PROCEDURE — 97110 THERAPEUTIC EXERCISES: CPT | Mod: HCWC,PN

## 2023-05-23 NOTE — PROGRESS NOTES
"  [facility]   Physical Therapy Treatment Note     Name: Ambrosio Montoya  Clinic Number: [id number]    Therapy Diagnosis:   Encounter Diagnosis   Name Primary?    Impaired mobility and activities of daily living Yes     Physician: Margarita Taylor MD    Visit Date: 5/23/2023    Physician Orders: PT Eval and Treat   Medical Diagnosis from Referral: G81.91 (ICD-10-CM) - Right hemiparesis   Evaluation Date: 3/27/2023  Authorization Period Expiration: 12/29/2023   Plan of Care Expiration: 6/23/2023  Visit # / Visits authorized: 11/40 (+eval)  FOTO #: next visit please  PTA Visit #: 0/5      Time In: 1:00 AM  Time Out: 1:45 PM  Total Billable Time: 45 minutes (2 gait, 1 TE)     Precautions: Standard, Diabetes, Fall, and HTN    SUBJECTIVE     Pt reports: Pt without complaints, he arrived with AFO in place   He was compliant with home exercise program.  Response to previous treatment: no adverse affects  Functional change: see goals    Pain: 0/10  Location: N/A     OBJECTIVE     Objective Measures updated at progress report unless specified.     Treatment     Ambrosio received the treatments listed below:      therapeutic exercises to develop strength, endurance, ROM, flexibility, posture, and core stabilization for 8 minutes including:  -- recumbent stepper            8 minutes L3.5 sprints with cues to prevent hip abduction    Not today  -- seated hip abduction                      5" holds x 2 minutes with yellow theraband  -- seated HS Curl                             2 x 10 with yellow theraband (active rest)  -- seated hip flexion                          2x45" (active rest) 3#    neuromuscular re-education activities to improve: Balance, Coordination, and Sense for 00 minutes. The following activities were included:    therapeutic activities to improve functional performance for 6 minutes, including:  Sit to stands from wheel chair  3 x 5 from wheelchair with supervision     Gait training x 26 " minutes total (including rests)  -- 3 trials gait training in gym with rolling walker and AFO on (80 feet 160 feet, 160 feet) CGA  (no wc follow)             * Seated rest break requested after each walking trial    Patient Education and Home Exercises     Home Exercises Provided and Patient Education Provided     Education provided:   - education on need to perform some type of physical activity every hour (sit to stands x 10)  - need to walk in home at least 3x/day outside of bathroom trips.    Written Home Exercises Provided: Patient instructed to cont prior HEP. Exercises were reviewed and Ambrosio was able to demonstrate them prior to the end of the session.  Ambrosio demonstrated good  understanding of the education provided. See EMR under Patient Instructions for exercises provided during therapy sessions    ASSESSMENT     Ambrosio was able to walk 160 feet without a seated rest twice this session today with the rolling walker.  He is progressing in walking tolerance well. He would continue to benefit from PT to progress toward LTGs.    Ambrosio Is progressing well towards his goals.   Pt prognosis is Good.     Pt will continue to benefit from skilled outpatient physical therapy to address the deficits listed in the problem list box on initial evaluation, provide pt/family education and to maximize pt's level of independence in the home and community environment.     Pt's spiritual, cultural and educational needs considered and pt agreeable to plan of care and goals.     Anticipated barriers to physical therapy: sedentary lifestyle, chronic nature of CEREBROVASCULAR ACCIDENT, low endurance.     Goals:  Short Term Goals: 4 weeks   Pt will be compliant with HEP in order to maximize PT benefits (Met daily walking in home)  Pt will improve BLE MMT grades by >/=1/2 grade in order to improve strength for ADL completion (PROGRESSING, NOT MET)   Pt will improve bilateral hamstring flexibility by at least 5 degrees in 90/90 hamstring  assessment to improve transfers and ambulation. (PROGRESSING, NOT MET)   Pt will complete TUG in </= 60 seconds with least restrictive assistive device in order to reduce risk for falls and improve safety with functional mobility (PROGRESSING, NOT MET)  64 secs  Pt will score >/= 15/28 on Tinetti balance test with least restrictive assistive device in order to reduce risk for falls and improve postural control (Met 16/28)  Pt will  perform 5x sit to  less than 50 seconds demonstrating improved BLE endurance and muscular power for transfers  (Met from wheelchair 41 seconds)     Long Term Goals: 8 weeks   Pt will score </= 40% on FOTO limitation survey in order to improve self-perception of functional mobility deficits (PROGRESSING, NOT MET)   Pt will improve BLE MMT grades by >/=1 grade in order to improve strength for ADL completion (PROGRESSING, NOT MET)   Pt will improve bilateral hamstring flexibility by at least 15 degrees in 90/90 hamstring assessment to improve transfers and ambulation. (PROGRESSING, NOT MET)   Pt will complete TUG in </= 45 seconds with least restrictive assistive device in order to reduce risk for falls and improve safety with functional mobility (Met from wheelchair 41 secs)   Pt will score >/= 19/28 on Tinetti with least restrictive assistive device in order to reduce risk for falls and improve postural control (PROGRESSING, NOT MET)   Pt will  perform 5x sit to  less than 40 seconds demonstrating improved BLE endurance and muscular power for transfers (PROGRESSING, NOT MET)    Pt will report 0 falls from initiation of PT management (PROGRESSING, NOT MET)   Pt will begin some form of home/community fitness in order to sustain progress gained in PT (PROGRESSING, NOT MET)     PLAN     Progress walking with rolling walker and functional activities   Gayle Nath, PT

## 2023-05-25 ENCOUNTER — CLINICAL SUPPORT (OUTPATIENT)
Dept: REHABILITATION | Facility: HOSPITAL | Age: 73
End: 2023-05-25
Payer: MEDICARE

## 2023-05-25 DIAGNOSIS — Z78.9 IMPAIRED MOBILITY AND ACTIVITIES OF DAILY LIVING: Primary | ICD-10-CM

## 2023-05-25 DIAGNOSIS — Z74.09 IMPAIRED MOBILITY AND ACTIVITIES OF DAILY LIVING: Primary | ICD-10-CM

## 2023-05-25 PROCEDURE — 97530 THERAPEUTIC ACTIVITIES: CPT | Mod: HCWC,PN

## 2023-05-25 NOTE — PROGRESS NOTES
"  [facility]   Physical Therapy Treatment Note     Name: Ambrosio Montoya  Clinic Number: [id number]    Therapy Diagnosis:   Encounter Diagnosis   Name Primary?    Impaired mobility and activities of daily living Yes     Physician: Margarita Taylor MD    Visit Date: 5/25/2023    Physician Orders: PT Eval and Treat   Medical Diagnosis from Referral: G81.91 (ICD-10-CM) - Right hemiparesis   Evaluation Date: 3/27/2023  Authorization Period Expiration: 12/29/2023   Plan of Care Expiration: 6/23/2023  Visit # / Visits authorized: 12/40 (+eval)  FOTO #: next visit please  PTA Visit #: 0/5      Time In: 1:15 AM  Time Out: 2:03 PM  Total Billable Time: 48 minutes [1:1 time = 40 minutes] (3TA)     Precautions: Standard, Diabetes, Fall, and HTN    SUBJECTIVE     Patient reports: No new complaints today; ready to walk.  He was compliant with home exercise program.  Response to previous treatment: no adverse affects  Functional change: see goals    Pain: 0/10  Location: N/A     OBJECTIVE     Objective Measures updated at progress report unless specified.     Treatment     Ambrosio received the treatments listed below:      therapeutic exercises to develop strength, endurance, ROM, flexibility, posture, and core stabilization for 10 minutes including:    -- recumbent stepper 8 minutes L3.5 sprints with cues to prevent hip abduction  -- seated hip abduction 5" holds x 2 minutes with yellow theraband    NOT TODAY  -- seated HS Curl 2 x 10 with yellow theraband (active rest)  -- seated hip flexion 2x45" (active rest) 3#    neuromuscular re-education activities to improve: Balance, Coordination, and Sense for 00 minutes. The following activities were included:    therapeutic activities to improve functional performance for 38 minutes, including:    Sit to stands from wheel chair  2 x 6 from wheelchair with supervision   3 trials overground walking in gym with rolling walker and AFO on for functional endurance " (160 feet, 160 feet, 160 feet) CGA  (no wc follow)             * Seated rest break requested after each walking trial    Gait training x 26 minutes total (including rests)    Patient Education and Home Exercises     Home Exercises Provided and Patient Education Provided     Education provided:   - education on need to perform some type of physical activity every hour (sit to stands x 10)  - need to walk in home at least 3x/day outside of bathroom trips.    Written Home Exercises Provided: Patient instructed to cont prior HEP. Exercises were reviewed and Ambrosio was able to demonstrate them prior to the end of the session.  Ambrosio demonstrated good  understanding of the education provided. See EMR under Patient Instructions for exercises provided during therapy sessions    ASSESSMENT     Ambrosio increased volume of overground walking today and was able to ambulate three full gym laps without wheelchair trail/loss of balance during visit today; seated rest break still required for fatigue recovery following 160-foot walk. He would benefit from continued PT services to improve functional endurance and walking tolerance.    Ambrosio Is progressing well towards his goals.   Pt prognosis is Good.     Pt will continue to benefit from skilled outpatient physical therapy to address the deficits listed in the problem list box on initial evaluation, provide pt/family education and to maximize pt's level of independence in the home and community environment.     Pt's spiritual, cultural and educational needs considered and pt agreeable to plan of care and goals.     Anticipated barriers to physical therapy: sedentary lifestyle, chronic nature of CEREBROVASCULAR ACCIDENT, low endurance.     Goals:  Short Term Goals: 4 weeks   Pt will be compliant with HEP in order to maximize PT benefits (Met daily walking in home)  Pt will improve BLE MMT grades by >/=1/2 grade in order to improve strength for ADL completion (PROGRESSING, NOT MET)   Pt  will improve bilateral hamstring flexibility by at least 5 degrees in 90/90 hamstring assessment to improve transfers and ambulation. (PROGRESSING, NOT MET)   Pt will complete TUG in </= 60 seconds with least restrictive assistive device in order to reduce risk for falls and improve safety with functional mobility (PROGRESSING, NOT MET)  64 secs  Pt will score >/= 15/28 on Tinetti balance test with least restrictive assistive device in order to reduce risk for falls and improve postural control (Met 16/28)  Pt will  perform 5x sit to  less than 50 seconds demonstrating improved BLE endurance and muscular power for transfers  (Met from wheelchair 41 seconds)     Long Term Goals: 8 weeks   Pt will score </= 40% on FOTO limitation survey in order to improve self-perception of functional mobility deficits (PROGRESSING, NOT MET)   Pt will improve BLE MMT grades by >/=1 grade in order to improve strength for ADL completion (PROGRESSING, NOT MET)   Pt will improve bilateral hamstring flexibility by at least 15 degrees in 90/90 hamstring assessment to improve transfers and ambulation. (PROGRESSING, NOT MET)   Pt will complete TUG in </= 45 seconds with least restrictive assistive device in order to reduce risk for falls and improve safety with functional mobility (Met from wheelchair 41 secs)   Pt will score >/= 19/28 on Tinetti with least restrictive assistive device in order to reduce risk for falls and improve postural control (PROGRESSING, NOT MET)   Pt will  perform 5x sit to  less than 40 seconds demonstrating improved BLE endurance and muscular power for transfers (PROGRESSING, NOT MET)    Pt will report 0 falls from initiation of PT management (PROGRESSING, NOT MET)   Pt will begin some form of home/community fitness in order to sustain progress gained in PT (PROGRESSING, NOT MET)     PLAN     Progress walking with rolling walker and functional activities     ARNEL DE LEÓN, PT

## 2023-05-30 ENCOUNTER — CLINICAL SUPPORT (OUTPATIENT)
Dept: REHABILITATION | Facility: HOSPITAL | Age: 73
End: 2023-05-30
Payer: MEDICARE

## 2023-05-30 DIAGNOSIS — Z78.9 IMPAIRED MOBILITY AND ACTIVITIES OF DAILY LIVING: Primary | ICD-10-CM

## 2023-05-30 DIAGNOSIS — Z74.09 IMPAIRED MOBILITY AND ACTIVITIES OF DAILY LIVING: Primary | ICD-10-CM

## 2023-05-30 PROCEDURE — 97110 THERAPEUTIC EXERCISES: CPT | Mod: HCWC,PN,CQ

## 2023-05-30 PROCEDURE — 97116 GAIT TRAINING THERAPY: CPT | Mod: HCWC,PN,CQ

## 2023-05-30 NOTE — PROGRESS NOTES
"  [facility]   Physical Therapy Treatment Note     Name: Ambrosio Montoya  Clinic Number: [id number]    Therapy Diagnosis:   Encounter Diagnosis   Name Primary?    Impaired mobility and activities of daily living Yes     Physician: Margarita Taylor MD    Visit Date: 5/30/2023    Physician Orders: PT Eval and Treat   Medical Diagnosis from Referral: G81.91 (ICD-10-CM) - Right hemiparesis   Evaluation Date: 3/27/2023  Authorization Period Expiration: 12/29/2023   Plan of Care Expiration: 6/23/2023  Visit # / Visits authorized: 12/40 (+eval)  FOTO #: next visit please  PTA Visit #: 0/5      Time In: 1:00 PM  Time Out: 1:45 PM  Total Billable Time: 45 minutes 2TE, 1 GT     Precautions: Standard, Diabetes, Fall, and HTN    SUBJECTIVE     Patient reports: No new complaints today. "I walked 3 times around the gym last time"  He was compliant with home exercise program.  Response to previous treatment: no adverse affects  Functional change: see goals    Pain: 0/10  Location: N/A     OBJECTIVE     Objective Measures updated at progress report unless specified.     Treatment     Ambrosio received the treatments listed below:      therapeutic exercises to develop strength, endurance, ROM, flexibility, posture, and core stabilization for 20 minutes including:    -- recumbent stepper 8 minutes L3.5 sprints with cues to prevent hip abduction  -- seated hip abduction 5" holds x 2 minutes with yellow theraband  -- seated HS Curl 2 x 10 with yellow theraband (active rest)  -- seated hip flexion 2x45" (active rest) 3#    neuromuscular re-education activities to improve: Balance, Coordination, and Sense for 00 minutes. The following activities were included:    therapeutic activities to improve functional performance for 00 minutes, including:    Sit to stands from wheel chair 2 x 6 from wheelchair with supervision     Gait training x 25 minutes total (including rests)  2 trials overground walking in gym with " rolling walker and AFO on for functional endurance (160 feet, 160 feet,) CGA  (no wc follow)             * Seated rest break requested after each walking trial        Patient Education and Home Exercises     Home Exercises Provided and Patient Education Provided     Education provided:   - education on need to perform some type of physical activity every hour (sit to stands x 10)  - need to walk in home at least 3x/day outside of bathroom trips.    Written Home Exercises Provided: Patient instructed to cont prior HEP. Exercises were reviewed and Ambrosio was able to demonstrate them prior to the end of the session.  Ambrosio demonstrated good  understanding of the education provided. See EMR under Patient Instructions for exercises provided during therapy sessions    ASSESSMENT     Ambrosio completed today's session no episodes of loss of balance and no reports of pain. He performed ambulation with CGA, and occasional verbal instructions on steering safely. Improved tolerance to sit to stand with SBA/occasional CGA from wheelchair to ballet bar.   Ambrosio Is progressing well towards his goals.   Pt prognosis is Good.     Pt will continue to benefit from skilled outpatient physical therapy to address the deficits listed in the problem list box on initial evaluation, provide pt/family education and to maximize pt's level of independence in the home and community environment.     Pt's spiritual, cultural and educational needs considered and pt agreeable to plan of care and goals.     Anticipated barriers to physical therapy: sedentary lifestyle, chronic nature of CEREBROVASCULAR ACCIDENT, low endurance.     Goals:  Short Term Goals: 4 weeks   Pt will be compliant with HEP in order to maximize PT benefits (Met daily walking in home)  Pt will improve BLE MMT grades by >/=1/2 grade in order to improve strength for ADL completion (PROGRESSING, NOT MET)   Pt will improve bilateral hamstring flexibility by at least 5 degrees in 90/90  hamstring assessment to improve transfers and ambulation. (PROGRESSING, NOT MET)   Pt will complete TUG in </= 60 seconds with least restrictive assistive device in order to reduce risk for falls and improve safety with functional mobility (PROGRESSING, NOT MET)  64 secs  Pt will score >/= 15/28 on Tinetti balance test with least restrictive assistive device in order to reduce risk for falls and improve postural control (Met 16/28)  Pt will  perform 5x sit to  less than 50 seconds demonstrating improved BLE endurance and muscular power for transfers  (Met from wheelchair 41 seconds)     Long Term Goals: 8 weeks   Pt will score </= 40% on FOTO limitation survey in order to improve self-perception of functional mobility deficits (PROGRESSING, NOT MET)   Pt will improve BLE MMT grades by >/=1 grade in order to improve strength for ADL completion (PROGRESSING, NOT MET)   Pt will improve bilateral hamstring flexibility by at least 15 degrees in 90/90 hamstring assessment to improve transfers and ambulation. (PROGRESSING, NOT MET)   Pt will complete TUG in </= 45 seconds with least restrictive assistive device in order to reduce risk for falls and improve safety with functional mobility (Met from wheelchair 41 secs)   Pt will score >/= 19/28 on Tinetti with least restrictive assistive device in order to reduce risk for falls and improve postural control (PROGRESSING, NOT MET)   Pt will  perform 5x sit to  less than 40 seconds demonstrating improved BLE endurance and muscular power for transfers (PROGRESSING, NOT MET)    Pt will report 0 falls from initiation of PT management (PROGRESSING, NOT MET)   Pt will begin some form of home/community fitness in order to sustain progress gained in PT (PROGRESSING, NOT MET)     PLAN     Progress walking with rolling walker and functional activities     Bony Wolff, PTA

## 2023-05-31 ENCOUNTER — CLINICAL SUPPORT (OUTPATIENT)
Dept: REHABILITATION | Facility: HOSPITAL | Age: 73
End: 2023-05-31
Payer: MEDICARE

## 2023-05-31 DIAGNOSIS — Z74.09 IMPAIRED MOBILITY AND ACTIVITIES OF DAILY LIVING: Primary | ICD-10-CM

## 2023-05-31 DIAGNOSIS — Z78.9 IMPAIRED MOBILITY AND ACTIVITIES OF DAILY LIVING: Primary | ICD-10-CM

## 2023-05-31 PROCEDURE — 97110 THERAPEUTIC EXERCISES: CPT | Mod: HCWC,PN,CQ

## 2023-05-31 PROCEDURE — 97116 GAIT TRAINING THERAPY: CPT | Mod: HCWC,PN,CQ

## 2023-05-31 NOTE — PROGRESS NOTES
"  [facility]   Physical Therapy Treatment Note     Name: Ambrosio Montoya  Clinic Number: [id number]    Therapy Diagnosis:   Encounter Diagnosis   Name Primary?    Impaired mobility and activities of daily living Yes     Physician: Margarita Taylor MD    Visit Date: 5/31/2023    Physician Orders: PT Eval and Treat   Medical Diagnosis from Referral: G81.91 (ICD-10-CM) - Right hemiparesis   Evaluation Date: 3/27/2023  Authorization Period Expiration: 12/29/2023   Plan of Care Expiration: 6/23/2023  Visit # / Visits authorized: 14/40 (+eval)  FOTO #: 5/5 (2nd survey completed visit 15 on 5/31)  PTA Visit #: 1/5      Time In: 4:00 PM  Time Out: 4:45 PM  Total Billable Time: 45 minutes (2TE, 1 GT)     Precautions: Standard, Diabetes, Fall, and HTN    SUBJECTIVE     Patient reports: "I was feeling tired this morning" but confirms eating before coming to PT so has more energy than previous session.  He was compliant with home exercise program.  Response to previous treatment: no adverse effects  Functional change: see goals    Pain: 0/10  Location: N/A     OBJECTIVE     Objective Measures updated at progress report unless specified.     Treatment     Ambrosio received the treatments listed below:      therapeutic exercises to develop strength, endurance, ROM, flexibility, posture, and core stabilization for 25 minutes including:    -- recumbent stepper 8 minutes L3.5 sprints with cues to prevent hip abduction  -- seated hip abduction 5" holds x 2 minutes with yellow theraband  -- seated HS Curl 2 x 10 with yellow theraband (active rest)  -- seated hip flexion 2x45" (active rest) 3#  -- FOTO survey completion    neuromuscular re-education activities to improve: Balance, Coordination, and Sense for 00 minutes. The following activities were included:    therapeutic activities to improve functional performance for 00 minutes, including:    Sit to stands from wheel chair 2 x 6 from wheelchair with " supervision     Gait training x 20 minutes total (including rests)  3 trials overground walking in gym with rolling walker and AFO on for functional endurance (150 feet, 150 feet, 50 feet) CGA  (no wc follow)             * Seated rest break requested after each walking trial        Patient Education and Home Exercises     Home Exercises Provided and Patient Education Provided     Education provided:   - education on need to perform some type of physical activity every hour (sit to stands x 10)  - need to walk in home at least 3x/day outside of bathroom trips.    Written Home Exercises Provided: Patient instructed to cont prior HEP. Exercises were reviewed and Ambrosio was able to demonstrate them prior to the end of the session.  Ambrosio demonstrated good  understanding of the education provided. See EMR under Patient Instructions for exercises provided during therapy sessions    ASSESSMENT     Ambrosio  arrived to session without any new complaints of was agreeable to treatment. He completed both walking trials without any loss of balance observed though extension thrust on R leg was more noticeable as he fatigued.  SBA/CGA for all transfers and ambulation without any assistance required for AD management or obstacle avoidance today.  Improved tolerance to sit to stand with SBA/occasional CGA from wheelchair to ballet bar. He would benefit from continued PT services to improve safe functional mobility.    Ambrosio Is progressing well towards his goals.   Pt prognosis is Good.     Pt will continue to benefit from skilled outpatient physical therapy to address the deficits listed in the problem list box on initial evaluation, provide pt/family education and to maximize pt's level of independence in the home and community environment.     Pt's spiritual, cultural and educational needs considered and pt agreeable to plan of care and goals.     Anticipated barriers to physical therapy: sedentary lifestyle, chronic nature of  CEREBROVASCULAR ACCIDENT, low endurance.     Goals:  Short Term Goals: 4 weeks   Pt will be compliant with HEP in order to maximize PT benefits (Met daily walking in home)  Pt will improve BLE MMT grades by >/=1/2 grade in order to improve strength for ADL completion (PROGRESSING, NOT MET)   Pt will improve bilateral hamstring flexibility by at least 5 degrees in 90/90 hamstring assessment to improve transfers and ambulation. (PROGRESSING, NOT MET)   Pt will complete TUG in </= 60 seconds with least restrictive assistive device in order to reduce risk for falls and improve safety with functional mobility (PROGRESSING, NOT MET)  64 secs  Pt will score >/= 15/28 on Tinetti balance test with least restrictive assistive device in order to reduce risk for falls and improve postural control (Met 16/28)  Pt will  perform 5x sit to  less than 50 seconds demonstrating improved BLE endurance and muscular power for transfers  (Met from wheelchair 41 seconds)     Long Term Goals: 8 weeks   Pt will score </= 40% on FOTO limitation survey in order to improve self-perception of functional mobility deficits (PROGRESSING, NOT MET)   Pt will improve BLE MMT grades by >/=1 grade in order to improve strength for ADL completion (PROGRESSING, NOT MET)   Pt will improve bilateral hamstring flexibility by at least 15 degrees in 90/90 hamstring assessment to improve transfers and ambulation. (PROGRESSING, NOT MET)   Pt will complete TUG in </= 45 seconds with least restrictive assistive device in order to reduce risk for falls and improve safety with functional mobility (Met from wheelchair 41 secs)   Pt will score >/= 19/28 on Tinetti with least restrictive assistive device in order to reduce risk for falls and improve postural control (PROGRESSING, NOT MET)   Pt will  perform 5x sit to  less than 40 seconds demonstrating improved BLE endurance and muscular power for transfers (PROGRESSING, NOT MET)    Pt will report 0  falls from initiation of PT management (PROGRESSING, NOT MET)   Pt will begin some form of home/community fitness in order to sustain progress gained in PT (PROGRESSING, NOT MET)     PLAN     Progress walking with rolling walker and functional activities     Sheridan Wayne, PTA

## 2023-06-06 ENCOUNTER — CLINICAL SUPPORT (OUTPATIENT)
Dept: REHABILITATION | Facility: HOSPITAL | Age: 73
End: 2023-06-06
Payer: MEDICARE

## 2023-06-06 DIAGNOSIS — Z78.9 IMPAIRED MOBILITY AND ACTIVITIES OF DAILY LIVING: Primary | ICD-10-CM

## 2023-06-06 DIAGNOSIS — Z74.09 IMPAIRED MOBILITY AND ACTIVITIES OF DAILY LIVING: Primary | ICD-10-CM

## 2023-06-06 PROCEDURE — 97116 GAIT TRAINING THERAPY: CPT | Mod: HCWC,PN,CQ

## 2023-06-06 PROCEDURE — 97110 THERAPEUTIC EXERCISES: CPT | Mod: HCWC,PN,CQ

## 2023-06-06 NOTE — PROGRESS NOTES
"  [facility]   Physical Therapy Treatment Note     Name: Ambrosio Montoya  Clinic Number: [id number]    Therapy Diagnosis:   Encounter Diagnosis   Name Primary?    Impaired mobility and activities of daily living Yes     Physician: Margarita Taylor MD    Visit Date: 6/6/2023    Physician Orders: PT Eval and Treat   Medical Diagnosis from Referral: G81.91 (ICD-10-CM) - Right hemiparesis   Evaluation Date: 3/27/2023  Authorization Period Expiration: 12/29/2023   Plan of Care Expiration: 6/23/2023  Visit # / Visits authorized: 14/40 (+eval)  FOTO #: 6/5 (2nd survey completed visit 15 on 5/31)  PTA Visit #: 2/5      Time In: 1:00 PM  Time Out: 1:45 PM  Total Billable Time: 45 minutes (2TE, 1 GT)     Precautions: Standard, Diabetes, Fall, and HTN    SUBJECTIVE     Patient reports: "I'm ready but I forgot to bring my brace". Pt did not wear his AFO today on R foot  He was compliant with home exercise program.  Response to previous treatment: no adverse effects  Functional change: see goals    Pain: 0/10  Location: N/A     OBJECTIVE     Objective Measures updated at progress report unless specified.     Treatment     Ambrosio received the treatments listed below:      therapeutic exercises to develop strength, endurance, ROM, flexibility, posture, and core stabilization for 25 minutes including:    -- recumbent stepper 8 minutes L3.5 sprints with cues to prevent hip abduction  -- seated hip abduction 5" holds x 2 minutes with yellow theraband  -- seated HS Curl 2 x 10 with yellow theraband (active rest)  -- seated hip flexion 2x45" (active rest) 3#    neuromuscular re-education activities to improve: Balance, Coordination, and Sense for 00 minutes. The following activities were included:    therapeutic activities to improve functional performance for 00 minutes, including:    Sit to stands from wheel chair 2 x 6 from wheelchair with supervision     Gait training x 20 minutes total (including rests)  3 " trials overground walking in gym with rolling walker and AFO on for functional endurance (150 feet, 150 feet, 50 feet) CGA  (no wc follow)             * Seated rest break requested after each walking trial        Patient Education and Home Exercises     Home Exercises Provided and Patient Education Provided     Education provided:   - education on need to perform some type of physical activity every hour (sit to stands x 10)  - need to walk in home at least 3x/day outside of bathroom trips.    Written Home Exercises Provided: Patient instructed to cont prior HEP. Exercises were reviewed and Ambrosio was able to demonstrate them prior to the end of the session.  Ambrosio demonstrated good  understanding of the education provided. See EMR under Patient Instructions for exercises provided during therapy sessions    ASSESSMENT     Ambrosio  arrived to session without any new complaints of was agreeable to treatment. He completed both walking trials without any loss of balance observed though extension thrust on R leg was more noticeable as he fatigued.  SBA/CGA for all transfers and ambulation without any assistance required for AD management or obstacle avoidance today.  Improved tolerance to sit to stand with SBA/occasional CGA from wheelchair to ballet bar.   Ambrosio Is progressing well towards his goals.   Pt prognosis is Good.     Pt will continue to benefit from skilled outpatient physical therapy to address the deficits listed in the problem list box on initial evaluation, provide pt/family education and to maximize pt's level of independence in the home and community environment.     Pt's spiritual, cultural and educational needs considered and pt agreeable to plan of care and goals.     Anticipated barriers to physical therapy: sedentary lifestyle, chronic nature of CEREBROVASCULAR ACCIDENT, low endurance.     Goals:  Short Term Goals: 4 weeks   Pt will be compliant with HEP in order to maximize PT benefits (Met daily walking  in home)  Pt will improve BLE MMT grades by >/=1/2 grade in order to improve strength for ADL completion (PROGRESSING, NOT MET)   Pt will improve bilateral hamstring flexibility by at least 5 degrees in 90/90 hamstring assessment to improve transfers and ambulation. (PROGRESSING, NOT MET)   Pt will complete TUG in </= 60 seconds with least restrictive assistive device in order to reduce risk for falls and improve safety with functional mobility (PROGRESSING, NOT MET)  64 secs  Pt will score >/= 15/28 on Tinetti balance test with least restrictive assistive device in order to reduce risk for falls and improve postural control (Met 16/28)  Pt will  perform 5x sit to  less than 50 seconds demonstrating improved BLE endurance and muscular power for transfers  (Met from wheelchair 41 seconds)     Long Term Goals: 8 weeks   Pt will score </= 40% on FOTO limitation survey in order to improve self-perception of functional mobility deficits (PROGRESSING, NOT MET)   Pt will improve BLE MMT grades by >/=1 grade in order to improve strength for ADL completion (PROGRESSING, NOT MET)   Pt will improve bilateral hamstring flexibility by at least 15 degrees in 90/90 hamstring assessment to improve transfers and ambulation. (PROGRESSING, NOT MET)   Pt will complete TUG in </= 45 seconds with least restrictive assistive device in order to reduce risk for falls and improve safety with functional mobility (Met from wheelchair 41 secs)   Pt will score >/= 19/28 on Tinetti with least restrictive assistive device in order to reduce risk for falls and improve postural control (PROGRESSING, NOT MET)   Pt will  perform 5x sit to  less than 40 seconds demonstrating improved BLE endurance and muscular power for transfers (PROGRESSING, NOT MET)    Pt will report 0 falls from initiation of PT management (PROGRESSING, NOT MET)   Pt will begin some form of home/community fitness in order to sustain progress gained in PT  (PROGRESSING, NOT MET)     PLAN     Progress walking with rolling walker and functional activities     Bony Wolff, PTA

## 2023-06-09 ENCOUNTER — HOSPITAL ENCOUNTER (EMERGENCY)
Facility: HOSPITAL | Age: 73
Discharge: HOME | End: 2023-06-09
Attending: EMERGENCY MEDICINE
Payer: MEDICARE

## 2023-06-09 VITALS
BODY MASS INDEX: 26.5 KG/M2 | TEMPERATURE: 98 F | WEIGHT: 190 LBS | RESPIRATION RATE: 16 BRPM | HEART RATE: 77 BPM | SYSTOLIC BLOOD PRESSURE: 155 MMHG | OXYGEN SATURATION: 99 % | DIASTOLIC BLOOD PRESSURE: 93 MMHG

## 2023-06-09 DIAGNOSIS — R21 RASH: Primary | ICD-10-CM

## 2023-06-09 DIAGNOSIS — K62.5 RECTAL BLEEDING: ICD-10-CM

## 2023-06-09 LAB
ALBUMIN SERPL BCP-MCNC: 3.8 G/DL (ref 3.5–5.2)
ALP SERPL-CCNC: 69 U/L (ref 55–135)
ALT SERPL W/O P-5'-P-CCNC: 16 U/L (ref 10–44)
ANION GAP SERPL CALC-SCNC: 10 MMOL/L (ref 8–16)
AST SERPL-CCNC: 22 U/L (ref 10–40)
BASOPHILS # BLD AUTO: 0.03 K/UL (ref 0–0.2)
BASOPHILS NFR BLD: 0.5 % (ref 0–1.9)
BILIRUB SERPL-MCNC: 0.3 MG/DL (ref 0.1–1)
BILIRUB UR QL STRIP: NEGATIVE
BUN SERPL-MCNC: 17 MG/DL (ref 8–23)
CALCIUM SERPL-MCNC: 9.5 MG/DL (ref 8.7–10.5)
CHLORIDE SERPL-SCNC: 112 MMOL/L (ref 95–110)
CLARITY UR: CLEAR
CO2 SERPL-SCNC: 22 MMOL/L (ref 23–29)
COLOR UR: YELLOW
CREAT SERPL-MCNC: 1.4 MG/DL (ref 0.5–1.4)
DIFFERENTIAL METHOD: ABNORMAL
EOSINOPHIL # BLD AUTO: 0.3 K/UL (ref 0–0.5)
EOSINOPHIL NFR BLD: 6.1 % (ref 0–8)
ERYTHROCYTE [DISTWIDTH] IN BLOOD BY AUTOMATED COUNT: 14.6 % (ref 11.5–14.5)
EST. GFR  (NO RACE VARIABLE): 53 ML/MIN/1.73 M^2
GLUCOSE SERPL-MCNC: 92 MG/DL (ref 70–110)
GLUCOSE UR QL STRIP: NEGATIVE
HCT VFR BLD AUTO: 39.9 % (ref 40–54)
HGB BLD-MCNC: 13 G/DL (ref 14–18)
HGB UR QL STRIP: NEGATIVE
IMM GRANULOCYTES # BLD AUTO: 0.01 K/UL (ref 0–0.04)
IMM GRANULOCYTES NFR BLD AUTO: 0.2 % (ref 0–0.5)
KETONES UR QL STRIP: NEGATIVE
LEUKOCYTE ESTERASE UR QL STRIP: NEGATIVE
LYMPHOCYTES # BLD AUTO: 1.5 K/UL (ref 1–4.8)
LYMPHOCYTES NFR BLD: 27.5 % (ref 18–48)
MCH RBC QN AUTO: 27.8 PG (ref 27–31)
MCHC RBC AUTO-ENTMCNC: 32.6 G/DL (ref 32–36)
MCV RBC AUTO: 85 FL (ref 82–98)
MONOCYTES # BLD AUTO: 0.7 K/UL (ref 0.3–1)
MONOCYTES NFR BLD: 12.7 % (ref 4–15)
NEUTROPHILS # BLD AUTO: 3 K/UL (ref 1.8–7.7)
NEUTROPHILS NFR BLD: 53 % (ref 38–73)
NITRITE UR QL STRIP: NEGATIVE
NRBC BLD-RTO: 0 /100 WBC
OB PNL STL: NEGATIVE
PH UR STRIP: 6 [PH] (ref 5–8)
PLATELET # BLD AUTO: 156 K/UL (ref 150–450)
PMV BLD AUTO: 11.9 FL (ref 9.2–12.9)
POTASSIUM SERPL-SCNC: 4 MMOL/L (ref 3.5–5.1)
PROT SERPL-MCNC: 7.3 G/DL (ref 6–8.4)
PROT UR QL STRIP: ABNORMAL
RBC # BLD AUTO: 4.67 M/UL (ref 4.6–6.2)
SODIUM SERPL-SCNC: 144 MMOL/L (ref 136–145)
SP GR UR STRIP: 1.02 (ref 1–1.03)
URN SPEC COLLECT METH UR: ABNORMAL
UROBILINOGEN UR STRIP-ACNC: ABNORMAL EU/DL
WBC # BLD AUTO: 5.59 K/UL (ref 3.9–12.7)

## 2023-06-09 PROCEDURE — 82272 OCCULT BLD FECES 1-3 TESTS: CPT | Mod: HCWC | Performed by: EMERGENCY MEDICINE

## 2023-06-09 PROCEDURE — 81003 URINALYSIS AUTO W/O SCOPE: CPT | Mod: HCWC | Performed by: EMERGENCY MEDICINE

## 2023-06-09 PROCEDURE — 99283 EMERGENCY DEPT VISIT LOW MDM: CPT | Mod: HCWC

## 2023-06-09 PROCEDURE — 80053 COMPREHEN METABOLIC PANEL: CPT | Mod: HCWC | Performed by: EMERGENCY MEDICINE

## 2023-06-09 PROCEDURE — 85025 COMPLETE CBC W/AUTO DIFF WBC: CPT | Mod: HCWC | Performed by: EMERGENCY MEDICINE

## 2023-06-09 RX ORDER — CETIRIZINE HYDROCHLORIDE 10 MG/1
10 TABLET ORAL DAILY
Qty: 30 TABLET | Refills: 0 | Status: SHIPPED | OUTPATIENT
Start: 2023-06-09 | End: 2023-07-09

## 2023-06-09 RX ORDER — HYDROCORTISONE 25 MG/G
CREAM TOPICAL 2 TIMES DAILY
Qty: 20 G | Refills: 0 | Status: SHIPPED | OUTPATIENT
Start: 2023-06-09

## 2023-06-10 NOTE — DISCHARGE INSTRUCTIONS

## 2023-06-10 NOTE — ED PROVIDER NOTES
Encounter Date: 6/9/2023       History     Chief Complaint   Patient presents with    Rectal Bleeding     Patient brought in by care taker. Patient has noticed blood in stool x 6 months since having a colonoscopy. Has been seen by his PCP since and was told possible hemorrhoids. Has been taking Linzess daily. States he has not been straining for bowel movements but does feel pain when having one.     Male  Problem     Has felt a stinging sensation with urination x 2 days. Urine has been dark with an odor.    Rash     Rash to bilateral arms x 1 week     Ambrosio Montoya is a 73 y.o. male who  has a past medical history of Cataract, Coronary artery disease (2001), CVA (cerebral infarction), Diabetes mellitus type II (2001), Glaucoma suspect, Hyperlipidemia (2001), Hypertension, Myocardial infarction (2002), and Stroke (3/2012).    The patient presents to the ED due to multiple complaints.  Patient reports painless intermittent rectal bleeding for the past 6 months which he states started after had a colonscopy.  He denies any associated abdominal pain vomiting chest pain or trouble breathing.  Does report dark colored urine for past couple of days as well as an itchy rash to his bilateral arms.  No other concerns noted today no acute change today however patient was worried about the length of time he had been having blood which prompted him to come here today.  He reports associated history of hemorrhoids however denies any rectal pain at this time.     The history is provided by the patient.   Review of patient's allergies indicates:   Allergen Reactions    Pcn [penicillins] Nausea And Vomiting    Plavix [clopidogrel] Itching and Rash     Past Medical History:   Diagnosis Date    Cataract     Coronary artery disease 2001    ACS    CVA (cerebral infarction)     Diabetes mellitus type II 2001    Glaucoma suspect     Hyperlipidemia 2001    Hypertension     Myocardial infarction 2002    Stroke 3/2012    [facility]      Past Surgical History:   Procedure Laterality Date    COLONOSCOPY N/A 4/20/2017    Procedure: COLONOSCOPY;  Surgeon: Armando Hidalgo MD;  Location: [facility] ENDO;  Service: Endoscopy;  Laterality: N/A;    COLONOSCOPY N/A 8/18/2022    Procedure: COLONOSCOPY;  Surgeon: Armando Hidalgo MD;  Location: [facility] ENDO;  Service: Endoscopy;  Laterality: N/A;     Family History   Problem Relation Age of Onset    Heart disease Mother     Cancer Father         prostate CA    Diabetes Sister     Cancer Sister     Depression Brother     Diabetes Brother      Social History     Tobacco Use    Smoking status: Every Day     Packs/day: 1.00     Years: 40.00     Pack years: 40.00     Types: Cigarettes    Smokeless tobacco: Never   Substance Use Topics    Alcohol use: No    Drug use: No     Review of Systems   Constitutional:  Negative for fever.   HENT:  Negative for sore throat.    Respiratory:  Negative for shortness of breath.    Cardiovascular:  Negative for chest pain.   Gastrointestinal:  Positive for blood in stool and constipation. Negative for nausea.   Genitourinary:  Negative for dysuria.        Discolored urine    Musculoskeletal:  Negative for back pain.   Skin:  Positive for rash.   Neurological:  Negative for weakness.   Hematological:  Does not bruise/bleed easily.     Physical Exam     Initial Vitals [06/09/23 2000]   BP Pulse Resp Temp SpO2   128/82 81 17 98.3 °F (36.8 °C) 98 %      MAP       --         Physical Exam    Nursing note and vitals reviewed.  Constitutional: He appears well-developed and well-nourished. He is not diaphoretic. No distress.   HENT:   Head: Normocephalic and atraumatic.   Mouth/Throat: Oropharynx is clear and moist.   Eyes: EOM are normal. Pupils are equal, round, and reactive to light.   Neck: No tracheal deviation present.   Cardiovascular:  Normal rate, regular rhythm, normal heart sounds and intact distal pulses.           Pulmonary/Chest: Breath sounds normal. No stridor. No  respiratory distress.   Abdominal: Abdomen is soft. He exhibits no distension and no mass. There is no abdominal tenderness.   Genitourinary: Rectum:      Guaiac result negative.   Guaiac negative stool.    Genitourinary Comments: Rectal exam with RN Mark: No gross blood no melena, no hemorrhoid     Musculoskeletal:         General: No edema. Normal range of motion.     Neurological: He is alert and oriented to person, place, and time. No cranial nerve deficit or sensory deficit.   Skin: Skin is warm and dry. Capillary refill takes less than 2 seconds. No rash noted.   Mildly erythematous and scaly skin to his bilateral upper extremities   Psychiatric: He has a normal mood and affect.       ED Course   Procedures  Labs Reviewed   URINALYSIS, REFLEX TO URINE CULTURE - Abnormal; Notable for the following components:       Result Value    Protein, UA Trace (*)     Urobilinogen, UA 2.0-3.0 (*)     All other components within normal limits    Narrative:     Specimen Source->Urine   CBC W/ AUTO DIFFERENTIAL - Abnormal; Notable for the following components:    Hemoglobin 13.0 (*)     Hematocrit 39.9 (*)     RDW 14.6 (*)     All other components within normal limits   COMPREHENSIVE METABOLIC PANEL - Abnormal; Notable for the following components:    Chloride 112 (*)     CO2 22 (*)     eGFR 53 (*)     All other components within normal limits   OCCULT BLOOD X 1, STOOL          Imaging Results    None          Medications - No data to display  Medical Decision Making:   History:   Old Records Summarized: records from previous admission(s).       <> Summary of Records: Patient underwent colonoscopy 8/22 which demonstrated:                           Two 4 to 6 mm polyps at the hepatic flexure,                          removed with a cold snare. Resected and retrieved.                          - One 5 mm polyp in the rectum, removed with a                          cold snare. Resected and retrieved.                           - Diverticulosis in the sigmoid colon and in the                          descending colon.                          - The examination was otherwise normal.   Initial Assessment:       Differential Diagnosis:   Differential Diagnosis includes, but is not limited to:  Lower GI bleed (AVM, colitis, colon cancer, polyp, internal/external hemorrhoid, IBS, rectal injury/foreign body, chronic diarrhea, anal fissure), upper GI bleed (PUD, perforated ulcer, esophageal variceal bleed), Crohn's disease, ulcerative colitis, chronic diarrhea, dietary intake    ED Management:  73-year-old man with no past medical history presenting today with multiple complaints.  He is mostly concerned about intermittent rectal bleeding.  His vital signs are stable.  His CBC demonstrates no significant anemia, CMP is within normal limits.  Occult blood is negative.  Low concern for emergent process today.  He appears stable for discharge with close follow-up with PCP and GI for further evaluation of his symptoms.  Will treat for nonspecific dermatitis.  Discussed indications for return especially for abdominal pain recurrent/worsening eating rash fevers or any other concerns    After taking into careful account the historical factors and physical exam findings of the patient's presentation today, in conjunction with the empirical and objective data obtained on ED workup, no acute emergent medical condition has been identified. The patient appears to be low risk for an emergent medical condition and I feel it is safe and appropriate at this time for the patient to be discharged to follow-up as detailed in their discharge instructions for reevaluation and possible continued outpatient workup and management. I have discussed the specifics of the workup with the patient and the patient has verbalized understanding of the details of the workup, the diagnosis, the treatment plan, and the need for outpatient follow-up.  Although the patient has no  emergent etiology today this does not preclude the development of an emergent condition so in addition, I have advised the patient that they can return to the ED and/or activate EMS at any time with worsening of their symptoms, change of their symptoms, or with any other medical complaint.  The patient remained comfortable and stable during their visit in the ED.  Discharge and follow-up instructions discussed with the patient who expressed understanding and willingness to comply with my recommendations.             ED Course as of 06/10/23 2320   Fri Jun 09, 2023   2153 Urinalysis, Reflex to Urine Culture Urine, Clean Catch(!)  No hematuria or sign of infection [RN]      ED Course User Index  [RN] Beltran Cerda Jr., MD                 Clinical Impression:   Final diagnoses:  [R21] Rash (Primary)  [K62.5] Rectal bleeding        ED Disposition Condition    Discharge Stable          ED Prescriptions       Medication Sig Dispense Start Date End Date Auth. Provider    cetirizine (ZYRTEC) 10 MG tablet Take 1 tablet (10 mg total) by mouth once daily. 30 tablet 6/9/2023 7/9/2023 Beltran Cerda Jr., MD    hydrocortisone 2.5 % cream Apply topically 2 (two) times daily. 20 g 6/9/2023 -- Beltran Cerda Jr., MD          Follow-up Information       Follow up With Specialties Details Why Contact Info    Margarita Taylor MD Internal Medicine Schedule an appointment as soon as possible for a visit in 3 days  [address] [facility]  [address]  [place] [id number] [phone]      Armando Hidalgo MD Gastroenterology Schedule an appointment as soon as possible for a visit   [address]  [place] [id number] [phone]              Portions of this note were dictated using voice recognition software and may contain dictation related errors in spelling/grammar/syntax not found on text review       Beltran Cerda Jr., MD  06/10/23 2320

## 2023-06-13 ENCOUNTER — CLINICAL SUPPORT (OUTPATIENT)
Dept: REHABILITATION | Facility: HOSPITAL | Age: 73
End: 2023-06-13
Payer: MEDICARE

## 2023-06-13 DIAGNOSIS — Z74.09 IMPAIRED MOBILITY AND ACTIVITIES OF DAILY LIVING: Primary | ICD-10-CM

## 2023-06-13 DIAGNOSIS — Z78.9 IMPAIRED MOBILITY AND ACTIVITIES OF DAILY LIVING: Primary | ICD-10-CM

## 2023-06-13 PROCEDURE — 97110 THERAPEUTIC EXERCISES: CPT | Mod: HCWC,PN,CQ

## 2023-06-13 NOTE — PROGRESS NOTES
"  [facility]   Physical Therapy Treatment Note     Name: Ambrosio Montoya  Clinic Number: [id number]    Therapy Diagnosis:   Encounter Diagnosis   Name Primary?    Impaired mobility and activities of daily living Yes     Physician: Margarita Taylor MD    Visit Date: 6/13/2023    Physician Orders: PT Eval and Treat   Medical Diagnosis from Referral: G81.91 (ICD-10-CM) - Right hemiparesis   Evaluation Date: 3/27/2023  Authorization Period Expiration: 12/29/2023   Plan of Care Expiration: 6/23/2023  Visit # / Visits authorized: 15/40 (+eval)  FOTO #: 7/5 (2nd survey completed visit 15 on 5/31)  PTA Visit #: 3/5      Time In: 1:15 PM  Time Out: 2:00 PM  Total Billable Time: 45 minutes (2TE, 1 GT)     Precautions: Standard, Diabetes, Fall, and HTN    SUBJECTIVE     Patient reports: "I'm having hip pain in both of them". Pt did not wear his AFO today on R foot  He was compliant with home exercise program.  Response to previous treatment: no adverse effects  Functional change: see goals    Pain: 0/10  Location: N/A     OBJECTIVE     Objective Measures updated at progress report unless specified.     Treatment     Ambrosio received the treatments listed below:      therapeutic exercises to develop strength, endurance, ROM, flexibility, posture, and core stabilization for 40 minutes including:    -- recumbent stepper 10 minutes L3.5 sprints with cues to prevent hip abduction  -- seated hip abduction 5" holds x 2 minutes with yellow theraband  -- seated HS Curl 2 x 10 with yellow theraband (active rest)  -- seated hip flexion 2x45" (active rest) 3#  -- LAQ w/ AAROM R 2x10 seated  -- hip adduction with Theraball 5"x15 seated    neuromuscular re-education activities to improve: Balance, Coordination, and Sense for 00 minutes. The following activities were included:    therapeutic activities to improve functional performance for  minutes, including:    Sit to stands from wheel chair 2 x 6 from wheelchair " with supervision     Gait training x 00 minutes total (including rests)  3 trials overground walking in gym with rolling walker (2 trials of 50 feet) CGA  (no wc follow) limited due to not wearing his AFO             * Seated rest break requested after each walking trial        Patient Education and Home Exercises     Home Exercises Provided and Patient Education Provided     Education provided:   - education on need to perform some type of physical activity every hour (sit to stands x 10)  - need to walk in home at least 3x/day outside of bathroom trips.    Written Home Exercises Provided: Patient instructed to cont prior HEP. Exercises were reviewed and Ambrosio was able to demonstrate them prior to the end of the session.  Ambrosio demonstrated good  understanding of the education provided. See EMR under Patient Instructions for exercises provided during therapy sessions    ASSESSMENT     Ambrosio arrived with B hip pain/ soreness which limited his ambulation trials today. Pt also did not arrive with his AFO for R LE because of pain. He was able to complete session within tolerance and without worsening of symptoms today.   Ambrosio Is progressing well towards his goals.   Pt prognosis is Good.     Pt will continue to benefit from skilled outpatient physical therapy to address the deficits listed in the problem list box on initial evaluation, provide pt/family education and to maximize pt's level of independence in the home and community environment.     Pt's spiritual, cultural and educational needs considered and pt agreeable to plan of care and goals.     Anticipated barriers to physical therapy: sedentary lifestyle, chronic nature of CEREBROVASCULAR ACCIDENT, low endurance.     Goals:  Short Term Goals: 4 weeks   Pt will be compliant with HEP in order to maximize PT benefits (Met daily walking in home)  Pt will improve BLE MMT grades by >/=1/2 grade in order to improve strength for ADL completion (PROGRESSING, NOT MET)   Pt  will improve bilateral hamstring flexibility by at least 5 degrees in 90/90 hamstring assessment to improve transfers and ambulation. (PROGRESSING, NOT MET)   Pt will complete TUG in </= 60 seconds with least restrictive assistive device in order to reduce risk for falls and improve safety with functional mobility (PROGRESSING, NOT MET)  64 secs  Pt will score >/= 15/28 on Tinetti balance test with least restrictive assistive device in order to reduce risk for falls and improve postural control (Met 16/28)  Pt will  perform 5x sit to  less than 50 seconds demonstrating improved BLE endurance and muscular power for transfers  (Met from wheelchair 41 seconds)     Long Term Goals: 8 weeks   Pt will score </= 40% on FOTO limitation survey in order to improve self-perception of functional mobility deficits (PROGRESSING, NOT MET)   Pt will improve BLE MMT grades by >/=1 grade in order to improve strength for ADL completion (PROGRESSING, NOT MET)   Pt will improve bilateral hamstring flexibility by at least 15 degrees in 90/90 hamstring assessment to improve transfers and ambulation. (PROGRESSING, NOT MET)   Pt will complete TUG in </= 45 seconds with least restrictive assistive device in order to reduce risk for falls and improve safety with functional mobility (Met from wheelchair 41 secs)   Pt will score >/= 19/28 on Tinetti with least restrictive assistive device in order to reduce risk for falls and improve postural control (PROGRESSING, NOT MET)   Pt will  perform 5x sit to  less than 40 seconds demonstrating improved BLE endurance and muscular power for transfers (PROGRESSING, NOT MET)    Pt will report 0 falls from initiation of PT management (PROGRESSING, NOT MET)   Pt will begin some form of home/community fitness in order to sustain progress gained in PT (PROGRESSING, NOT MET)     PLAN   Monitor response to session. Advance ambulation as appropriate    Bony Wolff, PTA   6/13/2023

## 2023-06-27 ENCOUNTER — CLINICAL SUPPORT (OUTPATIENT)
Dept: REHABILITATION | Facility: HOSPITAL | Age: 73
End: 2023-06-27
Payer: MEDICARE

## 2023-06-27 DIAGNOSIS — Z78.9 IMPAIRED MOBILITY AND ACTIVITIES OF DAILY LIVING: Primary | ICD-10-CM

## 2023-06-27 DIAGNOSIS — Z74.09 IMPAIRED MOBILITY AND ACTIVITIES OF DAILY LIVING: Primary | ICD-10-CM

## 2023-06-27 PROCEDURE — 97530 THERAPEUTIC ACTIVITIES: CPT | Mod: HCWC,PN

## 2023-06-27 PROCEDURE — 97110 THERAPEUTIC EXERCISES: CPT | Mod: HCWC,PN

## 2023-06-27 NOTE — PROGRESS NOTES
[facility]   Physical Therapy Treatment Note/Discharge     Name: Ambrosio Montoya  Clinic Number: [id number]    Therapy Diagnosis:   Encounter Diagnosis   Name Primary?    Impaired mobility and activities of daily living Yes     Physician: Margarita Taylor MD    Visit Date: 6/27/2023    Physician Orders: PT Eval and Treat   Medical Diagnosis from Referral: G81.91 (ICD-10-CM) - Right hemiparesis   Evaluation Date: 3/27/2023  Authorization Period Expiration: 12/29/2023   Plan of Care Expiration: 6/23/2023  Visit # / Visits authorized: 16/40 (+eval)  FOTO #: issued today  PTA Visit #: 0/5      Time In: 2:45PM  Time Out: 3:20 PM  Total Billable Time: 35 minutes (1TA, 1 TE)     Precautions: Standard, Diabetes, Fall, and HTN    SUBJECTIVE     Patient reports: that the heat is starting to get to him.  He stated even going outside in the morning is too hot.  He did motivate himself to come to today's session.  He was compliant with home exercise program.  Response to previous treatment: no adverse effects  Functional change: see goals    Pain: 0/10  Location: N/A     OBJECTIVE     Objective Measures updated at progress report unless specified.   6 minute walk test attempted but unable to complete. But able to complete 5 minutes and 155 feet.    5x sit to stand: 37 secs from wheelchair with UE support    Treatment     Ambrosio received the treatments listed below:      therapeutic exercises to develop strength, endurance, ROM, flexibility, posture, and core stabilization for 12 minutes including:  -- recumbent stepper 10 minutes L3.5 sprints with cues to prevent hip abduction    neuromuscular re-education activities to improve: Balance, Coordination, and Sense for 00 minutes. The following activities were included:    therapeutic activities x 20 minutes total to improve functional performance for  minutes, including:  See objective measures above x 10 minutes   Education on daily walking routine  outside of his normal walking to performed basic ADLs (8-10 minutes of consecutive walking in a safe climate 3x/day).    Patient Education and Home Exercises     Home Exercises Provided and Patient Education Provided     Education provided:   - education on need to perform some type of physical activity every hour (sit to stands x 10)  - need to walk in home at least 3x/day outside of bathroom trips.    Written Home Exercises Provided: Patient instructed to cont prior HEP. Exercises were reviewed and Ambrosio was able to demonstrate them prior to the end of the session.  Ambrosio demonstrated good  understanding of the education provided. See EMR under Patient Instructions for exercises provided during therapy sessions    ASSESSMENT     Ambrosio has progressed in function and met most goals but he is still limited with community ambulation with his cane and his rolling walker due to lack of muscular endurance and standing tolerance.  He is very sedentary in the home and has been since his stroke.  He has reach max rehab potential at this time.    Ambrosio Is progressing well towards his goals.   Pt prognosis is Good.     Pt will continue to benefit from skilled outpatient physical therapy to address the deficits listed in the problem list box on initial evaluation, provide pt/family education and to maximize pt's level of independence in the home and community environment.     Pt's spiritual, cultural and educational needs considered and pt agreeable to plan of care and goals.     Anticipated barriers to physical therapy: sedentary lifestyle, chronic nature of CEREBROVASCULAR ACCIDENT, low endurance.     Goals:  Short Term Goals: 4 weeks   Pt will be compliant with HEP in order to maximize PT benefits (Met daily walking in home)  Pt will improve BLE MMT grades by >/=1/2 grade in order to improve strength for ADL completion (PROGRESSING, NOT MET)   Pt will improve bilateral hamstring flexibility by at least 5 degrees in 90/90  hamstring assessment to improve transfers and ambulation. (PROGRESSING, NOT MET)   Pt will complete TUG in </= 60 seconds with least restrictive assistive device in order to reduce risk for falls and improve safety with functional mobility (PROGRESSING, NOT MET)  64 secs  Pt will score >/= 15/28 on Tinetti balance test with least restrictive assistive device in order to reduce risk for falls and improve postural control (Met 16/28)  Pt will  perform 5x sit to  less than 50 seconds demonstrating improved BLE endurance and muscular power for transfers  (Met from wheelchair 41 seconds)     Long Term Goals: 8 weeks   Pt will score </= 40% on FOTO limitation survey in order to improve self-perception of functional mobility deficits (PROGRESSING, NOT MET)   Pt will improve BLE MMT grades by >/=1 grade in order to improve strength for ADL completion (PROGRESSING, NOT MET)   Pt will improve bilateral hamstring flexibility by at least 15 degrees in 90/90 hamstring assessment to improve transfers and ambulation. (PROGRESSING  Pt will complete TUG in </= 45 seconds with least restrictive assistive device in order to reduce risk for falls and improve safety with functional mobility (Met from wheelchair 37 secs)   Pt will score >/= 19/28 on Tinetti with least restrictive assistive device in order to reduce risk for falls and improve postural control (PROGRESSING, NOT MET) 16/28  Pt will  perform 5x sit to  less than 40 seconds demonstrating improved BLE endurance and muscular power for transfers Met 37 secs  Pt will report 0 falls from initiation of PT management Met  Pt will begin some form of home/community fitness in order to sustain progress gained in PT (Met walking in the home when the heat is elevated and around the home on the porch when the weather permits.    PLAN   Discharge today    Gayle Nath, PT   6/27/2023

## 2023-08-14 ENCOUNTER — OFFICE VISIT (OUTPATIENT)
Dept: PODIATRY | Facility: CLINIC | Age: 73
End: 2023-08-14
Payer: MEDICARE

## 2023-08-14 VITALS
BODY MASS INDEX: 26.6 KG/M2 | DIASTOLIC BLOOD PRESSURE: 73 MMHG | SYSTOLIC BLOOD PRESSURE: 115 MMHG | WEIGHT: 190 LBS | HEIGHT: 71 IN | HEART RATE: 69 BPM

## 2023-08-14 DIAGNOSIS — B35.1 ONYCHOMYCOSIS: ICD-10-CM

## 2023-08-14 DIAGNOSIS — N18.31 TYPE 2 DIABETES MELLITUS WITH STAGE 3A CHRONIC KIDNEY DISEASE, WITHOUT LONG-TERM CURRENT USE OF INSULIN: Primary | ICD-10-CM

## 2023-08-14 DIAGNOSIS — E11.42 TYPE 2 DIABETES MELLITUS WITH PERIPHERAL NEUROPATHY: ICD-10-CM

## 2023-08-14 DIAGNOSIS — I69.30 HISTORY OF CVA WITH RESIDUAL DEFICIT: ICD-10-CM

## 2023-08-14 DIAGNOSIS — E11.22 TYPE 2 DIABETES MELLITUS WITH STAGE 3A CHRONIC KIDNEY DISEASE, WITHOUT LONG-TERM CURRENT USE OF INSULIN: Primary | ICD-10-CM

## 2023-08-14 DIAGNOSIS — L85.3 DRY SKIN: ICD-10-CM

## 2023-08-14 DIAGNOSIS — E11.51 TYPE 2 DIABETES MELLITUS WITH PERIPHERAL ARTERY DISEASE: ICD-10-CM

## 2023-08-14 PROCEDURE — 99212 OFFICE O/P EST SF 10 MIN: CPT | Mod: 25,HCWC,S$GLB, | Performed by: PODIATRIST

## 2023-08-14 PROCEDURE — 11721 DEBRIDE NAIL 6 OR MORE: CPT | Mod: 59,Q9,HCWC,S$GLB | Performed by: PODIATRIST

## 2023-08-14 PROCEDURE — 1101F PT FALLS ASSESS-DOCD LE1/YR: CPT | Mod: HCWC,CPTII,S$GLB, | Performed by: PODIATRIST

## 2023-08-14 PROCEDURE — 11055 PARING/CUTG B9 HYPRKER LES 1: CPT | Mod: Q9,HCWC,S$GLB, | Performed by: PODIATRIST

## 2023-08-14 PROCEDURE — 3078F PR MOST RECENT DIASTOLIC BLOOD PRESSURE < 80 MM HG: ICD-10-PCS | Mod: HCWC,CPTII,S$GLB, | Performed by: PODIATRIST

## 2023-08-14 PROCEDURE — 11721 ROUTINE FOOT CARE: ICD-10-PCS | Mod: 59,Q9,HCWC,S$GLB | Performed by: PODIATRIST

## 2023-08-14 PROCEDURE — 1160F PR REVIEW ALL MEDS BY PRESCRIBER/CLIN PHARMACIST DOCUMENTED: ICD-10-PCS | Mod: HCWC,CPTII,S$GLB, | Performed by: PODIATRIST

## 2023-08-14 PROCEDURE — 99999 PR PBB SHADOW E&M-EST. PATIENT-LVL IV: CPT | Mod: PBBFAC,HCWC,, | Performed by: PODIATRIST

## 2023-08-14 PROCEDURE — 3074F PR MOST RECENT SYSTOLIC BLOOD PRESSURE < 130 MM HG: ICD-10-PCS | Mod: HCWC,CPTII,S$GLB, | Performed by: PODIATRIST

## 2023-08-14 PROCEDURE — 99212 PR OFFICE/OUTPT VISIT, EST, LEVL II, 10-19 MIN: ICD-10-PCS | Mod: 25,HCWC,S$GLB, | Performed by: PODIATRIST

## 2023-08-14 PROCEDURE — 99999 PR PBB SHADOW E&M-EST. PATIENT-LVL IV: ICD-10-PCS | Mod: PBBFAC,HCWC,, | Performed by: PODIATRIST

## 2023-08-14 PROCEDURE — 11055 ROUTINE FOOT CARE: ICD-10-PCS | Mod: Q9,HCWC,S$GLB, | Performed by: PODIATRIST

## 2023-08-14 PROCEDURE — 1101F PR PT FALLS ASSESS DOC 0-1 FALLS W/OUT INJ PAST YR: ICD-10-PCS | Mod: HCWC,CPTII,S$GLB, | Performed by: PODIATRIST

## 2023-08-14 PROCEDURE — 3008F BODY MASS INDEX DOCD: CPT | Mod: HCWC,CPTII,S$GLB, | Performed by: PODIATRIST

## 2023-08-14 PROCEDURE — 4010F PR ACE/ARB THEARPY RXD/TAKEN: ICD-10-PCS | Mod: HCWC,CPTII,S$GLB, | Performed by: PODIATRIST

## 2023-08-14 PROCEDURE — 1159F PR MEDICATION LIST DOCUMENTED IN MEDICAL RECORD: ICD-10-PCS | Mod: HCWC,CPTII,S$GLB, | Performed by: PODIATRIST

## 2023-08-14 PROCEDURE — 3288F PR FALLS RISK ASSESSMENT DOCUMENTED: ICD-10-PCS | Mod: HCWC,CPTII,S$GLB, | Performed by: PODIATRIST

## 2023-08-14 PROCEDURE — 3074F SYST BP LT 130 MM HG: CPT | Mod: HCWC,CPTII,S$GLB, | Performed by: PODIATRIST

## 2023-08-14 PROCEDURE — 1126F AMNT PAIN NOTED NONE PRSNT: CPT | Mod: HCWC,CPTII,S$GLB, | Performed by: PODIATRIST

## 2023-08-14 PROCEDURE — 3078F DIAST BP <80 MM HG: CPT | Mod: HCWC,CPTII,S$GLB, | Performed by: PODIATRIST

## 2023-08-14 PROCEDURE — 1160F RVW MEDS BY RX/DR IN RCRD: CPT | Mod: HCWC,CPTII,S$GLB, | Performed by: PODIATRIST

## 2023-08-14 PROCEDURE — 3008F PR BODY MASS INDEX (BMI) DOCUMENTED: ICD-10-PCS | Mod: HCWC,CPTII,S$GLB, | Performed by: PODIATRIST

## 2023-08-14 PROCEDURE — 1159F MED LIST DOCD IN RCRD: CPT | Mod: HCWC,CPTII,S$GLB, | Performed by: PODIATRIST

## 2023-08-14 PROCEDURE — 3288F FALL RISK ASSESSMENT DOCD: CPT | Mod: HCWC,CPTII,S$GLB, | Performed by: PODIATRIST

## 2023-08-14 PROCEDURE — 1126F PR PAIN SEVERITY QUANTIFIED, NO PAIN PRESENT: ICD-10-PCS | Mod: HCWC,CPTII,S$GLB, | Performed by: PODIATRIST

## 2023-08-14 PROCEDURE — 4010F ACE/ARB THERAPY RXD/TAKEN: CPT | Mod: HCWC,CPTII,S$GLB, | Performed by: PODIATRIST

## 2023-08-14 RX ORDER — AMMONIUM LACTATE 12 G/100G
CREAM TOPICAL
Qty: 140 G | Refills: 5 | Status: SHIPPED | OUTPATIENT
Start: 2023-08-14

## 2023-08-14 NOTE — PROCEDURES
"Routine Foot Care    Date/Time: 8/14/2023 4:30 PM    Performed by: Garland Thompson DPM  Authorized by: Garland Thompson DPM    Time out: Immediately prior to procedure a "time out" was called to verify the correct patient, procedure, equipment, support staff and site/side marked as required.    Consent Done?:  Yes (Verbal)  Hyperkeratotic Skin Lesions?: Yes    Number of trimmed lesions:  1  Location(s):  Left 1st Toe    Nail Care Type:  Debride  Location(s): All  (Left 1st Toe, Left 3rd Toe, Left 2nd Toe, Left 4th Toe, Left 5th Toe, Right 1st Toe, Right 2nd Toe, Right 3rd Toe, Right 4th Toe and Right 5th Toe)  Patient tolerance:  Patient tolerated the procedure well with no immediate complications     Used sterile nail nipper and #15 scalpel.        "

## 2023-08-14 NOTE — PROGRESS NOTES
Subjective:      Patient ID: Ambrosio Montoya is a 73 y.o. male.    Chief Complaint: Nail Care (4 month nail care f/u)    Ambrosio is a 73 y.o. male who presents to the clinic for evaluation and treatment of high risk feet. Ambrosio has a past medical history of Cataract, Coronary artery disease (2001), CVA (cerebral infarction), Diabetes mellitus type II (2001), Glaucoma suspect, Hyperlipidemia (2001), Hypertension, Myocardial infarction (2002), and Stroke (3/2012). The patient's chief complaint is long, thick toenails. This patient has documented high risk feet requiring routine maintenance secondary to diabetes mellitis and those secondary complications of diabetes, as mentioned..    12/16/22: Returns for routine foot care. No new concerns.    03/16/2023:  Returns for routine foot care.  Inquiring about a prescription for extra-depth diabetic shoes.    08/20/2023:  Follow-up for routine foot care.  Relates that he gets a sticking sensation that is intermittent along the bottom of the right heel area and lateral right great toe.    PCP: Margarita Taylor MD    Date Last Seen by PCP:  03/15/2023    Current shoe gear:  Affected Foot: Tennis shoes     Unaffected Foot: Tennis shoes    Hemoglobin A1C   Date Value Ref Range Status   03/21/2022 6.6 (H) 4.0 - 5.6 % Final     Comment:     ADA Screening Guidelines:  5.7-6.4%  Consistent with prediabetes  >or=6.5%  Consistent with diabetes    High levels of fetal hemoglobin interfere with the HbA1C  assay. Heterozygous hemoglobin variants (HbS, HgC, etc)do  not significantly interfere with this assay.   However, presence of multiple variants may affect accuracy.     04/28/2017 6.9 (H) 4.5 - 6.2 % Final     Comment:     According to ADA guidelines, hemoglobin A1C <7.0% represents  optimal control in non-pregnant diabetic patients.  Different  metrics may apply to specific populations.   Standards of Medical Care in Diabetes - 2016.  For the purpose of screening for the presence of  "diabetes:  <5.7%     Consistent with the absence of diabetes  5.7-6.4%  Consistent with increasing risk for diabetes   (prediabetes)  >or=6.5%  Consistent with diabetes  Currently no consensus exists for use of hemoglobin A1C  for diagnosis of diabetes for children.     01/05/2016 6.6 (H) 4.5 - 6.2 % Final     Vitals:    08/14/23 1638   BP: 115/73   Pulse: 69   Weight: 86.2 kg (190 lb)   Height: 5' 11" (1.803 m)   PainSc: 0-No pain      Past Medical History:   Diagnosis Date    Cataract     Coronary artery disease 2001    ACS    CVA (cerebral infarction)     Diabetes mellitus type II 2001    Glaucoma suspect     Hyperlipidemia 2001    Hypertension     Myocardial infarction 2002    Stroke 3/2012    [facility]       Past Surgical History:   Procedure Laterality Date    COLONOSCOPY N/A 4/20/2017    Procedure: COLONOSCOPY;  Surgeon: Armando Hidalgo MD;  Location: [facility] ENDO;  Service: Endoscopy;  Laterality: N/A;    COLONOSCOPY N/A 8/18/2022    Procedure: COLONOSCOPY;  Surgeon: Armando Hidalgo MD;  Location: [facility] ENDO;  Service: Endoscopy;  Laterality: N/A;       Family History   Problem Relation Age of Onset    Heart disease Mother     Cancer Father         prostate CA    Diabetes Sister     Cancer Sister     Depression Brother     Diabetes Brother        Social History     Socioeconomic History    Marital status:    Tobacco Use    Smoking status: Every Day     Current packs/day: 1.00     Average packs/day: 1 pack/day for 40.0 years (40.0 ttl pk-yrs)     Types: Cigarettes    Smokeless tobacco: Never   Substance and Sexual Activity    Alcohol use: No    Drug use: No    Sexual activity: Not Currently     Partners: Female     Social Determinants of Health     Financial Resource Strain: Low Risk  (6/27/2022)    Overall Financial Resource Strain (CARDIA)     Difficulty of Paying Living Expenses: Not hard at all   Food Insecurity: No Food Insecurity (6/27/2022)    Hunger Vital Sign     Worried About Running " Out of Food in the Last Year: Never true     Ran Out of Food in the Last Year: Never true   Transportation Needs: No Transportation Needs (6/27/2022)    PRAPARE - Transportation     Lack of Transportation (Medical): No     Lack of Transportation (Non-Medical): No   Physical Activity: Inactive (6/27/2022)    Exercise Vital Sign     Days of Exercise per Week: 0 days     Minutes of Exercise per Session: 0 min   Stress: No Stress Concern Present (6/27/2022)    [employer] - Occupational Stress Questionnaire     Feeling of Stress : Not at all   Social Connections: Socially Isolated (6/27/2022)    Social Connection and Isolation Panel [NHANES]     Frequency of Communication with Friends and Family: More than three times a week     Frequency of Social Gatherings with Friends and Family: More than three times a week     Attends Voodoo Services: Never     Active Member of Clubs or Organizations: No     Attends Club or Organization Meetings: Never     Marital Status:    Housing Stability: Low Risk  (6/27/2022)    Housing Stability Vital Sign     Unable to Pay for Housing in the Last Year: No     Number of Places Lived in the Last Year: 1     Unstable Housing in the Last Year: No       Current Outpatient Medications   Medication Sig Dispense Refill    allopurinoL (ZYLOPRIM) 100 MG tablet       aspirin 325 MG tablet Take 1 tablet (325 mg total) by mouth once daily.      clotrimazole-betamethasone 1-0.05% (LOTRISONE) cream APPLY SPARINGLY TO THE AFFECTED AREA(S) TWICE DAILY      hydrocortisone 2.5 % cream Apply topically 2 (two) times daily. 20 g 0    indomethacin (INDOCIN) 50 MG capsule Take 1 capsule (50 mg total) by mouth 2 (two) times daily as needed (pain). 30 capsule 0    linaCLOtide (LINZESS) 145 mcg Cap capsule Take 1 capsule (145 mcg total) by mouth before breakfast. 30 capsule 11    lisinopril (PRINIVIL,ZESTRIL) 40 MG tablet Take 1 tablet (40 mg total) by mouth once daily. 90  tablet 1    metFORMIN (GLUCOPHAGE) 1000 MG tablet Take 1,000 mg by mouth once daily at 6am.      polyethylene glycol (GLYCOLAX) 17 gram PwPk Take 17 g by mouth once daily. 14 each 0    rosuvastatin (CRESTOR) 40 MG Tab Take 40 mg by mouth.      sod sulf-pot chloride-mag sulf (SUTAB) 1.479-0.188- 0.225 gram tablet Take 12 tablets by mouth once daily. Take according to package instructions with indicated amount of water. 24 tablet 0    triamcinolone acetonide 0.1% (KENALOG) 0.1 % ointment Apply topically once daily. For eczema 30 g 3    VITAMIN D2 1,250 mcg (50,000 unit) capsule       amLODIPine (NORVASC) 10 MG tablet Take 1 tablet (10 mg total) by mouth once daily. 90 tablet 1    ammonium lactate 12 % Crea Apply to feet avoiding use between toes twice daily. 140 g 5    cetirizine (ZYRTEC) 10 MG tablet Take 1 tablet (10 mg total) by mouth once daily. 30 tablet 0     No current facility-administered medications for this visit.       Review of patient's allergies indicates:   Allergen Reactions    Pcn [penicillins] Nausea And Vomiting    Plavix [clopidogrel] Itching and Rash       Review of Systems   Constitutional: Negative for chills and fever.   HENT:  Negative for congestion and hearing loss.    Respiratory:  Negative for cough and shortness of breath.    Skin:  Positive for color change, dry skin and nail changes.   Musculoskeletal:  Positive for back pain and muscle weakness.   Gastrointestinal:  Negative for nausea and vomiting.   Neurological:  Negative for numbness and paresthesias.   Psychiatric/Behavioral:  Negative for altered mental status.            Objective:      Physical Exam  Constitutional:       General: He is not in acute distress.     Appearance: He is not ill-appearing.   Cardiovascular:      Pulses:           Dorsalis pedis pulses are 2+ on the right side and detected w/ Doppler on the left side.        Posterior tibial pulses are detected w/ Doppler on the right side and detected w/ Doppler on  the left side.      Comments: Extremely weak monophasic left DP and weak monophasic PT bilateral with Doppler.  Moderate brawny edema to lower extremity bilateral with hyperpigmentation of the skin bilateral lower extremity.  No hair growth bilateral lower extremity.  Skin temp is warm bilateral foot.  No rubor noted on dependency bilateral foot.  Musculoskeletal:      Right foot: Foot drop present.      Comments: Dorsiflexion of the foot and toes right is 3/5 with remaining muscle groups right lower extremity 4/5 and left lower extremity groups are 5/5.    Pes planus foot type bilateral.  No pain with range of motion or manual muscle strength testing bilateral foot ankle.   Feet:      Right foot:      Protective Sensation: 10 sites tested.  5 sites sensed.      Toenail Condition: Right toenails are abnormally thick and long. Fungal disease present.     Left foot:      Protective Sensation: 10 sites tested.  5 sites sensed.      Toenail Condition: Left toenails are abnormally thick and long. Fungal disease present.  Skin:     General: Skin is warm.      Capillary Refill: Capillary refill takes 2 to 3 seconds.      Findings: No ecchymosis or erythema.      Nails: There is no clubbing.      Comments: Nails 1-5 bilateral foot are elongated, thickened, discolored brown-yellow moderate loosening and underlying debris.  Right hallux nail 3/4 grown with thickened leading edge.    Raised hyperkeratotic skin medial 1 MTP left foot.    Diffuse dry hyperkeratotic skin plantar foot bilateral.    No open wound or maceration noted to the foot bilateral.     Neurological:      Mental Status: He is alert and oriented to person, place, and time.      Sensory: Sensory deficit present.      Motor: Weakness present.               Assessment:       Encounter Diagnoses   Name Primary?    Type 2 diabetes mellitus with stage 3a chronic kidney disease, without long-term current use of insulin Yes    Type 2 diabetes mellitus with peripheral  artery disease     Type 2 diabetes mellitus with peripheral neuropathy     History of CVA with residual deficit     Onychomycosis     Dry skin          Plan:       Ambrosio was seen today for nail care.    Diagnoses and all orders for this visit:    Type 2 diabetes mellitus with stage 3a chronic kidney disease, without long-term current use of insulin  -     Routine Foot Care    Type 2 diabetes mellitus with peripheral artery disease  -     Routine Foot Care    Type 2 diabetes mellitus with peripheral neuropathy  -     Routine Foot Care    History of CVA with residual deficit  -     Routine Foot Care    Onychomycosis  -     Routine Foot Care    Dry skin    Other orders  -     ammonium lactate 12 % Crea; Apply to feet avoiding use between toes twice daily.      I counseled the patient on his conditions, their implications and medical management.    Shoe inspection. Diabetic Foot Education. Patient reminded of the importance of good nutrition and blood sugar control to help prevent podiatric complications of diabetes. Patient instructed on proper foot hygeine. We discussed wearing proper shoe gear, daily foot inspections, never walking without protective shoe gear, never putting sharp instruments to feet.    Routine foot care per attached note. Patient relates relief following the procedure. He will continue to monitor the areas daily, inspect his feet, wear protective shoe gear when ambulatory, moisturizer to maintain skin integrity.    New prescription for ammonium lactate to be used twice daily to both feet.  Avoid use between toes.  Patient ambulating with tennis shoes and does not have diabetic shoes.      RTC within 3-4 months or p.r.n. as discussed    A portion of this note was generated by voice recognition software and may contain spelling and grammar errors.

## 2023-09-28 ENCOUNTER — OFFICE VISIT (OUTPATIENT)
Dept: OPTOMETRY | Facility: CLINIC | Age: 73
End: 2023-09-28
Payer: MEDICARE

## 2023-09-28 DIAGNOSIS — E11.9 TYPE 2 DIABETES MELLITUS WITHOUT RETINOPATHY: ICD-10-CM

## 2023-09-28 DIAGNOSIS — H40.013 OAG (OPEN ANGLE GLAUCOMA) SUSPECT, LOW RISK, BILATERAL: Primary | ICD-10-CM

## 2023-09-28 DIAGNOSIS — H52.03 HYPEROPIA WITH ASTIGMATISM AND PRESBYOPIA, BILATERAL: ICD-10-CM

## 2023-09-28 DIAGNOSIS — H52.203 HYPEROPIA WITH ASTIGMATISM AND PRESBYOPIA, BILATERAL: ICD-10-CM

## 2023-09-28 DIAGNOSIS — E11.36 TYPE 2 DIABETES MELLITUS WITH CATARACT: ICD-10-CM

## 2023-09-28 DIAGNOSIS — H25.13 NUCLEAR SCLEROSIS OF BOTH EYES: ICD-10-CM

## 2023-09-28 DIAGNOSIS — H52.4 HYPEROPIA WITH ASTIGMATISM AND PRESBYOPIA, BILATERAL: ICD-10-CM

## 2023-09-28 DIAGNOSIS — Z86.73 HISTORY OF STROKE: ICD-10-CM

## 2023-09-28 PROCEDURE — 1159F PR MEDICATION LIST DOCUMENTED IN MEDICAL RECORD: ICD-10-PCS | Mod: HCWC,CPTII,S$GLB, | Performed by: OPTOMETRIST

## 2023-09-28 PROCEDURE — 92014 PR EYE EXAM, EST PATIENT,COMPREHESV: ICD-10-PCS | Mod: HCWC,S$GLB,, | Performed by: OPTOMETRIST

## 2023-09-28 PROCEDURE — 99999 PR PBB SHADOW E&M-EST. PATIENT-LVL III: ICD-10-PCS | Mod: PBBFAC,HCWC,, | Performed by: OPTOMETRIST

## 2023-09-28 PROCEDURE — 1126F AMNT PAIN NOTED NONE PRSNT: CPT | Mod: HCWC,CPTII,S$GLB, | Performed by: OPTOMETRIST

## 2023-09-28 PROCEDURE — 1101F PR PT FALLS ASSESS DOC 0-1 FALLS W/OUT INJ PAST YR: ICD-10-PCS | Mod: HCWC,CPTII,S$GLB, | Performed by: OPTOMETRIST

## 2023-09-28 PROCEDURE — 1159F MED LIST DOCD IN RCRD: CPT | Mod: HCWC,CPTII,S$GLB, | Performed by: OPTOMETRIST

## 2023-09-28 PROCEDURE — 1160F PR REVIEW ALL MEDS BY PRESCRIBER/CLIN PHARMACIST DOCUMENTED: ICD-10-PCS | Mod: HCWC,CPTII,S$GLB, | Performed by: OPTOMETRIST

## 2023-09-28 PROCEDURE — 1126F PR PAIN SEVERITY QUANTIFIED, NO PAIN PRESENT: ICD-10-PCS | Mod: HCWC,CPTII,S$GLB, | Performed by: OPTOMETRIST

## 2023-09-28 PROCEDURE — 1101F PT FALLS ASSESS-DOCD LE1/YR: CPT | Mod: HCWC,CPTII,S$GLB, | Performed by: OPTOMETRIST

## 2023-09-28 PROCEDURE — 4010F ACE/ARB THERAPY RXD/TAKEN: CPT | Mod: HCWC,CPTII,S$GLB, | Performed by: OPTOMETRIST

## 2023-09-28 PROCEDURE — 3288F FALL RISK ASSESSMENT DOCD: CPT | Mod: HCWC,CPTII,S$GLB, | Performed by: OPTOMETRIST

## 2023-09-28 PROCEDURE — 2023F PR DILATED RETINAL EXAM W/O EVID OF RETINOPATHY: ICD-10-PCS | Mod: HCWC,CPTII,S$GLB, | Performed by: OPTOMETRIST

## 2023-09-28 PROCEDURE — 2023F DILAT RTA XM W/O RTNOPTHY: CPT | Mod: HCWC,CPTII,S$GLB, | Performed by: OPTOMETRIST

## 2023-09-28 PROCEDURE — 4010F PR ACE/ARB THEARPY RXD/TAKEN: ICD-10-PCS | Mod: HCWC,CPTII,S$GLB, | Performed by: OPTOMETRIST

## 2023-09-28 PROCEDURE — 1160F RVW MEDS BY RX/DR IN RCRD: CPT | Mod: HCWC,CPTII,S$GLB, | Performed by: OPTOMETRIST

## 2023-09-28 PROCEDURE — 3288F PR FALLS RISK ASSESSMENT DOCUMENTED: ICD-10-PCS | Mod: HCWC,CPTII,S$GLB, | Performed by: OPTOMETRIST

## 2023-09-28 PROCEDURE — 99999 PR PBB SHADOW E&M-EST. PATIENT-LVL III: CPT | Mod: PBBFAC,HCWC,, | Performed by: OPTOMETRIST

## 2023-09-28 PROCEDURE — 92014 COMPRE OPH EXAM EST PT 1/>: CPT | Mod: HCWC,S$GLB,, | Performed by: OPTOMETRIST

## 2023-09-28 NOTE — PROGRESS NOTES
SAYRA    FAMILIA: 11/30/2022  Chief complaint (CC): Pt. States vision has been stable and has no   complains today.  Glasses? [place]  Contacts? No  H/o eye surgery, injections or laser: No  H/o eye injury: No  Known eye conditions? No  Family h/o eye conditions? No  Eye gtts? No      (--) Flashes (--)  Floaters (--) Mucous   (++)  Tearing (++) Itching (--) Burning   (--) Headaches (--) Eye Pain/discomfort (--) Irritation   (--)  Redness (--) Double vision (--) Blurry vision    Diabetic? Yes  A1c? Hemoglobin A1C       Date                     Value               Ref Range             Status                03/21/2022               6.6 (H)             4.0 - 5.6 %           Final                    Last edited by Klarissa Mcdonald on 9/28/2023 10:03 AM.            Assessment /Plan     For exam results, see Encounter Report.          OAG (open angle glaucoma) suspect, low risk, bilateral  History of stroke  (-) FHx. IOP 15 OD, 17 OS. Last 22 OD, 20 OS. C/d 0.55 OD, 0.65 OS. Pachy 481 OD, 505 OS. Prev OCT w/Dr Figueroa but pt didn't return for further testing.   11/30/2022 OCT OD borderline TI, G and TS, OS borderline T and G, thin TS  11/30/2022 HVF generalized depression OU-- likely related to stroke.   Educated pt on findings w/understanding.   RTC 1 year Routine and repeat testing. Repeat testing every 2 years.     Type 2 diabetes mellitus without retinopathy  BS control. No signs of diabetic retinopathy. Monitor with annual exam.     Type 2 diabetes mellitus with cataract  Nuclear sclerosis of both eyes  Nuclear sclerotic cataract - mildly visually significant. Observe.    Hyperopia with astigmatism and presbyopia, bilateral  SRx released to patient. Patient educated on lens options. Normal ocular health. RTC 1 year for routine exam.

## 2023-10-27 ENCOUNTER — TELEPHONE (OUTPATIENT)
Dept: GASTROENTEROLOGY | Facility: CLINIC | Age: 73
End: 2023-10-27
Payer: MEDICARE

## 2023-10-27 NOTE — TELEPHONE ENCOUNTER
Gi follow up scheduled with pt on Monday, November 6, 2023 at 230pm.  Dimas, earlene given clinic address and repeated correctly.

## 2023-11-06 ENCOUNTER — OFFICE VISIT (OUTPATIENT)
Dept: GASTROENTEROLOGY | Facility: CLINIC | Age: 73
End: 2023-11-06
Payer: MEDICARE

## 2023-11-06 VITALS
SYSTOLIC BLOOD PRESSURE: 114 MMHG | HEART RATE: 87 BPM | BODY MASS INDEX: 25.18 KG/M2 | HEIGHT: 71 IN | WEIGHT: 179.88 LBS | DIASTOLIC BLOOD PRESSURE: 74 MMHG

## 2023-11-06 DIAGNOSIS — K62.5 RECTAL BLEEDING: Primary | ICD-10-CM

## 2023-11-06 PROCEDURE — 3078F PR MOST RECENT DIASTOLIC BLOOD PRESSURE < 80 MM HG: ICD-10-PCS | Mod: HCWC,CPTII,S$GLB, | Performed by: INTERNAL MEDICINE

## 2023-11-06 PROCEDURE — 1101F PT FALLS ASSESS-DOCD LE1/YR: CPT | Mod: HCWC,CPTII,S$GLB, | Performed by: INTERNAL MEDICINE

## 2023-11-06 PROCEDURE — 3078F DIAST BP <80 MM HG: CPT | Mod: HCWC,CPTII,S$GLB, | Performed by: INTERNAL MEDICINE

## 2023-11-06 PROCEDURE — 99999 PR PBB SHADOW E&M-EST. PATIENT-LVL III: CPT | Mod: PBBFAC,HCWC,, | Performed by: INTERNAL MEDICINE

## 2023-11-06 PROCEDURE — 4010F PR ACE/ARB THEARPY RXD/TAKEN: ICD-10-PCS | Mod: HCWC,CPTII,S$GLB, | Performed by: INTERNAL MEDICINE

## 2023-11-06 PROCEDURE — 99999 PR PBB SHADOW E&M-EST. PATIENT-LVL III: ICD-10-PCS | Mod: PBBFAC,HCWC,, | Performed by: INTERNAL MEDICINE

## 2023-11-06 PROCEDURE — 99214 OFFICE O/P EST MOD 30 MIN: CPT | Mod: HCWC,S$GLB,, | Performed by: INTERNAL MEDICINE

## 2023-11-06 PROCEDURE — 3074F PR MOST RECENT SYSTOLIC BLOOD PRESSURE < 130 MM HG: ICD-10-PCS | Mod: HCWC,CPTII,S$GLB, | Performed by: INTERNAL MEDICINE

## 2023-11-06 PROCEDURE — 4010F ACE/ARB THERAPY RXD/TAKEN: CPT | Mod: HCWC,CPTII,S$GLB, | Performed by: INTERNAL MEDICINE

## 2023-11-06 PROCEDURE — 3008F BODY MASS INDEX DOCD: CPT | Mod: HCWC,CPTII,S$GLB, | Performed by: INTERNAL MEDICINE

## 2023-11-06 PROCEDURE — 99214 PR OFFICE/OUTPT VISIT, EST, LEVL IV, 30-39 MIN: ICD-10-PCS | Mod: HCWC,S$GLB,, | Performed by: INTERNAL MEDICINE

## 2023-11-06 PROCEDURE — 3008F PR BODY MASS INDEX (BMI) DOCUMENTED: ICD-10-PCS | Mod: HCWC,CPTII,S$GLB, | Performed by: INTERNAL MEDICINE

## 2023-11-06 PROCEDURE — 3074F SYST BP LT 130 MM HG: CPT | Mod: HCWC,CPTII,S$GLB, | Performed by: INTERNAL MEDICINE

## 2023-11-06 PROCEDURE — 3288F PR FALLS RISK ASSESSMENT DOCUMENTED: ICD-10-PCS | Mod: HCWC,CPTII,S$GLB, | Performed by: INTERNAL MEDICINE

## 2023-11-06 PROCEDURE — 3288F FALL RISK ASSESSMENT DOCD: CPT | Mod: HCWC,CPTII,S$GLB, | Performed by: INTERNAL MEDICINE

## 2023-11-06 PROCEDURE — 1101F PR PT FALLS ASSESS DOC 0-1 FALLS W/OUT INJ PAST YR: ICD-10-PCS | Mod: HCWC,CPTII,S$GLB, | Performed by: INTERNAL MEDICINE

## 2023-11-06 RX ORDER — POLYETHYLENE GLYCOL 3350, SODIUM SULFATE ANHYDROUS, SODIUM BICARBONATE, SODIUM CHLORIDE, POTASSIUM CHLORIDE 236; 22.74; 6.74; 5.86; 2.97 G/4L; G/4L; G/4L; G/4L; G/4L
4 POWDER, FOR SOLUTION ORAL ONCE
Qty: 4000 ML | Refills: 0 | Status: SHIPPED | OUTPATIENT
Start: 2023-11-06 | End: 2023-11-06

## 2023-11-06 NOTE — PATIENT INSTRUCTIONS
GOLYTELY/ COLYTE/ NULYTELY Prep Instructions    [facility] [address]  Clinic Office [phone]  Endoscopy Lab [phone]    You are scheduled for a Colonoscopy with Dr. Hidalgo on Thursday, November 16, 2023 at [facility] in [place].    Check in at the Hospital -1st floor, Information desk.   Call [phone] to reschedule.    An adult friend/family member must come with you to drive you home.  You cannot drive, take a taxi, Uber/Lyft or bus to leave the Endoscopy Center alone.  If you do not have someone to drive you home, your test will be cancelled.     Please follow the directions of your doctor if you take any pills that thin your blood. If you take these meds: Aggrenox, Brilinta, Effient, Eliquis, Lovenox, Plavix, Pletal, Pradaxa, Ticilid, Xarelto or Coumadin, let the doctor's office know.    Please hold any GLP-1 medications prior to the procedure: Dulaglutide Trulicity(hold week prior), Exenatide Byetta (hold the morning of procedure), Semaglutide Ozempic (hold week prior), Liraglutide Victoza, Saxenda(hold week prior), Lixisenatide Adlyxin (hold the morning of procedure), Semaglutide Rybelsus (hold the morning of procedure), Tirzepatide Mounjaro (hold week prior)     DON'T: On the morning of the test do not take insulin or pills for diabetes.     DO: On the morning of the test, do take any pills for blood pressure, heart, anti-rejection and or seizures with a small sip of water. Bring any inhalers with you.    To have a good prep, you must follow these instructions - please do not use the directions from the pharmacy.    The doctor will send a prescription for the Golytely.      The Day Before the test:    You can only drink CLEAR LIQUIDS the whole day before your test.  You can't eat any food for the whole day.    You CAN have:  Water, Coffee or decaf coffee (no milk or cream)  Tea  Soft drinks - regular and sugar free  Jello (green or yellow)  Apple  Juice, white grape juice, white cranberry juice  Gatorade, Power Aid, Crystal Light, Tad Aid  Lemonade and Limeade  Bouillon, clear soup  Snowball, popsicles  YOU CAN'T DRINK ANYTHING RED, PURPLE ORANGE OR BLUE   YOU CAN'T DRINK ALCOHOL  ONLY DRINK WHAT IS ON THE LIST      At 12 noon,   Add water up to the line on the jug of GOLYTELY (should be 1 gallon when done).  You can add a packet of yellow/green powder drink mix to the jug.   Put the bottle in the refrigerator if you prefer. It should taste better if it is cold. Do NOT put this solution over ice. It is ok to drink with a straw.    At 5 pm the NIGHT before your test:    Drink 1 glass (8 ounces) every 10 minutes until 1/2 of the jug is finished.  Put the jug back in the refrigerator after you finish the first half, and don't drink any more until 5 hours before you come to the hospital (see below for more specific details).    You can continue to drink clear liquids until you go to sleep.    The Day of the test - We will call you 2 days before your test to tell you what time to get there.    5 hours before you come to the hospital (this may be in the middle of the night)  Drink 1 glass (8 ounces) every 10 minutes until the jug is finished.     YOU CAN'T EAT OR DRINK ANYTHING ELSE ONCE YOU FINISH THE PREP    Leave all valuables and jewelry at home. You will be at the hospital for 2-4 hours.    Call the Endoscopy department at [phone] with any questions about your procedure.

## 2023-11-06 NOTE — PROGRESS NOTES
"[facility]    CC: Rectal bleeding    HPI 73 y.o. male with history of CKD, HTN, DBM, HLD, and smoking presents for follow up.    At last visit in February started Linzess. He is currently using Linzess every other day. Having bowel movements twice daily and states that they are soft. Continues to have blood with every bowel movement that he describes as dark in color and present on the toilet paper when he wipes. He admits to a stinging sensation with bowel movements.     Past Medical History  Past Medical History:   Diagnosis Date    Cataract     Coronary artery disease 2001    ACS    CVA (cerebral infarction)     Diabetes mellitus type II 2001    Glaucoma suspect     Hyperlipidemia 2001    Hypertension     Myocardial infarction 2002    [facility]       Physical Examination  /74 (BP Location: Left arm, Patient Position: Sitting, BP Method: Large (Automatic))   Pulse 87   Ht 5' 11" (1.803 m)   Wt 81.6 kg (179 lb 14.3 oz)   BMI 25.09 kg/m²   General appearance: alert, cooperative, no distress  Lungs: clear to auscultation bilaterally, no dullness to percussion bilaterally  Heart: regular rate and rhythm without rub; no displacement of the PMI   Abdomen: soft, non-tender; bowel sounds normoactive; no organomegaly  Lab Results   Component Value Date    WBC 8.59 11/10/2023    HGB 13.7 (L) 11/10/2023    HCT 41.9 11/10/2023    MCV 82 11/10/2023     11/10/2023           Assessment:   73 y.o.male with CKD, HTN, DBM, HLD, smoking presenting for follow up of rectal bleeding. Last colonoscopy 8/2022 notable only for small polyps, small hemorrhoids with 5 year repeat recommended. He continues to have rectal bleeding daily with every bowel movement despite resolution of constipation. Differential includes hemorrhoidal bleeding, anal fissure, malignancy. (Chronic problem with exacerbation)     Plan:  - Counseled on continuing lifestyle changes of good hydration, high fiber diet  - " Continue Linzess 290mcg every other day for constipation   - Schedule colonoscopy for next week, 11/16 to rule out malignancy as a source of iron deficiency anemia and rectal bleeding  - Plan for hemorrhoidal banding during colonoscopy   - Will assess for anal fissure during colonoscopy      Armando Hidalgo MD   [address] [place] [address], [address]  [place] [address] [phone]

## 2023-11-10 ENCOUNTER — HOSPITAL ENCOUNTER (EMERGENCY)
Facility: HOSPITAL | Age: 73
Discharge: HOME | End: 2023-11-10
Attending: EMERGENCY MEDICINE
Payer: MEDICARE

## 2023-11-10 VITALS
WEIGHT: 175 LBS | DIASTOLIC BLOOD PRESSURE: 77 MMHG | SYSTOLIC BLOOD PRESSURE: 127 MMHG | BODY MASS INDEX: 24.41 KG/M2 | RESPIRATION RATE: 20 BRPM | OXYGEN SATURATION: 96 % | TEMPERATURE: 98 F | HEART RATE: 89 BPM

## 2023-11-10 DIAGNOSIS — R31.0 GROSS HEMATURIA: Primary | ICD-10-CM

## 2023-11-10 DIAGNOSIS — N30.01 ACUTE CYSTITIS WITH HEMATURIA: ICD-10-CM

## 2023-11-10 LAB
ALBUMIN SERPL BCP-MCNC: 3.9 G/DL (ref 3.5–5.2)
ALP SERPL-CCNC: 62 U/L (ref 55–135)
ALT SERPL W/O P-5'-P-CCNC: 15 U/L (ref 10–44)
ANION GAP SERPL CALC-SCNC: 12 MMOL/L (ref 8–16)
AST SERPL-CCNC: 22 U/L (ref 10–40)
BACTERIA #/AREA URNS HPF: ABNORMAL /HPF
BASOPHILS # BLD AUTO: 0.05 K/UL (ref 0–0.2)
BASOPHILS NFR BLD: 0.6 % (ref 0–1.9)
BILIRUB SERPL-MCNC: 0.6 MG/DL (ref 0.1–1)
BILIRUB UR QL STRIP: NEGATIVE
BUN SERPL-MCNC: 13 MG/DL (ref 8–23)
CALCIUM SERPL-MCNC: 9.7 MG/DL (ref 8.7–10.5)
CHLORIDE SERPL-SCNC: 107 MMOL/L (ref 95–110)
CLARITY UR: ABNORMAL
CO2 SERPL-SCNC: 22 MMOL/L (ref 23–29)
COLOR UR: ABNORMAL
CREAT SERPL-MCNC: 1.4 MG/DL (ref 0.5–1.4)
DIFFERENTIAL METHOD: ABNORMAL
EOSINOPHIL # BLD AUTO: 0.2 K/UL (ref 0–0.5)
EOSINOPHIL NFR BLD: 2.8 % (ref 0–8)
ERYTHROCYTE [DISTWIDTH] IN BLOOD BY AUTOMATED COUNT: 15.4 % (ref 11.5–14.5)
EST. GFR  (NO RACE VARIABLE): 53 ML/MIN/1.73 M^2
GLUCOSE SERPL-MCNC: 96 MG/DL (ref 70–110)
GLUCOSE UR QL STRIP: NEGATIVE
HCT VFR BLD AUTO: 41.9 % (ref 40–54)
HGB BLD-MCNC: 13.7 G/DL (ref 14–18)
HGB UR QL STRIP: ABNORMAL
HYALINE CASTS #/AREA URNS LPF: 0 /LPF
IMM GRANULOCYTES # BLD AUTO: 0.02 K/UL (ref 0–0.04)
IMM GRANULOCYTES NFR BLD AUTO: 0.2 % (ref 0–0.5)
KETONES UR QL STRIP: NEGATIVE
LEUKOCYTE ESTERASE UR QL STRIP: ABNORMAL
LYMPHOCYTES # BLD AUTO: 0.6 K/UL (ref 1–4.8)
LYMPHOCYTES NFR BLD: 7.3 % (ref 18–48)
MAGNESIUM SERPL-MCNC: 1.8 MG/DL (ref 1.6–2.6)
MCH RBC QN AUTO: 26.8 PG (ref 27–31)
MCHC RBC AUTO-ENTMCNC: 32.7 G/DL (ref 32–36)
MCV RBC AUTO: 82 FL (ref 82–98)
MICROSCOPIC COMMENT: ABNORMAL
MONOCYTES # BLD AUTO: 0.7 K/UL (ref 0.3–1)
MONOCYTES NFR BLD: 8.5 % (ref 4–15)
NEUTROPHILS # BLD AUTO: 6.9 K/UL (ref 1.8–7.7)
NEUTROPHILS NFR BLD: 80.8 % (ref 38–73)
NITRITE UR QL STRIP: NEGATIVE
NRBC BLD-RTO: 0 /100 WBC
PH UR STRIP: 8 [PH] (ref 5–8)
PLATELET # BLD AUTO: 207 K/UL (ref 150–450)
PMV BLD AUTO: 10.6 FL (ref 9.2–12.9)
POTASSIUM SERPL-SCNC: 4 MMOL/L (ref 3.5–5.1)
PROT SERPL-MCNC: 7.9 G/DL (ref 6–8.4)
PROT UR QL STRIP: ABNORMAL
RBC # BLD AUTO: 5.11 M/UL (ref 4.6–6.2)
RBC #/AREA URNS HPF: >100 /HPF (ref 0–4)
RBC CASTS #/AREA URNS LPF: 0 /LPF
SODIUM SERPL-SCNC: 141 MMOL/L (ref 136–145)
SP GR UR STRIP: 1.01 (ref 1–1.03)
UNIDENT CRYS URNS QL MICRO: 16
URN SPEC COLLECT METH UR: ABNORMAL
UROBILINOGEN UR STRIP-ACNC: NEGATIVE EU/DL
WBC # BLD AUTO: 8.59 K/UL (ref 3.9–12.7)
WBC #/AREA URNS HPF: >100 /HPF (ref 0–5)

## 2023-11-10 PROCEDURE — 99285 EMERGENCY DEPT VISIT HI MDM: CPT | Mod: 25,HCWC

## 2023-11-10 PROCEDURE — 96361 HYDRATE IV INFUSION ADD-ON: CPT | Mod: HCWC

## 2023-11-10 PROCEDURE — 96365 THER/PROPH/DIAG IV INF INIT: CPT | Mod: 59,HCWC

## 2023-11-10 PROCEDURE — 87186 SC STD MICRODIL/AGAR DIL: CPT | Mod: HCWC | Performed by: EMERGENCY MEDICINE

## 2023-11-10 PROCEDURE — 25500020 PHARM REV CODE 255: Mod: HCWC | Performed by: EMERGENCY MEDICINE

## 2023-11-10 PROCEDURE — 87086 URINE CULTURE/COLONY COUNT: CPT | Mod: HCWC | Performed by: EMERGENCY MEDICINE

## 2023-11-10 PROCEDURE — 25000003 PHARM REV CODE 250: Mod: HCWC | Performed by: EMERGENCY MEDICINE

## 2023-11-10 PROCEDURE — 85025 COMPLETE CBC W/AUTO DIFF WBC: CPT | Mod: HCWC | Performed by: EMERGENCY MEDICINE

## 2023-11-10 PROCEDURE — 87077 CULTURE AEROBIC IDENTIFY: CPT | Mod: HCWC | Performed by: EMERGENCY MEDICINE

## 2023-11-10 PROCEDURE — 81000 URINALYSIS NONAUTO W/SCOPE: CPT | Mod: HCWC | Performed by: EMERGENCY MEDICINE

## 2023-11-10 PROCEDURE — 63600175 PHARM REV CODE 636 W HCPCS: Mod: HCWC | Performed by: EMERGENCY MEDICINE

## 2023-11-10 PROCEDURE — 83735 ASSAY OF MAGNESIUM: CPT | Mod: HCWC | Performed by: EMERGENCY MEDICINE

## 2023-11-10 PROCEDURE — 87088 URINE BACTERIA CULTURE: CPT | Mod: HCWC | Performed by: EMERGENCY MEDICINE

## 2023-11-10 PROCEDURE — 80053 COMPREHEN METABOLIC PANEL: CPT | Mod: HCWC | Performed by: EMERGENCY MEDICINE

## 2023-11-10 RX ORDER — ACETAMINOPHEN 500 MG
1000 TABLET ORAL
Status: COMPLETED | OUTPATIENT
Start: 2023-11-10 | End: 2023-11-10

## 2023-11-10 RX ORDER — CEPHALEXIN 500 MG/1
500 CAPSULE ORAL EVERY 6 HOURS
Qty: 28 CAPSULE | Refills: 0 | Status: SHIPPED | OUTPATIENT
Start: 2023-11-10 | End: 2023-11-17

## 2023-11-10 RX ADMIN — ACETAMINOPHEN 1000 MG: 500 TABLET ORAL at 04:11

## 2023-11-10 RX ADMIN — SODIUM CHLORIDE, POTASSIUM CHLORIDE, SODIUM LACTATE AND CALCIUM CHLORIDE 1000 ML: 600; 310; 30; 20 INJECTION, SOLUTION INTRAVENOUS at 04:11

## 2023-11-10 RX ADMIN — CEFTRIAXONE SODIUM 1 G: 1 INJECTION, POWDER, FOR SOLUTION INTRAMUSCULAR; INTRAVENOUS at 04:11

## 2023-11-10 RX ADMIN — IOHEXOL 75 ML: 350 INJECTION, SOLUTION INTRAVENOUS at 05:11

## 2023-11-10 NOTE — ED TRIAGE NOTES
Pt to ED 8 with c/o hematuria thaqt started today. Pt states abdominal pain and painful urination. Pt resting on stretcher; family at bedside. NADN. Vitals WNL AAOX4. Pt placed in gown and an external catheter was placed. Pt has a hx of previous stroke with deficits to the right side. Respirations even and non labored. Skin warm, dry, and intact.

## 2023-11-11 NOTE — DISCHARGE INSTRUCTIONS

## 2023-11-11 NOTE — ED PROVIDER NOTES
Encounter Date: 11/10/2023       History     Chief Complaint   Patient presents with    Hematuria     Pt reports gross amount of bright red blood in urine since this morning. Pt also reporting abd pain.      73-year-old male with past medical history as below presents with complaint of hematuria.  Patient says that today he would onset of gross hematuria.  He endorses associated dysuria.  He denies abdominal pain however caregiver at bedside that says that earlier he did have abdominal pain.  Patient denies nausea, vomiting, diarrhea.  Takes an aspirin daily.  Admits to a history of cigarette smoking.      The history is provided by the patient and a caregiver.     Review of patient's allergies indicates:   Allergen Reactions    Pcn [penicillins] Nausea And Vomiting    Plavix [clopidogrel] Itching and Rash     Past Medical History:   Diagnosis Date    Cataract     Coronary artery disease 2001    ACS    CVA (cerebral infarction)     Diabetes mellitus type II 2001    Glaucoma suspect     Hyperlipidemia 2001    Hypertension     Myocardial infarction 2002    Stroke 3/2012    [facility]     Past Surgical History:   Procedure Laterality Date    COLONOSCOPY N/A 4/20/2017    Procedure: COLONOSCOPY;  Surgeon: Armando Hidalgo MD;  Location: [facility] ENDO;  Service: Endoscopy;  Laterality: N/A;    COLONOSCOPY N/A 8/18/2022    Procedure: COLONOSCOPY;  Surgeon: Armando Hidalgo MD;  Location: [facility] ENDO;  Service: Endoscopy;  Laterality: N/A;     Family History   Problem Relation Age of Onset    Heart disease Mother     Cancer Father         prostate CA    Diabetes Sister     Cancer Sister     Depression Brother     Diabetes Brother      Social History     Tobacco Use    Smoking status: Every Day     Current packs/day: 1.00     Average packs/day: 1 pack/day for 40.0 years (40.0 ttl pk-yrs)     Types: Cigarettes    Smokeless tobacco: Never   Substance Use Topics    Alcohol use: No    Drug use: No     Review of  Systems    Physical Exam     Initial Vitals   BP Pulse Resp Temp SpO2   11/10/23 1432 11/10/23 1432 11/10/23 1432 11/10/23 1432 11/10/23 1755   (!) 141/96 101 20 98 °F (36.7 °C) 96 %      MAP       --                Physical Exam    Constitutional: He appears well-developed and well-nourished. He is not diaphoretic. No distress.   HENT:   Head: Normocephalic and atraumatic.   Eyes: EOM are normal.   Neck:   Normal range of motion.  Cardiovascular:  Normal rate.           Pulmonary/Chest: No respiratory distress.   Abdominal: Abdomen is soft. He exhibits no distension. There is no abdominal tenderness.   Musculoskeletal:         General: Normal range of motion.      Cervical back: Normal range of motion.     Neurological: He is alert and oriented to person, place, and time.   Skin: Skin is warm and dry.   Psychiatric: He has a normal mood and affect.         ED Course   Procedures  Labs Reviewed   URINALYSIS, REFLEX TO URINE CULTURE - Abnormal; Notable for the following components:       Result Value    Color, UA Brown (*)     Appearance, UA Cloudy (*)     Protein, UA 2+ (*)     Occult Blood UA 3+ (*)     Leukocytes, UA 3+ (*)     All other components within normal limits    Narrative:     Specimen Source->Urine   CBC W/ AUTO DIFFERENTIAL - Abnormal; Notable for the following components:    Hemoglobin 13.7 (*)     MCH 26.8 (*)     RDW 15.4 (*)     Lymph # 0.6 (*)     Gran % 80.8 (*)     Lymph % 7.3 (*)     All other components within normal limits   COMPREHENSIVE METABOLIC PANEL - Abnormal; Notable for the following components:    CO2 22 (*)     eGFR 53 (*)     All other components within normal limits   URINALYSIS MICROSCOPIC - Abnormal; Notable for the following components:    RBC, UA >100 (*)     WBC, UA >100 (*)     Bacteria Moderate (*)     All other components within normal limits    Narrative:     Specimen Source->Urine   CULTURE, URINE   MAGNESIUM          Imaging Results              CT Abdomen Pelvis With  IV Contrast (Final result)  Result time 11/10/23 18:03:12      Final result by Cindy Hemphill MD (11/10/23 18:03:12)                   Impression:      No acute abnormality.    Nonobstructing punctate renal calculi bilaterally.    Additional incidental findings as above.      Electronically signed by: Cindy Hemphill  Date:    11/10/2023  Time:    18:03               Narrative:    EXAMINATION:  CT ABDOMEN PELVIS WITH IV CONTRAST    CLINICAL HISTORY:  Flank pain, kidney stone suspected;    TECHNIQUE:  Low dose axial images, sagittal and coronal reformations were obtained from the lung bases to the pubic symphysis before and following the IV administration of 75 mL of Omnipaque 350 .  Oral contrast was not administered..    COMPARISON:  None    FINDINGS:  Abdomen:    - Lung bases: Mild dependent atelectasis.    Punctate neck for lithiasis without obstruction.    - Liver: Less than 1 cm low density in the right lobe of the liver is too small to characterize.  Liver is otherwise homogeneous.    - Gallbladder: No calcified gallstones.    - Bile Ducts: No evidence of intra or extra hepatic biliary ductal dilation.    - Spleen: Negative.    - Kidneys: A simple cyst in the right kidney.  Renal cortices enhance symmetrically.  Punctate calculi bilaterally noted.  No hydronephrosis.    - Adrenals: Unremarkable    - Pancreas: No mass or peripancreatic fat stranding.    - Retroperitoneum:  No significant adenopathy.    - Vascular: Atherosclerotic calcifications throughout the aorta and its branches.  There is no aneurysm.    - Abdominal wall:  Unremarkable.    Pelvis:    There is bladder wall thickening which may represent under distension or cystitis.  There is a heterogeneous and enlarged prostate.  There is no pelvic mass, adenopathy, or free fluid.    Bowel/Mesentery:    No evidence of bowel obstruction or inflammation.  Appendix is normal.  Degenerative changes of the hips and spine.    Bones:  No acute osseous  abnormality and no suspicious lytic or blastic lesion.                                       Medications   lactated ringers bolus 1,000 mL (1,000 mLs Intravenous New Bag 11/10/23 1648)   acetaminophen tablet 1,000 mg (1,000 mg Oral Given 11/10/23 1647)   cefTRIAXone (ROCEPHIN) 1 g in dextrose 5 % in water (D5W) 100 mL IVPB (MB+) (0 g Intravenous Stopped 11/10/23 1715)   iohexoL (OMNIPAQUE 350) injection 75 mL (75 mLs Intravenous Given 11/10/23 1740)     Medical Decision Making  73-year-old male with past medical history as above presents with complaint of gross hematuria.  Upon arrival he was afebrile, stable vital signs.  Differential includes but is not limited to UTI, nephrolithiasis, malignancy, stricture, glomerulonephritis. Labs, UA and CT as noted below. Treated for UTI. CT A/P without acute pathology. Pt and caregiver given strict return precautions and counseled about importance of f/u due to concern about underlying malignancy which cannot be excluded at this time. Urology referral placed. Pt able to void spontaneously, no indicatio for [facility] placement or hospitalization at this time.     No acute emergent medical condition has been identified. The patient appears to be low risk for an emergent medical condition is appropriate for discharge with outpatient f/u as detailed in discharge instructions for reevaluation and possible continued outpatient workup and management. I have discussed the workup with the patient, who has verbalized understanding of the plan and need for outpatient follow-up.  This evaluation does not preclude the development of an emergent condition so in addition, I have advised the patient that they can return to the ED at any time with worsening or change of their symptoms, or with any other medical complaint.       Amount and/or Complexity of Data Reviewed  Independent Historian: caregiver     Details: Present at bedside throughout ED stay   External Data Reviewed: notes.      Details: Seen 11/6/23 for rectal bleeding by GI   Labs: ordered. Decision-making details documented in ED Course.  Radiology: ordered and independent interpretation performed.    Risk  OTC drugs.  Prescription drug management.  Decision regarding hospitalization.               ED Course as of 11/10/23 1912   Fri Nov 10, 2023   1531 Hemoglobin(!): 13.7  Stable/improved from 13.0  [AT]   1548 Creatinine: 1.4  Normal  [AT]   1548 Potassium: 4.0  Normal  [AT]   1818 CT A/P Impression:     No acute abnormality.     Nonobstructing punctate renal calculi bilaterally.     Additional incidental findings as above. [AT]   1910 WBC, UA(!): >100  Abx given  [AT]   1910 Bacteria, UA(!): Moderate  Concern for UTI [AT]      ED Course User Index  [AT] Mariam Rehman MD                    Clinical Impression:   Final diagnoses:  [R31.0] Gross hematuria (Primary)  [N30.01] Acute cystitis with hematuria        ED Disposition Condition    Discharge Stable          ED Prescriptions       Medication Sig Dispense Start Date End Date Auth. Provider    cephALEXin (KEFLEX) 500 MG capsule Take 1 capsule (500 mg total) by mouth every 6 (six) hours. for 7 days 28 capsule 11/10/2023 11/17/2023 Mariam Rehman MD          Follow-up Information       Follow up With Specialties Details Why Contact Info Additional Information    Margarita Taylor MD Internal Medicine Schedule an appointment as soon as possible for a visit in 2 days  [address]  [address]  [place] [address]  [phone]       [facility] Urology Urology Schedule an appointment as soon as possible for a visit in 2 days  [address]  [address]  [facility] [address] [phone] Please park in [address] or [address] and use Liz grace. Take Medical Office Building elevators.    [facility] Emergency Dept Emergency Medicine  As needed, If symptoms worsen [address] [address] [phone]              Mariam Rehman,  MD  11/10/23 1912

## 2023-11-13 ENCOUNTER — TELEPHONE (OUTPATIENT)
Dept: GASTROENTEROLOGY | Facility: CLINIC | Age: 73
End: 2023-11-13
Payer: MEDICARE

## 2023-11-13 LAB — BACTERIA UR CULT: ABNORMAL

## 2023-11-13 NOTE — TELEPHONE ENCOUNTER
----- Message from Filomena Scott sent at 11/13/2023  8:28 AM CST -----  Type:  Needs Medical Advice    Who Called:  Pt Caregiver Sheree    Would the patient rather a call back or a response via [facility]?  call  Best Call Back Number:  [phone]  Additional Information:  Pt has a procedure scheduled for 11/16/23 and wants to cancel.

## 2023-11-13 NOTE — TELEPHONE ENCOUNTER
"Ms Garcia, caregiver, pt not willing to complete colonoscopy scheduled on 11/16/23.  Pt went to ED this weekend with rectal bleeding, stating "he don't want to do it". Advised Ms Garcia to contact clinic at later date to reschedule.  "

## 2023-11-14 ENCOUNTER — OFFICE VISIT (OUTPATIENT)
Dept: UROLOGY | Facility: CLINIC | Age: 73
End: 2023-11-14
Payer: MEDICARE

## 2023-11-14 VITALS
DIASTOLIC BLOOD PRESSURE: 68 MMHG | HEIGHT: 71 IN | SYSTOLIC BLOOD PRESSURE: 118 MMHG | WEIGHT: 175.06 LBS | BODY MASS INDEX: 24.51 KG/M2 | HEART RATE: 59 BPM

## 2023-11-14 DIAGNOSIS — R31.0 GROSS HEMATURIA: ICD-10-CM

## 2023-11-14 PROCEDURE — 1101F PR PT FALLS ASSESS DOC 0-1 FALLS W/OUT INJ PAST YR: ICD-10-PCS | Mod: HCWC,CPTII,S$GLB, | Performed by: UROLOGY

## 2023-11-14 PROCEDURE — 3288F FALL RISK ASSESSMENT DOCD: CPT | Mod: HCWC,CPTII,S$GLB, | Performed by: UROLOGY

## 2023-11-14 PROCEDURE — 99204 OFFICE O/P NEW MOD 45 MIN: CPT | Mod: HCWC,S$GLB,, | Performed by: UROLOGY

## 2023-11-14 PROCEDURE — 1159F PR MEDICATION LIST DOCUMENTED IN MEDICAL RECORD: ICD-10-PCS | Mod: HCWC,CPTII,S$GLB, | Performed by: UROLOGY

## 2023-11-14 PROCEDURE — 3008F BODY MASS INDEX DOCD: CPT | Mod: HCWC,CPTII,S$GLB, | Performed by: UROLOGY

## 2023-11-14 PROCEDURE — 99999 PR PBB SHADOW E&M-EST. PATIENT-LVL V: CPT | Mod: PBBFAC,,, | Performed by: UROLOGY

## 2023-11-14 PROCEDURE — 1160F RVW MEDS BY RX/DR IN RCRD: CPT | Mod: HCWC,CPTII,S$GLB, | Performed by: UROLOGY

## 2023-11-14 PROCEDURE — 1159F MED LIST DOCD IN RCRD: CPT | Mod: HCWC,CPTII,S$GLB, | Performed by: UROLOGY

## 2023-11-14 PROCEDURE — 3008F PR BODY MASS INDEX (BMI) DOCUMENTED: ICD-10-PCS | Mod: HCWC,CPTII,S$GLB, | Performed by: UROLOGY

## 2023-11-14 PROCEDURE — 1101F PT FALLS ASSESS-DOCD LE1/YR: CPT | Mod: HCWC,CPTII,S$GLB, | Performed by: UROLOGY

## 2023-11-14 PROCEDURE — 3074F SYST BP LT 130 MM HG: CPT | Mod: HCWC,CPTII,S$GLB, | Performed by: UROLOGY

## 2023-11-14 PROCEDURE — 1126F PR PAIN SEVERITY QUANTIFIED, NO PAIN PRESENT: ICD-10-PCS | Mod: HCWC,CPTII,S$GLB, | Performed by: UROLOGY

## 2023-11-14 PROCEDURE — 4010F ACE/ARB THERAPY RXD/TAKEN: CPT | Mod: HCWC,CPTII,S$GLB, | Performed by: UROLOGY

## 2023-11-14 PROCEDURE — 1126F AMNT PAIN NOTED NONE PRSNT: CPT | Mod: HCWC,CPTII,S$GLB, | Performed by: UROLOGY

## 2023-11-14 PROCEDURE — 3078F DIAST BP <80 MM HG: CPT | Mod: HCWC,CPTII,S$GLB, | Performed by: UROLOGY

## 2023-11-14 PROCEDURE — 99204 PR OFFICE/OUTPT VISIT, NEW, LEVL IV, 45-59 MIN: ICD-10-PCS | Mod: HCWC,S$GLB,, | Performed by: UROLOGY

## 2023-11-14 PROCEDURE — 3078F PR MOST RECENT DIASTOLIC BLOOD PRESSURE < 80 MM HG: ICD-10-PCS | Mod: HCWC,CPTII,S$GLB, | Performed by: UROLOGY

## 2023-11-14 PROCEDURE — 3074F PR MOST RECENT SYSTOLIC BLOOD PRESSURE < 130 MM HG: ICD-10-PCS | Mod: HCWC,CPTII,S$GLB, | Performed by: UROLOGY

## 2023-11-14 PROCEDURE — 1160F PR REVIEW ALL MEDS BY PRESCRIBER/CLIN PHARMACIST DOCUMENTED: ICD-10-PCS | Mod: HCWC,CPTII,S$GLB, | Performed by: UROLOGY

## 2023-11-14 PROCEDURE — 99999 PR PBB SHADOW E&M-EST. PATIENT-LVL V: ICD-10-PCS | Mod: PBBFAC,,, | Performed by: UROLOGY

## 2023-11-14 PROCEDURE — 3288F PR FALLS RISK ASSESSMENT DOCUMENTED: ICD-10-PCS | Mod: HCWC,CPTII,S$GLB, | Performed by: UROLOGY

## 2023-11-14 PROCEDURE — 4010F PR ACE/ARB THEARPY RXD/TAKEN: ICD-10-PCS | Mod: HCWC,CPTII,S$GLB, | Performed by: UROLOGY

## 2023-11-14 RX ORDER — CALCIUM CITRATE/VITAMIN D3 200MG-6.25
TABLET ORAL
COMMUNITY
Start: 2023-10-18

## 2023-11-14 RX ORDER — CIPROFLOXACIN 250 MG/1
250 TABLET, FILM COATED ORAL 2 TIMES DAILY
COMMUNITY
Start: 2023-10-24 | End: 2024-01-29

## 2023-11-14 RX ORDER — POLYETHYLENE GLYCOL-3350 AND ELECTROLYTES 236; 6.74; 5.86; 2.97; 22.74 G/274.31G; G/274.31G; G/274.31G; G/274.31G; G/274.31G
4000 POWDER, FOR SOLUTION ORAL ONCE
COMMUNITY
Start: 2023-11-06

## 2023-11-14 RX ORDER — LANCETS 33 GAUGE
EACH MISCELLANEOUS
COMMUNITY
Start: 2023-10-18

## 2023-11-14 RX ORDER — ROSUVASTATIN CALCIUM 40 MG/1
1 TABLET, COATED ORAL DAILY
COMMUNITY
Start: 2023-10-06

## 2023-11-14 RX ORDER — DIPHENHYDRAMINE HCL 25 MG
TABLET ORAL
COMMUNITY
Start: 2023-10-18

## 2023-11-14 NOTE — PROGRESS NOTES
Subjective:       Patient ID: Ambrosio Montoya is a 73 y.o. male.    Chief Complaint: blood in urine     This is a 73 y.o.  male patient that is new to me.  he is referred to me by ED for hematuria and UTI.  The patient did experience gross hematuria. The patient does not take blood thinners. The patient does not have a history of stones. The patient does have a history of smoking. Never had workup for hematuria before.  Associated symptoms - none. Flank pain - none.  UTI - yes, see culture below.  Dysuria - none.    CT with contrast 11/23 with punctate non obstructing stones.    Hematuria, ED visit 11/23 with E coli on Urine culture.  Hematuria was painless.  Continues to have some gross hematuria after starting antibiotics.      LAST PSA  Lab Results   Component Value Date    PSA 0.28 01/06/2014       Lab Results   Component Value Date    CREATININE 1.4 11/10/2023       ---  Past Medical History:   Diagnosis Date    Cataract     Coronary artery disease 2001    ACS    CVA (cerebral infarction)     Diabetes mellitus type II 2001    Glaucoma suspect     Hyperlipidemia 2001    Hypertension     Myocardial infarction 2002    Stroke 3/2012    [facility]       Past Surgical History:   Procedure Laterality Date    COLONOSCOPY N/A 4/20/2017    Procedure: COLONOSCOPY;  Surgeon: Armando Hidalgo MD;  Location: [facility] ENDO;  Service: Endoscopy;  Laterality: N/A;    COLONOSCOPY N/A 8/18/2022    Procedure: COLONOSCOPY;  Surgeon: Armando Hidalgo MD;  Location: [facility];  Service: Endoscopy;  Laterality: N/A;       Family History   Problem Relation Age of Onset    Heart disease Mother     Cancer Father         prostate CA    Diabetes Sister     Cancer Sister     Depression Brother     Diabetes Brother        Social History     Tobacco Use    Smoking status: Every Day     Current packs/day: 1.00     Average packs/day: 1 pack/day for 40.0 years (40.0 ttl pk-yrs)     Types: Cigarettes    Smokeless tobacco: Never   Substance Use  Topics    Alcohol use: No    Drug use: No       Current Outpatient Medications on File Prior to Visit   Medication Sig Dispense Refill    allopurinoL (ZYLOPRIM) 100 MG tablet       ammonium lactate 12 % Crea Apply to feet avoiding use between toes twice daily. 140 g 5    aspirin 325 MG tablet Take 1 tablet (325 mg total) by mouth once daily.      cephALEXin (KEFLEX) 500 MG capsule Take 1 capsule (500 mg total) by mouth every 6 (six) hours. for 7 days 28 capsule 0    ciprofloxacin HCl (CIPRO) 250 MG tablet Take 250 mg by mouth 2 (two) times daily.      clotrimazole-betamethasone 1-0.05% (LOTRISONE) cream APPLY SPARINGLY TO THE AFFECTED AREA(S) TWICE DAILY      GAVILYTE-G 236-22.74-6.74 -5.86 gram suspension Take 4,000 mLs by mouth once.      hydrocortisone 2.5 % cream Apply topically 2 (two) times daily. 20 g 0    indomethacin (INDOCIN) 50 MG capsule Take 1 capsule (50 mg total) by mouth 2 (two) times daily as needed (pain). 30 capsule 0    linaCLOtide (LINZESS) 145 mcg Cap capsule Take 1 capsule (145 mcg total) by mouth before breakfast. 30 capsule 11    lisinopril (PRINIVIL,ZESTRIL) 40 MG tablet Take 1 tablet (40 mg total) by mouth once daily. 90 tablet 1    metFORMIN (GLUCOPHAGE) 1000 MG tablet Take 1,000 mg by mouth once daily at 6am.      polyethylene glycol (GLYCOLAX) 17 gram PwPk Take 17 g by mouth once daily. 14 each 0    rosuvastatin (CRESTOR) 40 MG Tab Take 1 tablet by mouth once daily.      sod sulf-pot chloride-mag sulf (SUTAB) 1.479-0.188- 0.225 gram tablet Take 12 tablets by mouth once daily. Take according to package instructions with indicated amount of water. 24 tablet 0    triamcinolone acetonide 0.1% (KENALOG) 0.1 % ointment Apply topically once daily. For eczema 30 g 3    TRUE METRIX AIR GLUCOSE METER [facility]       TRUE METRIX GLUCOSE TEST STRIP Strp SMARTSIG:Via Meter      TRUEPLUS LANCETS 33 gauge Misc       VITAMIN D2 1,250 mcg (50,000 unit) capsule       amLODIPine (NORVASC) 10 MG tablet Take 1  tablet (10 mg total) by mouth once daily. 90 tablet 1    cetirizine (ZYRTEC) 10 MG tablet Take 1 tablet (10 mg total) by mouth once daily. 30 tablet 0     No current facility-administered medications on file prior to visit.       Review of patient's allergies indicates:   Allergen Reactions    Pcn [penicillins] Nausea And Vomiting    Plavix [clopidogrel] Itching and Rash       Review of Systems   Constitutional:  Negative for activity change, chills and fever.   HENT:  Negative for congestion.    Respiratory:  Negative for cough, chest tightness and shortness of breath.    Cardiovascular:  Negative for chest pain and palpitations.   Gastrointestinal:  Negative for abdominal distention, abdominal pain, nausea and vomiting.   Genitourinary:  Positive for hematuria. Negative for difficulty urinating, flank pain, penile pain, scrotal swelling and testicular pain.   Musculoskeletal:  Negative for gait problem.       Objective:      Physical Exam  HENT:      Head: Atraumatic.   Pulmonary:      Effort: Pulmonary effort is normal.   Neurological:      General: No focal deficit present.      Mental Status: He is alert and oriented to person, place, and time.         Assessment:     Problem Noted   Gross Hematuria 11/14/2023    Onset 11/2023, UTI time.  CT scan with contrast small punctate nonobstructing stones bilaterally.  No other findings.       Plan:   Cystoscopy and cytology for gross hematuria workup.  If persistent hematuria following above and no identifiable source we will consider CT urogram.  We will schedule cystoscopy after completes antibiotics for UTI.    Surinder Andrew MD    The above referenced imaging and interpretations were personally reviewed.  Disclaimer: This note has been generated using voice-recognition software. There may be typographical errors that have been missed during proof-reading.

## 2023-11-27 NOTE — PROGRESS NOTES
Subjective:       Patient ID: Ambrosio Montoya is a 73 y.o. male.    Chief Complaint: Procedure (cysto)     This is a 73 y.o.  male patient that is new to me.  he is referred to me by ED for hematuria and UTI.  The patient did experience gross hematuria. The patient does not take blood thinners. The patient does not have a history of stones. The patient does have a history of smoking. Never had workup for hematuria before.  Associated symptoms - none. Flank pain - none.  UTI - yes, see culture below.  Dysuria - none.    CT with contrast 11/23 with punctate non obstructing stones.    Hematuria, ED visit 11/23 with E coli on Urine culture.  Hematuria was painless.  Intermittent hematuria.   Now resolved.  Cystoscopy 11/23: petechial superficial hemorrhage in multiple areas of bladder, no lesions/masses.       LAST PSA  Lab Results   Component Value Date    PSA 0.28 01/06/2014       Lab Results   Component Value Date    CREATININE 1.4 11/10/2023       ---  Past Medical History:   Diagnosis Date    Cataract     Coronary artery disease 2001    ACS    CVA (cerebral infarction)     Diabetes mellitus type II 2001    Glaucoma suspect     Hyperlipidemia 2001    Hypertension     Myocardial infarction 2002    Stroke 3/2012    [facility]       Past Surgical History:   Procedure Laterality Date    COLONOSCOPY N/A 4/20/2017    Procedure: COLONOSCOPY;  Surgeon: Armando Hidalgo MD;  Location: [facility] ENDO;  Service: Endoscopy;  Laterality: N/A;    COLONOSCOPY N/A 8/18/2022    Procedure: COLONOSCOPY;  Surgeon: Armando Hidalgo MD;  Location: [facility] ENDO;  Service: Endoscopy;  Laterality: N/A;       Family History   Problem Relation Age of Onset    Heart disease Mother     Cancer Father         prostate CA    Diabetes Sister     Cancer Sister     Depression Brother     Diabetes Brother        Social History     Tobacco Use    Smoking status: Every Day     Current packs/day: 1.00     Average packs/day: 1 pack/day for 40.0 years (40.0  ttl pk-yrs)     Types: Cigarettes    Smokeless tobacco: Never   Substance Use Topics    Alcohol use: No    Drug use: No       Current Outpatient Medications on File Prior to Visit   Medication Sig Dispense Refill    allopurinoL (ZYLOPRIM) 100 MG tablet       ammonium lactate 12 % Crea Apply to feet avoiding use between toes twice daily. 140 g 5    aspirin 325 MG tablet Take 1 tablet (325 mg total) by mouth once daily.      ciprofloxacin HCl (CIPRO) 250 MG tablet Take 250 mg by mouth 2 (two) times daily.      clotrimazole-betamethasone 1-0.05% (LOTRISONE) cream APPLY SPARINGLY TO THE AFFECTED AREA(S) TWICE DAILY      GAVILYTE-G 236-22.74-6.74 -5.86 gram suspension Take 4,000 mLs by mouth once.      hydrocortisone 2.5 % cream Apply topically 2 (two) times daily. 20 g 0    indomethacin (INDOCIN) 50 MG capsule Take 1 capsule (50 mg total) by mouth 2 (two) times daily as needed (pain). 30 capsule 0    linaCLOtide (LINZESS) 145 mcg Cap capsule Take 1 capsule (145 mcg total) by mouth before breakfast. 30 capsule 11    lisinopril (PRINIVIL,ZESTRIL) 40 MG tablet Take 1 tablet (40 mg total) by mouth once daily. 90 tablet 1    metFORMIN (GLUCOPHAGE) 1000 MG tablet Take 1,000 mg by mouth once daily at 6am.      polyethylene glycol (GLYCOLAX) 17 gram PwPk Take 17 g by mouth once daily. 14 each 0    rosuvastatin (CRESTOR) 40 MG Tab Take 1 tablet by mouth once daily.      sod sulf-pot chloride-mag sulf (SUTAB) 1.479-0.188- 0.225 gram tablet Take 12 tablets by mouth once daily. Take according to package instructions with indicated amount of water. 24 tablet 0    triamcinolone acetonide 0.1% (KENALOG) 0.1 % ointment Apply topically once daily. For eczema 30 g 3    TRUE METRIX AIR GLUCOSE METER [facility]       TRUE METRIX GLUCOSE TEST STRIP Strp SMARTSIG:Via Meter      TRUEPLUS LANCETS 33 gauge Misc       VITAMIN D2 1,250 mcg (50,000 unit) capsule       amLODIPine (NORVASC) 10 MG tablet Take 1 tablet (10 mg total) by mouth once daily. 90  tablet 1    cetirizine (ZYRTEC) 10 MG tablet Take 1 tablet (10 mg total) by mouth once daily. 30 tablet 0     No current facility-administered medications on file prior to visit.       Review of patient's allergies indicates:   Allergen Reactions    Pcn [penicillins] Nausea And Vomiting    Plavix [clopidogrel] Itching and Rash       Review of Systems   Constitutional:  Negative for activity change, chills and fever.   HENT:  Negative for congestion.    Respiratory:  Negative for cough, chest tightness and shortness of breath.    Cardiovascular:  Negative for chest pain and palpitations.   Gastrointestinal:  Negative for abdominal distention, abdominal pain, nausea and vomiting.   Genitourinary:  Negative for difficulty urinating, flank pain, hematuria, penile pain, scrotal swelling and testicular pain.   Musculoskeletal:  Negative for gait problem.       Objective:      Physical Exam  HENT:      Head: Atraumatic.   Pulmonary:      Effort: Pulmonary effort is normal.   Neurological:      General: No focal deficit present.      Mental Status: He is alert and oriented to person, place, and time.         Assessment:     Problem Noted   Gross Hematuria 11/14/2023    Onset 11/2023, UTI time.  CT scan with contrast small punctate nonobstructing stones bilaterally.  No other findings.    Cystoscopy 11/23: petechial superficial hemorrhage in multiple areas of bladder, no lesions/masses.        Plan:   Cystoscopy   Done and reviewed.    Cytology sent.    If recurrent hematuria will need biopsy and fulguration of areas seen on cystoscopy however likely related to recent cystitis.  No need for biopsy at current.    Follow up in 6 months.    Surinder Andrew MD    The above referenced imaging and interpretations were personally reviewed.  Disclaimer: This note has been generated using voice-recognition software. There may be typographical errors that have been missed during proof-reading.

## 2023-11-29 ENCOUNTER — PROCEDURE VISIT (OUTPATIENT)
Dept: UROLOGY | Facility: CLINIC | Age: 73
End: 2023-11-29
Payer: MEDICARE

## 2023-11-29 VITALS
WEIGHT: 175.06 LBS | HEART RATE: 73 BPM | HEIGHT: 71 IN | SYSTOLIC BLOOD PRESSURE: 114 MMHG | BODY MASS INDEX: 24.51 KG/M2 | DIASTOLIC BLOOD PRESSURE: 72 MMHG

## 2023-11-29 DIAGNOSIS — R31.0 GROSS HEMATURIA: ICD-10-CM

## 2023-11-29 PROCEDURE — 52000 CYSTOURETHROSCOPY: CPT | Mod: HCWC,S$GLB,, | Performed by: UROLOGY

## 2023-11-29 PROCEDURE — 88112 CYTOPATH CELL ENHANCE TECH: CPT | Mod: HCWC | Performed by: PATHOLOGY

## 2023-11-29 PROCEDURE — 52000 CYSTOSCOPY: ICD-10-PCS | Mod: HCWC,S$GLB,, | Performed by: UROLOGY

## 2023-11-29 PROCEDURE — 99213 PR OFFICE/OUTPT VISIT, EST, LEVL III, 20-29 MIN: ICD-10-PCS | Mod: 25,HCWC,S$GLB, | Performed by: UROLOGY

## 2023-11-29 PROCEDURE — 99213 OFFICE O/P EST LOW 20 MIN: CPT | Mod: 25,HCWC,S$GLB, | Performed by: UROLOGY

## 2023-11-29 PROCEDURE — 88112 PR  CYTOPATH, CELL ENHANCE TECH: ICD-10-PCS | Mod: 26,HCWC,, | Performed by: PATHOLOGY

## 2023-11-29 PROCEDURE — 88112 CYTOPATH CELL ENHANCE TECH: CPT | Mod: 26,HCWC,, | Performed by: PATHOLOGY

## 2023-11-29 NOTE — PROCEDURES
Cystoscopy    Date/Time: 11/29/2023 11:00 AM    Performed by: Surinder Andrew MD  Authorized by: Surinder Andrew MD    Consent Done?:  Yes (Written)  Timeout: prior to procedure the correct patient, procedure, and site was verified    Prep: patient was prepped and draped in usual sterile fashion    Local anesthesia used?: Yes    Anesthesia:  Lidocaine jelly  Indications: hematuria    Position:  Supine  Anesthesia:  Lidocaine jelly  Patient sedated?: No    Preparation: Patient was prepped and draped in usual sterile fashion    Scope type:  Flexible cystoscope  External exam normal: Yes    Digital exam performed: No    Urethra normal: Yes    Prostate normal: Yes    Bladder neck normal: Yes    Bladder normal: No (superficial petechial hemorrhage the lining of the bladder multiple areas consistent with recent cystitis, no obvious lesions.)     patient tolerated the procedure well with no immediate complications

## 2023-12-01 LAB
FINAL PATHOLOGIC DIAGNOSIS: NORMAL
Lab: NORMAL

## 2023-12-14 ENCOUNTER — OFFICE VISIT (OUTPATIENT)
Dept: PODIATRY | Facility: CLINIC | Age: 73
End: 2023-12-14
Payer: MEDICARE

## 2023-12-14 VITALS
WEIGHT: 175 LBS | HEART RATE: 87 BPM | DIASTOLIC BLOOD PRESSURE: 86 MMHG | HEIGHT: 71 IN | SYSTOLIC BLOOD PRESSURE: 132 MMHG | BODY MASS INDEX: 24.5 KG/M2

## 2023-12-14 DIAGNOSIS — E11.51 TYPE 2 DIABETES MELLITUS WITH PERIPHERAL ARTERY DISEASE: ICD-10-CM

## 2023-12-14 DIAGNOSIS — E11.22 TYPE 2 DIABETES MELLITUS WITH STAGE 3A CHRONIC KIDNEY DISEASE, WITHOUT LONG-TERM CURRENT USE OF INSULIN: Primary | ICD-10-CM

## 2023-12-14 DIAGNOSIS — I69.30 HISTORY OF CVA WITH RESIDUAL DEFICIT: ICD-10-CM

## 2023-12-14 DIAGNOSIS — N18.31 TYPE 2 DIABETES MELLITUS WITH STAGE 3A CHRONIC KIDNEY DISEASE, WITHOUT LONG-TERM CURRENT USE OF INSULIN: Primary | ICD-10-CM

## 2023-12-14 DIAGNOSIS — E11.42 TYPE 2 DIABETES MELLITUS WITH PERIPHERAL NEUROPATHY: ICD-10-CM

## 2023-12-14 DIAGNOSIS — B35.1 ONYCHOMYCOSIS: ICD-10-CM

## 2023-12-14 PROCEDURE — 99999 PR PBB SHADOW E&M-EST. PATIENT-LVL IV: ICD-10-PCS | Mod: PBBFAC,HCWC,, | Performed by: PODIATRIST

## 2023-12-14 PROCEDURE — 99499 NO LOS: ICD-10-PCS | Mod: S$GLB,,, | Performed by: PODIATRIST

## 2023-12-14 PROCEDURE — 11721 ROUTINE FOOT CARE: ICD-10-PCS | Mod: Q9,S$GLB,, | Performed by: PODIATRIST

## 2023-12-14 PROCEDURE — 11721 DEBRIDE NAIL 6 OR MORE: CPT | Mod: Q9,S$GLB,, | Performed by: PODIATRIST

## 2023-12-14 PROCEDURE — 99999 PR PBB SHADOW E&M-EST. PATIENT-LVL IV: CPT | Mod: PBBFAC,HCWC,, | Performed by: PODIATRIST

## 2023-12-14 PROCEDURE — 99499 UNLISTED E&M SERVICE: CPT | Mod: S$GLB,,, | Performed by: PODIATRIST

## 2023-12-14 NOTE — PROGRESS NOTES
Subjective:      Patient ID: Ambrosio Montoya is a 73 y.o. male.    Chief Complaint: Nail Care (4 month nail care f/u)    Ambrosio is a 73 y.o. male who presents to the clinic for evaluation and treatment of high risk feet. Ambrosio has a past medical history of Cataract, Coronary artery disease (2001), CVA (cerebral infarction), Diabetes mellitus type II (2001), Glaucoma suspect, Hyperlipidemia (2001), Hypertension, Myocardial infarction (2002), and Stroke (3/2012). The patient's chief complaint is long, thick toenails. This patient has documented high risk feet requiring routine maintenance secondary to diabetes mellitis and those secondary complications of diabetes, as mentioned..    12/16/22: Returns for routine foot care. No new concerns.    03/16/2023:  Returns for routine foot care.  Inquiring about a prescription for extra-depth diabetic shoes.    08/20/2023:  Follow-up for routine foot care.  Relates that he gets a sticking sensation that is intermittent along the bottom of the right heel area and lateral right great toe.    12/14/2023: Returns for routine foot care.  No new concerns.    PCP: Margarita Taylor MD    Date Last Seen by PCP:  11/16/2023    Current shoe gear:  Affected Foot: Tennis shoes     Unaffected Foot: Tennis shoes    Hemoglobin A1C   Date Value Ref Range Status   03/21/2022 6.6 (H) 4.0 - 5.6 % Final     Comment:     ADA Screening Guidelines:  5.7-6.4%  Consistent with prediabetes  >or=6.5%  Consistent with diabetes    High levels of fetal hemoglobin interfere with the HbA1C  assay. Heterozygous hemoglobin variants (HbS, HgC, etc)do  not significantly interfere with this assay.   However, presence of multiple variants may affect accuracy.     04/28/2017 6.9 (H) 4.5 - 6.2 % Final     Comment:     According to ADA guidelines, hemoglobin A1C <7.0% represents  optimal control in non-pregnant diabetic patients.  Different  metrics may apply to specific populations.   Standards of Medical Care in  "Diabetes - 2016.  For the purpose of screening for the presence of diabetes:  <5.7%     Consistent with the absence of diabetes  5.7-6.4%  Consistent with increasing risk for diabetes   (prediabetes)  >or=6.5%  Consistent with diabetes  Currently no consensus exists for use of hemoglobin A1C  for diagnosis of diabetes for children.     01/05/2016 6.6 (H) 4.5 - 6.2 % Final     Vitals:    12/14/23 1541   BP: 132/86   Pulse: 87   Weight: 79.4 kg (175 lb)   Height: 5' 11" (1.803 m)   PainSc: 0-No pain      Past Medical History:   Diagnosis Date    Cataract     Coronary artery disease 2001    ACS    CVA (cerebral infarction)     Diabetes mellitus type II 2001    Glaucoma suspect     Hyperlipidemia 2001    Hypertension     Myocardial infarction 2002    Stroke 3/2012    [facility] [place]       Past Surgical History:   Procedure Laterality Date    COLONOSCOPY N/A 4/20/2017    Procedure: COLONOSCOPY;  Surgeon: Armando Hidalgo MD;  Location: [facility] ENDO;  Service: Endoscopy;  Laterality: N/A;    COLONOSCOPY N/A 8/18/2022    Procedure: COLONOSCOPY;  Surgeon: Armando Hidalgo MD;  Location: [facility] ENDO;  Service: Endoscopy;  Laterality: N/A;       Family History   Problem Relation Age of Onset    Heart disease Mother     Cancer Father         prostate CA    Diabetes Sister     Cancer Sister     Depression Brother     Diabetes Brother        Social History     Socioeconomic History    Marital status:    Tobacco Use    Smoking status: Every Day     Current packs/day: 1.00     Average packs/day: 1 pack/day for 40.0 years (40.0 ttl pk-yrs)     Types: Cigarettes    Smokeless tobacco: Never   Substance and Sexual Activity    Alcohol use: No    Drug use: No    Sexual activity: Not Currently     Partners: Female     Social Determinants of Health     Financial Resource Strain: Low Risk  (6/27/2022)    Overall Financial Resource Strain (CARDIA)     Difficulty of Paying Living Expenses: Not hard at all   Food Insecurity: No Food " Insecurity (6/27/2022)    Hunger Vital Sign     Worried About Running Out of Food in the Last Year: Never true     Ran Out of Food in the Last Year: Never true   Transportation Needs: No Transportation Needs (6/27/2022)    PRAPARE - Transportation     Lack of Transportation (Medical): No     Lack of Transportation (Non-Medical): No   Physical Activity: Inactive (6/27/2022)    Exercise Vital Sign     Days of Exercise per Week: 0 days     Minutes of Exercise per Session: 0 min   Stress: No Stress Concern Present (6/27/2022)    [employer] [place] - Occupational Stress Questionnaire     Feeling of Stress : Not at all   Social Connections: Socially Isolated (6/27/2022)    Social Connection and Isolation Panel [NHANES]     Frequency of Communication with Friends and Family: More than three times a week     Frequency of Social Gatherings with Friends and Family: More than three times a week     Attends Zoroastrian Services: Never     Active Member of Clubs or Organizations: No     Attends Club or Organization Meetings: Never     Marital Status:    Housing Stability: Low Risk  (6/27/2022)    Housing Stability Vital Sign     Unable to Pay for Housing in the Last Year: No     Number of Places Lived in the Last Year: 1     Unstable Housing in the Last Year: No       Current Outpatient Medications   Medication Sig Dispense Refill    allopurinoL (ZYLOPRIM) 100 MG tablet       ammonium lactate 12 % Crea Apply to feet avoiding use between toes twice daily. 140 g 5    aspirin 325 MG tablet Take 1 tablet (325 mg total) by mouth once daily.      GAVILYTE-G 236-22.74-6.74 -5.86 gram suspension Take 4,000 mLs by mouth once.      indomethacin (INDOCIN) 50 MG capsule Take 1 capsule (50 mg total) by mouth 2 (two) times daily as needed (pain). 30 capsule 0    linaCLOtide (LINZESS) 145 mcg Cap capsule Take 1 capsule (145 mcg total) by mouth before breakfast. 30 capsule 11    lisinopril (PRINIVIL,ZESTRIL) 40 MG  tablet Take 1 tablet (40 mg total) by mouth once daily. 90 tablet 1    metFORMIN (GLUCOPHAGE) 1000 MG tablet Take 1,000 mg by mouth once daily at 6am.      rosuvastatin (CRESTOR) 40 MG Tab Take 1 tablet by mouth once daily.      triamcinolone acetonide 0.1% (KENALOG) 0.1 % ointment Apply topically once daily. For eczema 30 g 3    TRUE MET[facility]X [facility] GL[facility]COSE METER [facility]       TRUE METRIX GLUCOSE TEST STRIP Strp SMARTSIG:Via Meter      TRUEPLUS LANCETS 33 gauge Misc       VITAMIN D2 1,250 mcg (50,000 unit) capsule       amLODIPine (NORVASC) 10 MG tablet Take 1 tablet (10 mg total) by mouth once daily. 90 tablet 1    cetirizine (ZYRTEC) 10 MG tablet Take 1 tablet (10 mg total) by mouth once daily. 30 tablet 0    ciprofloxacin HCl (CIPRO) 250 MG tablet Take 250 mg by mouth 2 (two) times daily.      clotrimazole-betamethasone 1-0.05% (LOTRISONE) cream APPLY SPARINGLY TO THE AFFECTED AREA(S) TWICE DAILY      hydrocortisone 2.5 % cream Apply topically 2 (two) times daily. (Patient not taking: Reported on 12/14/2023) 20 g 0    polyethylene glycol (GLYCOLAX) 17 gram PwPk Take 17 g by mouth once daily. (Patient not taking: Reported on 12/14/2023) 14 each 0    sod sulf-pot chloride-mag sulf (SUTAB) 1.479-0.188- 0.225 gram tablet Take 12 tablets by mouth once daily. Take according to package instructions with indicated amount of water. (Patient not taking: Reported on 12/14/2023) 24 tablet 0     No current facility-administered medications for this visit.       Review of patient's allergies indicates:   Allergen Reactions    Pcn [penicillins] Nausea And Vomiting    Plavix [clopidogrel] Itching and Rash       Review of Systems   Constitutional: Negative for chills and fever.   HENT:  Negative for congestion and hearing loss.    Respiratory:  Negative for cough and shortness of breath.    Skin:  Positive for color change, dry skin and nail changes.   Musculoskeletal:  Positive for back pain and muscle weakness.   Gastrointestinal:   Negative for nausea and vomiting.   Neurological:  Negative for numbness and paresthesias.   Psychiatric/Behavioral:  Negative for altered mental status.            Objective:      Physical Exam  Constitutional:       General: He is not in acute distress.     Appearance: He is not ill-appearing.   Cardiovascular:      Pulses:           Dorsalis pedis pulses are 2+ on the right side and detected w/ Doppler on the left side.        Posterior tibial pulses are detected w/ Doppler on the right side and detected w/ Doppler on the left side.      Comments: Extremely weak monophasic left DP and weak monophasic PT bilateral with Doppler.  Moderate brawny edema to lower extremity bilateral with hyperpigmentation of the skin bilateral lower extremity.  No hair growth bilateral lower extremity.  Skin temp is warm bilateral foot.  No rubor noted on dependency bilateral foot.  Musculoskeletal:      Right foot: Foot drop present.      Comments: Dorsiflexion of the foot and toes right is 3/5 with remaining muscle groups right lower extremity 4/5 and left lower extremity groups are 5/5.    Pes planus foot type bilateral.  No pain with range of motion or manual muscle strength testing bilateral foot ankle.   Feet:      Right foot:      Protective Sensation: 10 sites tested.  5 sites sensed.      Toenail Condition: Right toenails are abnormally thick and long. Fungal disease present.     Left foot:      Protective Sensation: 10 sites tested.  5 sites sensed.      Toenail Condition: Left toenails are abnormally thick and long. Fungal disease present.  Skin:     General: Skin is warm.      Capillary Refill: Capillary refill takes 2 to 3 seconds.      Findings: No ecchymosis or erythema.      Nails: There is no clubbing.      Comments: Nails 1-5 bilateral foot are elongated, thickened, discolored brown-yellow moderate loosening and underlying debris.  Right hallux nail 3/4 grown with thickened leading edge.    Raised hyperkeratotic skin  medial 1 MTP left foot.    Diffuse dry hyperkeratotic skin plantar foot bilateral.    No open wound or maceration noted to the foot bilateral.     Neurological:      Mental Status: He is alert and oriented to person, place, and time.      Sensory: Sensory deficit present.      Motor: Weakness present.               Assessment:       Encounter Diagnoses   Name Primary?    Type 2 diabetes mellitus with stage 3a chronic kidney disease, without long-term current use of insulin Yes    Type 2 diabetes mellitus with peripheral artery disease     Type 2 diabetes mellitus with peripheral neuropathy     History of CVA with residual deficit     Onychomycosis          Plan:       Ambrosio was seen today for nail care.    Diagnoses and all orders for this visit:    Type 2 diabetes mellitus with stage 3a chronic kidney disease, without long-term current use of insulin  -     Routine Foot Care    Type 2 diabetes mellitus with peripheral artery disease  -     Routine Foot Care    Type 2 diabetes mellitus with peripheral neuropathy  -     Routine Foot Care    History of CVA with residual deficit  -     Routine Foot Care    Onychomycosis  -     Routine Foot Care      I counseled the patient on his conditions, their implications and medical management.    Shoe inspection. Diabetic Foot Education. Patient reminded of the importance of good nutrition and blood sugar control to help prevent podiatric complications of diabetes. Patient instructed on proper foot hygeine. We discussed wearing proper shoe gear, daily foot inspections, never walking without protective shoe gear, never putting sharp instruments to feet.    Routine foot care per attached note. Patient relates relief following the procedure. He will continue to monitor the areas daily, inspect his feet, wear protective shoe gear when ambulatory, moisturizer to maintain skin integrity.    RTC within 3-4 months or p.r.n. as discussed    A portion of this note was generated by voice  recognition software and may contain spelling and grammar errors.

## 2023-12-14 NOTE — PROCEDURES
"Routine Foot Care    Date/Time: 12/14/2023 2:45 PM    Performed by: Garland Thompson DPM  Authorized by: Garland Thompson DPM    Time out: Immediately prior to procedure a "time out" was called to verify the correct patient, procedure, equipment, support staff and site/side marked as required.    Consent Done?:  Yes (Verbal)  Hyperkeratotic Skin Lesions?: No      Nail Care Type:  Debride  Location(s): All  (Left 1st Toe, Left 3rd Toe, Left 2nd Toe, Left 4th Toe, Left 5th Toe, Right 1st Toe, Right 2nd Toe, Right 3rd Toe, Right 4th Toe and Right 5th Toe)  Patient tolerance:  Patient tolerated the procedure well with no immediate complications     Used sterile nail nipper.    "

## 2024-01-24 ENCOUNTER — OFFICE VISIT (OUTPATIENT)
Dept: UROLOGY | Facility: CLINIC | Age: 74
End: 2024-01-24
Payer: MEDICARE

## 2024-01-24 VITALS
HEIGHT: 71 IN | HEART RATE: 86 BPM | BODY MASS INDEX: 24.51 KG/M2 | WEIGHT: 175.06 LBS | SYSTOLIC BLOOD PRESSURE: 107 MMHG | DIASTOLIC BLOOD PRESSURE: 61 MMHG

## 2024-01-24 DIAGNOSIS — R30.0 DYSURIA: Primary | ICD-10-CM

## 2024-01-24 DIAGNOSIS — R31.0 GROSS HEMATURIA: ICD-10-CM

## 2024-01-24 LAB — POC RESIDUAL URINE VOLUME: 38 ML (ref 0–100)

## 2024-01-24 PROCEDURE — 3008F BODY MASS INDEX DOCD: CPT | Mod: HCWC,CPTII,S$GLB, | Performed by: UROLOGY

## 2024-01-24 PROCEDURE — 3072F LOW RISK FOR RETINOPATHY: CPT | Mod: HCWC,CPTII,S$GLB, | Performed by: UROLOGY

## 2024-01-24 PROCEDURE — 51798 US URINE CAPACITY MEASURE: CPT | Mod: HCWC,S$GLB,, | Performed by: UROLOGY

## 2024-01-24 PROCEDURE — 3078F DIAST BP <80 MM HG: CPT | Mod: HCWC,CPTII,S$GLB, | Performed by: UROLOGY

## 2024-01-24 PROCEDURE — 3074F SYST BP LT 130 MM HG: CPT | Mod: HCWC,CPTII,S$GLB, | Performed by: UROLOGY

## 2024-01-24 PROCEDURE — 99213 OFFICE O/P EST LOW 20 MIN: CPT | Mod: HCWC,S$GLB,, | Performed by: UROLOGY

## 2024-01-24 PROCEDURE — 1126F AMNT PAIN NOTED NONE PRSNT: CPT | Mod: HCWC,CPTII,S$GLB, | Performed by: UROLOGY

## 2024-01-24 PROCEDURE — 1159F MED LIST DOCD IN RCRD: CPT | Mod: HCWC,CPTII,S$GLB, | Performed by: UROLOGY

## 2024-01-24 PROCEDURE — 1160F RVW MEDS BY RX/DR IN RCRD: CPT | Mod: HCWC,CPTII,S$GLB, | Performed by: UROLOGY

## 2024-01-24 PROCEDURE — 99999 PR PBB SHADOW E&M-EST. PATIENT-LVL IV: CPT | Mod: PBBFAC,HCWC,, | Performed by: UROLOGY

## 2024-01-24 PROCEDURE — 3288F FALL RISK ASSESSMENT DOCD: CPT | Mod: HCWC,CPTII,S$GLB, | Performed by: UROLOGY

## 2024-01-24 PROCEDURE — 1101F PT FALLS ASSESS-DOCD LE1/YR: CPT | Mod: HCWC,CPTII,S$GLB, | Performed by: UROLOGY

## 2024-01-24 NOTE — PROGRESS NOTES
Subjective:       Patient ID: Ambrosio Montoya is a 73 y.o. male.    Chief Complaint: Follow-up (Blood in urine/ burning when urine)     This is a 73 y.o.  male patient that is new to me.  he is referred to me by ED for hematuria and UTI.  The patient did experience gross hematuria. The patient does not take blood thinners. The patient does not have a history of stones. The patient does have a history of smoking. Never had workup for hematuria before.  Associated symptoms - none. Flank pain - none.  UTI - yes, see culture below.  Dysuria - none.    CT with contrast 11/23 with punctate non obstructing stones.    Hematuria, ED visit 11/23 with E coli on Urine culture.  Hematuria was painless.  Intermittent hematuria.   Now resolved.  Cystoscopy 11/23: petechial superficial hemorrhage in multiple areas of bladder, no lesions/masses.   Some dysuria and hematuria 1/2024.  PVR 48 can not give specimen.      LAST PSA  Lab Results   Component Value Date    PSA 0.28 01/06/2014       Lab Results   Component Value Date    CREATININE 1.4 11/10/2023       ---  Past Medical History:   Diagnosis Date    Cataract     Coronary artery disease 2001    ACS    CVA (cerebral infarction)     Diabetes mellitus type II 2001    Glaucoma suspect     Hyperlipidemia 2001    Hypertension     Myocardial infarction 2002    Stroke 3/2012    [facility]       Past Surgical History:   Procedure Laterality Date    COLONOSCOPY N/A 4/20/2017    Procedure: COLONOSCOPY;  Surgeon: Armando Hidalgo MD;  Location: [facility] ENDO;  Service: Endoscopy;  Laterality: N/A;    COLONOSCOPY N/A 8/18/2022    Procedure: COLONOSCOPY;  Surgeon: Armando Hidalgo MD;  Location: [facility] ENDO;  Service: Endoscopy;  Laterality: N/A;       Family History   Problem Relation Age of Onset    Heart disease Mother     Cancer Father         prostate CA    Diabetes Sister     Cancer Sister     Depression Brother     Diabetes Brother        Social History     Tobacco Use    Smoking  status: Every Day     Current packs/day: 1.00     Average packs/day: 1 pack/day for 40.0 years (40.0 ttl pk-yrs)     Types: Cigarettes    Smokeless tobacco: Never   Substance Use Topics    Alcohol use: No    Drug use: No       Current Outpatient Medications on File Prior to Visit   Medication Sig Dispense Refill    allopurinoL (ZYLOPRIM) 100 MG tablet       ammonium lactate 12 % Crea Apply to feet avoiding use between toes twice daily. 140 g 5    aspirin 325 MG tablet Take 1 tablet (325 mg total) by mouth once daily.      ciprofloxacin HCl (CIPRO) 250 MG tablet Take 250 mg by mouth 2 (two) times daily.      clotrimazole-betamethasone 1-0.05% (LOTRISONE) cream APPLY SPARINGLY TO THE AFFECTED AREA(S) TWICE DAILY      GAVILYTE-G 236-22.74-6.74 -5.86 gram suspension Take 4,000 mLs by mouth once.      indomethacin (INDOCIN) 50 MG capsule Take 1 capsule (50 mg total) by mouth 2 (two) times daily as needed (pain). 30 capsule 0    linaCLOtide (LINZESS) 145 mcg Cap capsule Take 1 capsule (145 mcg total) by mouth before breakfast. 30 capsule 11    lisinopril (PRINIVIL,ZESTRIL) 40 MG tablet Take 1 tablet (40 mg total) by mouth once daily. 90 tablet 1    metFORMIN (GLUCOPHAGE) 1000 MG tablet Take 1,000 mg by mouth once daily at 6am.      rosuvastatin (CRESTOR) 40 MG Tab Take 1 tablet by mouth once daily.      triamcinolone acetonide 0.1% (KENALOG) 0.1 % ointment Apply topically once daily. For eczema 30 g 3    TRUE METRIX AIR GLUCOSE METER [facility]       TRUE METRIX GLUCOSE TEST STRIP Strp SMARTSIG:Via Meter      TRUEPLUS LANCETS 33 gauge Misc       VITAMIN D2 1,250 mcg (50,000 unit) capsule       amLODIPine (NORVASC) 10 MG tablet Take 1 tablet (10 mg total) by mouth once daily. 90 tablet 1    cetirizine (ZYRTEC) 10 MG tablet Take 1 tablet (10 mg total) by mouth once daily. 30 tablet 0    hydrocortisone 2.5 % cream Apply topically 2 (two) times daily. (Patient not taking: Reported on 12/14/2023) 20 g 0    polyethylene glycol  (GLYCOLAX) 17 gram PwPk Take 17 g by mouth once daily. (Patient not taking: Reported on 12/14/2023) 14 each 0    sod sulf-pot chloride-mag sulf (SUTAB) 1.479-0.188- 0.225 gram tablet Take 12 tablets by mouth once daily. Take according to package instructions with indicated amount of water. (Patient not taking: Reported on 12/14/2023) 24 tablet 0     No current facility-administered medications on file prior to visit.       Review of patient's allergies indicates:   Allergen Reactions    Pcn [penicillins] Nausea And Vomiting    Plavix [clopidogrel] Itching and Rash       Review of Systems   Constitutional:  Negative for activity change, chills and fever.   HENT:  Negative for congestion.    Respiratory:  Negative for cough, chest tightness and shortness of breath.    Cardiovascular:  Negative for chest pain and palpitations.   Gastrointestinal:  Negative for abdominal distention, abdominal pain, nausea and vomiting.   Genitourinary:  Negative for difficulty urinating, flank pain, hematuria, penile pain, scrotal swelling and testicular pain.   Musculoskeletal:  Negative for gait problem.       Objective:      Physical Exam  HENT:      Head: Atraumatic.   Pulmonary:      Effort: Pulmonary effort is normal.   Neurological:      General: No focal deficit present.      Mental Status: He is alert and oriented to person, place, and time.       Assessment:     Problem Noted   Gross Hematuria 11/14/2023    Onset 11/2023, UTI time.  CT scan with contrast small punctate nonobstructing stones bilaterally.  No other findings.    Cystoscopy 11/23: petechial superficial hemorrhage in multiple areas of bladder, no lesions/masses.        Plan:   UA and urine culture   Encouraged fluids    Surinder Andrew MD    The above referenced imaging and interpretations were personally reviewed.  Disclaimer: This note has been generated using voice-recognition software. There may be typographical errors that have been missed during  proof-reading.

## 2024-01-25 ENCOUNTER — LAB VISIT (OUTPATIENT)
Dept: LAB | Facility: HOSPITAL | Age: 74
End: 2024-01-25
Attending: UROLOGY
Payer: MEDICARE

## 2024-01-25 DIAGNOSIS — R30.0 DYSURIA: ICD-10-CM

## 2024-01-25 DIAGNOSIS — R31.0 GROSS HEMATURIA: ICD-10-CM

## 2024-01-25 PROCEDURE — 87086 URINE CULTURE/COLONY COUNT: CPT | Mod: HCWC | Performed by: UROLOGY

## 2024-01-25 PROCEDURE — 87077 CULTURE AEROBIC IDENTIFY: CPT | Mod: HCWC | Performed by: UROLOGY

## 2024-01-25 PROCEDURE — 87186 SC STD MICRODIL/AGAR DIL: CPT | Mod: HCWC | Performed by: UROLOGY

## 2024-01-25 PROCEDURE — 87088 URINE BACTERIA CULTURE: CPT | Mod: HCWC | Performed by: UROLOGY

## 2024-01-27 LAB — BACTERIA UR CULT: ABNORMAL

## 2024-01-29 ENCOUNTER — TELEPHONE (OUTPATIENT)
Dept: UROLOGY | Facility: CLINIC | Age: 74
End: 2024-01-29
Payer: MEDICARE

## 2024-01-29 DIAGNOSIS — R31.9 URINARY TRACT INFECTION WITH HEMATURIA, SITE UNSPECIFIED: Primary | ICD-10-CM

## 2024-01-29 DIAGNOSIS — N39.0 URINARY TRACT INFECTION WITH HEMATURIA, SITE UNSPECIFIED: Primary | ICD-10-CM

## 2024-01-29 RX ORDER — SULFAMETHOXAZOLE AND TRIMETHOPRIM 800; 160 MG/1; MG/1
1 TABLET ORAL 2 TIMES DAILY
Qty: 14 TABLET | Refills: 0 | Status: SHIPPED | OUTPATIENT
Start: 2024-01-29 | End: 2024-02-05

## 2024-01-29 NOTE — TELEPHONE ENCOUNTER
I reached out to the pt there was no answer. I reached out to MsRon Radha who was listed on his contact list whom answered. I voiced Per : Bactrim sent to pharmacy.  Follow up in 3-4 weeks. The friend is aware of the antibiotic being sent to the pharmacy and offered an appointment on 02/20 at 11:15am. The friend confirmed.

## 2024-01-29 NOTE — TELEPHONE ENCOUNTER
----- Message from Surinder Andrew MD sent at 1/29/2024  6:51 AM CST -----  Bactrim sent to pharmacy.  Follow up in 3-4 weeks.

## 2024-02-19 NOTE — PROGRESS NOTES
Subjective:       Patient ID: Ambrosio Montoya is a 73 y.o. male.    Chief Complaint: No chief complaint on file.     This is a 73 y.o.  male patient that is new to me.  he is referred to me by ED for hematuria and UTI.  The patient did experience gross hematuria. The patient does not take blood thinners. The patient does not have a history of stones. The patient does have a history of smoking. Never had workup for hematuria before.  Associated symptoms - none. Flank pain - none.  UTI - yes, see culture below.  Dysuria - none.    CT with contrast 11/23 with punctate non obstructing stones.    Hematuria, ED visit 11/23 with E coli on Urine culture.  Hematuria was painless.  Intermittent hematuria.   Now resolved.  Cystoscopy 11/23: petechial superficial hemorrhage in multiple areas of bladder, no lesions/masses.   Some dysuria and hematuria 1/2024.  PVR 48 can not give specimen.  E coli Uti treated at time    Now follow up in 2/20/24 and no symptoms, no hematuria.      LAST PSA  Lab Results   Component Value Date    PSA 0.28 01/06/2014       Lab Results   Component Value Date    CREATININE 1.4 11/10/2023       --  PMH/PSH/PFH/Meds/Allergies/Social reviewed as in chart.         Review of Systems   Constitutional:  Negative for activity change, chills and fever.   HENT:  Negative for congestion.    Respiratory:  Negative for cough, chest tightness and shortness of breath.    Cardiovascular:  Negative for chest pain and palpitations.   Gastrointestinal:  Negative for abdominal distention, abdominal pain, nausea and vomiting.   Genitourinary:  Negative for difficulty urinating, flank pain, hematuria, penile pain, scrotal swelling and testicular pain.   Musculoskeletal:  Negative for gait problem.       Objective:      Physical Exam  HENT:      Head: Atraumatic.   Pulmonary:      Effort: Pulmonary effort is normal.   Neurological:      General: No focal deficit present.      Mental Status: He is alert and oriented to person,  place, and time.       Assessment:     Problem Noted   Gross Hematuria 11/14/2023    Onset 11/2023, UTI time.  CT scan with contrast small punctate nonobstructing stones bilaterally.  No other findings.    Cystoscopy 11/23: petechial superficial hemorrhage in multiple areas of bladder, no lesions/masses.        Plan:   Encouraged fluids  Follow up in 6 months sooner if needed    Surinder Andrew MD    The above referenced imaging and interpretations were personally reviewed.  Disclaimer: This note has been generated using voice-recognition software. There may be typographical errors that have been missed during proof-reading.

## 2024-02-20 ENCOUNTER — OFFICE VISIT (OUTPATIENT)
Dept: UROLOGY | Facility: CLINIC | Age: 74
End: 2024-02-20
Payer: MEDICARE

## 2024-02-20 VITALS
BODY MASS INDEX: 25.46 KG/M2 | HEART RATE: 71 BPM | HEIGHT: 71 IN | SYSTOLIC BLOOD PRESSURE: 121 MMHG | DIASTOLIC BLOOD PRESSURE: 77 MMHG | WEIGHT: 181.88 LBS

## 2024-02-20 DIAGNOSIS — R31.0 GROSS HEMATURIA: Primary | ICD-10-CM

## 2024-02-20 PROCEDURE — 3008F BODY MASS INDEX DOCD: CPT | Mod: HCWC,CPTII,S$GLB, | Performed by: UROLOGY

## 2024-02-20 PROCEDURE — 99213 OFFICE O/P EST LOW 20 MIN: CPT | Mod: HCWC,S$GLB,, | Performed by: UROLOGY

## 2024-02-20 PROCEDURE — 3072F LOW RISK FOR RETINOPATHY: CPT | Mod: HCWC,CPTII,S$GLB, | Performed by: UROLOGY

## 2024-02-20 PROCEDURE — 3074F SYST BP LT 130 MM HG: CPT | Mod: HCWC,CPTII,S$GLB, | Performed by: UROLOGY

## 2024-02-20 PROCEDURE — 99999 PR PBB SHADOW E&M-EST. PATIENT-LVL IV: CPT | Mod: PBBFAC,HCWC,, | Performed by: UROLOGY

## 2024-02-20 PROCEDURE — 3078F DIAST BP <80 MM HG: CPT | Mod: HCWC,CPTII,S$GLB, | Performed by: UROLOGY

## 2024-02-20 PROCEDURE — 4010F ACE/ARB THERAPY RXD/TAKEN: CPT | Mod: HCWC,CPTII,S$GLB, | Performed by: UROLOGY

## 2024-02-20 PROCEDURE — 3288F FALL RISK ASSESSMENT DOCD: CPT | Mod: HCWC,CPTII,S$GLB, | Performed by: UROLOGY

## 2024-02-20 PROCEDURE — 1101F PT FALLS ASSESS-DOCD LE1/YR: CPT | Mod: HCWC,CPTII,S$GLB, | Performed by: UROLOGY

## 2024-02-20 PROCEDURE — 1159F MED LIST DOCD IN RCRD: CPT | Mod: HCWC,CPTII,S$GLB, | Performed by: UROLOGY

## 2024-02-20 PROCEDURE — 1160F RVW MEDS BY RX/DR IN RCRD: CPT | Mod: HCWC,CPTII,S$GLB, | Performed by: UROLOGY

## 2024-02-20 PROCEDURE — 1126F AMNT PAIN NOTED NONE PRSNT: CPT | Mod: HCWC,CPTII,S$GLB, | Performed by: UROLOGY

## 2024-04-10 ENCOUNTER — TELEPHONE (OUTPATIENT)
Dept: PODIATRY | Facility: CLINIC | Age: 74
End: 2024-04-10
Payer: MEDICARE

## 2024-04-10 NOTE — TELEPHONE ENCOUNTER
Spoke with Bel in regards to Mr Jan and confirmed that he has a PMHx of Type 2 DM with PVD, HTN, Hyperlipidemia, Coronary Artery Disease and previous MI. Thus he has a HX of heart disease. Thus, Diabetic Shoes should be covered by his plan. No other needs voiced at this time per Bel. Call back encouraged if needed.

## 2024-04-10 NOTE — TELEPHONE ENCOUNTER
----- Message from Vanessa Crow MA sent at 4/9/2024  4:29 PM CDT -----  See Below  ----- Message -----  From: Hailey Ayoub  Sent: 4/9/2024   2:39 PM CDT  To: Donna Haque Staff    .Type:  Needs Medical Advice    Who Called:  [facility]  Symptoms (please be specific):    How long has patient had these symptoms:    Pharmacy name and phone #:    Would the patient rather a call back or a response via [facility]?   Best Call Back Number: [phone]  Additional Information: NEEDS TO VFY PT'S MEDICAL HEALTH CONDITION FOR DIABETIC

## 2024-04-12 ENCOUNTER — OFFICE VISIT (OUTPATIENT)
Dept: PODIATRY | Facility: CLINIC | Age: 74
End: 2024-04-12
Payer: MEDICARE

## 2024-04-12 VITALS
HEIGHT: 71 IN | BODY MASS INDEX: 25.4 KG/M2 | WEIGHT: 181.44 LBS | DIASTOLIC BLOOD PRESSURE: 89 MMHG | HEART RATE: 86 BPM | SYSTOLIC BLOOD PRESSURE: 149 MMHG

## 2024-04-12 DIAGNOSIS — I69.30 HISTORY OF CVA WITH RESIDUAL DEFICIT: ICD-10-CM

## 2024-04-12 DIAGNOSIS — B35.1 ONYCHOMYCOSIS: ICD-10-CM

## 2024-04-12 DIAGNOSIS — E11.42 TYPE 2 DIABETES MELLITUS WITH PERIPHERAL NEUROPATHY: ICD-10-CM

## 2024-04-12 DIAGNOSIS — L84 CORN OR CALLUS: ICD-10-CM

## 2024-04-12 DIAGNOSIS — E11.51 TYPE 2 DIABETES MELLITUS WITH PERIPHERAL ARTERY DISEASE: Primary | ICD-10-CM

## 2024-04-12 PROCEDURE — 99213 OFFICE O/P EST LOW 20 MIN: CPT | Mod: 25,S$GLB,, | Performed by: PODIATRIST

## 2024-04-12 PROCEDURE — 1101F PT FALLS ASSESS-DOCD LE1/YR: CPT | Mod: CPTII,S$GLB,, | Performed by: PODIATRIST

## 2024-04-12 PROCEDURE — 3077F SYST BP >= 140 MM HG: CPT | Mod: CPTII,S$GLB,, | Performed by: PODIATRIST

## 2024-04-12 PROCEDURE — 3072F LOW RISK FOR RETINOPATHY: CPT | Mod: CPTII,S$GLB,, | Performed by: PODIATRIST

## 2024-04-12 PROCEDURE — 3008F BODY MASS INDEX DOCD: CPT | Mod: CPTII,S$GLB,, | Performed by: PODIATRIST

## 2024-04-12 PROCEDURE — 99999 PR PBB SHADOW E&M-EST. PATIENT-LVL IV: CPT | Mod: PBBFAC,,, | Performed by: PODIATRIST

## 2024-04-12 PROCEDURE — 3288F FALL RISK ASSESSMENT DOCD: CPT | Mod: CPTII,S$GLB,, | Performed by: PODIATRIST

## 2024-04-12 PROCEDURE — 1126F AMNT PAIN NOTED NONE PRSNT: CPT | Mod: CPTII,S$GLB,, | Performed by: PODIATRIST

## 2024-04-12 PROCEDURE — 1159F MED LIST DOCD IN RCRD: CPT | Mod: CPTII,S$GLB,, | Performed by: PODIATRIST

## 2024-04-12 PROCEDURE — 11721 DEBRIDE NAIL 6 OR MORE: CPT | Mod: 59,Q8,S$GLB, | Performed by: PODIATRIST

## 2024-04-12 PROCEDURE — 4010F ACE/ARB THERAPY RXD/TAKEN: CPT | Mod: CPTII,S$GLB,, | Performed by: PODIATRIST

## 2024-04-12 PROCEDURE — 11055 PARING/CUTG B9 HYPRKER LES 1: CPT | Mod: Q8,S$GLB,, | Performed by: PODIATRIST

## 2024-04-12 PROCEDURE — 3079F DIAST BP 80-89 MM HG: CPT | Mod: CPTII,S$GLB,, | Performed by: PODIATRIST

## 2024-04-12 PROCEDURE — 1160F RVW MEDS BY RX/DR IN RCRD: CPT | Mod: CPTII,S$GLB,, | Performed by: PODIATRIST

## 2024-04-12 NOTE — PROGRESS NOTES
Subjective:      Patient ID: Ambrosio Montoya is a 73 y.o. male.    Chief Complaint: Diabetes Mellitus and Nail Care    Ambrosio is a 73 y.o. male who presents to the clinic for evaluation and treatment of high risk feet. Ambrosio has a past medical history of Cataract, Coronary artery disease (2001), CVA (cerebral infarction), Diabetes mellitus type II (2001), Glaucoma suspect, Hyperlipidemia (2001), Hypertension, Myocardial infarction (2002), and Stroke (3/2012). The patient's chief complaint is long, thick toenails. This patient has documented high risk feet requiring routine maintenance secondary to diabetes mellitis and those secondary complications of diabetes, as mentioned..    12/16/22: Returns for routine foot care. No new concerns.    03/16/2023:  Returns for routine foot care.  Inquiring about a prescription for extra-depth diabetic shoes.    08/20/2023:  Follow-up for routine foot care.  Relates that he gets a sticking sensation that is intermittent along the bottom of the right heel area and lateral right great toe.    12/14/2023: Returns for routine foot care.  No new concerns.    04/12/2024:  Returns for diabetic foot care.  Due for annual foot exam.    PCP: Margarita Taylor MD    Date Last Seen by PCP:  11/16/2023    Current shoe gear:  Affected Foot: Tennis shoes     Unaffected Foot: Tennis shoes    Hemoglobin A1C   Date Value Ref Range Status   03/15/2023 6.6 (H) 4.5 - 5.7 % Final   03/21/2022 6.6 (H) 4.0 - 5.6 % Final     Comment:     ADA Screening Guidelines:  5.7-6.4%  Consistent with prediabetes  >or=6.5%  Consistent with diabetes    High levels of fetal hemoglobin interfere with the HbA1C  assay. Heterozygous hemoglobin variants (HbS, HgC, etc)do  not significantly interfere with this assay.   However, presence of multiple variants may affect accuracy.     04/28/2017 6.9 (H) 4.5 - 6.2 % Final     Comment:     According to ADA guidelines, hemoglobin A1C <7.0% represents  optimal control in non-pregnant  "diabetic patients.  Different  metrics may apply to specific populations.   Standards of Medical Care in Diabetes - 2016.  For the purpose of screening for the presence of diabetes:  <5.7%     Consistent with the absence of diabetes  5.7-6.4%  Consistent with increasing risk for diabetes   (prediabetes)  >or=6.5%  Consistent with diabetes  Currently no consensus exists for use of hemoglobin A1C  for diagnosis of diabetes for children.     01/05/2016 6.6 (H) 4.5 - 6.2 % Final     Vitals:    04/12/24 1349   BP: (!) 149/89   Pulse: 86   Weight: 82.3 kg (181 lb 7 oz)   Height: 5' 11" (1.803 m)   PainSc: 0-No pain      Past Medical History:   Diagnosis Date    Cataract     Coronary artery disease 2001    ACS    CVA (cerebral infarction)     Diabetes mellitus type II 2001    Glaucoma suspect     Hyperlipidemia 2001    Hypertension     Myocardial infarction 2002    Stroke 3/2012    [facility]       Past Surgical History:   Procedure Laterality Date    COLONOSCOPY N/A 4/20/2017    Procedure: COLONOSCOPY;  Surgeon: Armando Hidalgo MD;  Location: [facility] ENDO;  Service: Endoscopy;  Laterality: N/A;    COLONOSCOPY N/A 8/18/2022    Procedure: COLONOSCOPY;  Surgeon: Armando Hidalgo MD;  Location: [facility] ENDO;  Service: Endoscopy;  Laterality: N/A;       Family History   Problem Relation Age of Onset    Heart disease Mother     Cancer Father         prostate CA    Diabetes Sister     Cancer Sister     Depression Brother     Diabetes Brother        Social History     Socioeconomic History    Marital status:    Tobacco Use    Smoking status: Every Day     Current packs/day: 1.00     Average packs/day: 1 pack/day for 40.0 years (40.0 ttl pk-yrs)     Types: Cigarettes    Smokeless tobacco: Never   Substance and Sexual Activity    Alcohol use: No    Drug use: No    Sexual activity: Not Currently     Partners: Female     Social Determinants of Health     Financial Resource Strain: Low Risk  (6/27/2022)    Overall Financial " Resource Strain (CARDIA)     Difficulty of Paying Living Expenses: Not hard at all   Food Insecurity: No Food Insecurity (6/27/2022)    Hunger Vital Sign     Worried About Running Out of Food in the Last Year: Never true     Ran Out of Food in the Last Year: Never true   Transportation Needs: No Transportation Needs (6/27/2022)    PRAPARE - Transportation     Lack of Transportation (Medical): No     Lack of Transportation (Non-Medical): No   Physical Activity: Inactive (6/27/2022)    Exercise Vital Sign     Days of Exercise per Week: 0 days     Minutes of Exercise per Session: 0 min   Stress: No Stress Concern Present (6/27/2022)    [place] [place] [employer] - Occupational Stress Questionnaire     Feeling of Stress : Not at all   Social Connections: Socially Isolated (6/27/2022)    Social Connection and Isolation Panel [NHANES]     Frequency of Communication with Friends and Family: More than three times a week     Frequency of Social Gatherings with Friends and Family: More than three times a week     Attends Hinduism Services: Never     Active Member of Clubs or Organizations: No     Attends Club or Organization Meetings: Never     Marital Status:    Housing Stability: Low Risk  (6/27/2022)    Housing Stability Vital Sign     Unable to Pay for Housing in the Last Year: No     Number of Places Lived in the Last Year: 1     Unstable Housing in the Last Year: No       Current Outpatient Medications   Medication Sig Dispense Refill    allopurinoL (ZYLOPRIM) 100 MG tablet       ammonium lactate 12 % Crea Apply to feet avoiding use between toes twice daily. 140 g 5    aspirin 325 MG tablet Take 1 tablet (325 mg total) by mouth once daily.      clotrimazole-betamethasone 1-0.05% (LOTRISONE) cream APPLY SPARINGLY TO THE AFFECTED AREA(S) TWICE DAILY      GAVILYTE-G 236-22.74-6.74 -5.86 gram suspension Take 4,000 mLs by mouth once.      hydrocortisone 2.5 % cream Apply topically 2 (two) times daily.  20 g 0    indomethacin (INDOCIN) 50 MG capsule Take 1 capsule (50 mg total) by mouth 2 (two) times daily as needed (pain). 30 capsule 0    linaCLOtide (LINZESS) 145 mcg Cap capsule Take 1 capsule (145 mcg total) by mouth before breakfast. 30 capsule 11    lisinopril (PRINIVIL,ZESTRIL) 40 MG tablet Take 1 tablet (40 mg total) by mouth once daily. 90 tablet 1    metFORMIN (GLUCOPHAGE) 1000 MG tablet Take 1,000 mg by mouth once daily at 6am.      polyethylene glycol (GLYCOLAX) 17 gram PwPk Take 17 g by mouth once daily. 14 each 0    rosuvastatin (CRESTOR) 40 MG Tab Take 1 tablet by mouth once daily.      sod sulf-pot chloride-mag sulf (SUTAB) 1.479-0.188- 0.225 gram tablet Take 12 tablets by mouth once daily. Take according to package instructions with indicated amount of water. 24 tablet 0    triamcinolone acetonide 0.1% (KENALOG) 0.1 % ointment Apply topically once daily. For eczema 30 g 3    TRUE METRIX AIR GLUCOSE METER [facility]       TRUE METRIX GLUCOSE TEST STRIP Strp SMARTSIG:Via Meter      TRUEPLUS LANCETS 33 gauge Misc       VITAMIN D2 1,250 mcg (50,000 unit) capsule       amLODIPine (NORVASC) 10 MG tablet Take 1 tablet (10 mg total) by mouth once daily. 90 tablet 1    cetirizine (ZYRTEC) 10 MG tablet Take 1 tablet (10 mg total) by mouth once daily. 30 tablet 0     No current facility-administered medications for this visit.       Review of patient's allergies indicates:   Allergen Reactions    Pcn [penicillins] Nausea And Vomiting    Plavix [clopidogrel] Itching and Rash       Review of Systems   Constitutional: Negative for chills and fever.   HENT:  Negative for congestion and hearing loss.    Respiratory:  Negative for cough and shortness of breath.    Skin:  Positive for color change, dry skin and nail changes.   Musculoskeletal:  Positive for back pain and muscle weakness.   Gastrointestinal:  Negative for nausea and vomiting.   Neurological:  Negative for numbness and paresthesias.   Psychiatric/Behavioral:   Negative for altered mental status.            Objective:      Physical Exam  Constitutional:       General: He is not in acute distress.     Appearance: He is not ill-appearing.   Cardiovascular:      Pulses:           Dorsalis pedis pulses are detected w/ Doppler on the right side and detected w/ Doppler on the left side.        Posterior tibial pulses are detected w/ Doppler on the right side and detected w/ Doppler on the left side.      Comments: Extremely weak monophasic left DP, monophasic right DP and weak monophasic PT bilateral with Doppler.  Moderate brawny edema to lower extremity bilateral with hyperpigmentation of the skin bilateral lower extremity.  No hair growth bilateral lower extremity.  Skin temp is warm bilateral foot.  No rubor noted on dependency bilateral foot.  Musculoskeletal:      Right foot: Foot drop present.      Comments: Dorsiflexion of the foot and toes right is 3/5 with remaining muscle groups right lower extremity 4/5 and left lower extremity groups are 5/5.    Pes planus foot type bilateral.  No pain with range of motion or manual muscle strength testing bilateral foot ankle.   Feet:      Right foot:      Protective Sensation: 10 sites tested.  9 sites sensed.      Skin integrity: Callus and dry skin present.      Toenail Condition: Right toenails are abnormally thick and long. Fungal disease present.     Left foot:      Protective Sensation: 10 sites tested.  9 sites sensed.      Skin integrity: Dry skin present.      Toenail Condition: Left toenails are abnormally thick and long. Fungal disease present.  Skin:     General: Skin is warm.      Capillary Refill: Capillary refill takes 2 to 3 seconds.      Findings: No ecchymosis or erythema.      Nails: There is no clubbing.      Comments: Nails 1-5 bilateral foot are elongated, thickened, discolored brown-yellow moderate loosening and underlying debris.  Right hallux nail 3/4 grown with thickened leading edge.    Raised  hyperkeratotic skin plantar 5th met head right foot.    Diffuse dry hyperkeratotic skin plantar foot bilateral.    No open wound or maceration noted to the foot bilateral.     Neurological:      Mental Status: He is alert and oriented to person, place, and time.      Sensory: Sensory deficit present.      Motor: Weakness present.      Comments: Reduced vibratory sensation bilateral foot.               Assessment:       Encounter Diagnoses   Name Primary?    Type 2 diabetes mellitus with peripheral artery disease Yes    Type 2 diabetes mellitus with peripheral neuropathy     History of CVA with residual deficit     Onychomycosis     [place] or callus          Plan:       Ambrosio was seen today for diabetes mellitus and nail care.    Diagnoses and all orders for this visit:    Type 2 diabetes mellitus with peripheral artery disease  -     DIABETIC SHOES FOR HOME USE  -     Routine Foot Care    Type 2 diabetes mellitus with peripheral neuropathy  -     DIABETIC SHOES FOR HOME USE  -     Routine Foot Care    History of CVA with residual deficit  -     DIABETIC SHOES FOR HOME USE  -     Routine Foot Care    Onychomycosis  -     DIABETIC SHOES FOR HOME USE  -     Routine Foot Care    [place] or callus  -     DIABETIC SHOES FOR HOME USE  -     Routine Foot Care      I counseled the patient on his conditions, their implications and medical management.    Shoe inspection. Diabetic Foot Education. Patient reminded of the importance of good nutrition and blood sugar control to help prevent podiatric complications of diabetes. Patient instructed on proper foot hygeine. We discussed wearing proper shoe gear, daily foot inspections, never walking without protective shoe gear, never putting sharp instruments to feet.    Routine foot care per attached note. Patient relates relief following the procedure. He will continue to monitor the areas daily, inspect his feet, wear protective shoe gear when ambulatory, moisturizer to maintain skin  integrity.    Comprehensive annual diabetic foot exam completed today.  Patient found to be intermediate risk for diabetic foot complication.    New prescription for extra-depth diabetic shoes with heat molded insoles dispensed to the patient.    RTC within 3-4 months or p.r.n. as discussed    Assisted by Joseph Doherty DPM PGY 2    A portion of this note was generated by voice recognition software and may contain spelling and grammar errors.

## 2024-04-12 NOTE — PROCEDURES
"Routine Foot Care    Date/Time: 4/12/2024 2:45 PM    Performed by: Garland Thompson DPM  Authorized by: Garland Thompson DPM    Time out: Immediately prior to procedure a "time out" was called to verify the correct patient, procedure, equipment, support staff and site/side marked as required.    Consent Done?:  Yes (Verbal)  Hyperkeratotic Skin Lesions?: Yes    Number of trimmed lesions:  1  Location(s):  Right 5th Metatarsal Head    Nail Care Type:  [place]br[place]  Location(s): All  (Left 1st Toe, Left 3rd Toe, Left 2nd Toe, Left 4th Toe, Left 5th Toe, Right 1st Toe, Right 2nd Toe, Right 3rd Toe, Right 4th Toe and Right 5th Toe)  Patient tolerance:  Patient tolerated the procedure well with no immediate complications     Used sterile nail nipper and #15 scalpel. Assisted by Joseph Doherty DPM PGY2    "

## 2024-05-13 DIAGNOSIS — I69.351 HEMIPARESIS AFFECTING RIGHT SIDE AS LATE EFFECT OF STROKE: Primary | ICD-10-CM

## 2024-05-21 ENCOUNTER — TELEPHONE (OUTPATIENT)
Dept: PODIATRY | Facility: CLINIC | Age: 74
End: 2024-05-21
Payer: MEDICARE

## 2024-05-21 NOTE — TELEPHONE ENCOUNTER
Faxed and mailed signed written order and supporting documentation for diabetic shoes to [facility].

## 2024-06-12 ENCOUNTER — CLINICAL SUPPORT (OUTPATIENT)
Dept: REHABILITATION | Facility: HOSPITAL | Age: 74
End: 2024-06-12
Payer: MEDICARE

## 2024-06-12 DIAGNOSIS — R29.898 WEAKNESS OF BOTH LOWER EXTREMITIES: ICD-10-CM

## 2024-06-12 DIAGNOSIS — Z74.09 IMPAIRED TRANSFERS: ICD-10-CM

## 2024-06-12 DIAGNOSIS — Z74.09 IMPAIRED MOBILITY AND ACTIVITIES OF DAILY LIVING: Primary | ICD-10-CM

## 2024-06-12 DIAGNOSIS — Z78.9 IMPAIRED MOBILITY AND ACTIVITIES OF DAILY LIVING: Primary | ICD-10-CM

## 2024-06-12 PROCEDURE — 97162 PT EVAL MOD COMPLEX 30 MIN: CPT | Mod: PN

## 2024-06-12 NOTE — PLAN OF CARE
"[facility]  Physical Therapy Neurological Rehabilitation Initial Evaluation     Name: Ambrosio Montoya  Clinic Number: [id number]    Therapy Diagnosis:   Encounter Diagnoses   Name Primary?    Impaired mobility and activities of daily living Yes    Impaired transfers     Weakness of both lower extremities      Physician: Kerri Starr NP    Physician Orders: PT Eval and Treat   Medical Diagnosis from Referral: Hemiparesis affecting right side as late effect of stroke [I69.351]   Evaluation Date: 6/12/2024  Authorization Period Expiration: 12/31/2024  Plan of Care Expiration: 7/26/2024  Progress Note Due: 7/12/2024  Date of Surgery: N/A  Visit # / Visits authorized: 1/1  FOTO: 1/3    Precautions: Standard and Fall    Time In: 9:50AM  Time Out: 10:30AM  Total Billable Time: 40 minutes (1 mod eval)    Subjective      Date of onset: 2013    History of current condition - Ambrosio reports: Coming in for a "tune up" for chronic stroke-related deficits. No major change in medical status since last seen in this clinic. Denies any new falls. Keeping up with mostly sitting exercises at home. Admits sit to stands are more difficult than they used to be and gait speed has declined.     Imaging  - MRI Brain (10/27/2022): No acute intracranial abnormality.     Chronic microangiopathic change with remote infarcts as detailed.     Susceptibility focus at the right cerebellar peduncle and cerebellar lobe, possible sequela of remote microhemorrhage.    Prior Therapy: at this location a few times for same complaint; most recently discharged in 6/2023  Social History: lives with girlfriend   Falls: denies any new ones   DME: wheelchair, walking stick, AFO and Swedish knee cage   Home Environment: single story home, 3 steps to enter with single handrail  Exercise Routine / History: sitting HEP   Family Present at time of Eval: no    Occupation: retired    Prior Level of Function: min A for " ADLs  Current Level of Function: min A for ADLs and no longer independent with sit to stands; mod I with walking (currently using rollator as primary AD); decreased gait speed    Pain:  Current 0/10, worst 0/10, best 0/10   Location: N/A  Description: N/A  Aggravating Factors: N/A  Easing Factors: N/A    Patient's goals: improve walking speed; improve transfers      Medical History:   Past Medical History:   Diagnosis Date    Cataract     Coronary artery disease     ACS    CVA (cerebral infarction)     Diabetes mellitus type II     Glaucoma suspect     Hyperlipidemia     Hypertension     Myocardial infarction     Stroke 3/2012    [facility]     Surgical History:   Ambrosio Montoya  has a past surgical history that includes Colonoscopy (N/A, 2017) and Colonoscopy (N/A, 2022).    Medications:   Ambrosio has a current medication list which includes the following prescription(s): allopurinol, amlodipine, ammonium lactate, aspirin, cetirizine, clotrimazole-betamethasone 1-0.05%, gavilyte-g, hydrocortisone, indomethacin, linaclotide, lisinopril, metformin, polyethylene glycol, rosuvastatin, sutab, triamcinolone acetonide 0.1%, true metrix air glucose meter, true metrix glucose test strip, trueplus lancets, and vitamin d2.    Allergies:   Review of patient's allergies indicates:   Allergen Reactions    Pcn [penicillins] Nausea And Vomiting    Plavix [clopidogrel] Itching and Rash        Objective      - Command followin% simple and 75% complex   - Speech: no deficits     Mental status: alert, oriented to person, place, and time, normal mood, behavior, speech, dress, motor activity, and thought processes  Behavior:  calm, cooperative, and adequate rapport can be established  Attention Span and Concentration:  Normal    Posture Alignment: forward head/rounded shoulders bilaterally    Tone: 1+ Slight increases in muscle tone, manifested by a catch, followed by minimal resistance throughout the  "remainder (less than half) of the ROM.  Limbs/muscles affected: right hamstring    Coordination:   - fine motor: impaired opposition screen right hand  - UE coordination: not assessed     - LE coordination:  impaired rapid alternating movement         RANGE OF MOTION--LOWER EXTREMITIES  (R) LE Knee: Lacking 55 degrees knee extension for HS 90/90   (L) LE: Knee: lacking 36 degrees knee extension for HS 90/90     Lower Extremity Strength   RLE LLE   Hip Flexion: 2/5 4-/5   Hip Abduction: 2/5 3/5   Knee Extension: 3-/5 4+/5   Knee Flexion: 2/5 4/5   Ankle Dorsiflexion: 2/5 4/5     Two-Minute Walk Test: 63 feet with rollator and CGA    TINETTI BALANCE ASSESSMENT TOOL    Geoffreyetti ME, Alessio TF, Cameron R, Fall Risk Index for elderly patients based on number of chronic disabilities.Am J Med [id number]    BALANCE SECTION  Patient is seated in hard, armless chair;    1.Sitting Balance:   Steady; safe = 1  2.Rises from chair :  Unable without help = 0  3.Attempts to arise :  Unable without help = 0  4.Immediate standing Balance (first 5 seconds) : Steady but uses walker or other support = 1  5.Standing balance: Steady but wide stance (medal heel>4" apart) & uses cane or other support = 1  6.Nudged : Steady = 2  7.Eyes closed: Steady = 1  8.Turning 360 degrees:  Discontinuous steps = 0 and Unsteady (grabs, staggers) = 0  9.Sitting Down : Uses arms or not a smooth motion = 1    Balance Score:  7/16    GAIT SECTION  Patient stands with therapist, walks across room (+/- aids), first at usual pace, then at rapid pace.    10.Initiation of Gait (Immediately after told to go.): No hesitancy = 1  11.Step length and height :  Step to=0  12.Foot Clearance :  L foot clears floor=1   13.Step symmetry: Right & Left step length not equal (estimate) = 0  14.Step continuity : Stooping or discontinuity between steps = 0  15.Path: Marked deviation = 0  16.Trunk : Marked sway or uses walking aid = 0  17.Walking Time: Heels apart = " 0    Gait Score: 2/12  Balance score:7/16  Total Score=Balance + Gait Score: 9/28     Risk Indicators:    Tinetti Tool Score   Risk of Falls   ?18    High   19-23   Moderate   ?24   Low    Gait Assessment:   - AD used: rollator  - Assistance: stand by assist   - Distance: see Two-Minute Walk Test    Gait Analysis:  Deviations noted: significantly reduced gait speed, step to gait pattern, right knee hyperextension in stance; decreased right ankle dorsiflexion in swing phase     Impairments contributing to deviations:  right hemiparesis    Functional Mobility (Bed mobility, transfers)  Supine to sit: Mod I  Sit to supine: Min A  Rolling: Mod I  Sit to stand:  Min A    Intake Outcome Measure for FOTO Stroke Lower Extremity Survey    Therapist reviewed FOTO scores for Ambrosio Montoya on 6/12/2024.   FOTO report - see Media section or FOTO account episode details.    Intake Score: 42%       Treatment     Treatment not initiated today; suggestions for follow up = stepper bike; sit to stands from elevated mat with height regressed as able; standing tolerance activity     Patient Education and Home Exercises     Education provided:   - PT plan of care  - Chronicity and its impact on prognosis  - Importance of regular home exercise post-discharge    Written Home Exercises Provided: Not provided due to time constraints; plan to provide at follow up    Assessment     Ambrosio is a 74 y.o. male referred to outpatient Physical Therapy with a medical diagnosis of Hemiparesis affecting right side as late effect of stroke [I69.351]. Patient presents with bilateral leg weakness (right lower extremity > left lower extremity); decreased flexibility of bilateral lower extremity; gait abnormality; significantly decreased gait speed; impaired transfers; need for assistance with functional mobility; decreased cardiovascular endurance and standing tolerance; and risk for falls. Of note, patient's functional status has declined since last initial  evaluation at this clinic performed in March of last year. He would benefit from skilled physical therapy services to address listed impairments, improve independence with functional mobility, and decrease risk for future falls/injury.    Patient prognosis is Fair.   Patient will benefit from skilled outpatient Physical Therapy to address the deficits stated above and in the chart below, provide patient /family education, and to maximize patient's level of independence.     Plan of care discussed with patient: Yes  Patient's spiritual, cultural and educational needs considered and patient is agreeable to the plan of care and goals as stated below:     Anticipated Barriers for therapy: chronicity; functional decline over the last year    Medical Necessity is demonstrated by the following  History  Co-morbidities and personal factors that may impact the plan of care [] LOW: no personal factors / co-morbidities  [] MODERATE: 1-2 personal factors / co-morbidities  [x] HIGH: 3+ personal factors / co-morbidities    Moderate / High Support Documentation:   Co-morbidities affecting plan of care:   Cataract    Coronary artery disease    Diabetes mellitus type II    Glaucoma suspect    Hyperlipidemia    Hypertension    Myocardial infarction    Stroke      Personal Factors:   age     Examination  Body Structures and Functions, activity limitations and participation restrictions that may impact the plan of care [] LOW: addressing 1-2 elements  [] MODERATE: 3+ elements  [x] HIGH: 4+ elements (please support below)    Moderate / High Support Documentation: see above     Clinical Presentation [] LOW: stable  [x] MODERATE: Evolving  [] HIGH: Unstable     Decision Making/ Complexity Score: moderate       Goals:  Short Term Goals: 3 weeks   Patient will be compliant with HEP in order to maximize PT benefits  Patient will score >/= 11/28 on Tinetti with least restrictive assistive device in order to reduce risk for falls and improve  postural control    Long Term Goals: 6 weeks   Patient will score >/= 50% on FOTO survey in order to improve self-perception of functional mobility deficits  Patient will score >/= 13/28 on Tinetti with least restrictive assistive device in order to reduce risk for falls and improve postural control  Patient will improve hamstring range of motion by >/= 10 degrees for bilateral hamstring 90/90 test in order to improve functional mobility for activities such as lower body dressing  Patient will walk >/= 80 feet on Two-Minute Walk Test indoors in order to improve endurance and gait speed for home mobility   Patient will complete Six-Minute Walk Test in order to improve endurance and standing tolerance for community mobility/to attend medical appointments    Patient will complete >/=1 sit to stand from standard height chair modified independently in order to improve independence with functional mobility and decrease caregiver burden    Plan     Plan of care Certification: 6/12/2024 to 7/26/2024.    Outpatient Physical Therapy 2 times weekly for 6 weeks to include the following interventions: Gait Training, Manual Therapy, Moist Heat/ Ice, Neuromuscular Re-ed, Orthotic Management and Training, Patient Education, Self Care, Therapeutic Activities, and Therapeutic Exercise.     ARNEL DE LEÓN PT    Physician's Signature: _________________________________________ Date: ________________

## 2024-06-16 PROBLEM — R29.898 WEAKNESS OF BOTH LOWER EXTREMITIES: Status: ACTIVE | Noted: 2024-06-16

## 2024-06-16 PROBLEM — Z74.09 IMPAIRED TRANSFERS: Status: ACTIVE | Noted: 2024-06-16

## 2024-06-17 ENCOUNTER — CLINICAL SUPPORT (OUTPATIENT)
Dept: REHABILITATION | Facility: HOSPITAL | Age: 74
End: 2024-06-17
Payer: MEDICARE

## 2024-06-17 DIAGNOSIS — R29.898 WEAKNESS OF BOTH LOWER EXTREMITIES: ICD-10-CM

## 2024-06-17 DIAGNOSIS — Z74.09 IMPAIRED TRANSFERS: Primary | ICD-10-CM

## 2024-06-17 PROCEDURE — 97110 THERAPEUTIC EXERCISES: CPT | Mod: PN

## 2024-06-17 NOTE — PROGRESS NOTES
"[facility]   Physical Therapy Treatment Note      Name: Ambrosio Montoya  Clinic Number: [id number]    Therapy Diagnosis:   Encounter Diagnoses   Name Primary?    Impaired transfers Yes    Weakness of both lower extremities      Physician: Order, Paper    Visit Date: 6/17/2024    Physician: Kerri Starr NP     Physician Orders: PT Eval and Treat   Medical Diagnosis from Referral: Hemiparesis affecting right side as late effect of stroke [I69.351]   Evaluation Date: 6/12/2024  Authorization Period Expiration: 12/31/2024  Plan of Care Expiration: 7/26/2024  Progress Note Due: 7/12/2024  Date of Surgery: N/A  Visit # / Visits authorized: 1/20 + eval  FOTO: 1/3     Precautions: Standard and Fall     Time In: 9:00AM  Time Out: 9:45AM  Total Billable Time: 45 minutes (3TE)    PTA Visit #: 0/5     Subjective     Patient reports: No changes since initial evaluation. Denies pain.  He  will be  compliant with home exercise program (received 6/17/2024)  Response to previous treatment: Last session was initial evaluation   Functional change: Last session was initial evaluation     Pain: 0/10  Location: N/A    Objective      Objective Measures updated at progress report unless specified.     Treatment     Ambrosio received the treatments listed below:      therapeutic exercises to develop strength, endurance, ROM, and flexibility for 45 minutes including:  - stepper bike x 8 minutes level 2 constant resistance   - propped hamstring stretch on stool 2x60" each leg  - hooklying hip isometric hip abduction with 5" holds x 2 minutes total  - hooklying left hip fallouts 2x10 with yellow theraband   - hooklying butterfly stretch 2x60"  - supine hip abduction AAROM 2x10  - bridges 2x10 with brief holds    neuromuscular re-education activities to improve: Balance, Coordination, Kinesthetic, Sense, and Proprioception for 00 minutes. The following activities were included:  Not today    therapeutic activities to " improve functional performance for 00 minutes, including:  Not today    Patient Education and Home Exercises       Education provided:   - HEP instruction    Written Home Exercises Provided: yes. Exercises were reviewed and Ambrosio was able to demonstrate them prior to the end of the session.  Ambrosio demonstrated good  understanding of the education provided. See Electronic Medical Record under Patient Instructions for exercises provided during therapy sessions    Assessment     Ambrosio completed today's visit without adverse response. Assistance needed to stand from waiting room and close stand by assist for all overground walking during visit. Encouraged patient to wear AFO at future sessions due to inconsistent right foot clearance. Session focused on HEP instruction with patient demonstrating sufficient understanding of all education provided. He would benefit from additional functional standing interventions at future visits now that seated/supine HEP established.    Ambrosio Is progressing well towards his goals.   Patient prognosis is Fair.     Patient will continue to benefit from skilled outpatient physical therapy to address the deficits listed in the problem list box on initial evaluation, provide pt/family education and to maximize pt's level of independence in the home and community environment.     Patient's spiritual, cultural and educational needs considered and pt agreeable to plan of care and goals.     Anticipated barriers to physical therapy: chronicity; functional decline over the last year     Goals:     Short Term Goals: 3 weeks   Patient will be compliant with HEP in order to maximize PT benefits (progressing, not met)  Patient will score >/= 11/28 on Tinetti with least restrictive assistive device in order to reduce risk for falls and improve postural control (progressing, not met)     Long Term Goals: 6 weeks   Patient will score >/= 50% on FOTO survey in order to improve self-perception of functional  mobility deficits (progressing, not met)  Patient will score >/= 13/28 on Tinetti with least restrictive assistive device in order to reduce risk for falls and improve postural control (progressing, not met)  Patient will improve hamstring range of motion by >/= 10 degrees for bilateral hamstring 90/90 test in order to improve functional mobility for activities such as lower body dressing (progressing, not met)  Patient will walk >/= 80 feet on Two-Minute Walk Test indoors in order to improve endurance and gait speed for home mobility (progressing, not met)  Patient will complete Six-Minute Walk Test in order to improve endurance and standing tolerance for community mobility/to attend medical appointments  (progressing, not met)  Patient will complete >/=1 sit to stand from standard height chair modified independently in order to improve independence with functional mobility and decrease caregiver burden (progressing, not met)    Plan     Continue to progress as per plan of care.    ARNEL DE LEÓN, PT

## 2024-06-20 ENCOUNTER — CLINICAL SUPPORT (OUTPATIENT)
Dept: REHABILITATION | Facility: HOSPITAL | Age: 74
End: 2024-06-20
Payer: MEDICARE

## 2024-06-20 DIAGNOSIS — Z74.09 IMPAIRED TRANSFERS: Primary | ICD-10-CM

## 2024-06-20 DIAGNOSIS — R29.898 WEAKNESS OF BOTH LOWER EXTREMITIES: ICD-10-CM

## 2024-06-20 PROCEDURE — 97530 THERAPEUTIC ACTIVITIES: CPT | Mod: PN,CQ

## 2024-06-20 PROCEDURE — 97110 THERAPEUTIC EXERCISES: CPT | Mod: PN,CQ

## 2024-06-20 PROCEDURE — 97112 NEUROMUSCULAR REEDUCATION: CPT | Mod: PN,CQ

## 2024-06-20 NOTE — PROGRESS NOTES
"[facility]   Physical Therapy Treatment Note      Name: Ambrosio Montoya  Clinic Number: [id number]    Therapy Diagnosis:   Encounter Diagnoses   Name Primary?    Impaired transfers Yes    Weakness of both lower extremities      Physician: Order, Paper    Visit Date: 6/20/2024    Physician: Kerri Starr NP     Physician Orders: PT Eval and Treat   Medical Diagnosis from Referral: Hemiparesis affecting right side as late effect of stroke [I69.351]   Evaluation Date: 6/12/2024  Authorization Period Expiration: 12/31/2024  Plan of Care Expiration: 7/26/2024  Progress Note Due: 7/12/2024  Date of Surgery: N/A  Visit # / Visits authorized: 2/20 + eval  FOTO: 1/3     Precautions: Standard and Fall     Time In: 3:15 PM  Time Out: 4:00 PM  Total Billable Time: 45 minutes (1TE + 1 NMR +1TA)    PTA Visit #: 1/5     Subjective     Patient reports: No changes since initial evaluation. Denies pain.  He  will be  compliant with home exercise program (received 6/17/2024)  Response to previous treatment: Last session was initial evaluation   Functional change: Last session was initial evaluation     Pain: 0/10  Location: N/A    Objective      Objective Measures updated at progress report unless specified.     Treatment     Ambrosio received the treatments listed below:      therapeutic exercises to develop strength, endurance, ROM, and flexibility for 20 minutes including:  - stepper bike x 8 minutes level 2 constant resistance   - propped hamstring stretch on stool 2x60" each leg  - Calf raises standing: 2x10   - Standing marches: 3x30"  - Standing hamstring curl: 2x10    Active rest:  Hip adduction with ball squeeze 5" holds 2 minute  Hip abduction red theraband 5" holds x 2 minute    Not completed today:  - hooklying hip isometric hip abduction with 5" holds x 2 minutes total  - hooklying left hip fallouts 2x10 with yellow theraband   - hooklying butterfly stretch 2x60"  - supine hip abduction AAROM " "2x10  - bridges 2x10 with brief holds    neuromuscular re-education activities to improve: Balance, Coordination, Kinesthetic, Sense, and Proprioception for 10 minutes. The following activities were included:  - narrow base of support: 2x30" eyes open                                         : 2x30" eyes closed                                        : 2x10 volleyball chest press  - semi tandem balance: 2x30" ea leg leading   - standing cone pick ups from floor with CGA 2 trials of 6 cones    therapeutic activities to improve functional performance for 15 minutes, including:  -Sit<>stand: 2x10 with pull up method from citlalli bar  -Mini squats over chair: 2x10  -4" step ups with bilateral upper extremity support 2x30" ea leg leading  -Walking trials: 3 trials of ~90 feet with rollator     Patient Education and Home Exercises       Education provided:   - HEP instruction    Written Home Exercises Provided: yes. Exercises were reviewed and Ambrosio was able to demonstrate them prior to the end of the session.  Ambrosio demonstrated good  understanding of the education provided. See Electronic Medical Record under Patient Instructions for exercises provided during therapy sessions    Assessment     Ambrosio arrived to session without any complaints and agreeable to treatment.  He arrived with rollator, walking at extremely slow pace with decreased right sided step clearance and no AFO.  He again required assistance to stand from waiting room chair.  He was able to complete sit to stands without assistance by using citlalli bar and pull up method.  Session today focused on improving standing tolerance and endurance, and static balance.  He fatigues quickly and required numerous seated rest breaks due to general fatigue.   He would benefit from continued PT services to improve ease with transfers for increased independence.     Ambrosio Is progressing well towards his goals.   Patient prognosis is Fair.     Patient will continue to benefit from " skilled outpatient physical therapy to address the deficits listed in the problem list box on initial evaluation, provide pt/family education and to maximize pt's level of independence in the home and community environment.     Patient's spiritual, cultural and educational needs considered and pt agreeable to plan of care and goals.     Anticipated barriers to physical therapy: chronicity; functional decline over the last year     Goals:     Short Term Goals: 3 weeks   Patient will be compliant with HEP in order to maximize PT benefits (progressing, not met)  Patient will score >/= 11/28 on Tinetti with least restrictive assistive device in order to reduce risk for falls and improve postural control (progressing, not met)     Long Term Goals: 6 weeks   Patient will score >/= 50% on FOTO survey in order to improve self-perception of functional mobility deficits (progressing, not met)  Patient will score >/= 13/28 on Tinetti with least restrictive assistive device in order to reduce risk for falls and improve postural control (progressing, not met)  Patient will improve hamstring range of motion by >/= 10 degrees for bilateral hamstring 90/90 test in order to improve functional mobility for activities such as lower body dressing (progressing, not met)  Patient will walk >/= 80 feet on Two-Minute Walk Test indoors in order to improve endurance and gait speed for home mobility (progressing, not met)  Patient will complete Six-Minute Walk Test in order to improve endurance and standing tolerance for community mobility/to attend medical appointments  (progressing, not met)  Patient will complete >/=1 sit to stand from standard height chair modified independently in order to improve independence with functional mobility and decrease caregiver burden (progressing, not met)    Plan     Continue to progress as per plan of care.    Sheridan Wayne, PTA

## 2024-06-26 ENCOUNTER — CLINICAL SUPPORT (OUTPATIENT)
Dept: REHABILITATION | Facility: HOSPITAL | Age: 74
End: 2024-06-26
Payer: MEDICARE

## 2024-06-26 DIAGNOSIS — Z74.09 IMPAIRED TRANSFERS: Primary | ICD-10-CM

## 2024-06-26 DIAGNOSIS — R29.898 WEAKNESS OF BOTH LOWER EXTREMITIES: ICD-10-CM

## 2024-06-26 PROCEDURE — 97110 THERAPEUTIC EXERCISES: CPT | Mod: PN,CQ

## 2024-06-26 PROCEDURE — 97530 THERAPEUTIC ACTIVITIES: CPT | Mod: PN,CQ

## 2024-06-26 PROCEDURE — 97112 NEUROMUSCULAR REEDUCATION: CPT | Mod: PN,CQ

## 2024-06-26 NOTE — PROGRESS NOTES
"  [facility]   Physical Therapy Treatment Note      Name: Ambrosio Montoya  Clinic Number: [id number]    Therapy Diagnosis:   Encounter Diagnoses   Name Primary?    Impaired transfers Yes    Weakness of both lower extremities      Physician: Order, Paper    Visit Date: 6/26/2024    Physician: Kerri Starr NP     Physician Orders: PT Eval and Treat   Medical Diagnosis from Referral: Hemiparesis affecting right side as late effect of stroke [I69.351]   Evaluation Date: 6/12/2024  Authorization Period Expiration: 12/31/2024  Plan of Care Expiration: 7/26/2024  Progress Note Due: 7/12/2024  Date of Surgery: N/A  Visit # / Visits authorized: 3/20 + eval  FOTO: 1/3     Precautions: Standard and Fall     Time In: 8:15 PM  Time Out: 9:00 PM  Total Billable Time: 45 minutes (1TE + 1 NMR +1TA)    PTA Visit #: 2/5     Subjective     Patient reports: Was sick all day yesterday, not specific to type of sickness, is feeling extremely fatigued today and not sure how much he can do today but wants to try.  He  will be  compliant with home exercise program (received 6/17/2024)  Response to previous treatment: high levels of fatigue  Functional change: none    Pain: 0/10  Location: N/A    Objective      Objective Measures updated at progress report unless specified.     Treatment     Ambrosio received the treatments listed below:      therapeutic exercises to develop strength, endurance, ROM, and flexibility for 20 minutes including:  - stepper bike x 8 minutes level 2 constant resistance   - propped hamstring stretch on stool 2x60" each leg  - Calf raises standing: 2x10   - Standing marches: 3x30"  - Standing hamstring curl: 2x10 with yoga block to minimize hip flexion compensation    Active rest:  Hip adduction with ball squeeze 5" holds 2 minute  Hip abduction red theraband 5" holds x 2 minute    Not completed today:  - hooklying hip isometric hip abduction with 5" holds x 2 minutes total  - hooklying left " "hip fallouts 2x10 with yellow theraband   - hooklying butterfly stretch 2x60"  - supine hip abduction AAROM 2x10  - bridges 2x10 with brief holds    neuromuscular re-education activities to improve: Balance, Coordination, Kinesthetic, Sense, and Proprioception for 10 minutes. The following activities were included:  - narrow base of support: 2x30" eyes open                                         : 2x30" eyes closed                                        : 2x10 volleyball chest press  - semi tandem balance: 2x30" ea leg leading   - standing cone pick ups from floor with CGA 2 trials of 6 cones    therapeutic activities to improve functional performance for 15 minutes, including:  -Sit<>stand: 2x10 with pull up method from citlalli bar  -Mini squats over chair: 2x10  -4" step ups with bilateral upper extremity support 2x30" ea leg leading  -Walking trials: 3 trials of ~90 feet with rollator     Patient Education and Home Exercises       Education provided:   - HEP instruction    Written Home Exercises Provided: yes. Exercises were reviewed and Ambrosio was able to demonstrate them prior to the end of the session.  Ambrosio demonstrated good  understanding of the education provided. See Electronic Medical Record under Patient Instructions for exercises provided during therapy sessions    Assessment     Ambrosio arrived to session with complaints of high levels of fatigue following sickness yesterday but was insistent on attempting treatment.  He arrived with rollator, walking at extremely slow pace with decreased right sided step clearance and no AFO.  He again required assistance to stand from waiting room chair.  He was able to complete sit to stands without assistance by using citlalli bar and pull up method.  Session today focused on improving standing tolerance and endurance, and static balance.  He fatigued quickly and required numerous seated rest breaks due to general fatigue.   He would benefit from continued PT services to " improve ease with transfers for increased independence.     Ambrosio Is progressing well towards his goals.   Patient prognosis is Fair.     Patient will continue to benefit from skilled outpatient physical therapy to address the deficits listed in the problem list box on initial evaluation, provide pt/family education and to maximize pt's level of independence in the home and community environment.     Patient's spiritual, cultural and educational needs considered and pt agreeable to plan of care and goals.     Anticipated barriers to physical therapy: chronicity; functional decline over the last year     Goals:     Short Term Goals: 3 weeks   Patient will be compliant with HEP in order to maximize PT benefits (progressing, not met)  Patient will score >/= 11/28 on Tinetti with least restrictive assistive device in order to reduce risk for falls and improve postural control (progressing, not met)     Long Term Goals: 6 weeks   Patient will score >/= 50% on FOTO survey in order to improve self-perception of functional mobility deficits (progressing, not met)  Patient will score >/= 13/28 on Tinetti with least restrictive assistive device in order to reduce risk for falls and improve postural control (progressing, not met)  Patient will improve hamstring range of motion by >/= 10 degrees for bilateral hamstring 90/90 test in order to improve functional mobility for activities such as lower body dressing (progressing, not met)  Patient will walk >/= 80 feet on Two-Minute Walk Test indoors in order to improve endurance and gait speed for home mobility (progressing, not met)  Patient will complete Six-Minute Walk Test in order to improve endurance and standing tolerance for community mobility/to attend medical appointments  (progressing, not met)  Patient will complete >/=1 sit to stand from standard height chair modified independently in order to improve independence with functional mobility and decrease caregiver burden  (progressing, not met)    Plan     Continue to progress as per plan of care.    Sheridan Wayne, PTA

## 2024-07-02 ENCOUNTER — CLINICAL SUPPORT (OUTPATIENT)
Dept: REHABILITATION | Facility: HOSPITAL | Age: 74
End: 2024-07-02
Payer: MEDICARE

## 2024-07-02 DIAGNOSIS — Z74.09 IMPAIRED TRANSFERS: Primary | ICD-10-CM

## 2024-07-02 DIAGNOSIS — R29.898 WEAKNESS OF BOTH LOWER EXTREMITIES: ICD-10-CM

## 2024-07-02 PROCEDURE — 97530 THERAPEUTIC ACTIVITIES: CPT | Mod: PN

## 2024-07-02 PROCEDURE — 97110 THERAPEUTIC EXERCISES: CPT | Mod: PN

## 2024-07-02 NOTE — PROGRESS NOTES
"  [facility]   Physical Therapy Treatment Note      Name: Ambrosio Montoya  Clinic Number: [id number]    Therapy Diagnosis:   Encounter Diagnoses   Name Primary?    Impaired transfers Yes    Weakness of both lower extremities      Visit Date: 7/2/2024    Physician: Kerri Starr NP     Physician Orders: PT Eval and Treat   Medical Diagnosis from Referral: Hemiparesis affecting right side as late effect of stroke [I69.351]   Evaluation Date: 6/12/2024  Authorization Period Expiration: 12/31/2024  Plan of Care Expiration: 7/26/2024  Progress Note Due: 7/12/2024  Date of Surgery: N/A  Visit # / Visits authorized: 4/20 + eval  FOTO: 1/3     Precautions: Standard and Fall     Time In: 10:35AM  Time Out: 11:15AM  Total Billable Time: 40 minutes (1TE+2TA)    PTA Visit #: 0/5     Subjective     Patient reports: Low back pain today that he says is 10/10 but thinks he will be able to tolerate today. Not standing much at home but he is doing his home exercises.  He  was  compliant with home exercise program (received 6/17/2024)  Response to previous treatment: no adverse response  Functional change: none    Pain: 10/10 but patient in no apparent distress throughout visit  Location: bilateral low back    Objective      Objective Measures updated at progress report unless specified.     Treatment     Ambrosio received the treatments listed below:      therapeutic exercises to develop strength, endurance, ROM, and flexibility for 17 minutes including:  - stepper bike x 8 minutes level 4 double peak  - seated toe raises for active rest break x 2 minutes total  - hip adduction with ball squeeze for active rest break 5" holds 2 minutes   - calf raises standing: 2x15    neuromuscular re-education activities to improve: Balance, Coordination, Kinesthetic, Sense, and Proprioception for 10 minutes. The following activities were included:    therapeutic activities to improve functional performance for 23 minutes, " "including:  - Mini squats over chair: 2x10  - Lateral stepping at / bar x 4 lengths x 8 feet each (patient unable to perform correctly [hip flexor compensation] even with max verbal cuing  - Right lateral step taps x20 with bilateral upper extremity support  - Walking trials: 2 trials of ~90 feet with rollator   - Patient education (see assessment)    Not performed today:  - 4" step ups with bilateral upper extremity support 2x30" ea leg leading  - Sit<>stand: 2x10 with pull up method from citlalli bar  - narrow base of support: 2x30" eyes open                                         : 2x30" eyes closed                                        : 2x10 volleyball chest press  - semi tandem balance: 2x30" ea leg leading   - standing cone pick ups from floor with CGA 2 trials of 6 cones  - propped hamstring stretch on stool 2x60" each leg  - Standing marches: 3x30"  - Standing hamstring curl: 2x10 with yoga block to minimize hip flexion compensation    Patient Education and Home Exercises       Education provided:   - HEP instruction    Written Home Exercises Provided: yes. Exercises were reviewed and Ambrosio was able to demonstrate them prior to the end of the session.  Ambrosio demonstrated good  understanding of the education provided. See Electronic Medical Record under Patient Instructions for exercises provided during therapy sessions    Assessment     Ambrosio arrived to session with complaints of high levels of low back pain but he was able to tolerate activity with greatest complaints during exercise of fatigue only. He has very low standing tolerance; PT attempted to coax patient to utilize standing versus seated rest breaks during visit with only partial success. Educated patient that he should increase standing time at home in addition to performing prescribed exercise if he wishes to see functional carryover. Also encouraged him to wear or bring AFO to future visits to assess any improvements in gait speed/right foot " clearance. Overall tolerance of today's session was fair.    Ambrosio Is progressing well towards his goals.   Patient prognosis is Fair.     Patient will continue to benefit from skilled outpatient physical therapy to address the deficits listed in the problem list box on initial evaluation, provide pt/family education and to maximize pt's level of independence in the home and community environment.     Patient's spiritual, cultural and educational needs considered and pt agreeable to plan of care and goals.     Anticipated barriers to physical therapy: chronicity; functional decline over the last year     Goals:     Short Term Goals: 3 weeks   Patient will be compliant with HEP in order to maximize PT benefits (met)  Patient will score >/= 11/28 on Tinetti with least restrictive assistive device in order to reduce risk for falls and improve postural control (progressing, not met)     Long Term Goals: 6 weeks   Patient will score >/= 50% on FOTO survey in order to improve self-perception of functional mobility deficits (progressing, not met)  Patient will score >/= 13/28 on Tinetti with least restrictive assistive device in order to reduce risk for falls and improve postural control (progressing, not met)  Patient will improve hamstring range of motion by >/= 10 degrees for bilateral hamstring 90/90 test in order to improve functional mobility for activities such as lower body dressing (progressing, not met)  Patient will walk >/= 80 feet on Two-Minute Walk Test indoors in order to improve endurance and gait speed for home mobility (progressing, not met)  Patient will complete Six-Minute Walk Test in order to improve endurance and standing tolerance for community mobility/to attend medical appointments  (progressing, not met)  Patient will complete >/=1 sit to stand from standard height chair modified independently in order to improve independence with functional mobility and decrease caregiver burden (progressing, not  met)    Plan     Continue to progress as per plan of care.    ARNEL DE LEÓN, PT

## 2024-07-08 ENCOUNTER — CLINICAL SUPPORT (OUTPATIENT)
Dept: REHABILITATION | Facility: HOSPITAL | Age: 74
End: 2024-07-08
Payer: MEDICARE

## 2024-07-08 DIAGNOSIS — Z74.09 IMPAIRED TRANSFERS: Primary | ICD-10-CM

## 2024-07-08 DIAGNOSIS — R29.898 WEAKNESS OF BOTH LOWER EXTREMITIES: ICD-10-CM

## 2024-07-08 PROCEDURE — 97530 THERAPEUTIC ACTIVITIES: CPT | Mod: PN

## 2024-07-08 PROCEDURE — 97110 THERAPEUTIC EXERCISES: CPT | Mod: PN

## 2024-07-08 NOTE — PROGRESS NOTES
"  [facility]   Physical Therapy Treatment Note      Name: Ambrosio Montoya  Clinic Number: [id number]    Therapy Diagnosis:   Encounter Diagnoses   Name Primary?    Impaired transfers Yes    Weakness of both lower extremities      Visit Date: 7/8/2024    Physician: Kerri Starr NP     Physician Orders: PT Eval and Treat   Medical Diagnosis from Referral: Hemiparesis affecting right side as late effect of stroke [I69.351]   Evaluation Date: 6/12/2024  Authorization Period Expiration: 12/31/2024  Plan of Care Expiration: 7/26/2024  Progress Note Due: last assessed on 7/8/2024  Date of Surgery: N/A  Visit # / Visits authorized: 5/20 + eval  FOTO: 1/3     Precautions: Standard and Fall     Time In: 8:17AM  Time Out: 9:00AM  Total Billable Time: 43 minutes (1TE+2TA)    PTA Visit #: 0/5     Subjective     Patient reports: Only standing 3-4 times a day at home.  He  was  compliant with home exercise program (received 6/17/2024)  Response to previous treatment: no adverse response  Functional change: none    Pain: 5/10   Location: bilateral low back    Objective      Objective Measures updated at progress report unless specified.     Two-Minute Walk Test: 73 feet with rollator     Treatment     Ambrosio received the treatments listed below:      therapeutic exercises to develop strength, endurance, ROM, and flexibility for 15 minutes including:  - stepper bike x 8 minutes level 4 double peak  - seated toe raises for active rest break x 2 minutes total  - hip adduction with ball squeeze for active rest break 5" holds 2 minutes     neuromuscular re-education activities to improve: Balance, Coordination, Kinesthetic, Sense, and Proprioception for 00 minutes. The following activities were included:    therapeutic activities to improve functional performance for 28 minutes, including:  - Two-Minute Walk Test (see above)  - Sit to stands from elevated mat 2x10  - Mini march walks x 4 lengths x 10 feet each " "with bilateral upper extremity support  - Retro walks x 3 lengths x 10 feet each with bilateral upper extremity support  - Reciprocal lateral step taps x20B with bilateral upper extremity support  - Patient supervised on walk to car x 75' with rollator    Not performed today:  - calf raises standing: 2x15  - Mini squats over chair: 2x10  - Walking trials: 2 trials of ~90 feet with rollator   - 4" step ups with bilateral upper extremity support 2x30" ea leg leading  - narrow base of support: 2x30" eyes open                                         : 2x30" eyes closed                                        : 2x10 volleyball chest press  - semi tandem balance: 2x30" ea leg leading   - standing cone pick ups from floor with CGA 2 trials of 6 cones  - propped hamstring stretch on stool 2x60" each leg  - Standing marches: 3x30"  - Standing hamstring curl: 2x10 with yoga block to minimize hip flexion compensation    Patient Education and Home Exercises       Education provided:   - HEP instruction    Written Home Exercises Provided: yes. Exercises were reviewed and Ambrosio was able to demonstrate them prior to the end of the session.  Ambrosio demonstrated good  understanding of the education provided. See Electronic Medical Record under Patient Instructions for exercises provided during therapy sessions    Assessment     Ambrosio is progressing minimally toward goals. Two-Minute Walk Test distance has improved 10 feet since initial evaluation and standing tolerance continues to be poor despite education to increase volume of standing activity in home setting. Again educated patient that if he would like to see change in mobility status, he needs to be increasing activity level at home and giving full effort during therapy sessions. Attempted to emphasize standing over seated rest breaks, but patient did need to sit frequently due to reports of legs feeling like they were going to "give out." He will likely be ready for discharge by " the end of his plan of care if no additional goals are achieved.    Ambrosio Is progressing marginally towards his goals.   Patient prognosis is Fair.     Patient will continue to benefit from skilled outpatient physical therapy to address the deficits listed in the problem list box on initial evaluation, provide pt/family education and to maximize pt's level of independence in the home and community environment.     Patient's spiritual, cultural and educational needs considered and pt agreeable to plan of care and goals.     Anticipated barriers to physical therapy: chronicity; functional decline over the last year     Goals:     Short Term Goals: 3 weeks   Patient will be compliant with HEP in order to maximize PT benefits (met)  Patient will score >/= 11/28 on Tinetti with least restrictive assistive device in order to reduce risk for falls and improve postural control (progressing, not met)     Long Term Goals: 6 weeks   Patient will score >/= 50% on FOTO survey in order to improve self-perception of functional mobility deficits (progressing, not met)  Patient will score >/= 13/28 on Tinetti with least restrictive assistive device in order to reduce risk for falls and improve postural control (progressing, not met)  Patient will improve hamstring range of motion by >/= 10 degrees for bilateral hamstring 90/90 test in order to improve functional mobility for activities such as lower body dressing (progressing, not met)  Patient will walk >/= 80 feet on Two-Minute Walk Test indoors in order to improve endurance and gait speed for home mobility (progressing, not met)  Patient will complete Six-Minute Walk Test in order to improve endurance and standing tolerance for community mobility/to attend medical appointments  (progressing, not met)  Patient will complete >/=1 sit to stand from standard height chair modified independently in order to improve independence with functional mobility and decrease caregiver burden  (progressing, not met)    Plan     Continue to progress as per plan of care. Discharge later in July if no additional goals achieved.    ARNEL DE LEÓN, PT

## 2024-07-10 ENCOUNTER — CLINICAL SUPPORT (OUTPATIENT)
Dept: REHABILITATION | Facility: HOSPITAL | Age: 74
End: 2024-07-10
Payer: MEDICARE

## 2024-07-10 DIAGNOSIS — R29.898 WEAKNESS OF BOTH LOWER EXTREMITIES: ICD-10-CM

## 2024-07-10 DIAGNOSIS — Z74.09 IMPAIRED TRANSFERS: Primary | ICD-10-CM

## 2024-07-10 PROCEDURE — 97110 THERAPEUTIC EXERCISES: CPT | Mod: PN,CQ

## 2024-07-10 PROCEDURE — 97530 THERAPEUTIC ACTIVITIES: CPT | Mod: PN,CQ

## 2024-07-10 NOTE — PROGRESS NOTES
"    [facility]   Physical Therapy Treatment Note      Name: Ambrosio Montoya  Clinic Number: [id number]    Therapy Diagnosis:   Encounter Diagnoses   Name Primary?    Impaired transfers Yes    Weakness of both lower extremities        Visit Date: 7/10/2024    Physician: Kerri Starr NP     Physician Orders: PT Eval and Treat   Medical Diagnosis from Referral: Hemiparesis affecting right side as late effect of stroke [I69.351]   Evaluation Date: 6/12/2024  Authorization Period Expiration: 12/31/2024  Plan of Care Expiration: 7/26/2024  Progress Note Due: last assessed on 7/8/2024  Date of Surgery: N/A  Visit # / Visits authorized: 6/20 + eval  FOTO: 1/3     Precautions: Standard and Fall     Time In: 8:30 AM  Time Out: 9:10 AM  Total Billable Time: 40 minutes (1TE+2TA)    PTA Visit #: 1/5     Subjective     Patient reports: Arrived 2 hours early because he thought his appointment was at 8:15.    He  was  compliant with home exercise program (received 6/17/2024)  Response to previous treatment: no adverse response  Functional change: none    Pain: 5/10   Location: bilateral low back, right leg     Objective      Objective Measures updated at progress report unless specified.     Two-Minute Walk Test: 73 feet with rollator     Treatment     Ambrosio received the treatments listed below:      therapeutic exercises to develop strength, endurance, ROM, and flexibility for 15 minutes including:  - stepper bike x 8 minutes level 4 double peak  - seated toe raises for active rest break x 2 minutes total  - hip adduction with ball squeeze for active rest break 5" holds 2 minutes   - standing unilateral hip flexion with 2# ankle weights: 2x10 B  - standing hamstring curl with 2# ankle weights: 2x10 B  - lateral walking with 2# ankle weights 4 lengths x 10 feet    neuromuscular re-education activities to improve: Balance, Coordination, Kinesthetic, Sense, and Proprioception for 00 minutes. The following " "activities were included:    therapeutic activities to improve functional performance for 25 minutes, including:  - 3 trials of (2 lengths x 45 feet) with seated rest break in between each trial ~300 feet total in clinic ambulation  - Sit to stands from elevated mat 2x10  - Mini march walks x 4 lengths x 10 feet each with bilateral upper extremity support  - Retro walks x 3 lengths x 10 feet each with bilateral upper extremity support  - Reciprocal lateral step taps x20B with bilateral upper extremity support      Not performed today:  - calf raises standing: 2x15  - Mini squats over chair: 2x10  - Walking trials: 2 trials of ~90 feet with rollator   - 4" step ups with bilateral upper extremity support 2x30" ea leg leading  - narrow base of support: 2x30" eyes open                                         : 2x30" eyes closed                                        : 2x10 volleyball chest press  - semi tandem balance: 2x30" ea leg leading   - standing cone pick ups from floor with CGA 2 trials of 6 cones  - propped hamstring stretch on stool 2x60" each leg  - Standing marches: 3x30"  - Standing hamstring curl: 2x10 with yoga block to minimize hip flexion compensation    Patient Education and Home Exercises       Education provided:   - HEP instruction    Written Home Exercises Provided: yes. Exercises were reviewed and Ambrosio was able to demonstrate them prior to the end of the session.  Ambrosio demonstrated good  understanding of the education provided. See Electronic Medical Record under Patient Instructions for exercises provided during therapy sessions    Assessment     Ambrosio arrived to session with moderate complaints of right leg and bilateral lower back pain but was agreeable to treatment.  He arrived almost two hours early with PTA able to accommodate to complete full session.  Continues to focus on improving standing tolerance with endurance based activities.  Additional lower extremity strengthening completed today " with desired challenge level achieved.  High levels of fatigue reported with all activity and patient requesting four seated rest breaks for water and fatigue recovery.  He may benefit from continued PT services with increased activity at home to improve activity tolerance and maximize benefit of treatment.     Ambrosio Is progressing marginally towards his goals.   Patient prognosis is Fair.     Patient will continue to benefit from skilled outpatient physical therapy to address the deficits listed in the problem list box on initial evaluation, provide pt/family education and to maximize pt's level of independence in the home and community environment.     Patient's spiritual, cultural and educational needs considered and pt agreeable to plan of care and goals.     Anticipated barriers to physical therapy: chronicity; functional decline over the last year     Goals:     Short Term Goals: 3 weeks   Patient will be compliant with HEP in order to maximize PT benefits (met)  Patient will score >/= 11/28 on Tinetti with least restrictive assistive device in order to reduce risk for falls and improve postural control (progressing, not met)     Long Term Goals: 6 weeks   Patient will score >/= 50% on FOTO survey in order to improve self-perception of functional mobility deficits (progressing, not met)  Patient will score >/= 13/28 on Tinetti with least restrictive assistive device in order to reduce risk for falls and improve postural control (progressing, not met)  Patient will improve hamstring range of motion by >/= 10 degrees for bilateral hamstring 90/90 test in order to improve functional mobility for activities such as lower body dressing (progressing, not met)  Patient will walk >/= 80 feet on Two-Minute Walk Test indoors in order to improve endurance and gait speed for home mobility (progressing, not met)  Patient will complete Six-Minute Walk Test in order to improve endurance and standing tolerance for community  mobility/to attend medical appointments  (progressing, not met)  Patient will complete >/=1 sit to stand from standard height chair modified independently in order to improve independence with functional mobility and decrease caregiver burden (progressing, not met)    Plan     Continue to progress as per plan of care. Discharge later in July if no additional goals achieved.    Sheridan Wayne, PTA

## 2024-07-15 ENCOUNTER — CLINICAL SUPPORT (OUTPATIENT)
Dept: REHABILITATION | Facility: HOSPITAL | Age: 74
End: 2024-07-15
Payer: MEDICARE

## 2024-07-15 DIAGNOSIS — Z74.09 IMPAIRED TRANSFERS: Primary | ICD-10-CM

## 2024-07-15 DIAGNOSIS — R29.898 WEAKNESS OF BOTH LOWER EXTREMITIES: ICD-10-CM

## 2024-07-15 PROCEDURE — 97530 THERAPEUTIC ACTIVITIES: CPT | Mod: PN

## 2024-07-15 PROCEDURE — 97110 THERAPEUTIC EXERCISES: CPT | Mod: PN

## 2024-07-15 NOTE — PROGRESS NOTES
"  [facility]   Physical Therapy Treatment Note      Name: Ambrosio Montoya  [employer] Number: [id number]    Therapy Diagnosis:   Encounter Diagnoses   Name Primary?    Impaired transfers Yes    Weakness of both lower extremities        Visit Date: 7/15/2024    Physician: Kerri Starr NP     Physician Orders: PT Eval and Treat   Medical Diagnosis from Referral: Hemiparesis affecting right side as late effect of stroke [I69.351]   Evaluation Date: 6/12/2024  Authorization Period Expiration: 12/31/2024  Plan of Care Expiration: 7/26/2024  Progress Note Due: last assessed on 7/8/2024  Date of Surgery: N/A  Visit # / Visits authorized: 7/20 + eval  FOTO: 1/3     Precautions: Standard and Fall     Time In: 9:05 AM  Time Out: 9:45 AM  Total Billable Time: 40 minutes (1TE+2TA)    PTA Visit #: 0/5     Subjective     Patient reports: no new complaints today.  He stated that he was feeling "alright"   He  was  compliant with home exercise program (received 6/17/2024)  Response to previous treatment: no adverse response  Functional change: none    Pain: 5/10   Location: bilateral low back, right leg     Objective      Objective Measures updated at progress report unless specified.     Two-Minute Walk Test: 73 feet with rollator     Treatment     Ambrosio received the treatments listed below:      therapeutic exercises to develop strength, endurance, ROM, and flexibility for 15 minutes including:  - stepper bike x 8 minutes level 4 double peak  - seated toe raises for active rest break x 2 minutes total  - hip adduction with ball squeeze for active rest break 5" holds 2 minutes   - standing unilateral hip flexion with 2# ankle weights: 2x10 B  - standing hamstring curl with 2# ankle weights: 2x10 B  - lateral walking with 2# ankle weights 4 lengths x 10 feet    neuromuscular re-education activities to improve: Balance, Coordination, Kinesthetic, Sense, and Proprioception for 00 minutes. The following " "activities were included:    therapeutic activities to improve functional performance for 25 minutes, including:  - 2 trials of (2 lengths x 45 feet) with seated rest break in between each trial ~200 feet total in clinic ambulation  - regressive Sit to stands from elevated mat 4 x 5 (high to low mat)  - Mini march walks x 4 lengths x 10 feet each with bilateral upper extremity support  - Retro walks x 4 lengths x 10 feet each with bilateral upper extremity support  - side stepping x 3 lengths each way x 10 feet with B UE support  - Reciprocal forward 4'' step taps x20B with bilateral upper extremity support    Not performed today:  - calf raises standing: 2x15  - Mini squats over chair: 2x10  - Walking trials: 2 trials of ~90 feet with rollator   - 4" step ups with bilateral upper extremity support 2x30" ea leg leading  - narrow base of support: 2x30" eyes open                                         : 2x30" eyes closed                                        : 2x10 volleyball chest press  - semi tandem balance: 2x30" ea leg leading   - standing cone pick ups from floor with CGA 2 trials of 6 cones  - propped hamstring stretch on stool 2x60" each leg  - Standing marches: 3x30"  - Standing hamstring curl: 2x10 with yoga block to minimize hip flexion compensation    Patient Education and Home Exercises       Education provided:   - HEP instruction    Written Home Exercises Provided: yes. Exercises were reviewed and Ambrosio was able to demonstrate them prior to the end of the session.  Ambrosio demonstrated good  understanding of the education provided. See Electronic Medical Record under Patient Instructions for exercises provided during therapy sessions    Assessment      Ambrosio is still unable to consistently stand from a standard chair without assist today.   Continues to focus on improving standing tolerance with endurance based activities.  Additional lower extremity strengthening completed today with desired challenge " level achieved.  Moderate levels of fatigue reported with all activity and patient requesting three seated rest breaks for water and fatigue recovery.  He may benefit from continued PT services with increased activity at home to improve activity tolerance and maximize benefit of treatment.     Ambrosio Is progressing marginally towards his goals.   Patient prognosis is Fair.     Patient will continue to benefit from skilled outpatient physical therapy to address the deficits listed in the problem list box on initial evaluation, provide pt/family education and to maximize pt's level of independence in the home and community environment.     Patient's spiritual, cultural and educational needs considered and pt agreeable to plan of care and goals.     Anticipated barriers to physical therapy: chronicity; functional decline over the last year     Goals:     Short Term Goals: 3 weeks   Patient will be compliant with HEP in order to maximize PT benefits (met)  Patient will score >/= 11/28 on Tinetti with least restrictive assistive device in order to reduce risk for falls and improve postural control (progressing, not met)     Long Term Goals: 6 weeks   Patient will score >/= 50% on FOTO survey in order to improve self-perception of functional mobility deficits (progressing, not met)  Patient will score >/= 13/28 on Tinetti with least restrictive assistive device in order to reduce risk for falls and improve postural control (progressing, not met)  Patient will improve hamstring range of motion by >/= 10 degrees for bilateral hamstring 90/90 test in order to improve functional mobility for activities such as lower body dressing (progressing, not met)  Patient will walk >/= 80 feet on Two-Minute Walk Test indoors in order to improve endurance and gait speed for home mobility (progressing, not met)  Patient will complete Six-Minute Walk Test in order to improve endurance and standing tolerance for community mobility/to attend  medical appointments  (progressing, not met)  Patient will complete >/=1 sit to stand from standard height chair modified independently in order to improve independence with functional mobility and decrease caregiver burden (progressing, not met)    Plan     Continue to progress as per plan of care. Discharge later in July if no additional goals achieved.    Gayle Nath, PT

## 2024-07-23 ENCOUNTER — CLINICAL SUPPORT (OUTPATIENT)
Dept: REHABILITATION | Facility: HOSPITAL | Age: 74
End: 2024-07-23
Payer: MEDICARE

## 2024-07-23 DIAGNOSIS — Z74.09 IMPAIRED TRANSFERS: Primary | ICD-10-CM

## 2024-07-23 DIAGNOSIS — R29.898 WEAKNESS OF BOTH LOWER EXTREMITIES: ICD-10-CM

## 2024-07-23 PROCEDURE — 97530 THERAPEUTIC ACTIVITIES: CPT | Mod: KX,PN

## 2024-07-23 NOTE — PROGRESS NOTES
[facility]   Physical Therapy Treatment Note / Discharge Summary     Name: Ambrosio Montoya  Clinic Number: [id number]    Therapy Diagnosis:   Encounter Diagnoses   Name Primary?    Impaired transfers Yes    Weakness of both lower extremities      Visit Date: 7/23/2024    Physician: Kerri Starr NP     Physician Orders: PT Eval and Treat   Medical Diagnosis from Referral: Hemiparesis affecting right side as late effect of stroke [I69.351]   Evaluation Date: 6/12/2024  Authorization Period Expiration: 12/31/2024  Plan of Care Expiration: 7/26/2024  Progress Note Due: last assessed on 7/8/2024  Date of Surgery: N/A  Visit # / Visits authorized: 8/20 + eval  FOTO: Discharge FOTO completed     Precautions: Standard and Fall     Time In: 1:25 PM (patient with late arrival)  Time Out: 1:45 PM  Total Billable Time: 20 minutes (1TA)    PTA Visit #: 0/5     Subjective     Patient reports: Didn't realize he was late to today's appointment. Still agreeable to discharge today.  He  was  compliant with home exercise program (received 6/17/2024)  Response to previous treatment: no adverse response  Functional change: none    Pain: 5/10   Location: bilateral low back, right leg     Objective      Objective Measures updated at progress report unless specified.     Two-Minute Walk Test: 60 feet with rollator     FOTO Stroke Lower Extremity: 55%    Sit to Stand: requiring min A from standard height chair    Treatment     Ambrosio received the treatments listed below:      therapeutic exercises to develop strength, endurance, ROM, and flexibility for 15 minutes including:  - stepper bike x 5 minutes level 4 double peak    therapeutic activities to improve functional performance for 15 minutes, including:  - Two-Minute Walk Test, FOTO, HEP review    Patient Education and Home Exercises       Education provided:   - HEP instruction    Written Home Exercises Provided: yes. Exercises were reviewed and Ambrosio was  able to demonstrate them prior to the end of the session.  Ambrosio demonstrated good  understanding of the education provided. See Electronic Medical Record under Patient Instructions for exercises provided during therapy sessions    Assessment     Ambrosio arrived 25 minutes late to discharge visit and PT unable to accommodate. Partially reassessed goals due to late arrival, but patient is not progressing from a functional standpoint based on performance at recent visits. He was encouraged to continue standing and walking as much as possible and to stay compliant with regular home exercise. He was receptive to education and agreeable to this plan. He is to be discharged at this time due to low progress toward goals.    Ambrosio Is progressing marginally towards his goals.   Patient prognosis is Fair.     Patient's spiritual, cultural and educational needs considered and pt agreeable to plan of care and goals.     Anticipated barriers to physical therapy: chronicity; functional decline over the last year     Goals:     Short Term Goals: 3 weeks   Patient will be compliant with HEP in order to maximize PT benefits (met)  Patient will score >/= 11/28 on Tinetti with least restrictive assistive device in order to reduce risk for falls and improve postural control (not met)     Long Term Goals: 6 weeks   Patient will score >/= 50% on FOTO survey in order to improve self-perception of functional mobility deficits (met)  Patient will score >/= 13/28 on Tinetti with least restrictive assistive device in order to reduce risk for falls and improve postural control (not met)  Patient will improve hamstring range of motion by >/= 10 degrees for bilateral hamstring 90/90 test in order to improve functional mobility for activities such as lower body dressing (progressing, not met)  Patient will walk >/= 80 feet on Two-Minute Walk Test indoors in order to improve endurance and gait speed for home mobility (not met)  Patient will complete  Six-Minute Walk Test in order to improve endurance and standing tolerance for community mobility/to attend medical appointments  (not met)  Patient will complete >/=1 sit to stand from standard height chair modified independently in order to improve independence with functional mobility and decrease caregiver burden (not met)    Plan     Discharge PT    ARNEL DE LEÓN, PT

## 2024-07-23 NOTE — PROGRESS NOTES
"  [facility]   Physical Therapy Treatment Note      Name: Ambrosio Montoya  Clinic Number: [id number]    Therapy Diagnosis:   No diagnosis found.      Visit Date: 7/23/2024    Physician: Kerri Starr NP     Physician Orders: PT Eval and Treat   Medical Diagnosis from Referral: Hemiparesis affecting right side as late effect of stroke [I69.351]   Evaluation Date: 6/12/2024  Authorization Period Expiration: 12/31/2024  Plan of Care Expiration: 7/26/2024  Progress Note Due: last assessed on 7/8/2024  Date of Surgery: N/A  Visit # / Visits authorized: 7/20 + eval  FOTO: 1/3     Precautions: Standard and Fall     Time In: 9:05 AM  Time Out: 9:45 AM  Total Billable Time: 40 minutes (1TE+2TA)    PTA Visit #: 0/5     Subjective     Patient reports: no new complaints today.  He stated that he was feeling "alright"   He  was  compliant with home exercise program (received 6/17/2024)  Response to previous treatment: no adverse response  Functional change: none    Pain: 5/10   Location: bilateral low back, right leg     Objective      Objective Measures updated at progress report unless specified.     Two-Minute Walk Test: 73 feet with rollator     Treatment     Ambrosio received the treatments listed below:      therapeutic exercises to develop strength, endurance, ROM, and flexibility for 15 minutes including:  - stepper bike x 8 minutes level 4 double peak  - seated toe raises for active rest break x 2 minutes total  - hip adduction with ball squeeze for active rest break 5" holds 2 minutes   - standing unilateral hip flexion with 2# ankle weights: 2x10 B  - standing hamstring curl with 2# ankle weights: 2x10 B  - lateral walking with 2# ankle weights 4 lengths x 10 feet    neuromuscular re-education activities to improve: Balance, Coordination, Kinesthetic, Sense, and Proprioception for 00 minutes. The following activities were included:    therapeutic activities to improve functional performance for " "25 minutes, including:  - 2 trials of (2 lengths x 45 feet) with seated rest break in between each trial ~200 feet total in clinic ambulation  - regressive Sit to stands from elevated mat 4 x 5 (high to low mat)  - Mini march walks x 4 lengths x 10 feet each with bilateral upper extremity support  - Retro walks x 4 lengths x 10 feet each with bilateral upper extremity support  - side stepping x 3 lengths each way x 10 feet with B UE support  - Reciprocal forward 4'' step taps x20B with bilateral upper extremity support    Not performed today:  - calf raises standing: 2x15  - Mini squats over chair: 2x10  - Walking trials: 2 trials of ~90 feet with rollator   - 4" step ups with bilateral upper extremity support 2x30" ea leg leading  - narrow base of support: 2x30" eyes open                                         : 2x30" eyes closed                                        : 2x10 volleyball chest press  - semi tandem balance: 2x30" ea leg leading   - standing cone pick ups from floor with CGA 2 trials of 6 cones  - propped hamstring stretch on stool 2x60" each leg  - Standing marches: 3x30"  - Standing hamstring curl: 2x10 with yoga block to minimize hip flexion compensation    Patient Education and Home Exercises       Education provided:   - HEP instruction    Written Home Exercises Provided: yes. Exercises were reviewed and Ambrosio was able to demonstrate them prior to the end of the session.  Ambrosio demonstrated good  understanding of the education provided. See Electronic Medical Record under Patient Instructions for exercises provided during therapy sessions    Assessment      Ambrosio is still unable to consistently stand from a standard chair without assist today.   Continues to focus on improving standing tolerance with endurance based activities.  Additional lower extremity strengthening completed today with desired challenge level achieved.  Moderate levels of fatigue reported with all activity and patient requesting " three seated rest breaks for water and fatigue recovery.  He may benefit from continued PT services with increased activity at home to improve activity tolerance and maximize benefit of treatment.     Ambrosio Is progressing marginally towards his goals.   Patient prognosis is Fair.     Patient will continue to benefit from skilled outpatient physical therapy to address the deficits listed in the problem list box on initial evaluation, provide pt/family education and to maximize pt's level of independence in the home and community environment.     Patient's spiritual, cultural and educational needs considered and pt agreeable to plan of care and goals.     Anticipated barriers to physical therapy: chronicity; functional decline over the last year     Goals:     Short Term Goals: 3 weeks   Patient will be compliant with HEP in order to maximize PT benefits (met)  Patient will score >/= 11/28 on Tinetti with least restrictive assistive device in order to reduce risk for falls and improve postural control (progressing, not met)     Long Term Goals: 6 weeks   Patient will score >/= 50% on FOTO survey in order to improve self-perception of functional mobility deficits (progressing, not met)  Patient will score >/= 13/28 on Tinetti with least restrictive assistive device in order to reduce risk for falls and improve postural control (progressing, not met)  Patient will improve hamstring range of motion by >/= 10 degrees for bilateral hamstring 90/90 test in order to improve functional mobility for activities such as lower body dressing (progressing, not met)  Patient will walk >/= 80 feet on Two-Minute Walk Test indoors in order to improve endurance and gait speed for home mobility (progressing, not met)  Patient will complete Six-Minute Walk Test in order to improve endurance and standing tolerance for community mobility/to attend medical appointments  (progressing, not met)  Patient will complete >/=1 sit to stand from  standard height chair modified independently in order to improve independence with functional mobility and decrease caregiver burden (progressing, not met)    Plan     Continue to progress as per plan of care. Discharge later in July if no additional goals achieved.    ARNEL DE LEÓN, PT

## 2024-08-19 PROBLEM — R29.898 WEAKNESS OF BOTH LOWER EXTREMITIES: Status: RESOLVED | Noted: 2024-06-16 | Resolved: 2024-08-19

## 2024-08-19 PROBLEM — Z74.09 IMPAIRED TRANSFERS: Status: RESOLVED | Noted: 2024-06-16 | Resolved: 2024-08-19

## 2024-10-23 DIAGNOSIS — R93.5 ABNORMAL CT OF THE ABDOMEN: Primary | ICD-10-CM

## 2024-10-23 DIAGNOSIS — K76.9 LIVER DISEASE: ICD-10-CM

## 2024-11-01 ENCOUNTER — HOSPITAL ENCOUNTER (OUTPATIENT)
Dept: RADIOLOGY | Facility: HOSPITAL | Age: 74
Discharge: HOME | End: 2024-11-01
Attending: NON-EMERGENCY MEDICAL TRANSPORT (VAN)
Payer: MEDICARE

## 2024-11-01 DIAGNOSIS — K76.9 LIVER DISEASE: ICD-10-CM

## 2024-11-01 DIAGNOSIS — R93.5 ABNORMAL CT OF THE ABDOMEN: ICD-10-CM

## 2024-11-01 LAB — POCT GLUCOSE: 97 MG/DL (ref 70–110)

## 2024-11-01 PROCEDURE — 78815 PET IMAGE W/CT SKULL-THIGH: CPT | Mod: 26,PI,, | Performed by: STUDENT IN AN ORGANIZED HEALTH CARE EDUCATION/TRAINING PROGRAM

## 2024-11-01 PROCEDURE — 78815 PET IMAGE W/CT SKULL-THIGH: CPT | Mod: TC

## 2024-11-01 PROCEDURE — A9552 F18 FDG: HCPCS | Performed by: NON-EMERGENCY MEDICAL TRANSPORT (VAN)

## 2024-11-01 RX ORDER — FLUDEOXYGLUCOSE F18 500 MCI/ML
12.12 INJECTION INTRAVENOUS
Status: COMPLETED | OUTPATIENT
Start: 2024-11-01 | End: 2024-11-01

## 2024-11-01 RX ADMIN — FLUDEOXYGLUCOSE F-18 12.12 MILLICURIE: 500 INJECTION INTRAVENOUS at 01:11

## 2024-11-06 ENCOUNTER — TELEPHONE (OUTPATIENT)
Dept: HEMATOLOGY/ONCOLOGY | Facility: CLINIC | Age: 74
End: 2024-11-06
Payer: MEDICARE

## 2024-11-08 ENCOUNTER — TELEPHONE (OUTPATIENT)
Dept: HEMATOLOGY/ONCOLOGY | Facility: CLINIC | Age: 74
End: 2024-11-08
Payer: MEDICARE

## 2024-11-08 DIAGNOSIS — C22.9 MALIGNANT NEOPLASM OF LIVER, UNSPECIFIED LIVER MALIGNANCY TYPE: Primary | ICD-10-CM

## 2024-11-08 NOTE — TELEPHONE ENCOUNTER
Spoke with patient and daughter and let them know that he is scheduled the first available as of right now. I tried telling her about the patient portal to be added to the wait list in case something comes available sooner but she said she doesn't know how to do all of that and wants us to just call her. I told her that there's no way of me knowing unless I look through the schedule daily and the waitlist has a pool so if others come up before him they may get a message about the appointment before I can even see it. She just kept saying that her dad needs to get in ASAP but I kept stating the only way to possibly get him in sooner through the waitlist then she said have a great day and got off the phone.      ----- Message from Anny sent at 11/8/2024 11:42 AM CST -----  Appointment Request    Name of Caller:Pt  Symptoms or reason for appointment:sooner appt request  Best Call Back Number:[phone]  Additional Information: 12/17/2024 current appt date. Unable to reschedule as workstation is locked.

## 2024-11-11 ENCOUNTER — TELEPHONE (OUTPATIENT)
Dept: HEMATOLOGY/ONCOLOGY | Facility: CLINIC | Age: 74
End: 2024-11-11
Payer: MEDICARE

## 2024-11-11 NOTE — NURSING
Spoke with patient's healthcare advocate Ms. Peñaloza at her number, [phone],  and scheduled appointment for 11/19/2024 with Dr. Graham; Provided Ms Peñaloza with appointment time and date, address of [facility] facility and direct line to navigator. All questions and concerns addressed.

## 2024-11-18 ENCOUNTER — HOSPITAL ENCOUNTER (OUTPATIENT)
Dept: RADIOLOGY | Facility: HOSPITAL | Age: 74
Discharge: HOME | End: 2024-11-18
Attending: NON-EMERGENCY MEDICAL TRANSPORT (VAN)
Payer: MEDICARE

## 2024-11-18 DIAGNOSIS — I69.351 HEMIPARESIS AFFECTING RIGHT SIDE AS LATE EFFECT OF STROKE: ICD-10-CM

## 2024-11-18 DIAGNOSIS — G31.9 BRAIN ATROPHY: ICD-10-CM

## 2024-11-18 DIAGNOSIS — G31.9 BRAIN ATROPHY: Primary | ICD-10-CM

## 2024-11-18 PROCEDURE — 70450 CT HEAD/BRAIN W/O DYE: CPT | Mod: 26,,, | Performed by: RADIOLOGY

## 2024-11-18 PROCEDURE — 70450 CT HEAD/BRAIN W/O DYE: CPT | Mod: TC

## 2024-11-19 ENCOUNTER — DOCUMENTATION ONLY (OUTPATIENT)
Dept: HEMATOLOGY/ONCOLOGY | Facility: CLINIC | Age: 74
End: 2024-11-19
Payer: MEDICARE

## 2024-11-19 ENCOUNTER — LAB VISIT (OUTPATIENT)
Dept: LAB | Facility: HOSPITAL | Age: 74
End: 2024-11-19
Attending: STUDENT IN AN ORGANIZED HEALTH CARE EDUCATION/TRAINING PROGRAM
Payer: MEDICARE

## 2024-11-19 ENCOUNTER — OFFICE VISIT (OUTPATIENT)
Dept: HEMATOLOGY/ONCOLOGY | Facility: CLINIC | Age: 74
End: 2024-11-19
Payer: MEDICARE

## 2024-11-19 VITALS
OXYGEN SATURATION: 97 % | SYSTOLIC BLOOD PRESSURE: 123 MMHG | TEMPERATURE: 98 F | HEART RATE: 90 BPM | DIASTOLIC BLOOD PRESSURE: 68 MMHG

## 2024-11-19 DIAGNOSIS — R97.8 OTHER ABNORMAL TUMOR MARKERS: ICD-10-CM

## 2024-11-19 DIAGNOSIS — C78.7 SECONDARY MALIGNANT NEOPLASM OF LIVER: ICD-10-CM

## 2024-11-19 DIAGNOSIS — R97.20 ELEVATED PROSTATE SPECIFIC ANTIGEN (PSA): ICD-10-CM

## 2024-11-19 DIAGNOSIS — I10 HYPERTENSION, UNSPECIFIED TYPE: ICD-10-CM

## 2024-11-19 DIAGNOSIS — S49.92XA SHOULDER INJURY, LEFT, INITIAL ENCOUNTER: ICD-10-CM

## 2024-11-19 DIAGNOSIS — I69.30 HISTORY OF CVA WITH RESIDUAL DEFICIT: ICD-10-CM

## 2024-11-19 DIAGNOSIS — C78.7 SECONDARY MALIGNANT NEOPLASM OF LIVER: Primary | ICD-10-CM

## 2024-11-19 LAB
AFP SERPL-MCNC: <2 NG/ML (ref 0–8.4)
ALBUMIN SERPL BCP-MCNC: 3.1 G/DL (ref 3.5–5.2)
ALP SERPL-CCNC: 227 U/L (ref 40–150)
ALT SERPL W/O P-5'-P-CCNC: 55 U/L (ref 10–44)
ANION GAP SERPL CALC-SCNC: 8 MMOL/L (ref 8–16)
AST SERPL-CCNC: 129 U/L (ref 10–40)
BASOPHILS # BLD AUTO: 0.05 K/UL (ref 0–0.2)
BASOPHILS NFR BLD: 0.8 % (ref 0–1.9)
BILIRUB SERPL-MCNC: 0.7 MG/DL (ref 0.1–1)
BUN SERPL-MCNC: 19 MG/DL (ref 8–23)
CALCIUM SERPL-MCNC: 11.9 MG/DL (ref 8.7–10.5)
CANCER AG19-9 SERPL-ACNC: 695 U/ML (ref 0–40)
CEA SERPL-MCNC: 5.2 NG/ML (ref 0–5)
CHLORIDE SERPL-SCNC: 109 MMOL/L (ref 95–110)
CO2 SERPL-SCNC: 22 MMOL/L (ref 23–29)
COMPLEXED PSA SERPL-MCNC: 0.46 NG/ML (ref 0–4)
CREAT SERPL-MCNC: 1.4 MG/DL (ref 0.5–1.4)
DIFFERENTIAL METHOD BLD: ABNORMAL
EOSINOPHIL # BLD AUTO: 0.1 K/UL (ref 0–0.5)
EOSINOPHIL NFR BLD: 1.4 % (ref 0–8)
ERYTHROCYTE [DISTWIDTH] IN BLOOD BY AUTOMATED COUNT: 18.8 % (ref 11.5–14.5)
EST. GFR  (NO RACE VARIABLE): 53.1 ML/MIN/1.73 M^2
GLUCOSE SERPL-MCNC: 117 MG/DL (ref 70–110)
HCT VFR BLD AUTO: 36.1 % (ref 40–54)
HGB BLD-MCNC: 11.4 G/DL (ref 14–18)
IMM GRANULOCYTES # BLD AUTO: 0.02 K/UL (ref 0–0.04)
IMM GRANULOCYTES NFR BLD AUTO: 0.3 % (ref 0–0.5)
INR PPP: 1 (ref 0.8–1.2)
LYMPHOCYTES # BLD AUTO: 0.9 K/UL (ref 1–4.8)
LYMPHOCYTES NFR BLD: 14.2 % (ref 18–48)
MCH RBC QN AUTO: 22.8 PG (ref 27–31)
MCHC RBC AUTO-ENTMCNC: 31.6 G/DL (ref 32–36)
MCV RBC AUTO: 72 FL (ref 82–98)
MONOCYTES # BLD AUTO: 0.8 K/UL (ref 0.3–1)
MONOCYTES NFR BLD: 12.1 % (ref 4–15)
NEUTROPHILS # BLD AUTO: 4.6 K/UL (ref 1.8–7.7)
NEUTROPHILS NFR BLD: 71.2 % (ref 38–73)
NRBC BLD-RTO: 0 /100 WBC
PLATELET # BLD AUTO: 212 K/UL (ref 150–450)
PMV BLD AUTO: ABNORMAL FL (ref 9.2–12.9)
POTASSIUM SERPL-SCNC: 4.2 MMOL/L (ref 3.5–5.1)
PROT SERPL-MCNC: 7.4 G/DL (ref 6–8.4)
PROTHROMBIN TIME: 10.9 SEC (ref 9–12.5)
RBC # BLD AUTO: 4.99 M/UL (ref 4.6–6.2)
SODIUM SERPL-SCNC: 139 MMOL/L (ref 136–145)
WBC # BLD AUTO: 6.46 K/UL (ref 3.9–12.7)

## 2024-11-19 PROCEDURE — G2211 COMPLEX E/M VISIT ADD ON: HCPCS | Mod: S$GLB,,, | Performed by: STUDENT IN AN ORGANIZED HEALTH CARE EDUCATION/TRAINING PROGRAM

## 2024-11-19 PROCEDURE — 1160F RVW MEDS BY RX/DR IN RCRD: CPT | Mod: CPTII,S$GLB,, | Performed by: STUDENT IN AN ORGANIZED HEALTH CARE EDUCATION/TRAINING PROGRAM

## 2024-11-19 PROCEDURE — 3078F DIAST BP <80 MM HG: CPT | Mod: CPTII,S$GLB,, | Performed by: STUDENT IN AN ORGANIZED HEALTH CARE EDUCATION/TRAINING PROGRAM

## 2024-11-19 PROCEDURE — 85025 COMPLETE CBC W/AUTO DIFF WBC: CPT | Performed by: STUDENT IN AN ORGANIZED HEALTH CARE EDUCATION/TRAINING PROGRAM

## 2024-11-19 PROCEDURE — 36415 COLL VENOUS BLD VENIPUNCTURE: CPT | Performed by: STUDENT IN AN ORGANIZED HEALTH CARE EDUCATION/TRAINING PROGRAM

## 2024-11-19 PROCEDURE — 85610 PROTHROMBIN TIME: CPT | Performed by: STUDENT IN AN ORGANIZED HEALTH CARE EDUCATION/TRAINING PROGRAM

## 2024-11-19 PROCEDURE — 84153 ASSAY OF PSA TOTAL: CPT | Performed by: STUDENT IN AN ORGANIZED HEALTH CARE EDUCATION/TRAINING PROGRAM

## 2024-11-19 PROCEDURE — 4010F ACE/ARB THERAPY RXD/TAKEN: CPT | Mod: CPTII,S$GLB,, | Performed by: STUDENT IN AN ORGANIZED HEALTH CARE EDUCATION/TRAINING PROGRAM

## 2024-11-19 PROCEDURE — 3072F LOW RISK FOR RETINOPATHY: CPT | Mod: CPTII,S$GLB,, | Performed by: STUDENT IN AN ORGANIZED HEALTH CARE EDUCATION/TRAINING PROGRAM

## 2024-11-19 PROCEDURE — 80053 COMPREHEN METABOLIC PANEL: CPT | Performed by: STUDENT IN AN ORGANIZED HEALTH CARE EDUCATION/TRAINING PROGRAM

## 2024-11-19 PROCEDURE — 99204 OFFICE O/P NEW MOD 45 MIN: CPT | Mod: S$GLB,,, | Performed by: STUDENT IN AN ORGANIZED HEALTH CARE EDUCATION/TRAINING PROGRAM

## 2024-11-19 PROCEDURE — 1126F AMNT PAIN NOTED NONE PRSNT: CPT | Mod: CPTII,S$GLB,, | Performed by: STUDENT IN AN ORGANIZED HEALTH CARE EDUCATION/TRAINING PROGRAM

## 2024-11-19 PROCEDURE — 82378 CARCINOEMBRYONIC ANTIGEN: CPT | Performed by: STUDENT IN AN ORGANIZED HEALTH CARE EDUCATION/TRAINING PROGRAM

## 2024-11-19 PROCEDURE — 82105 ALPHA-FETOPROTEIN SERUM: CPT | Performed by: STUDENT IN AN ORGANIZED HEALTH CARE EDUCATION/TRAINING PROGRAM

## 2024-11-19 PROCEDURE — 3074F SYST BP LT 130 MM HG: CPT | Mod: CPTII,S$GLB,, | Performed by: STUDENT IN AN ORGANIZED HEALTH CARE EDUCATION/TRAINING PROGRAM

## 2024-11-19 PROCEDURE — 99999 PR PBB SHADOW E&M-EST. PATIENT-LVL V: CPT | Mod: PBBFAC,,, | Performed by: STUDENT IN AN ORGANIZED HEALTH CARE EDUCATION/TRAINING PROGRAM

## 2024-11-19 PROCEDURE — 3288F FALL RISK ASSESSMENT DOCD: CPT | Mod: CPTII,S$GLB,, | Performed by: STUDENT IN AN ORGANIZED HEALTH CARE EDUCATION/TRAINING PROGRAM

## 2024-11-19 PROCEDURE — 86301 IMMUNOASSAY TUMOR CA 19-9: CPT | Performed by: STUDENT IN AN ORGANIZED HEALTH CARE EDUCATION/TRAINING PROGRAM

## 2024-11-19 PROCEDURE — 1101F PT FALLS ASSESS-DOCD LE1/YR: CPT | Mod: CPTII,S$GLB,, | Performed by: STUDENT IN AN ORGANIZED HEALTH CARE EDUCATION/TRAINING PROGRAM

## 2024-11-19 PROCEDURE — 1159F MED LIST DOCD IN RCRD: CPT | Mod: CPTII,S$GLB,, | Performed by: STUDENT IN AN ORGANIZED HEALTH CARE EDUCATION/TRAINING PROGRAM

## 2024-11-19 NOTE — PROGRESS NOTES
LONI was notified by Secure Chat from Dr. Graham that she was in the exam room with this patient, and he was advised to fill out Healthcare POA form.    LONI went to pt's room and met with pt, his caregiver (Dimas Garcia), and Dr. Graham. Dr. Graham explained what it means for pt to remain a Full Code, versus switching to DNR status, and pt opted to be Full Code. SW explained that pt is able to make his own decisions regarding his healthcare, but in the event that he became unable, he could appoint one or more to make those decisions for him.     Pt chose to appoint Dimas Garcia as his first POA, and his brother Guilherme Montoya as the second.     Dimas Garcia- [address] [place] [address]- [phone]  Guilherme Montoya- address unknown- [phone]    LONI and Dr. Graham both signed as witnesses.    LONI also gave pt the name and number of an  for additional legal needs- Vanessa Mcclendon JD, [facility]- [phone].    LONI gave the POA form to Dr. Graham' nurse to upload to pt's chart. Tried to find pt after appointment to provide him with copies to give to Felix, but pt had already exited the building.    LONI will assist and support pt as needed.    Randy Quintanilla, [facility]  Oncology Social Worker  [facility]  [phone]

## 2024-11-19 NOTE — PROGRESS NOTES
[facility] - NEW PATIENT VISIT    Reason for visit: [facility]     Best Contact Phone Number(s): [phone] (home) [phone] (work)     Cancer of Unknown Origin  8/2022: Colonoscopy shows polyp x2 with tubular adenoma in colon, one tubular adenoma in rectum   11/2023: Cystoscopy shows urothelial cells negative for carcinoma  11/2023: CT Abd/Pelvis shows bilateral renal calculi, 1 cm right liver lobe cyst, bladder wall thickening, no bowel obstruction, no osseous abnormality   11/1/24: PET scan shows hypermetabolic right upper lobe solid nodule, numerous hypermetabolic hepatic and osseous lesions concerning for metastatic disease    HPI: Ambrosio Montoya is a 74 y.o. male with metastatic cancer who presents to [facility]. Patient had shoulder pain due to two recent falls. Left shoulder X-ray at [facility] and CT at [facility] with no shoulder pathology. Declines mechanical fall. No lightheadness or dizziness, no symptoms of heart racing.     Patient has had a 10 lbs weight loss over past several weeks. No issues with swallowing. Occasional blood in stool or urine, not recently. Infrequent cough. Patient smokes 2 ppd, started smoking 16-18 YO.     Ambulates at home with walker.     Patient presents with caretaker Mrs. Garcia.  ECOG PS is 2.  History has been obtained by chart review and discussion with the patient.    ROS:   A complete 12-point review of systems was reviewed and is negative except as mentioned above.     Past Medical History:   Past Medical History:   Diagnosis Date    Cataract     Coronary artery disease 2001    ACS    CVA (cerebral infarction)     Diabetes mellitus type II 2001    Glaucoma suspect     Hyperlipidemia 2001    Hypertension     Myocardial infarction 2002    Stroke 3/2012    [facility]        Past Surgical History:   Past Surgical History:   Procedure Laterality Date    COLONOSCOPY N/A 4/20/2017    Procedure: COLONOSCOPY;  Surgeon: Armando GONCALVES  MD [place];  Location: [facility] ENDO;  Service: Endoscopy;  Laterality: N/A;    COLONOSCOPY N/A 8/18/2022    Procedure: COLONOSCOPY;  Surgeon: Armando Hidalgo MD;  Location: [facility] ENDO;  Service: Endoscopy;  Laterality: N/A;        Family History:   Family History   Problem Relation Name Age of Onset    Heart disease Mother      Cancer Father          prostate CA    Diabetes Sister x4     Cancer Sister x1     Depression Brother X1     Diabetes Brother X7         Social History:   Social History     Tobacco Use    Smoking status: Every Day     Current packs/day: 1.00     Average packs/day: 1 pack/day for 40.0 years (40.0 ttl pk-yrs)     Types: Cigarettes    Smokeless tobacco: Never   Substance Use Topics    Alcohol use: No        I have reviewed and updated the patient's past medical, surgical, family and social histories.    Allergies:   Review of patient's allergies indicates:   Allergen Reactions    Pcn [penicillins] Nausea And Vomiting    Plavix [clopidogrel] Itching and Rash        Medications:   Current Outpatient Medications   Medication Sig Dispense Refill    allopurinoL (ZYLOPRIM) 100 MG tablet       amLODIPine (NORVASC) 10 MG tablet Take 1 tablet (10 mg total) by mouth once daily. 90 tablet 1    ammonium lactate 12 % Crea Apply to feet avoiding use between toes twice daily. 140 g 5    aspirin 325 MG tablet Take 1 tablet (325 mg total) by mouth once daily.      cetirizine (ZYRTEC) 10 MG tablet Take 1 tablet (10 mg total) by mouth once daily. 30 tablet 0    clotrimazole-betamethasone 1-0.05% (LOTRISONE) cream APPLY SPARINGLY TO THE AFFECTED AREA(S) TWICE DAILY      GAVILYTE-G 236-22.74-6.74 -5.86 gram suspension Take 4,000 mLs by mouth once.      hydrocortisone 2.5 % cream Apply topically 2 (two) times daily. 20 g 0    indomethacin (INDOCIN) 50 MG capsule Take 1 capsule (50 mg total) by mouth 2 (two) times daily as needed (pain). 30 capsule 0    linaCLOtide (LINZESS) 145 mcg Cap capsule Take 1 capsule (145 mcg  total) by mouth before breakfast. 30 capsule 11    lisinopril (PRINIVIL,ZESTRIL) 40 MG tablet Take 1 tablet (40 mg total) by mouth once daily. 90 tablet 1    metFORMIN (GLUCOPHAGE) 1000 MG tablet Take 1,000 mg by mouth once daily at 6am.      polyethylene glycol (GLYCOLAX) 17 gram PwPk Take 17 g by mouth once daily. 14 each 0    rosuvastatin (CRESTOR) 40 MG Tab Take 1 tablet by mouth once daily.      sod sulf-pot chloride-mag sulf (SUTAB) 1.479-0.188- 0.225 gram tablet Take 12 tablets by mouth once daily. Take according to package instructions with indicated amount of water. 24 tablet 0    triamcinolone acetonide 0.1% (KENALOG) 0.1 % ointment Apply topically once daily. For eczema 30 g 3    TRUE METRIX AIR GLUCOSE METER [facility]       TRUE METRIX GLUCOSE TEST STRIP Strp SMARTSIG:Via Meter      TRUEPLUS LANCETS 33 gauge Misc       VITAMIN D2 1,250 mcg (50,000 unit) capsule        No current facility-administered medications for this visit.        Physical Exam:   /68 (BP Location: Left arm, Patient Position: Sitting)   Pulse 90   Temp 98 °F (36.7 °C) (Temporal)   SpO2 97%                Physical Exam  Constitutional:       Appearance: Normal appearance.   HENT:      Head: Normocephalic and atraumatic.      Mouth/Throat:      Mouth: Mucous membranes are moist.   Eyes:      Extraocular Movements: Extraocular movements intact.      Pupils: Pupils are equal, round, and reactive to light.   Cardiovascular:      Rate and Rhythm: Normal rate and regular rhythm.      Pulses: Normal pulses.      Heart sounds: No murmur heard.  Pulmonary:      Effort: Pulmonary effort is normal. No respiratory distress.      Breath sounds: Normal breath sounds.   Abdominal:      General: Abdomen is flat. There is no distension.      Palpations: Abdomen is soft.      Tenderness: There is no abdominal tenderness.   Musculoskeletal:         General: No swelling.      Left shoulder: Tenderness present. Decreased range of motion.      Cervical  back: Normal range of motion. No rigidity.   Skin:     General: Skin is warm and dry.      Coloration: Skin is not jaundiced.   Neurological:      General: No focal deficit present.      Mental Status: He is alert and oriented to person, place, and time.      Comments: Right sided weakness    Psychiatric:         Mood and Affect: Mood normal.         Behavior: Behavior normal.      Comments: A&Ox3            Labs:   Lab Results   Component Value Date    WBC 8.59 11/10/2023    HGB 13.7 (L) 11/10/2023    HCT 41.9 11/10/2023    MCV 82 11/10/2023     11/10/2023       Lab Results   Component Value Date     11/10/2023    K 4.0 11/10/2023     11/10/2023    CO2 22 (L) 11/10/2023    BUN 13 11/10/2023    CREATININE 1.4 11/10/2023    ALBUMIN 3.9 11/10/2023    BILITOT 0.6 11/10/2023    ALKPHOS 62 11/10/2023    AST 22 11/10/2023    ALT 15 11/10/2023       Imaging:   CT Head Without Contrast  Narrative: EXAMINATION:  CT HEAD WITHOUT CONTRAST    CLINICAL HISTORY:  Brain Atrophy G31.9;  Degenerative disease of nervous system, unspecified    TECHNIQUE:  Multiple sequential 5 mm axial images of the head without contrast.  Coronal and sagittal reformatted imaging from the axial acquisition.    COMPARISON:  MRI 10/27/2022    FINDINGS:  Generalized cerebral volume loss similar to prior.  Compensatory enlargement ventricles sulci and cisterns without hydrocephalus.  Slight prominence of the extra-axial space overlying the left cerebral convexity similar to MRI allowing for differences in technique suggestive for subdural hygroma.  Continued mass effect right cerebral hemisphere with 2 mm of rightward midline shift similar to prior.  There is no evidence for acute intracranial hemorrhage.  Small hypodensity foci within the centrum semiovale extending to the corona radiata most compatible with prior infarcts as seen on MRI.  In addition there are scattered areas of encephalomalacia in the cerebellar hemispheres  compatible with remote infarcts.    Small hypodensity within the left citlalli gillian compatible with additional remote infarction    No evidence for acute intracranial hemorrhage or sulcal effacement to suggest large territory recent infarction.  Remote fracture deformity left nasal bone.  No significant opacification paranasal sinuses or mastoid air cells.  Impression: No acute intracranial findings specifically without evidence for acute intracranial hemorrhage or sulcal effacement to suggest large territory recent infarction    Scattered regions of encephalomalacia bilateral centrum semiovale and cerebellar hemispheres with small hypodensity within the left gillian compatible with known prior infarcts.    Prominence of the extra-axial space overlying the left cerebral convexity suggestive for subdural hygroma    Clinical correlation and further evaluation with MRI as warranted if patient compatible.    Electronically signed by: Mian Monroy DO  Date:    11/18/2024  Time:    16:22            Assessment:       1. Secondary malignant neoplasm of liver    2. Other abnormal tumor markers    3. Elevated prostate specific antigen (PSA)    4. Shoulder injury, left, initial encounter    5. History of CVA with residual deficit    6. Hypertension, unspecified type          Plan:             # Metastatic carcinoma - Patient presents with PET scan findings concerning for primary lung lesion, multiple liver lesions, and bone metastases. Message to IR plan for urgent liver biopsy. Patient has heavy smoking history, suspect lung cancer primary.    Labs today: cbc, cmp, cea, ca 19 9, afp. Will send tempus pending pathology     # Left shoulder injury - after fall, ortho referral     # CVA history - Patient has stroke history contributing to living at home with assistance. On ASA, allergy to plavix     # HTN - on antihypertensives     # Chronic constipation - Linzess & bowel regimen     # ACP - Patient present with caretaker. No POA  paperwork. Patient is , no adult children, adult siblings out of state. Patient expresses desire to be full code. Pending diagnosis, we discussed that a more peaceful option to end of life would be desirable.     Follow up: following liver biopsy      The above information has been reviewed with the patient and all questions have been answered to their apparent satisfaction.  They understand that they can call the clinic with any questions. All diagnostic tests and imaging personally reviewed and interpreted, communication with consultants as appropriate. Visit complexity inherent to evaluation and management associated with medical care services that serve as the continuing focal point for all needed health care services and/or with medical care services that are part of ongoing care related to a patient's single, serious condition or a complex condition provided today    Mary Anne Graham MD  Hematology/Oncology  [facility]      Med Onc Chart Routing      Follow up with physician 3 weeks. following liver biopsy   Follow up with PATEL    Infusion scheduling note    Injection scheduling note    Labs   Scheduling:  Preferred lab:  Lab interval:  All labs ordered today.   Imaging    Pharmacy appointment    Other referrals

## 2024-11-20 ENCOUNTER — TELEPHONE (OUTPATIENT)
Dept: INTERVENTIONAL RADIOLOGY/VASCULAR | Facility: CLINIC | Age: 74
End: 2024-11-20
Payer: MEDICARE

## 2024-11-21 DIAGNOSIS — G31.9 BRAIN ATROPHY: ICD-10-CM

## 2024-11-21 DIAGNOSIS — G96.08 SUBDURAL HYGROMA: ICD-10-CM

## 2024-11-21 DIAGNOSIS — C78.7 SECONDARY MALIGNANT NEOPLASM OF LIVER: ICD-10-CM

## 2024-11-21 DIAGNOSIS — G93.89 ENCEPHALOMALACIA ON IMAGING STUDY: Primary | ICD-10-CM

## 2024-11-22 ENCOUNTER — HOSPITAL ENCOUNTER (OUTPATIENT)
Dept: RADIOLOGY | Facility: HOSPITAL | Age: 74
Discharge: HOME | End: 2024-11-22
Attending: NON-EMERGENCY MEDICAL TRANSPORT (VAN)
Payer: MEDICARE

## 2024-11-22 DIAGNOSIS — G31.9 BRAIN ATROPHY: ICD-10-CM

## 2024-11-22 DIAGNOSIS — G93.89 ENCEPHALOMALACIA ON IMAGING STUDY: ICD-10-CM

## 2024-11-22 DIAGNOSIS — C78.7 SECONDARY MALIGNANT NEOPLASM OF LIVER: ICD-10-CM

## 2024-11-22 DIAGNOSIS — G96.08 SUBDURAL HYGROMA: ICD-10-CM

## 2024-11-22 PROCEDURE — 70551 MRI BRAIN STEM W/O DYE: CPT | Mod: 26,,, | Performed by: RADIOLOGY

## 2024-11-22 PROCEDURE — 70551 MRI BRAIN STEM W/O DYE: CPT | Mod: TC

## 2024-12-04 ENCOUNTER — TELEPHONE (OUTPATIENT)
Dept: INTERVENTIONAL RADIOLOGY/VASCULAR | Facility: CLINIC | Age: 74
End: 2024-12-04
Payer: MEDICARE